# Patient Record
Sex: MALE | Race: BLACK OR AFRICAN AMERICAN | NOT HISPANIC OR LATINO | Employment: FULL TIME | URBAN - METROPOLITAN AREA
[De-identification: names, ages, dates, MRNs, and addresses within clinical notes are randomized per-mention and may not be internally consistent; named-entity substitution may affect disease eponyms.]

---

## 2017-01-16 RX ORDER — POTASSIUM CHLORIDE 1500 MG/1
TABLET, EXTENDED RELEASE ORAL
Qty: 60 TABLET | Refills: 0 | Status: SHIPPED | OUTPATIENT
Start: 2017-01-16 | End: 2017-02-21 | Stop reason: SDUPTHER

## 2017-02-21 RX ORDER — POTASSIUM CHLORIDE 1500 MG/1
TABLET, EXTENDED RELEASE ORAL
Qty: 60 TABLET | Refills: 0 | Status: SHIPPED | OUTPATIENT
Start: 2017-02-21 | End: 2017-04-04 | Stop reason: SDUPTHER

## 2017-04-05 RX ORDER — POTASSIUM CHLORIDE 1500 MG/1
TABLET, EXTENDED RELEASE ORAL
Qty: 60 TABLET | Refills: 0 | Status: SHIPPED | OUTPATIENT
Start: 2017-04-05 | End: 2017-05-15 | Stop reason: SDUPTHER

## 2017-05-15 RX ORDER — POTASSIUM CHLORIDE 1500 MG/1
TABLET, EXTENDED RELEASE ORAL
Qty: 60 TABLET | Refills: 3 | Status: SHIPPED | OUTPATIENT
Start: 2017-05-15 | End: 2017-12-07 | Stop reason: SDUPTHER

## 2017-06-08 ENCOUNTER — PATIENT OUTREACH (OUTPATIENT)
Dept: ADMINISTRATIVE | Facility: HOSPITAL | Age: 57
End: 2017-06-08
Payer: COMMERCIAL

## 2017-06-08 NOTE — LETTER
June 8, 2017    Agusto Juarez  [address] [place] [address]           [facility]  [address]  [facility] [address]  Phone: [phone] Dear Mr. Juarez:    [facility] is committed to your overall health.  To help you get the most out of each of your visits, we will review your information to make sure you are up to date on all of your recommended tests and/or procedures.      Joe Bentley MD has found that you may be due for   Health Maintenance Due   Topic    Pneumococcal PPSV23 (Medium Risk) (1)    Colonoscopy         If you have had any of the above done at another facility, please bring the records or information with you so that your record at [facility] will be complete.    If you are currently taking medication, please bring it with you to your appointment for review.    We will be happy to assist you with scheduling any necessary appointments or you may contact the [facility] appointment desk at [phone] to schedule at your convenience.     Thank you for choosing [facility] for your healthcare needs,        If you have any questions or concerns, please don't hesitate to call.    Sincerely,    Yun TITUS LPN  Care Coordination Department   [facility]

## 2017-06-22 ENCOUNTER — OFFICE VISIT (OUTPATIENT)
Dept: FAMILY MEDICINE | Facility: CLINIC | Age: 57
End: 2017-06-22
Payer: COMMERCIAL

## 2017-06-22 VITALS
HEIGHT: 70 IN | DIASTOLIC BLOOD PRESSURE: 76 MMHG | HEART RATE: 88 BPM | SYSTOLIC BLOOD PRESSURE: 120 MMHG | BODY MASS INDEX: 28.63 KG/M2 | WEIGHT: 200 LBS

## 2017-06-22 DIAGNOSIS — Z02.89 PHYSICAL EXAMINATION OF EMPLOYEE: Primary | ICD-10-CM

## 2017-06-22 PROCEDURE — 99999 PR PBB SHADOW E&M-EST. PATIENT-LVL II: CPT | Mod: PBBFAC,,, | Performed by: FAMILY MEDICINE

## 2017-06-22 PROCEDURE — 99214 OFFICE O/P EST MOD 30 MIN: CPT | Mod: S$GLB,,, | Performed by: FAMILY MEDICINE

## 2017-09-20 ENCOUNTER — OFFICE VISIT (OUTPATIENT)
Dept: FAMILY MEDICINE | Facility: CLINIC | Age: 57
End: 2017-09-20
Payer: COMMERCIAL

## 2017-09-20 VITALS
HEIGHT: 69 IN | DIASTOLIC BLOOD PRESSURE: 77 MMHG | HEART RATE: 86 BPM | WEIGHT: 196.88 LBS | TEMPERATURE: 98 F | SYSTOLIC BLOOD PRESSURE: 124 MMHG | BODY MASS INDEX: 29.16 KG/M2

## 2017-09-20 DIAGNOSIS — H10.9 CONJUNCTIVITIS OF RIGHT EYE, UNSPECIFIED CONJUNCTIVITIS TYPE: Primary | ICD-10-CM

## 2017-09-20 PROCEDURE — 99213 OFFICE O/P EST LOW 20 MIN: CPT | Mod: S$GLB,,, | Performed by: NURSE PRACTITIONER

## 2017-09-20 PROCEDURE — 3008F BODY MASS INDEX DOCD: CPT | Mod: S$GLB,,, | Performed by: NURSE PRACTITIONER

## 2017-09-20 PROCEDURE — 3078F DIAST BP <80 MM HG: CPT | Mod: S$GLB,,, | Performed by: NURSE PRACTITIONER

## 2017-09-20 PROCEDURE — 99999 PR PBB SHADOW E&M-EST. PATIENT-LVL III: CPT | Mod: PBBFAC,,, | Performed by: NURSE PRACTITIONER

## 2017-09-20 PROCEDURE — 3074F SYST BP LT 130 MM HG: CPT | Mod: S$GLB,,, | Performed by: NURSE PRACTITIONER

## 2017-09-20 RX ORDER — CIPROFLOXACIN HYDROCHLORIDE 3 MG/ML
1 SOLUTION/ DROPS OPHTHALMIC EVERY 4 HOURS
Qty: 5 ML | Refills: 0 | Status: SHIPPED | OUTPATIENT
Start: 2017-09-20 | End: 2017-09-27

## 2017-09-20 NOTE — PROGRESS NOTES
Subjective:       Patient ID: Agusto Juarez is a 57 y.o. male.    Chief Complaint: Eye Problem    Conjunctivitis   This is a new (Right eye) problem. The current episode started in the past 7 days. The problem occurs constantly. The problem has been unchanged. Pertinent negatives include no abdominal pain, anorexia, arthralgias, change in bowel habit, chest pain, chills, congestion, coughing, diaphoresis, fatigue, fever, headaches, joint swelling, myalgias, nausea, neck pain, numbness, rash, sore throat, swollen glands, urinary symptoms, vertigo, visual change, vomiting or weakness. Nothing aggravates the symptoms. He has tried nothing for the symptoms. The treatment provided no relief.     Past Medical History:   Diagnosis Date    BPH (benign prostatic hypertrophy)     Hyperlipidemia     Hypertension      Social History     Social History    Marital status:      Spouse name: N/A    Number of children: N/A    Years of education: N/A     Occupational History         Social History Main Topics    Smoking status: Current Every Day Smoker     Packs/day: 0.50     Years: 30.00    Smokeless tobacco: Not on file    Alcohol use No    Drug use: No    Sexual activity: Not on file     History reviewed. No pertinent surgical history.    Review of Systems   Constitutional: Negative.  Negative for chills, diaphoresis, fatigue and fever.   HENT: Negative.  Negative for congestion and sore throat.    Eyes: Positive for pain, discharge, redness and itching.        Matting upon awakening   Respiratory: Negative.  Negative for cough.    Cardiovascular: Negative.  Negative for chest pain.   Gastrointestinal: Negative.  Negative for abdominal pain, anorexia, change in bowel habit, nausea and vomiting.   Endocrine: Negative.    Genitourinary: Negative.    Musculoskeletal: Negative.  Negative for arthralgias, joint swelling, myalgias and neck pain.   Skin: Negative.  Negative for rash.   Allergic/Immunologic: Negative.     Neurological: Negative.  Negative for vertigo, weakness, numbness and headaches.   Psychiatric/Behavioral: Negative.        Objective:      Physical Exam   Constitutional: He is oriented to person, place, and time. He appears well-developed and well-nourished.   HENT:   Head: Normocephalic.   Right Ear: External ear normal.   Left Ear: External ear normal.   Nose: Nose normal.   Mouth/Throat: Oropharynx is clear and moist.   Eyes: Pupils are equal, round, and reactive to light. Right eye exhibits discharge. Right eye exhibits no chemosis, no exudate and no hordeolum. No foreign body present in the right eye. Left eye exhibits no chemosis, no discharge, no exudate and no hordeolum. No foreign body present in the left eye. Right conjunctiva is injected. Right conjunctiva has no hemorrhage. Left conjunctiva is not injected. Left conjunctiva has no hemorrhage. No scleral icterus.   Neck: Normal range of motion. Neck supple.   Cardiovascular: Normal rate, regular rhythm and normal heart sounds.    Pulmonary/Chest: Effort normal and breath sounds normal.   Abdominal: Soft. Bowel sounds are normal.   Musculoskeletal: Normal range of motion.   Neurological: He is alert and oriented to person, place, and time.   Skin: Skin is warm and dry. Capillary refill takes 2 to 3 seconds.   Psychiatric: He has a normal mood and affect. His behavior is normal. Judgment and thought content normal.   Nursing note and vitals reviewed.      Assessment:       1. Conjunctivitis of right eye, unspecified conjunctivitis type        Plan:           Agusto was seen today for eye problem.    Diagnoses and all orders for this visit:    Conjunctivitis of right eye, unspecified conjunctivitis type  -     ciprofloxacin HCl (CILOXAN) 0.3 % ophthalmic solution; Place 1 drop into the right eye every 4 (four) hours.  Handout r/t cipro, conjuntivitis given  Strict handwashing encouraged  Report to ER as needed

## 2017-12-06 RX ORDER — POTASSIUM CHLORIDE 1500 MG/1
TABLET, EXTENDED RELEASE ORAL
Qty: 60 TABLET | Refills: 3 | OUTPATIENT
Start: 2017-12-06

## 2017-12-07 RX ORDER — POTASSIUM CHLORIDE 20 MEQ/1
40 TABLET, EXTENDED RELEASE ORAL DAILY
Qty: 60 TABLET | Refills: 5 | Status: SHIPPED | OUTPATIENT
Start: 2017-12-07 | End: 2018-12-18 | Stop reason: SDUPTHER

## 2017-12-21 DIAGNOSIS — I10 ESSENTIAL HYPERTENSION: Primary | ICD-10-CM

## 2017-12-21 RX ORDER — PRAVASTATIN SODIUM 10 MG/1
TABLET ORAL
Qty: 30 TABLET | Refills: 11 | Status: SHIPPED | OUTPATIENT
Start: 2017-12-21 | End: 2018-12-30 | Stop reason: SDUPTHER

## 2017-12-21 RX ORDER — HYDROCHLOROTHIAZIDE 25 MG/1
TABLET ORAL
Qty: 30 TABLET | Refills: 11 | Status: SHIPPED | OUTPATIENT
Start: 2017-12-21 | End: 2018-12-30 | Stop reason: SDUPTHER

## 2017-12-22 ENCOUNTER — TELEPHONE (OUTPATIENT)
Dept: FAMILY MEDICINE | Facility: CLINIC | Age: 57
End: 2017-12-22

## 2017-12-26 ENCOUNTER — TELEPHONE (OUTPATIENT)
Dept: FAMILY MEDICINE | Facility: CLINIC | Age: 57
End: 2017-12-26

## 2017-12-26 DIAGNOSIS — E78.5 HYPERLIPIDEMIA, UNSPECIFIED HYPERLIPIDEMIA TYPE: Primary | ICD-10-CM

## 2017-12-26 NOTE — TELEPHONE ENCOUNTER
----- Message from Marquita Zamora sent at 12/26/2017  8:27 AM CST -----  Contact: wife  States the pt has to have his labs drawn at [facility], the pt wife request a call to [phone]///[id number]

## 2017-12-27 ENCOUNTER — TELEPHONE (OUTPATIENT)
Dept: FAMILY MEDICINE | Facility: CLINIC | Age: 57
End: 2017-12-27

## 2017-12-28 LAB
ALBUMIN SERPL-MCNC: 4.2 G/DL (ref 3.5–5.5)
ALBUMIN/GLOB SERPL: 1.6 {RATIO} (ref 1.2–2.2)
ALP SERPL-CCNC: 59 IU/L (ref 39–117)
ALT SERPL-CCNC: 15 IU/L (ref 0–44)
AST SERPL-CCNC: 16 IU/L (ref 0–40)
BILIRUB SERPL-MCNC: 0.8 MG/DL (ref 0–1.2)
BUN SERPL-MCNC: 19 MG/DL (ref 6–24)
BUN/CREAT SERPL: 19 (ref 9–20)
CALCIUM SERPL-MCNC: 9.3 MG/DL (ref 8.7–10.2)
CHLORIDE SERPL-SCNC: 104 MMOL/L (ref 96–106)
CHOLEST SERPL-MCNC: 146 MG/DL (ref 100–199)
CO2 SERPL-SCNC: 26 MMOL/L (ref 18–29)
CREAT SERPL-MCNC: 1.01 MG/DL (ref 0.76–1.27)
GLOBULIN SER CALC-MCNC: 2.7 G/DL (ref 1.5–4.5)
GLUCOSE SERPL-MCNC: 99 MG/DL (ref 65–99)
HDLC SERPL-MCNC: 48 MG/DL
LDLC SERPL CALC-MCNC: 84 MG/DL (ref 0–99)
POTASSIUM SERPL-SCNC: 4 MMOL/L (ref 3.5–5.2)
PROT SERPL-MCNC: 6.9 G/DL (ref 6–8.5)
SODIUM SERPL-SCNC: 144 MMOL/L (ref 134–144)
TRIGL SERPL-MCNC: 70 MG/DL (ref 0–149)
VLDLC SERPL CALC-MCNC: 14 MG/DL (ref 5–40)

## 2018-08-27 ENCOUNTER — PATIENT OUTREACH (OUTPATIENT)
Dept: ADMINISTRATIVE | Facility: HOSPITAL | Age: 58
End: 2018-08-27

## 2018-12-13 ENCOUNTER — PATIENT OUTREACH (OUTPATIENT)
Dept: ADMINISTRATIVE | Facility: HOSPITAL | Age: 58
End: 2018-12-13

## 2018-12-19 RX ORDER — POTASSIUM CHLORIDE 1500 MG/1
TABLET, EXTENDED RELEASE ORAL
Qty: 60 TABLET | Refills: 0 | Status: SHIPPED | OUTPATIENT
Start: 2018-12-19 | End: 2019-01-30 | Stop reason: SDUPTHER

## 2018-12-31 RX ORDER — HYDROCHLOROTHIAZIDE 25 MG/1
TABLET ORAL
Qty: 30 TABLET | Refills: 0 | Status: SHIPPED | OUTPATIENT
Start: 2018-12-31 | End: 2019-01-30 | Stop reason: SDUPTHER

## 2018-12-31 RX ORDER — PRAVASTATIN SODIUM 10 MG/1
TABLET ORAL
Qty: 30 TABLET | Refills: 0 | Status: SHIPPED | OUTPATIENT
Start: 2018-12-31 | End: 2019-01-30 | Stop reason: SDUPTHER

## 2019-01-30 ENCOUNTER — OFFICE VISIT (OUTPATIENT)
Dept: FAMILY MEDICINE | Facility: CLINIC | Age: 59
End: 2019-01-30
Payer: COMMERCIAL

## 2019-01-30 VITALS
HEART RATE: 83 BPM | BODY MASS INDEX: 30.76 KG/M2 | DIASTOLIC BLOOD PRESSURE: 76 MMHG | SYSTOLIC BLOOD PRESSURE: 120 MMHG | HEIGHT: 67 IN | WEIGHT: 196 LBS | TEMPERATURE: 98 F

## 2019-01-30 DIAGNOSIS — E78.5 HYPERLIPIDEMIA, UNSPECIFIED HYPERLIPIDEMIA TYPE: ICD-10-CM

## 2019-01-30 DIAGNOSIS — I10 ESSENTIAL HYPERTENSION: ICD-10-CM

## 2019-01-30 DIAGNOSIS — F17.200 SMOKING: ICD-10-CM

## 2019-01-30 DIAGNOSIS — Z00.00 ROUTINE HISTORY AND PHYSICAL EXAMINATION OF ADULT: Primary | ICD-10-CM

## 2019-01-30 PROCEDURE — 3074F SYST BP LT 130 MM HG: CPT | Mod: CPTII,S$GLB,, | Performed by: FAMILY MEDICINE

## 2019-01-30 PROCEDURE — 3074F PR MOST RECENT SYSTOLIC BLOOD PRESSURE < 130 MM HG: ICD-10-PCS | Mod: CPTII,S$GLB,, | Performed by: FAMILY MEDICINE

## 2019-01-30 PROCEDURE — 99396 PREV VISIT EST AGE 40-64: CPT | Mod: S$GLB,,, | Performed by: FAMILY MEDICINE

## 2019-01-30 PROCEDURE — 3078F PR MOST RECENT DIASTOLIC BLOOD PRESSURE < 80 MM HG: ICD-10-PCS | Mod: CPTII,S$GLB,, | Performed by: FAMILY MEDICINE

## 2019-01-30 PROCEDURE — 99396 PR PREVENTIVE VISIT,EST,40-64: ICD-10-PCS | Mod: S$GLB,,, | Performed by: FAMILY MEDICINE

## 2019-01-30 PROCEDURE — 99999 PR PBB SHADOW E&M-EST. PATIENT-LVL III: CPT | Mod: PBBFAC,,, | Performed by: FAMILY MEDICINE

## 2019-01-30 PROCEDURE — 3078F DIAST BP <80 MM HG: CPT | Mod: CPTII,S$GLB,, | Performed by: FAMILY MEDICINE

## 2019-01-30 PROCEDURE — 99999 PR PBB SHADOW E&M-EST. PATIENT-LVL III: ICD-10-PCS | Mod: PBBFAC,,, | Performed by: FAMILY MEDICINE

## 2019-01-30 RX ORDER — HYDROCHLOROTHIAZIDE 25 MG/1
25 TABLET ORAL DAILY
Qty: 90 TABLET | Refills: 3 | Status: SHIPPED | OUTPATIENT
Start: 2019-01-30 | End: 2020-01-20

## 2019-01-30 RX ORDER — PRAVASTATIN SODIUM 10 MG/1
10 TABLET ORAL DAILY
Qty: 90 TABLET | Refills: 3 | Status: SHIPPED | OUTPATIENT
Start: 2019-01-30 | End: 2020-01-20

## 2019-01-30 RX ORDER — POTASSIUM CHLORIDE 20 MEQ/1
20 TABLET, EXTENDED RELEASE ORAL DAILY
Qty: 90 TABLET | Refills: 3 | Status: SHIPPED | OUTPATIENT
Start: 2019-01-30 | End: 2020-02-26

## 2019-01-30 NOTE — PROGRESS NOTES
Physical exam.  Hypertension controlled.  Hyperlipidemia reviewed.  Smoking, interested in cessation.      Past Medical History:  Past Medical History:   Diagnosis Date    BPH (benign prostatic hypertrophy)     Hyperlipidemia     Hypertension      No past surgical history on file.  Social History     Socioeconomic History    Marital status:      Spouse name: Not on file    Number of children: Not on file    Years of education: Not on file    Highest education level: Not on file   Social Needs    Financial resource strain: Not on file    Food insecurity - worry: Not on file    Food insecurity - inability: Not on file    Transportation needs - medical: Not on file    Transportation needs - non-medical: Not on file   Occupational History    Not on file   Tobacco Use    Smoking status: Current Every Day Smoker     Packs/day: 0.50     Years: 30.00     Pack years: 15.00   Substance and Sexual Activity    Alcohol use: No     Alcohol/week: 0.0 oz    Drug use: No    Sexual activity: Not on file   Other Topics Concern    Not on file   Social History Narrative    Not on file     Family History   Problem Relation Age of Onset    Hypertension Unknown      Review of patient's allergies indicates:  No Known Allergies  Current Outpatient Medications on File Prior to Visit   Medication Sig Dispense Refill    [DISCONTINUED] hydroCHLOROthiazide (HYDRODIURIL) 25 MG tablet TAKE ONE TABLET BY MOUTH ONCE DAILY 30 tablet 0    [DISCONTINUED] KLOR-CON M20 20 mEq tablet TAKE TWO TABLETS BY MOUTH ONCE DAILY (Patient taking differently: TAKE one TABLETS BY MOUTH ONCE DAILY) 60 tablet 0    [DISCONTINUED] pravastatin (PRAVACHOL) 10 MG tablet TAKE ONE TABLET BY MOUTH ONCE DAILY 30 tablet 0     No current facility-administered medications on file prior to visit.            ROS:  GENERAL: No fever, chills,  or significant weight changes.  HEENT: No headache or hearing complaints.  No dysphagia  Eyes: No vision  "complaints  CHEST: Denies ALFARO, cyanosis, wheezing, cough and sputum production.  CARDIOVASCULAR: Denies chest pain, PND, orthopnea or reduced exercise tolerance.  ABDOMEN: Appetite fine. Denies diarrhea, abdominal pain, hematemesis or blood in stool.  URINARY: No flank pain, dysuria or hematuria.  MUSCULOSKELETAL: No warmth swelling or tenderness of the joints  NEUROLOGIC: No focal weakness numbness or paresthesia  PSYCHIATRIC: Denies depression        OBJECTIVE:     Vitals:    01/30/19 0929   BP: 120/76   Pulse: 83   Temp: 98.1 °F (36.7 °C)   Weight: 88.9 kg (196 lb)   Height: 5' 7" (1.702 m)     Wt Readings from Last 3 Encounters:   01/30/19 88.9 kg (196 lb)   09/20/17 89.3 kg (196 lb 13.9 oz)   06/22/17 90.7 kg (200 lb)     APPEARANCE: Well nourished, well developed, in no acute distress.    HEAD: Normocephalic.  Atraumatic.  No sinus tenderness.  EYES:   Right eye: Pupil reactive.  Conjunctiva clear.    Left eye: Pupil reactive.  Conjunctiva clear.    Both fundi:  Grossly normal to nondilated exam. EOMI.    EARS: TM's intact. Light reflex normal. No retraction or perforation.    NOSE:  clear.  MOUTH & THROAT:  No pharyngeal erythema or exudate. No lesions.  NECK: Supple. No bruits.  No JVD.  No cervical lymphadenopathy.  No thyromegaly.    CHEST: Breath sounds clear bilaterally.  Normal respiratory effort  CARDIOVASCULAR: Normal rate.  Regular rhythm.  No murmurs.  No rub.  No gallops.  ABDOMEN: Bowel sounds normal.  Soft.  No tenderness.  No organomegaly.  PERIPHERAL VASCULAR: No cyanosis.  No clubbing.  No edema.  NEUROLOGIC: No focal findings.  MENTAL STATUS: Alert.  Oriented x 3.          Agusto was seen today for annual exam and medication refill.    Diagnoses and all orders for this visit:    Routine history and physical examination of adult  -     Comprehensive metabolic panel; Future  -     Lipid panel; Future  -     PSA, Screening; Future  -     Fecal Immunochemical Test (iFOBT); " Future    Hyperlipidemia, unspecified hyperlipidemia type    Essential hypertension  -     Comprehensive metabolic panel; Future  -     Lipid panel; Future    Smoking  -     Ambulatory referral to Smoking Cessation Program    Other orders  -     hydroCHLOROthiazide (HYDRODIURIL) 25 MG tablet; Take 1 tablet (25 mg total) by mouth once daily.  -     pravastatin (PRAVACHOL) 10 MG tablet; Take 1 tablet (10 mg total) by mouth once daily.  -     potassium chloride SA (KLOR-CON M20) 20 MEQ tablet; Take 1 tablet (20 mEq total) by mouth once daily.     Anticipatory guidance: Don't smoke.  Healthy diet and regular exercise recommended.  Continue current medications as is

## 2019-01-31 ENCOUNTER — TELEPHONE (OUTPATIENT)
Dept: FAMILY MEDICINE | Facility: CLINIC | Age: 59
End: 2019-01-31

## 2019-01-31 DIAGNOSIS — Z00.00 ROUTINE HISTORY AND PHYSICAL EXAMINATION OF ADULT: Primary | ICD-10-CM

## 2019-01-31 DIAGNOSIS — I10 ESSENTIAL HYPERTENSION: ICD-10-CM

## 2019-01-31 NOTE — TELEPHONE ENCOUNTER
----- Message from Lynda Teran sent at 1/31/2019  8:09 AM CST -----  Contact: Pt  Please give pt a call at ..[phone] (voru) regarding labs being done through [facility]

## 2019-02-05 ENCOUNTER — TELEPHONE (OUTPATIENT)
Dept: FAMILY MEDICINE | Facility: CLINIC | Age: 59
End: 2019-02-05

## 2019-02-05 NOTE — TELEPHONE ENCOUNTER
----- Message from Curt Barbour sent at 2/5/2019  8:16 AM CST -----  Contact: Pt   States he's calling to let the nurse know that he's going to [facility] in [place] and needs to have the orders sent there and can be reached at [phone]//thanks/dbw

## 2019-02-05 NOTE — TELEPHONE ENCOUNTER
----- Message from Amanda Marcelo sent at 2/5/2019  9:12 AM CST -----  Contact: [phone]   Patient Waiting at the lab   Pt would like to consult with the nurse about labs, pt waiting on labs to be sent to [facility].  Please call back at [phone].  Thx-AH

## 2019-02-07 LAB
ALBUMIN SERPL-MCNC: 4.2 G/DL (ref 3.5–5.5)
ALBUMIN/GLOB SERPL: 1.4 {RATIO} (ref 1.2–2.2)
ALP SERPL-CCNC: 59 IU/L (ref 39–117)
ALT SERPL-CCNC: 14 IU/L (ref 0–44)
AST SERPL-CCNC: 16 IU/L (ref 0–40)
BILIRUB SERPL-MCNC: 0.6 MG/DL (ref 0–1.2)
BUN SERPL-MCNC: 13 MG/DL (ref 6–24)
BUN/CREAT SERPL: 13 (ref 9–20)
CALCIUM SERPL-MCNC: 9.4 MG/DL (ref 8.7–10.2)
CHLORIDE SERPL-SCNC: 102 MMOL/L (ref 96–106)
CHOLEST SERPL-MCNC: 138 MG/DL (ref 100–199)
CO2 SERPL-SCNC: 25 MMOL/L (ref 20–29)
CREAT SERPL-MCNC: 0.97 MG/DL (ref 0.76–1.27)
GLOBULIN SER CALC-MCNC: 2.9 G/DL (ref 1.5–4.5)
GLUCOSE SERPL-MCNC: 101 MG/DL (ref 65–99)
HDLC SERPL-MCNC: 42 MG/DL
LDLC SERPL CALC-MCNC: 79 MG/DL (ref 0–99)
POTASSIUM SERPL-SCNC: 4.2 MMOL/L (ref 3.5–5.2)
PROT SERPL-MCNC: 7.1 G/DL (ref 6–8.5)
PSA SERPL-MCNC: 0.6 NG/ML (ref 0–4)
SODIUM SERPL-SCNC: 141 MMOL/L (ref 134–144)
TRIGL SERPL-MCNC: 86 MG/DL (ref 0–149)
VLDLC SERPL CALC-MCNC: 17 MG/DL (ref 5–40)

## 2019-02-18 ENCOUNTER — CLINICAL SUPPORT (OUTPATIENT)
Dept: SMOKING CESSATION | Facility: CLINIC | Age: 59
End: 2019-02-18
Payer: COMMERCIAL

## 2019-02-18 DIAGNOSIS — F17.210 MODERATE CIGARETTE SMOKER (10-19 PER DAY): Primary | ICD-10-CM

## 2019-02-18 PROCEDURE — 99404 PREV MED CNSL INDIV APPRX 60: CPT | Mod: S$GLB,,,

## 2019-02-18 PROCEDURE — 99999 PR PBB SHADOW E&M-EST. PATIENT-LVL I: CPT | Mod: PBBFAC,,,

## 2019-02-18 PROCEDURE — 99999 PR PBB SHADOW E&M-EST. PATIENT-LVL I: ICD-10-PCS | Mod: PBBFAC,,,

## 2019-02-18 PROCEDURE — 99404 PR PREVENT COUNSEL,INDIV,60 MIN: ICD-10-PCS | Mod: S$GLB,,,

## 2019-02-18 RX ORDER — IBUPROFEN 200 MG
1 TABLET ORAL DAILY
Qty: 28 PATCH | Refills: 0 | Status: SHIPPED | OUTPATIENT
Start: 2019-02-18 | End: 2019-05-01

## 2019-02-18 RX ORDER — VARENICLINE TARTRATE 0.5 (11)-1
KIT ORAL
Qty: 1 PACKAGE | Refills: 0 | Status: SHIPPED | OUTPATIENT
Start: 2019-02-18 | End: 2020-06-11

## 2019-02-18 NOTE — Clinical Note
Patient seen for the tobacco cessation program. This will be his first attempt to stop smoking through our program. He is a cigarette smoker of 1 PPD X 41 years. He quit once in the '80's for 3 months and started back because of weight gain.  He is motivated to quit the use of tobacco and has agreed to attend our 6 week tobacco cessation program.

## 2019-03-19 ENCOUNTER — TELEPHONE (OUTPATIENT)
Dept: SMOKING CESSATION | Facility: CLINIC | Age: 59
End: 2019-03-19

## 2019-03-26 ENCOUNTER — TELEPHONE (OUTPATIENT)
Dept: SMOKING CESSATION | Facility: CLINIC | Age: 59
End: 2019-03-26

## 2019-03-26 NOTE — TELEPHONE ENCOUNTER
Attempted to contact patient to follow up with smoking cessation. He has been a no show for several appointments. I was able to leave a detailed message requesting a return call. He has my contact information, Ness Dunn, [phone].

## 2019-04-01 ENCOUNTER — TELEPHONE (OUTPATIENT)
Dept: SMOKING CESSATION | Facility: CLINIC | Age: 59
End: 2019-04-01

## 2019-04-01 NOTE — TELEPHONE ENCOUNTER
Attempted to contact patient to follow up with smoking cessation. I was able to leave a detailed message requesting a return call. He has my contact information, Ness Dunn, [phone].

## 2019-04-10 ENCOUNTER — TELEPHONE (OUTPATIENT)
Dept: SMOKING CESSATION | Facility: CLINIC | Age: 59
End: 2019-04-10

## 2019-04-10 NOTE — TELEPHONE ENCOUNTER
Attempted to contact patient to follow up with smoking  cessation. I was able to leave a detailed message with the following information: Diane Miller, [facility] Tobacco Cessation program and my phone number [phone]. I requested a return call.

## 2019-05-01 ENCOUNTER — HOSPITAL ENCOUNTER (OUTPATIENT)
Dept: RADIOLOGY | Facility: HOSPITAL | Age: 59
Discharge: HOME | End: 2019-05-01
Attending: FAMILY MEDICINE
Payer: COMMERCIAL

## 2019-05-01 ENCOUNTER — OFFICE VISIT (OUTPATIENT)
Dept: FAMILY MEDICINE | Facility: CLINIC | Age: 59
End: 2019-05-01
Payer: COMMERCIAL

## 2019-05-01 VITALS
TEMPERATURE: 98 F | WEIGHT: 193 LBS | SYSTOLIC BLOOD PRESSURE: 120 MMHG | DIASTOLIC BLOOD PRESSURE: 82 MMHG | BODY MASS INDEX: 28.58 KG/M2 | HEART RATE: 88 BPM | HEIGHT: 69 IN

## 2019-05-01 DIAGNOSIS — F17.200 SMOKING: ICD-10-CM

## 2019-05-01 DIAGNOSIS — R05.9 COUGH: ICD-10-CM

## 2019-05-01 DIAGNOSIS — M25.511 ACUTE PAIN OF RIGHT SHOULDER: ICD-10-CM

## 2019-05-01 DIAGNOSIS — M25.511 ACUTE PAIN OF RIGHT SHOULDER: Primary | ICD-10-CM

## 2019-05-01 PROCEDURE — 3008F PR BODY MASS INDEX (BMI) DOCUMENTED: ICD-10-PCS | Mod: CPTII,S$GLB,, | Performed by: FAMILY MEDICINE

## 2019-05-01 PROCEDURE — 3074F PR MOST RECENT SYSTOLIC BLOOD PRESSURE < 130 MM HG: ICD-10-PCS | Mod: CPTII,S$GLB,, | Performed by: FAMILY MEDICINE

## 2019-05-01 PROCEDURE — 71046 X-RAY EXAM CHEST 2 VIEWS: CPT | Mod: TC,PO

## 2019-05-01 PROCEDURE — 71046 XR CHEST PA AND LATERAL: ICD-10-PCS | Mod: 26,,, | Performed by: RADIOLOGY

## 2019-05-01 PROCEDURE — 99999 PR PBB SHADOW E&M-EST. PATIENT-LVL III: CPT | Mod: PBBFAC,,, | Performed by: FAMILY MEDICINE

## 2019-05-01 PROCEDURE — 71046 X-RAY EXAM CHEST 2 VIEWS: CPT | Mod: 26,,, | Performed by: RADIOLOGY

## 2019-05-01 PROCEDURE — 99213 OFFICE O/P EST LOW 20 MIN: CPT | Mod: S$GLB,,, | Performed by: FAMILY MEDICINE

## 2019-05-01 PROCEDURE — 99213 PR OFFICE/OUTPT VISIT, EST, LEVL III, 20-29 MIN: ICD-10-PCS | Mod: S$GLB,,, | Performed by: FAMILY MEDICINE

## 2019-05-01 PROCEDURE — 3079F DIAST BP 80-89 MM HG: CPT | Mod: CPTII,S$GLB,, | Performed by: FAMILY MEDICINE

## 2019-05-01 PROCEDURE — 3079F PR MOST RECENT DIASTOLIC BLOOD PRESSURE 80-89 MM HG: ICD-10-PCS | Mod: CPTII,S$GLB,, | Performed by: FAMILY MEDICINE

## 2019-05-01 PROCEDURE — 3008F BODY MASS INDEX DOCD: CPT | Mod: CPTII,S$GLB,, | Performed by: FAMILY MEDICINE

## 2019-05-01 PROCEDURE — 3074F SYST BP LT 130 MM HG: CPT | Mod: CPTII,S$GLB,, | Performed by: FAMILY MEDICINE

## 2019-05-01 PROCEDURE — 99999 PR PBB SHADOW E&M-EST. PATIENT-LVL III: ICD-10-PCS | Mod: PBBFAC,,, | Performed by: FAMILY MEDICINE

## 2019-05-01 RX ORDER — IBUPROFEN 800 MG/1
800 TABLET ORAL EVERY 8 HOURS PRN
Qty: 30 TABLET | Refills: 0 | Status: SHIPPED | OUTPATIENT
Start: 2019-05-01 | End: 2019-05-11

## 2019-05-01 RX ORDER — DOXYCYCLINE 100 MG/1
100 CAPSULE ORAL 2 TIMES DAILY
Qty: 14 CAPSULE | Refills: 0 | Status: SHIPPED | OUTPATIENT
Start: 2019-05-01 | End: 2019-05-08

## 2019-05-01 RX ORDER — BENZONATATE 200 MG/1
200 CAPSULE ORAL 3 TIMES DAILY PRN
Qty: 30 CAPSULE | Refills: 0 | Status: SHIPPED | OUTPATIENT
Start: 2019-05-01 | End: 2019-05-11

## 2019-05-01 NOTE — PROGRESS NOTES
Patient complains of mild discomfort at the posterior shoulder.  Occasionally goes towards the anterior shoulder.  Notices mainly associated with mild intermittent cough.  Cough is productive yellowish/greenish.  No fever chills.  No upper respiratory symptoms otherwise.  Symptoms for about 2-3 weeks.  Does smoke, trying to quit.  Denies any exertional chest pain or shortness of breath.  No injury.      Past Medical History:  Past Medical History:   Diagnosis Date    BPH (benign prostatic hypertrophy)     Hyperlipidemia     Hypertension      No past surgical history on file.  Social History     Socioeconomic History    Marital status:      Spouse name: Not on file    Number of children: Not on file    Years of education: Not on file    Highest education level: Not on file   Occupational History    Not on file   Social Needs    Financial resource strain: Not on file    Food insecurity:     Worry: Not on file     Inability: Not on file    Transportation needs:     Medical: Not on file     Non-medical: Not on file   Tobacco Use    Smoking status: Current Every Day Smoker     Packs/day: 0.50     Years: 41.00     Pack years: 20.50     Types: Cigarettes     Start date: 5/1/1978    Smokeless tobacco: Never Used    Tobacco comment: now 1 pack/week   Substance and Sexual Activity    Alcohol use: No     Alcohol/week: 0.0 oz    Drug use: No    Sexual activity: Not on file   Lifestyle    Physical activity:     Days per week: Not on file     Minutes per session: Not on file    Stress: Not on file   Relationships    Social connections:     Talks on phone: Not on file     Gets together: Not on file     Attends Jain service: Not on file     Active member of club or organization: Not on file     Attends meetings of clubs or organizations: Not on file     Relationship status: Not on file   Other Topics Concern    Not on file   Social History Narrative    Not on file     Family History   Problem  "Relation Age of Onset    Hypertension Unknown      Review of patient's allergies indicates:  No Known Allergies  Current Outpatient Medications on File Prior to Visit   Medication Sig Dispense Refill    hydroCHLOROthiazide (HYDRODIURIL) 25 MG tablet Take 1 tablet (25 mg total) by mouth once daily. 90 tablet 3    potassium chloride SA (KLOR-CON M20) 20 MEQ tablet Take 1 tablet (20 mEq total) by mouth once daily. 90 tablet 3    pravastatin (PRAVACHOL) 10 MG tablet Take 1 tablet (10 mg total) by mouth once daily. 90 tablet 3    varenicline (CHANTIX STARTING MONTH BOX) 0.5 mg (11)- 1 mg (42) tablet Take one 0.5mg tab by mouth once daily X3 days,then increase to one 0.5mg tab twice daily X4 days,then increase to one 1mg tab twice daily 1 Package 0    [DISCONTINUED] nicotine (NICODERM CQ) 21 mg/24 hr Place 1 patch onto the skin once daily. 28 patch 0     No current facility-administered medications on file prior to visit.            ROS:  GENERAL: No fever, chills,  or significant weight changes.   CARDIOVASCULAR: Denies exertional chest pain, PND, orthopnea or reduced exercise tolerance.  ABDOMEN: Appetite fine. Denies diarrhea, abdominal pain, hematemesis or blood in stool.  URINARY: No flank pain, dysuria or hematuria.      OBJECTIVE:     Vitals:    05/01/19 0908   BP: 120/82   Pulse: 88   Temp: 98.4 °F (36.9 °C)   Weight: 87.5 kg (193 lb)   Height: 5' 9" (1.753 m)     Wt Readings from Last 3 Encounters:   05/01/19 87.5 kg (193 lb)   01/30/19 88.9 kg (196 lb)   09/20/17 89.3 kg (196 lb 13.9 oz)     APPEARANCE: Well nourished, well developed, in no acute distress.    HEAD: Normocephalic.  Atraumatic.  No sinus tenderness.  EYES:   Right eye: Pupil reactive.  Conjunctiva clear.    Left eye: Pupil reactive.  Conjunctiva clear.     EOMI.    EARS: TM's intact. Light reflex normal. No retraction or perforation.    NOSE:  clear.  MOUTH & THROAT:  No pharyngeal erythema or exudate. No lesions.  NECK: Supple.  Nontender. " No bruits.  No JVD.  No cervical lymphadenopathy.  No thyromegaly.    CHEST: Breath sounds clear bilaterally.  Normal respiratory effort  CARDIOVASCULAR: Normal rate.  Regular rhythm.  No murmurs.  No rub.  No gallops.  NEUROLOGIC: No focal findings.  MENTAL STATUS: Alert.  Oriented x 3.  Right shoulder full range of motion.  No impingement.  No tenderness.        Agusto was seen today for cough and back pain.    Diagnoses and all orders for this visit:    Acute pain of right shoulder  -     X-Ray Chest PA And Lateral; Future    Cough  -     X-Ray Chest PA And Lateral; Future    Smoking  -     X-Ray Chest PA And Lateral; Future    Other orders  -     ibuprofen (ADVIL,MOTRIN) 800 MG tablet; Take 1 tablet (800 mg total) by mouth every 8 (eight) hours as needed for Pain.  -     doxycycline (MONODOX) 100 MG capsule; Take 1 capsule (100 mg total) by mouth 2 (two) times daily. for 7 days  -     benzonatate (TESSALON) 200 MG capsule; Take 1 capsule (200 mg total) by mouth 3 (three) times daily as needed for Cough.     Will treat for possible mild bronchitis with antibiotics due to the length of symptoms.  X-ray as above.  Follow-up if symptoms not improving next couple weeks  Continue attempts with smoking cessation

## 2019-08-14 ENCOUNTER — TELEPHONE (OUTPATIENT)
Dept: SMOKING CESSATION | Facility: CLINIC | Age: 59
End: 2019-08-14

## 2019-09-18 ENCOUNTER — TELEPHONE (OUTPATIENT)
Dept: SMOKING CESSATION | Facility: CLINIC | Age: 59
End: 2019-09-18

## 2019-09-18 NOTE — TELEPHONE ENCOUNTER
2nd attempt unable to leave a message regarding smoking cessation quit 1 episode. Call can not be completed.

## 2020-01-20 RX ORDER — HYDROCHLOROTHIAZIDE 25 MG/1
TABLET ORAL
Qty: 90 TABLET | Refills: 0 | Status: SHIPPED | OUTPATIENT
Start: 2020-01-20 | End: 2020-04-20

## 2020-01-20 RX ORDER — PRAVASTATIN SODIUM 10 MG/1
TABLET ORAL
Qty: 90 TABLET | Refills: 0 | Status: SHIPPED | OUTPATIENT
Start: 2020-01-20 | End: 2020-04-20

## 2020-02-26 RX ORDER — POTASSIUM CHLORIDE 20 MEQ/1
TABLET, EXTENDED RELEASE ORAL
Qty: 90 TABLET | Refills: 0 | Status: SHIPPED | OUTPATIENT
Start: 2020-02-26 | End: 2020-05-26

## 2020-03-09 ENCOUNTER — TELEPHONE (OUTPATIENT)
Dept: SMOKING CESSATION | Facility: CLINIC | Age: 60
End: 2020-03-09

## 2020-04-20 RX ORDER — PRAVASTATIN SODIUM 10 MG/1
TABLET ORAL
Qty: 90 TABLET | Refills: 1 | Status: SHIPPED | OUTPATIENT
Start: 2020-04-20 | End: 2020-10-18 | Stop reason: SDUPTHER

## 2020-04-20 RX ORDER — HYDROCHLOROTHIAZIDE 25 MG/1
TABLET ORAL
Qty: 90 TABLET | Refills: 1 | Status: SHIPPED | OUTPATIENT
Start: 2020-04-20 | End: 2020-10-27 | Stop reason: SDUPTHER

## 2020-05-26 RX ORDER — POTASSIUM CHLORIDE 20 MEQ/1
TABLET, EXTENDED RELEASE ORAL
Qty: 30 TABLET | Refills: 0 | Status: SHIPPED | OUTPATIENT
Start: 2020-05-26 | End: 2020-06-11 | Stop reason: SDUPTHER

## 2020-06-10 ENCOUNTER — PATIENT OUTREACH (OUTPATIENT)
Dept: ADMINISTRATIVE | Facility: HOSPITAL | Age: 60
End: 2020-06-10

## 2020-06-10 NOTE — PROGRESS NOTES
Health Maintenance Updated.   Immunizations: Abstracted.   Care Everywhere: Abstracted: No results found.     Health Maintenance Due   Topic    Colonoscopy     Pneumococcal Vaccine (Medium Risk) (1 of 1 - PPSV23)    Lipid Panel

## 2020-06-11 ENCOUNTER — LAB VISIT (OUTPATIENT)
Dept: LAB | Facility: HOSPITAL | Age: 60
End: 2020-06-11
Attending: FAMILY MEDICINE
Payer: COMMERCIAL

## 2020-06-11 ENCOUNTER — OFFICE VISIT (OUTPATIENT)
Dept: FAMILY MEDICINE | Facility: CLINIC | Age: 60
End: 2020-06-11
Payer: COMMERCIAL

## 2020-06-11 VITALS
HEIGHT: 69 IN | SYSTOLIC BLOOD PRESSURE: 136 MMHG | TEMPERATURE: 98 F | WEIGHT: 189 LBS | HEART RATE: 86 BPM | DIASTOLIC BLOOD PRESSURE: 88 MMHG | BODY MASS INDEX: 27.99 KG/M2

## 2020-06-11 DIAGNOSIS — Z00.00 ROUTINE HISTORY AND PHYSICAL EXAMINATION OF ADULT: ICD-10-CM

## 2020-06-11 DIAGNOSIS — Z12.11 SCREENING FOR COLON CANCER: ICD-10-CM

## 2020-06-11 DIAGNOSIS — Z12.5 SCREENING FOR PROSTATE CANCER: ICD-10-CM

## 2020-06-11 DIAGNOSIS — I10 ESSENTIAL HYPERTENSION: ICD-10-CM

## 2020-06-11 DIAGNOSIS — E78.5 HYPERLIPIDEMIA, UNSPECIFIED HYPERLIPIDEMIA TYPE: ICD-10-CM

## 2020-06-11 DIAGNOSIS — F17.200 SMOKING: ICD-10-CM

## 2020-06-11 DIAGNOSIS — Z00.00 ROUTINE HISTORY AND PHYSICAL EXAMINATION OF ADULT: Primary | ICD-10-CM

## 2020-06-11 LAB
ALBUMIN SERPL BCP-MCNC: 3.9 G/DL (ref 3.5–5.2)
ALP SERPL-CCNC: 58 U/L (ref 55–135)
ALT SERPL W/O P-5'-P-CCNC: 19 U/L (ref 10–44)
ANION GAP SERPL CALC-SCNC: 8 MMOL/L (ref 8–16)
AST SERPL-CCNC: 19 U/L (ref 10–40)
BASOPHILS # BLD AUTO: 0.04 K/UL (ref 0–0.2)
BASOPHILS NFR BLD: 0.6 % (ref 0–1.9)
BILIRUB SERPL-MCNC: 0.4 MG/DL (ref 0.1–1)
BUN SERPL-MCNC: 14 MG/DL (ref 6–20)
CALCIUM SERPL-MCNC: 9.3 MG/DL (ref 8.7–10.5)
CHLORIDE SERPL-SCNC: 105 MMOL/L (ref 95–110)
CHOLEST SERPL-MCNC: 140 MG/DL (ref 120–199)
CHOLEST/HDLC SERPL: 3.1 {RATIO} (ref 2–5)
CO2 SERPL-SCNC: 30 MMOL/L (ref 23–29)
COMPLEXED PSA SERPL-MCNC: 0.64 NG/ML (ref 0–4)
CREAT SERPL-MCNC: 1.1 MG/DL (ref 0.5–1.4)
DIFFERENTIAL METHOD: ABNORMAL
EOSINOPHIL # BLD AUTO: 0.2 K/UL (ref 0–0.5)
EOSINOPHIL NFR BLD: 3.4 % (ref 0–8)
ERYTHROCYTE [DISTWIDTH] IN BLOOD BY AUTOMATED COUNT: 15.4 % (ref 11.5–14.5)
EST. GFR  (AFRICAN AMERICAN): >60 ML/MIN/1.73 M^2
EST. GFR  (NON AFRICAN AMERICAN): >60 ML/MIN/1.73 M^2
GLUCOSE SERPL-MCNC: 87 MG/DL (ref 70–110)
HCT VFR BLD AUTO: 46.6 % (ref 40–54)
HDLC SERPL-MCNC: 45 MG/DL (ref 40–75)
HDLC SERPL: 32.1 % (ref 20–50)
HGB BLD-MCNC: 15 G/DL (ref 14–18)
IMM GRANULOCYTES # BLD AUTO: 0.01 K/UL (ref 0–0.04)
IMM GRANULOCYTES NFR BLD AUTO: 0.2 % (ref 0–0.5)
LDLC SERPL CALC-MCNC: 79 MG/DL (ref 63–159)
LYMPHOCYTES # BLD AUTO: 2.2 K/UL (ref 1–4.8)
LYMPHOCYTES NFR BLD: 33.9 % (ref 18–48)
MCH RBC QN AUTO: 30.2 PG (ref 27–31)
MCHC RBC AUTO-ENTMCNC: 32.2 G/DL (ref 32–36)
MCV RBC AUTO: 94 FL (ref 82–98)
MONOCYTES # BLD AUTO: 0.8 K/UL (ref 0.3–1)
MONOCYTES NFR BLD: 12.6 % (ref 4–15)
NEUTROPHILS # BLD AUTO: 3.2 K/UL (ref 1.8–7.7)
NEUTROPHILS NFR BLD: 49.3 % (ref 38–73)
NONHDLC SERPL-MCNC: 95 MG/DL
NRBC BLD-RTO: 0 /100 WBC
PLATELET # BLD AUTO: 241 K/UL (ref 150–350)
PMV BLD AUTO: 10.6 FL (ref 9.2–12.9)
POTASSIUM SERPL-SCNC: 4.1 MMOL/L (ref 3.5–5.1)
PROT SERPL-MCNC: 7.5 G/DL (ref 6–8.4)
RBC # BLD AUTO: 4.96 M/UL (ref 4.6–6.2)
SODIUM SERPL-SCNC: 143 MMOL/L (ref 136–145)
TRIGL SERPL-MCNC: 80 MG/DL (ref 30–150)
WBC # BLD AUTO: 6.52 K/UL (ref 3.9–12.7)

## 2020-06-11 PROCEDURE — 3075F PR MOST RECENT SYSTOLIC BLOOD PRESS GE 130-139MM HG: ICD-10-PCS | Mod: CPTII,S$GLB,, | Performed by: FAMILY MEDICINE

## 2020-06-11 PROCEDURE — 80061 LIPID PANEL: CPT

## 2020-06-11 PROCEDURE — 99999 PR PBB SHADOW E&M-EST. PATIENT-LVL III: ICD-10-PCS | Mod: PBBFAC,,, | Performed by: FAMILY MEDICINE

## 2020-06-11 PROCEDURE — 84153 ASSAY OF PSA TOTAL: CPT

## 2020-06-11 PROCEDURE — 99999 PR PBB SHADOW E&M-EST. PATIENT-LVL III: CPT | Mod: PBBFAC,,, | Performed by: FAMILY MEDICINE

## 2020-06-11 PROCEDURE — 99396 PREV VISIT EST AGE 40-64: CPT | Mod: S$GLB,,, | Performed by: FAMILY MEDICINE

## 2020-06-11 PROCEDURE — 3079F DIAST BP 80-89 MM HG: CPT | Mod: CPTII,S$GLB,, | Performed by: FAMILY MEDICINE

## 2020-06-11 PROCEDURE — 3075F SYST BP GE 130 - 139MM HG: CPT | Mod: CPTII,S$GLB,, | Performed by: FAMILY MEDICINE

## 2020-06-11 PROCEDURE — 3079F PR MOST RECENT DIASTOLIC BLOOD PRESSURE 80-89 MM HG: ICD-10-PCS | Mod: CPTII,S$GLB,, | Performed by: FAMILY MEDICINE

## 2020-06-11 PROCEDURE — 99396 PR PREVENTIVE VISIT,EST,40-64: ICD-10-PCS | Mod: S$GLB,,, | Performed by: FAMILY MEDICINE

## 2020-06-11 PROCEDURE — 85025 COMPLETE CBC W/AUTO DIFF WBC: CPT

## 2020-06-11 PROCEDURE — 36415 COLL VENOUS BLD VENIPUNCTURE: CPT | Mod: PO

## 2020-06-11 PROCEDURE — 80053 COMPREHEN METABOLIC PANEL: CPT

## 2020-06-11 RX ORDER — BUPROPION HYDROCHLORIDE 300 MG/1
300 TABLET ORAL DAILY
Qty: 30 TABLET | Refills: 2 | Status: SHIPPED | OUTPATIENT
Start: 2020-06-11 | End: 2020-07-11

## 2020-06-11 RX ORDER — BUPROPION HYDROCHLORIDE 150 MG/1
150 TABLET ORAL DAILY
Qty: 7 TABLET | Refills: 0 | Status: SHIPPED | OUTPATIENT
Start: 2020-06-11 | End: 2021-06-03

## 2020-06-11 RX ORDER — FLUCONAZOLE 150 MG/1
300 TABLET ORAL
Qty: 4 TABLET | Refills: 0 | Status: SHIPPED | OUTPATIENT
Start: 2020-06-11 | End: 2020-06-19

## 2020-06-11 RX ORDER — POTASSIUM CHLORIDE 20 MEQ/1
20 TABLET, EXTENDED RELEASE ORAL DAILY
Qty: 90 TABLET | Refills: 3 | Status: SHIPPED | OUTPATIENT
Start: 2020-06-11 | End: 2021-06-25 | Stop reason: SDUPTHER

## 2020-06-11 NOTE — PROGRESS NOTES
Patient presents physical exam.  Continues to smoke, but states has cut back.  Had side effects from Chantix and discontinued.  Hypertension controlled.  Hyperlipidemia compliant medication.  He does have tinea versicolor which he treats with Selsun Blue twice weekly, but still has some rash.    Agusto was seen today for annual exam and medication refill.    Diagnoses and all orders for this visit:    Routine history and physical examination of adult  -     Lipid Panel; Future  -     Comprehensive metabolic panel; Future  -     CBC auto differential; Future  -     PSA, Screening; Future  -     Fecal Immunochemical Test (iFOBT); Future    Essential hypertension  -     Comprehensive metabolic panel; Future  -     CBC auto differential; Future    Hyperlipidemia, unspecified hyperlipidemia type  -     Lipid Panel; Future    Smoking    Screening for prostate cancer  -     PSA, Screening; Future    Screening for colon cancer  -     Fecal Immunochemical Test (iFOBT); Future    Other orders  -     buPROPion (WELLBUTRIN XL) 150 MG TB24 tablet; Take 1 tablet (150 mg total) by mouth once daily. Then start 300 mg pills.  -     buPROPion (WELLBUTRIN XL) 300 MG 24 hr tablet; Take 1 tablet (300 mg total) by mouth once daily.  -     potassium chloride SA (K-DUR,KLOR-CON) 20 MEQ tablet; Take 1 tablet (20 mEq total) by mouth once daily.  -     fluconazole (DIFLUCAN) 150 MG Tab; Take 2 tablets (300 mg total) by mouth every 7 days. for 2 doses     Declines immunizations.  Assistance with smoking cessation was offered, including:  [x]  Medications  [x]  Counseling  [x]  Printed Information on Smoking Cessation  [x]  Referral to a Smoking Cessation Program-declined    Patient was counseled regarding smoking for 3-10 minutes.        Anticipatory guidance: Don't smoke.  Healthy diet and regular exercise recommended.          Past Medical History:  Past Medical History:   Diagnosis Date    BPH (benign prostatic hypertrophy)      Hyperlipidemia     Hypertension      No past surgical history on file.  Review of patient's allergies indicates:  No Known Allergies  Current Outpatient Medications on File Prior to Visit   Medication Sig Dispense Refill    hydroCHLOROthiazide (HYDRODIURIL) 25 MG tablet Take 1 tablet by mouth once daily 90 tablet 1    pravastatin (PRAVACHOL) 10 MG tablet Take 1 tablet by mouth once daily 90 tablet 1    [DISCONTINUED] potassium chloride SA (K-DUR,KLOR-CON) 20 MEQ tablet Take 1 tablet by mouth once daily 30 tablet 0    [DISCONTINUED] varenicline (CHANTIX STARTING MONTH BOX) 0.5 mg (11)- 1 mg (42) tablet Take one 0.5mg tab by mouth once daily X3 days,then increase to one 0.5mg tab twice daily X4 days,then increase to one 1mg tab twice daily (Patient not taking: Reported on 6/11/2020) 1 Package 0     No current facility-administered medications on file prior to visit.      Social History     Socioeconomic History    Marital status:      Spouse name: Not on file    Number of children: Not on file    Years of education: Not on file    Highest education level: Not on file   Occupational History    Not on file   Social Needs    Financial resource strain: Not on file    Food insecurity:     Worry: Not on file     Inability: Not on file    Transportation needs:     Medical: Not on file     Non-medical: Not on file   Tobacco Use    Smoking status: Current Every Day Smoker     Packs/day: 0.50     Years: 41.00     Pack years: 20.50     Types: Cigarettes     Start date: 5/1/1978    Smokeless tobacco: Never Used    Tobacco comment: now 1 pack/week   Substance and Sexual Activity    Alcohol use: No     Alcohol/week: 0.0 standard drinks    Drug use: No    Sexual activity: Not on file   Lifestyle    Physical activity:     Days per week: Not on file     Minutes per session: Not on file    Stress: Not on file   Relationships    Social connections:     Talks on phone: Not on file     Gets together: Not on  "file     Attends Religion service: Not on file     Active member of club or organization: Not on file     Attends meetings of clubs or organizations: Not on file     Relationship status: Not on file   Other Topics Concern    Not on file   Social History Narrative    Not on file     Family History   Problem Relation Age of Onset    Hypertension Unknown              ROS:  GENERAL: No fever, chills,  or significant weight changes.  HEENT: No headache or hearing complaints.  No dysphagia  Eyes: No vision complaints  CHEST: Denies ALFARO, cyanosis, wheezing, cough and sputum production.  CARDIOVASCULAR: Denies chest pain, PND, orthopnea or reduced exercise tolerance.  ABDOMEN: Appetite fine. Denies diarrhea, abdominal pain, hematemesis or blood in stool.  URINARY: No flank pain, dysuria or hematuria.  MUSCULOSKELETAL: No warmth swelling or tenderness of the joints  NEUROLOGIC: No focal weakness numbness or paresthesia  PSYCHIATRIC: Denies depression        Vitals:    06/11/20 0828   BP: 136/88   Pulse: 86   Temp: 97.9 °F (36.6 °C)   Weight: 85.7 kg (189 lb)   Height: 5' 9" (1.753 m)     Wt Readings from Last 3 Encounters:   06/11/20 85.7 kg (189 lb)   05/01/19 87.5 kg (193 lb)   01/30/19 88.9 kg (196 lb)       OBJECTIVE:   APPEARANCE: Well nourished, well developed, in no acute distress.    HEAD: Normocephalic.  Atraumatic.  No sinus tenderness.  EYES:   Right eye: Pupil reactive.  Conjunctiva clear.    Left eye: Pupil reactive.  Conjunctiva clear.  EOMI.    EARS: TM's intact. Light reflex normal. No retraction or perforation.    NOSE:  clear.  MOUTH & THROAT:  No pharyngeal erythema or exudate. No lesions.  NECK: Supple. No bruits.  No JVD.  No cervical lymphadenopathy.  No thyromegaly.    CHEST: Breath sounds clear bilaterally.  Normal respiratory effort  CARDIOVASCULAR: Normal rate.  Regular rhythm.  No murmurs.  No rub.  No gallops.  ABDOMEN: Bowel sounds normal.  Soft.  No tenderness.  No organomegaly.  PERIPHERAL " VASCULAR: No cyanosis.  No clubbing.  No edema.  NEUROLOGIC: No focal findings.  MENTAL STATUS: Alert.  Oriented x 3.  Skin shows confluent macular rash with slight scale and hypopigmentation around the neck upper chest and upper back.

## 2020-06-11 NOTE — PATIENT INSTRUCTIONS
Try to stop smoking after being on the bupropion for 2 weeks    How to Quit Smoking  Smoking is one of the hardest habits to break. About half of all people who have ever smoked have been able to quit. Most people who still smoke want to quit. Here are some of the best ways to stop smoking.    Keep trying  Most smokers make many attempts at quitting before they are successful. Its important not to give up.  Go cold turkey  Most former smokers quit cold turkey (all at once). Trying to cut back gradually doesn't seem to work as well, perhaps because it continues the smoking habit. Also, it is possible to inhale more while smoking fewer cigarettes. This results in the same amount of nicotine in your body.  Get support  Support programs can be a big help, especially for heavy smokers. These groups offer lectures, ways to change behavior, and peer support. Here are some ways to find a support program:  · Free national quitline: 800-QUIT-NOW (888-721-8462).  · Hospital quit-smoking programs.  · American Lung Association: (334.708.1513).  · American Cancer Society (962-048-2010).  Support at home is important too. Nonsmokers can offer praise and encouragement. If the smoker in your life finds it hard to quit, encourage them to keep trying.  Over-the-counter medicines  Nicotine replacement therapy may make quitting easier. Certain aids, such as the nicotine patch, gum, and lozenges, are available without a prescription. It is best to use these under a doctors care, though. The skin patch provides a steady supply of nicotine. Nicotine gum and lozenges give temporary bursts of low levels of nicotine. Both methods reduce the craving for cigarettes. Warning: If you have nausea, vomiting, dizziness, weakness, or a fast heartbeat, stop using these products and see your doctor.  Prescription medicines  After reviewing your smoking patterns and past attempts to quit, your doctor may offer a prescription medicine such as  "bupropion, varenicline, a nicotine inhaler, or nasal spray. Each has advantages and side effects. Your doctor can review these with you.  Health benefits of quitting  The benefits of quitting start right away and keep improving the longer you go without smoking. These benefits occur at any age.  So whether you are 17 or 70, quitting is a good decision. Some of the benefits include:  · 20 minutes: Blood pressure and pulse return to normal.  · 8 hours: Oxygen levels return to normal.  · 2 days: Ability to smell and taste begin to improve as damaged nerves regrow.  · 2 to 3 weeks: Circulation and lung function improve.  · 1 to 9 months: Coughing, congestion, and shortness of breath decrease; tiredness decreases.  · 1 year: Risk of heart attack decreases by half.  · 5 years: Risk of lung cancer decreases by half; risk of stroke becomes the same as a nonsmokers.  For more on how to quit smoking, try these online resources:   · Smokefree.gov  · "Clearing the Air" booklet from the National [employer]: smokefree.gov/sites/default/files/pdf/clearing-the-air-accessible.pdf  Date Last Reviewed: 3/1/2017  © 5769-5231 [employer]. [address] [place] [address], [place] [address]. All rights reserved. This information is not intended as a substitute for professional medical care. Always follow your healthcare professional's instructions.          "

## 2021-01-29 ENCOUNTER — OFFICE VISIT (OUTPATIENT)
Dept: FAMILY MEDICINE | Facility: CLINIC | Age: 61
End: 2021-01-29
Payer: COMMERCIAL

## 2021-01-29 ENCOUNTER — HOSPITAL ENCOUNTER (OUTPATIENT)
Dept: RADIOLOGY | Facility: HOSPITAL | Age: 61
Discharge: HOME | End: 2021-01-29
Attending: NURSE PRACTITIONER
Payer: COMMERCIAL

## 2021-01-29 VITALS
SYSTOLIC BLOOD PRESSURE: 146 MMHG | HEIGHT: 66 IN | BODY MASS INDEX: 31.08 KG/M2 | TEMPERATURE: 98 F | DIASTOLIC BLOOD PRESSURE: 96 MMHG | WEIGHT: 193.38 LBS | HEART RATE: 89 BPM

## 2021-01-29 DIAGNOSIS — R10.13 EPIGASTRIC DISCOMFORT: ICD-10-CM

## 2021-01-29 DIAGNOSIS — R10.9 ABDOMINAL PAIN, UNSPECIFIED ABDOMINAL LOCATION: Primary | ICD-10-CM

## 2021-01-29 DIAGNOSIS — R10.9 ABDOMINAL PAIN, UNSPECIFIED ABDOMINAL LOCATION: ICD-10-CM

## 2021-01-29 PROCEDURE — 74019 RADEX ABDOMEN 2 VIEWS: CPT | Mod: 26,,, | Performed by: RADIOLOGY

## 2021-01-29 PROCEDURE — 74019 XR ABDOMEN FLAT AND ERECT: ICD-10-PCS | Mod: 26,,, | Performed by: RADIOLOGY

## 2021-01-29 PROCEDURE — 99213 PR OFFICE/OUTPT VISIT, EST, LEVL III, 20-29 MIN: ICD-10-PCS | Mod: S$GLB,,, | Performed by: NURSE PRACTITIONER

## 2021-01-29 PROCEDURE — 74019 RADEX ABDOMEN 2 VIEWS: CPT | Mod: TC,PO

## 2021-01-29 PROCEDURE — 1125F PR PAIN SEVERITY QUANTIFIED, PAIN PRESENT: ICD-10-PCS | Mod: S$GLB,,, | Performed by: NURSE PRACTITIONER

## 2021-01-29 PROCEDURE — 99999 PR PBB SHADOW E&M-EST. PATIENT-LVL IV: CPT | Mod: PBBFAC,,, | Performed by: NURSE PRACTITIONER

## 2021-01-29 PROCEDURE — 3080F DIAST BP >= 90 MM HG: CPT | Mod: CPTII,S$GLB,, | Performed by: NURSE PRACTITIONER

## 2021-01-29 PROCEDURE — 99213 OFFICE O/P EST LOW 20 MIN: CPT | Mod: S$GLB,,, | Performed by: NURSE PRACTITIONER

## 2021-01-29 PROCEDURE — 3008F PR BODY MASS INDEX (BMI) DOCUMENTED: ICD-10-PCS | Mod: CPTII,S$GLB,, | Performed by: NURSE PRACTITIONER

## 2021-01-29 PROCEDURE — 99999 PR PBB SHADOW E&M-EST. PATIENT-LVL IV: ICD-10-PCS | Mod: PBBFAC,,, | Performed by: NURSE PRACTITIONER

## 2021-01-29 PROCEDURE — 1125F AMNT PAIN NOTED PAIN PRSNT: CPT | Mod: S$GLB,,, | Performed by: NURSE PRACTITIONER

## 2021-01-29 PROCEDURE — 3080F PR MOST RECENT DIASTOLIC BLOOD PRESSURE >= 90 MM HG: ICD-10-PCS | Mod: CPTII,S$GLB,, | Performed by: NURSE PRACTITIONER

## 2021-01-29 PROCEDURE — 3077F SYST BP >= 140 MM HG: CPT | Mod: CPTII,S$GLB,, | Performed by: NURSE PRACTITIONER

## 2021-01-29 PROCEDURE — 3008F BODY MASS INDEX DOCD: CPT | Mod: CPTII,S$GLB,, | Performed by: NURSE PRACTITIONER

## 2021-01-29 PROCEDURE — 3077F PR MOST RECENT SYSTOLIC BLOOD PRESSURE >= 140 MM HG: ICD-10-PCS | Mod: CPTII,S$GLB,, | Performed by: NURSE PRACTITIONER

## 2021-01-29 RX ORDER — ASPIRIN 81 MG/1
81 TABLET ORAL DAILY
COMMUNITY
End: 2021-07-02

## 2021-01-29 RX ORDER — DICYCLOMINE HYDROCHLORIDE 20 MG/1
20 TABLET ORAL 2 TIMES DAILY PRN
Qty: 14 TABLET | Refills: 0 | Status: SHIPPED | OUTPATIENT
Start: 2021-01-29 | End: 2021-02-05

## 2021-04-09 ENCOUNTER — OFFICE VISIT (OUTPATIENT)
Dept: FAMILY MEDICINE | Facility: CLINIC | Age: 61
End: 2021-04-09
Payer: COMMERCIAL

## 2021-04-09 VITALS
WEIGHT: 184.38 LBS | TEMPERATURE: 98 F | SYSTOLIC BLOOD PRESSURE: 138 MMHG | DIASTOLIC BLOOD PRESSURE: 88 MMHG | BODY MASS INDEX: 27.94 KG/M2 | HEART RATE: 100 BPM | HEIGHT: 68 IN | RESPIRATION RATE: 16 BRPM

## 2021-04-09 DIAGNOSIS — R10.13 EPIGASTRIC DISCOMFORT: Primary | ICD-10-CM

## 2021-04-09 DIAGNOSIS — K21.9 GASTROESOPHAGEAL REFLUX DISEASE, UNSPECIFIED WHETHER ESOPHAGITIS PRESENT: ICD-10-CM

## 2021-04-09 DIAGNOSIS — R19.06 ABDOMINAL WALL MASS OF EPIGASTRIC REGION: ICD-10-CM

## 2021-04-09 PROCEDURE — 3008F PR BODY MASS INDEX (BMI) DOCUMENTED: ICD-10-PCS | Mod: CPTII,S$GLB,, | Performed by: FAMILY MEDICINE

## 2021-04-09 PROCEDURE — 3008F BODY MASS INDEX DOCD: CPT | Mod: CPTII,S$GLB,, | Performed by: FAMILY MEDICINE

## 2021-04-09 PROCEDURE — 1125F PR PAIN SEVERITY QUANTIFIED, PAIN PRESENT: ICD-10-PCS | Mod: S$GLB,,, | Performed by: FAMILY MEDICINE

## 2021-04-09 PROCEDURE — 99999 PR PBB SHADOW E&M-EST. PATIENT-LVL IV: ICD-10-PCS | Mod: PBBFAC,,, | Performed by: FAMILY MEDICINE

## 2021-04-09 PROCEDURE — 1125F AMNT PAIN NOTED PAIN PRSNT: CPT | Mod: S$GLB,,, | Performed by: FAMILY MEDICINE

## 2021-04-09 PROCEDURE — 99214 OFFICE O/P EST MOD 30 MIN: CPT | Mod: S$GLB,,, | Performed by: FAMILY MEDICINE

## 2021-04-09 PROCEDURE — 99214 PR OFFICE/OUTPT VISIT, EST, LEVL IV, 30-39 MIN: ICD-10-PCS | Mod: S$GLB,,, | Performed by: FAMILY MEDICINE

## 2021-04-09 PROCEDURE — 99999 PR PBB SHADOW E&M-EST. PATIENT-LVL IV: CPT | Mod: PBBFAC,,, | Performed by: FAMILY MEDICINE

## 2021-04-09 RX ORDER — PANTOPRAZOLE SODIUM 40 MG/1
40 TABLET, DELAYED RELEASE ORAL DAILY
Qty: 30 TABLET | Refills: 1 | Status: SHIPPED | OUTPATIENT
Start: 2021-04-09 | End: 2022-05-23

## 2021-04-12 ENCOUNTER — PATIENT OUTREACH (OUTPATIENT)
Dept: ADMINISTRATIVE | Facility: OTHER | Age: 61
End: 2021-04-12

## 2021-04-13 ENCOUNTER — OFFICE VISIT (OUTPATIENT)
Dept: SURGERY | Facility: CLINIC | Age: 61
End: 2021-04-13
Payer: COMMERCIAL

## 2021-04-13 VITALS
DIASTOLIC BLOOD PRESSURE: 96 MMHG | SYSTOLIC BLOOD PRESSURE: 159 MMHG | TEMPERATURE: 98 F | BODY MASS INDEX: 29.8 KG/M2 | HEIGHT: 68 IN | WEIGHT: 196.63 LBS | HEART RATE: 88 BPM

## 2021-04-13 DIAGNOSIS — K46.9 ABDOMINAL HERNIA WITHOUT OBSTRUCTION AND WITHOUT GANGRENE, RECURRENCE NOT SPECIFIED, UNSPECIFIED HERNIA TYPE: ICD-10-CM

## 2021-04-13 DIAGNOSIS — R19.06 ABDOMINAL WALL MASS OF EPIGASTRIC REGION: ICD-10-CM

## 2021-04-13 DIAGNOSIS — R10.13 EPIGASTRIC DISCOMFORT: ICD-10-CM

## 2021-04-13 DIAGNOSIS — Z12.11 SPECIAL SCREENING FOR MALIGNANT NEOPLASM OF COLON: Primary | ICD-10-CM

## 2021-04-13 PROCEDURE — 99999 PR PBB SHADOW E&M-EST. PATIENT-LVL IV: CPT | Mod: PBBFAC,,, | Performed by: STUDENT IN AN ORGANIZED HEALTH CARE EDUCATION/TRAINING PROGRAM

## 2021-04-13 PROCEDURE — 99204 OFFICE O/P NEW MOD 45 MIN: CPT | Mod: S$GLB,,, | Performed by: STUDENT IN AN ORGANIZED HEALTH CARE EDUCATION/TRAINING PROGRAM

## 2021-04-13 PROCEDURE — 3008F BODY MASS INDEX DOCD: CPT | Mod: CPTII,S$GLB,, | Performed by: STUDENT IN AN ORGANIZED HEALTH CARE EDUCATION/TRAINING PROGRAM

## 2021-04-13 PROCEDURE — 99204 PR OFFICE/OUTPT VISIT, NEW, LEVL IV, 45-59 MIN: ICD-10-PCS | Mod: S$GLB,,, | Performed by: STUDENT IN AN ORGANIZED HEALTH CARE EDUCATION/TRAINING PROGRAM

## 2021-04-13 PROCEDURE — 1125F PR PAIN SEVERITY QUANTIFIED, PAIN PRESENT: ICD-10-PCS | Mod: S$GLB,,, | Performed by: STUDENT IN AN ORGANIZED HEALTH CARE EDUCATION/TRAINING PROGRAM

## 2021-04-13 PROCEDURE — 3008F PR BODY MASS INDEX (BMI) DOCUMENTED: ICD-10-PCS | Mod: CPTII,S$GLB,, | Performed by: STUDENT IN AN ORGANIZED HEALTH CARE EDUCATION/TRAINING PROGRAM

## 2021-04-13 PROCEDURE — 99999 PR PBB SHADOW E&M-EST. PATIENT-LVL IV: ICD-10-PCS | Mod: PBBFAC,,, | Performed by: STUDENT IN AN ORGANIZED HEALTH CARE EDUCATION/TRAINING PROGRAM

## 2021-04-13 PROCEDURE — 1125F AMNT PAIN NOTED PAIN PRSNT: CPT | Mod: S$GLB,,, | Performed by: STUDENT IN AN ORGANIZED HEALTH CARE EDUCATION/TRAINING PROGRAM

## 2021-04-14 ENCOUNTER — TELEPHONE (OUTPATIENT)
Dept: GASTROENTEROLOGY | Facility: CLINIC | Age: 61
End: 2021-04-14

## 2021-04-14 RX ORDER — HYDROCHLOROTHIAZIDE 25 MG/1
TABLET ORAL
Qty: 90 TABLET | Refills: 0 | Status: SHIPPED | OUTPATIENT
Start: 2021-04-14 | End: 2021-06-27

## 2021-04-14 RX ORDER — PRAVASTATIN SODIUM 10 MG/1
TABLET ORAL
Qty: 90 TABLET | Refills: 0 | Status: SHIPPED | OUTPATIENT
Start: 2021-04-14 | End: 2021-06-27

## 2021-04-26 ENCOUNTER — HOSPITAL ENCOUNTER (OUTPATIENT)
Dept: RADIOLOGY | Facility: HOSPITAL | Age: 61
Discharge: HOME | End: 2021-04-26
Attending: STUDENT IN AN ORGANIZED HEALTH CARE EDUCATION/TRAINING PROGRAM
Payer: COMMERCIAL

## 2021-04-26 DIAGNOSIS — K46.9 ABDOMINAL HERNIA WITHOUT OBSTRUCTION AND WITHOUT GANGRENE, RECURRENCE NOT SPECIFIED, UNSPECIFIED HERNIA TYPE: ICD-10-CM

## 2021-04-26 PROCEDURE — 74176 CT ABD & PELVIS W/O CONTRAST: CPT | Mod: TC,PO

## 2021-04-26 PROCEDURE — 74176 CT ABD & PELVIS W/O CONTRAST: CPT | Mod: 26,,, | Performed by: RADIOLOGY

## 2021-04-26 PROCEDURE — 25500020 PHARM REV CODE 255: Mod: PO | Performed by: STUDENT IN AN ORGANIZED HEALTH CARE EDUCATION/TRAINING PROGRAM

## 2021-04-26 PROCEDURE — 74176 CT ABDOMEN PELVIS WITHOUT CONTRAST: ICD-10-PCS | Mod: 26,,, | Performed by: RADIOLOGY

## 2021-04-26 PROCEDURE — A9698 NON-RAD CONTRAST MATERIALNOC: HCPCS | Mod: PO | Performed by: STUDENT IN AN ORGANIZED HEALTH CARE EDUCATION/TRAINING PROGRAM

## 2021-04-26 RX ADMIN — IOHEXOL 1000 ML: 9 SOLUTION ORAL at 10:04

## 2021-04-27 ENCOUNTER — OFFICE VISIT (OUTPATIENT)
Dept: SURGERY | Facility: CLINIC | Age: 61
End: 2021-04-27
Payer: COMMERCIAL

## 2021-04-27 ENCOUNTER — TELEPHONE (OUTPATIENT)
Dept: ENDOSCOPY | Facility: HOSPITAL | Age: 61
End: 2021-04-27

## 2021-04-27 VITALS — HEIGHT: 68 IN | BODY MASS INDEX: 27.74 KG/M2 | TEMPERATURE: 98 F | RESPIRATION RATE: 18 BRPM | WEIGHT: 183 LBS

## 2021-04-27 DIAGNOSIS — R19.06 EPIGASTRIC MASS: Primary | ICD-10-CM

## 2021-04-27 PROCEDURE — 3008F PR BODY MASS INDEX (BMI) DOCUMENTED: ICD-10-PCS | Mod: CPTII,S$GLB,, | Performed by: STUDENT IN AN ORGANIZED HEALTH CARE EDUCATION/TRAINING PROGRAM

## 2021-04-27 PROCEDURE — 99212 PR OFFICE/OUTPT VISIT, EST, LEVL II, 10-19 MIN: ICD-10-PCS | Mod: S$GLB,,, | Performed by: STUDENT IN AN ORGANIZED HEALTH CARE EDUCATION/TRAINING PROGRAM

## 2021-04-27 PROCEDURE — 99999 PR PBB SHADOW E&M-EST. PATIENT-LVL III: ICD-10-PCS | Mod: PBBFAC,,, | Performed by: STUDENT IN AN ORGANIZED HEALTH CARE EDUCATION/TRAINING PROGRAM

## 2021-04-27 PROCEDURE — 3008F BODY MASS INDEX DOCD: CPT | Mod: CPTII,S$GLB,, | Performed by: STUDENT IN AN ORGANIZED HEALTH CARE EDUCATION/TRAINING PROGRAM

## 2021-04-27 PROCEDURE — 99999 PR PBB SHADOW E&M-EST. PATIENT-LVL III: CPT | Mod: PBBFAC,,, | Performed by: STUDENT IN AN ORGANIZED HEALTH CARE EDUCATION/TRAINING PROGRAM

## 2021-04-27 PROCEDURE — 1126F AMNT PAIN NOTED NONE PRSNT: CPT | Mod: S$GLB,,, | Performed by: STUDENT IN AN ORGANIZED HEALTH CARE EDUCATION/TRAINING PROGRAM

## 2021-04-27 PROCEDURE — 1126F PR PAIN SEVERITY QUANTIFIED, NO PAIN PRESENT: ICD-10-PCS | Mod: S$GLB,,, | Performed by: STUDENT IN AN ORGANIZED HEALTH CARE EDUCATION/TRAINING PROGRAM

## 2021-04-27 PROCEDURE — 99212 OFFICE O/P EST SF 10 MIN: CPT | Mod: S$GLB,,, | Performed by: STUDENT IN AN ORGANIZED HEALTH CARE EDUCATION/TRAINING PROGRAM

## 2021-04-28 ENCOUNTER — TELEPHONE (OUTPATIENT)
Dept: SURGERY | Facility: CLINIC | Age: 61
End: 2021-04-28

## 2021-04-28 DIAGNOSIS — R19.06 EPIGASTRIC MASS: Primary | ICD-10-CM

## 2021-04-29 ENCOUNTER — TELEPHONE (OUTPATIENT)
Dept: ENDOSCOPY | Facility: HOSPITAL | Age: 61
End: 2021-04-29

## 2021-04-30 ENCOUNTER — ANESTHESIA (OUTPATIENT)
Dept: ENDOSCOPY | Facility: HOSPITAL | Age: 61
End: 2021-04-30
Payer: COMMERCIAL

## 2021-04-30 ENCOUNTER — HOSPITAL ENCOUNTER (OUTPATIENT)
Facility: HOSPITAL | Age: 61
Discharge: HOME | End: 2021-04-30
Attending: INTERNAL MEDICINE | Admitting: INTERNAL MEDICINE
Payer: COMMERCIAL

## 2021-04-30 ENCOUNTER — ANESTHESIA EVENT (OUTPATIENT)
Dept: ENDOSCOPY | Facility: HOSPITAL | Age: 61
End: 2021-04-30
Payer: COMMERCIAL

## 2021-04-30 VITALS
HEIGHT: 69 IN | RESPIRATION RATE: 18 BRPM | WEIGHT: 200 LBS | BODY MASS INDEX: 29.62 KG/M2 | OXYGEN SATURATION: 99 % | HEART RATE: 73 BPM | DIASTOLIC BLOOD PRESSURE: 82 MMHG | SYSTOLIC BLOOD PRESSURE: 122 MMHG | TEMPERATURE: 98 F

## 2021-04-30 DIAGNOSIS — R93.5 ABNORMAL CT OF THE ABDOMEN: Primary | ICD-10-CM

## 2021-04-30 LAB — SARS-COV-2 RDRP RESP QL NAA+PROBE: NEGATIVE

## 2021-04-30 PROCEDURE — 88341 PR IHC OR ICC EACH ADD'L SINGLE ANTIBODY  STAINPR: ICD-10-PCS | Mod: 26,,, | Performed by: PATHOLOGY

## 2021-04-30 PROCEDURE — 37000008 HC ANESTHESIA 1ST 15 MINUTES: Performed by: INTERNAL MEDICINE

## 2021-04-30 PROCEDURE — 43239 EGD BIOPSY SINGLE/MULTIPLE: CPT | Performed by: INTERNAL MEDICINE

## 2021-04-30 PROCEDURE — 43242 PR UPGI ENDOSCOPY,FN NEEDLE BX,GUIDED: ICD-10-PCS | Mod: ,,, | Performed by: INTERNAL MEDICINE

## 2021-04-30 PROCEDURE — U0002 COVID-19 LAB TEST NON-CDC: HCPCS | Performed by: INTERNAL MEDICINE

## 2021-04-30 PROCEDURE — 81314 PDGFRA GENE: CPT | Performed by: PATHOLOGY

## 2021-04-30 PROCEDURE — 27202131 HC NEEDLE, FNB SINGLE (ANY): Performed by: INTERNAL MEDICINE

## 2021-04-30 PROCEDURE — 27201012 HC FORCEPS, HOT/COLD, DISP: Performed by: INTERNAL MEDICINE

## 2021-04-30 PROCEDURE — 88342 CHG IMMUNOCYTOCHEMISTRY: ICD-10-PCS | Mod: 26,,, | Performed by: PATHOLOGY

## 2021-04-30 PROCEDURE — 37000009 HC ANESTHESIA EA ADD 15 MINS: Performed by: INTERNAL MEDICINE

## 2021-04-30 PROCEDURE — 63600175 PHARM REV CODE 636 W HCPCS: Performed by: NURSE ANESTHETIST, CERTIFIED REGISTERED

## 2021-04-30 PROCEDURE — 81272 KIT GENE TARGETED SEQ ANALYS: CPT | Performed by: PATHOLOGY

## 2021-04-30 PROCEDURE — 88381 MICRODISSECTION MANUAL: CPT | Performed by: PATHOLOGY

## 2021-04-30 PROCEDURE — 88341 IMHCHEM/IMCYTCHM EA ADD ANTB: CPT | Mod: 59 | Performed by: PATHOLOGY

## 2021-04-30 PROCEDURE — 43239 EGD BIOPSY SINGLE/MULTIPLE: CPT | Mod: 59,,, | Performed by: INTERNAL MEDICINE

## 2021-04-30 PROCEDURE — 88173 PR  INTERPRETATION OF FNA SMEAR: ICD-10-PCS | Mod: 26,,, | Performed by: PATHOLOGY

## 2021-04-30 PROCEDURE — 43242 EGD US FINE NEEDLE BX/ASPIR: CPT | Mod: ,,, | Performed by: INTERNAL MEDICINE

## 2021-04-30 PROCEDURE — 25000003 PHARM REV CODE 250: Performed by: INTERNAL MEDICINE

## 2021-04-30 PROCEDURE — 88173 CYTOPATH EVAL FNA REPORT: CPT | Performed by: PATHOLOGY

## 2021-04-30 PROCEDURE — 43239 PR EGD, FLEX, W/BIOPSY, SGL/MULTI: ICD-10-PCS | Mod: 59,,, | Performed by: INTERNAL MEDICINE

## 2021-04-30 PROCEDURE — 88341 IMHCHEM/IMCYTCHM EA ADD ANTB: CPT | Mod: 26,,, | Performed by: PATHOLOGY

## 2021-04-30 PROCEDURE — 88342 IMHCHEM/IMCYTCHM 1ST ANTB: CPT | Mod: 26,,, | Performed by: PATHOLOGY

## 2021-04-30 PROCEDURE — 25000003 PHARM REV CODE 250: Performed by: NURSE ANESTHETIST, CERTIFIED REGISTERED

## 2021-04-30 PROCEDURE — 88305 TISSUE EXAM BY PATHOLOGIST: CPT | Performed by: PATHOLOGY

## 2021-04-30 PROCEDURE — 88305 TISSUE EXAM BY PATHOLOGIST: ICD-10-PCS | Mod: 26,,, | Performed by: PATHOLOGY

## 2021-04-30 PROCEDURE — 88305 TISSUE EXAM BY PATHOLOGIST: CPT | Mod: 26,,, | Performed by: PATHOLOGY

## 2021-04-30 PROCEDURE — 88342 IMHCHEM/IMCYTCHM 1ST ANTB: CPT | Performed by: PATHOLOGY

## 2021-04-30 PROCEDURE — 88173 CYTOPATH EVAL FNA REPORT: CPT | Mod: 26,,, | Performed by: PATHOLOGY

## 2021-04-30 PROCEDURE — 43242 EGD US FINE NEEDLE BX/ASPIR: CPT | Performed by: INTERNAL MEDICINE

## 2021-04-30 RX ORDER — PROPOFOL 10 MG/ML
INJECTION, EMULSION INTRAVENOUS
Status: DISCONTINUED | OUTPATIENT
Start: 2021-04-30 | End: 2021-04-30

## 2021-04-30 RX ORDER — DEXTROMETHORPHAN/PSEUDOEPHED 2.5-7.5/.8
DROPS ORAL
Status: COMPLETED | OUTPATIENT
Start: 2021-04-30 | End: 2021-04-30

## 2021-04-30 RX ORDER — LIDOCAINE HCL/PF 100 MG/5ML
SYRINGE (ML) INTRAVENOUS
Status: DISCONTINUED | OUTPATIENT
Start: 2021-04-30 | End: 2021-04-30

## 2021-04-30 RX ORDER — PHENYLEPHRINE HYDROCHLORIDE 10 MG/ML
INJECTION INTRAVENOUS
Status: DISCONTINUED | OUTPATIENT
Start: 2021-04-30 | End: 2021-04-30

## 2021-04-30 RX ORDER — SODIUM CHLORIDE 9 MG/ML
INJECTION, SOLUTION INTRAVENOUS CONTINUOUS
Status: DISCONTINUED | OUTPATIENT
Start: 2021-04-30 | End: 2021-04-30 | Stop reason: HOSPADM

## 2021-04-30 RX ORDER — SODIUM CHLORIDE 0.9 % (FLUSH) 0.9 %
10 SYRINGE (ML) INJECTION
Status: DISCONTINUED | OUTPATIENT
Start: 2021-04-30 | End: 2022-09-13

## 2021-04-30 RX ADMIN — LIDOCAINE HYDROCHLORIDE 50 MG: 20 INJECTION, SOLUTION INTRAVENOUS at 12:04

## 2021-04-30 RX ADMIN — PROPOFOL 30 MG: 10 INJECTION, EMULSION INTRAVENOUS at 12:04

## 2021-04-30 RX ADMIN — GLYCOPYRROLATE 0.2 MG: 0.2 INJECTION, SOLUTION INTRAMUSCULAR; INTRAVITREAL at 12:04

## 2021-04-30 RX ADMIN — PROPOFOL 20 MG: 10 INJECTION, EMULSION INTRAVENOUS at 12:04

## 2021-04-30 RX ADMIN — SIMETHICONE 40 MG: 20 SUSPENSION/ DROPS ORAL at 12:04

## 2021-04-30 RX ADMIN — PROPOFOL 80 MG: 10 INJECTION, EMULSION INTRAVENOUS at 12:04

## 2021-04-30 RX ADMIN — PHENYLEPHRINE HYDROCHLORIDE 100 MCG: 10 INJECTION INTRAVENOUS at 12:04

## 2021-04-30 RX ADMIN — PHENYLEPHRINE HYDROCHLORIDE 50 MCG: 10 INJECTION INTRAVENOUS at 12:04

## 2021-04-30 RX ADMIN — SODIUM CHLORIDE: 0.9 INJECTION, SOLUTION INTRAVENOUS at 10:04

## 2021-05-03 ENCOUNTER — TELEPHONE (OUTPATIENT)
Dept: ENDOSCOPY | Facility: HOSPITAL | Age: 61
End: 2021-05-03

## 2021-05-04 ENCOUNTER — TELEPHONE (OUTPATIENT)
Dept: FAMILY MEDICINE | Facility: CLINIC | Age: 61
End: 2021-05-04

## 2021-05-04 DIAGNOSIS — C49.A0 MALIGNANT GASTROINTESTINAL STROMAL TUMOR, UNSPECIFIED SITE: Primary | ICD-10-CM

## 2021-05-04 LAB
COMMENT: ABNORMAL
FINAL PATHOLOGIC DIAGNOSIS: ABNORMAL
Lab: ABNORMAL

## 2021-05-05 PROBLEM — C49.A0 MALIGNANT GASTROINTESTINAL STROMAL TUMOR: Status: ACTIVE | Noted: 2021-05-05

## 2021-05-07 ENCOUNTER — TELEPHONE (OUTPATIENT)
Dept: FAMILY MEDICINE | Facility: CLINIC | Age: 61
End: 2021-05-07

## 2021-05-07 ENCOUNTER — TELEPHONE (OUTPATIENT)
Dept: ENDOSCOPY | Facility: HOSPITAL | Age: 61
End: 2021-05-07

## 2021-05-07 DIAGNOSIS — C49.A2 GASTRIC STROMAL TUMOR: Primary | ICD-10-CM

## 2021-05-10 ENCOUNTER — NURSE TRIAGE (OUTPATIENT)
Dept: ADMINISTRATIVE | Facility: CLINIC | Age: 61
End: 2021-05-10

## 2021-05-11 ENCOUNTER — TELEPHONE (OUTPATIENT)
Dept: HEMATOLOGY/ONCOLOGY | Facility: CLINIC | Age: 61
End: 2021-05-11

## 2021-05-12 ENCOUNTER — TELEPHONE (OUTPATIENT)
Dept: HEMATOLOGY/ONCOLOGY | Facility: CLINIC | Age: 61
End: 2021-05-12

## 2021-05-24 ENCOUNTER — LAB VISIT (OUTPATIENT)
Dept: LAB | Facility: HOSPITAL | Age: 61
End: 2021-05-24
Attending: INTERNAL MEDICINE
Payer: COMMERCIAL

## 2021-05-24 ENCOUNTER — OFFICE VISIT (OUTPATIENT)
Dept: HEMATOLOGY/ONCOLOGY | Facility: CLINIC | Age: 61
End: 2021-05-24
Payer: COMMERCIAL

## 2021-05-24 ENCOUNTER — DOCUMENTATION ONLY (OUTPATIENT)
Dept: HEMATOLOGY/ONCOLOGY | Facility: CLINIC | Age: 61
End: 2021-05-24

## 2021-05-24 VITALS
OXYGEN SATURATION: 98 % | WEIGHT: 176.13 LBS | HEART RATE: 95 BPM | BODY MASS INDEX: 26.69 KG/M2 | TEMPERATURE: 98 F | SYSTOLIC BLOOD PRESSURE: 153 MMHG | HEIGHT: 68 IN | DIASTOLIC BLOOD PRESSURE: 89 MMHG

## 2021-05-24 DIAGNOSIS — C49.A0 MALIGNANT GASTROINTESTINAL STROMAL TUMOR, UNSPECIFIED SITE: ICD-10-CM

## 2021-05-24 DIAGNOSIS — C49.A2 GASTRIC STROMAL TUMOR: ICD-10-CM

## 2021-05-24 LAB
ALBUMIN SERPL BCP-MCNC: 3.3 G/DL (ref 3.5–5.2)
ALP SERPL-CCNC: 85 U/L (ref 55–135)
ALT SERPL W/O P-5'-P-CCNC: 22 U/L (ref 10–44)
ANION GAP SERPL CALC-SCNC: 11 MMOL/L (ref 8–16)
AST SERPL-CCNC: 34 U/L (ref 10–40)
BASOPHILS # BLD AUTO: 0.06 K/UL (ref 0–0.2)
BASOPHILS NFR BLD: 0.7 % (ref 0–1.9)
BILIRUB SERPL-MCNC: 0.7 MG/DL (ref 0.1–1)
BUN SERPL-MCNC: 13 MG/DL (ref 6–20)
CALCIUM SERPL-MCNC: 9.5 MG/DL (ref 8.7–10.5)
CHLORIDE SERPL-SCNC: 104 MMOL/L (ref 95–110)
CO2 SERPL-SCNC: 28 MMOL/L (ref 23–29)
CREAT SERPL-MCNC: 1.1 MG/DL (ref 0.5–1.4)
DIFFERENTIAL METHOD: ABNORMAL
EOSINOPHIL # BLD AUTO: 0.3 K/UL (ref 0–0.5)
EOSINOPHIL NFR BLD: 3.1 % (ref 0–8)
ERYTHROCYTE [DISTWIDTH] IN BLOOD BY AUTOMATED COUNT: 13.8 % (ref 11.5–14.5)
EST. GFR  (AFRICAN AMERICAN): >60 ML/MIN/1.73 M^2
EST. GFR  (NON AFRICAN AMERICAN): >60 ML/MIN/1.73 M^2
GLUCOSE SERPL-MCNC: 92 MG/DL (ref 70–110)
HCT VFR BLD AUTO: 42.1 % (ref 40–54)
HGB BLD-MCNC: 13.4 G/DL (ref 14–18)
IMM GRANULOCYTES # BLD AUTO: 0.03 K/UL (ref 0–0.04)
IMM GRANULOCYTES NFR BLD AUTO: 0.3 % (ref 0–0.5)
LYMPHOCYTES # BLD AUTO: 2.7 K/UL (ref 1–4.8)
LYMPHOCYTES NFR BLD: 29.3 % (ref 18–48)
MCH RBC QN AUTO: 27.4 PG (ref 27–31)
MCHC RBC AUTO-ENTMCNC: 31.8 G/DL (ref 32–36)
MCV RBC AUTO: 86 FL (ref 82–98)
MONOCYTES # BLD AUTO: 1.3 K/UL (ref 0.3–1)
MONOCYTES NFR BLD: 14 % (ref 4–15)
NEUTROPHILS # BLD AUTO: 4.9 K/UL (ref 1.8–7.7)
NEUTROPHILS NFR BLD: 52.6 % (ref 38–73)
NRBC BLD-RTO: 0 /100 WBC
PLATELET # BLD AUTO: 282 K/UL (ref 150–450)
PMV BLD AUTO: 9.4 FL (ref 9.2–12.9)
POTASSIUM SERPL-SCNC: 3.4 MMOL/L (ref 3.5–5.1)
PROT SERPL-MCNC: 8.4 G/DL (ref 6–8.4)
RBC # BLD AUTO: 4.89 M/UL (ref 4.6–6.2)
SODIUM SERPL-SCNC: 143 MMOL/L (ref 136–145)
WBC # BLD AUTO: 9.22 K/UL (ref 3.9–12.7)

## 2021-05-24 PROCEDURE — 1126F AMNT PAIN NOTED NONE PRSNT: CPT | Mod: S$GLB,,, | Performed by: INTERNAL MEDICINE

## 2021-05-24 PROCEDURE — 3008F BODY MASS INDEX DOCD: CPT | Mod: CPTII,S$GLB,, | Performed by: INTERNAL MEDICINE

## 2021-05-24 PROCEDURE — 36415 COLL VENOUS BLD VENIPUNCTURE: CPT | Performed by: INTERNAL MEDICINE

## 2021-05-24 PROCEDURE — 85025 COMPLETE CBC W/AUTO DIFF WBC: CPT | Performed by: INTERNAL MEDICINE

## 2021-05-24 PROCEDURE — 3008F PR BODY MASS INDEX (BMI) DOCUMENTED: ICD-10-PCS | Mod: CPTII,S$GLB,, | Performed by: INTERNAL MEDICINE

## 2021-05-24 PROCEDURE — 99205 PR OFFICE/OUTPT VISIT, NEW, LEVL V, 60-74 MIN: ICD-10-PCS | Mod: S$GLB,,, | Performed by: INTERNAL MEDICINE

## 2021-05-24 PROCEDURE — 80053 COMPREHEN METABOLIC PANEL: CPT | Performed by: INTERNAL MEDICINE

## 2021-05-24 PROCEDURE — 1126F PR PAIN SEVERITY QUANTIFIED, NO PAIN PRESENT: ICD-10-PCS | Mod: S$GLB,,, | Performed by: INTERNAL MEDICINE

## 2021-05-24 PROCEDURE — 99999 PR PBB SHADOW E&M-EST. PATIENT-LVL III: ICD-10-PCS | Mod: PBBFAC,,, | Performed by: INTERNAL MEDICINE

## 2021-05-24 PROCEDURE — 99999 PR PBB SHADOW E&M-EST. PATIENT-LVL III: CPT | Mod: PBBFAC,,, | Performed by: INTERNAL MEDICINE

## 2021-05-24 PROCEDURE — 99205 OFFICE O/P NEW HI 60 MIN: CPT | Mod: S$GLB,,, | Performed by: INTERNAL MEDICINE

## 2021-05-24 RX ORDER — ACETAMINOPHEN 325 MG/1
325 TABLET ORAL EVERY 6 HOURS PRN
COMMUNITY

## 2021-05-25 ENCOUNTER — TELEPHONE (OUTPATIENT)
Dept: GASTROENTEROLOGY | Facility: CLINIC | Age: 61
End: 2021-05-25

## 2021-05-26 LAB
COMMENT: NORMAL
FINAL PATHOLOGIC DIAGNOSIS: NORMAL
GROSS: NORMAL
Lab: NORMAL
SUPPLEMENTAL DIAGNOSIS: NORMAL

## 2021-05-28 ENCOUNTER — SPECIALTY PHARMACY (OUTPATIENT)
Dept: PHARMACY | Facility: CLINIC | Age: 61
End: 2021-05-28

## 2021-05-28 ENCOUNTER — TUMOR BOARD CONFERENCE (OUTPATIENT)
Dept: HEMATOLOGY/ONCOLOGY | Facility: CLINIC | Age: 61
End: 2021-05-28

## 2021-05-28 DIAGNOSIS — C49.A2 GASTRIC STROMAL TUMOR: Primary | ICD-10-CM

## 2021-05-28 RX ORDER — IMATINIB MESYLATE 400 MG/1
400 TABLET, FILM COATED ORAL DAILY
Qty: 90 TABLET | Refills: 1 | Status: SHIPPED | OUTPATIENT
Start: 2021-05-28 | End: 2021-12-09 | Stop reason: SDUPTHER

## 2021-06-01 ENCOUNTER — DOCUMENTATION ONLY (OUTPATIENT)
Dept: HEMATOLOGY/ONCOLOGY | Facility: CLINIC | Age: 61
End: 2021-06-01

## 2021-06-03 ENCOUNTER — HOSPITAL ENCOUNTER (OUTPATIENT)
Dept: RADIOLOGY | Facility: HOSPITAL | Age: 61
Discharge: HOME | End: 2021-06-03
Attending: INTERNAL MEDICINE
Payer: COMMERCIAL

## 2021-06-03 ENCOUNTER — CLINICAL SUPPORT (OUTPATIENT)
Dept: HEMATOLOGY/ONCOLOGY | Facility: CLINIC | Age: 61
End: 2021-06-03
Payer: COMMERCIAL

## 2021-06-03 ENCOUNTER — HOSPITAL ENCOUNTER (OUTPATIENT)
Dept: CARDIOLOGY | Facility: HOSPITAL | Age: 61
Discharge: HOME | End: 2021-06-03
Attending: INTERNAL MEDICINE
Payer: COMMERCIAL

## 2021-06-03 VITALS
BODY MASS INDEX: 26.67 KG/M2 | SYSTOLIC BLOOD PRESSURE: 153 MMHG | HEART RATE: 77 BPM | DIASTOLIC BLOOD PRESSURE: 89 MMHG | WEIGHT: 176 LBS | HEIGHT: 68 IN

## 2021-06-03 DIAGNOSIS — C49.A0 MALIGNANT GASTROINTESTINAL STROMAL TUMOR, UNSPECIFIED SITE: ICD-10-CM

## 2021-06-03 DIAGNOSIS — C49.A2 GASTRIC STROMAL TUMOR: ICD-10-CM

## 2021-06-03 DIAGNOSIS — Z71.9 ENCOUNTER FOR EDUCATION: Primary | ICD-10-CM

## 2021-06-03 LAB
ASCENDING AORTA: 2.78 CM
AV INDEX (PROSTH): 0.94
AV MEAN GRADIENT: 4 MMHG
AV PEAK GRADIENT: 8 MMHG
AV VALVE AREA: 2.76 CM2
AV VELOCITY RATIO: 0.91
BSA FOR ECHO PROCEDURE: 1.96 M2
CV ECHO LV RWT: 0.56 CM
DOP CALC AO PEAK VEL: 1.45 M/S
DOP CALC AO VTI: 26.28 CM
DOP CALC LVOT AREA: 3 CM2
DOP CALC LVOT DIAMETER: 1.94 CM
DOP CALC LVOT PEAK VEL: 1.32 M/S
DOP CALC LVOT STROKE VOLUME: 72.62 CM3
DOP CALC RVOT PEAK VEL: 0.8 M/S
DOP CALC RVOT VTI: 12.94 CM
DOP CALCLVOT PEAK VEL VTI: 24.58 CM
E WAVE DECELERATION TIME: 243.81 MSEC
E/A RATIO: 0.78
E/E' RATIO: 6.09 M/S
ECHO LV POSTERIOR WALL: 1.04 CM (ref 0.6–1.1)
EJECTION FRACTION: 60 %
FRACTIONAL SHORTENING: 38 % (ref 28–44)
INTERVENTRICULAR SEPTUM: 1.13 CM (ref 0.6–1.1)
IVRT: 74.22 MSEC
LA MAJOR: 4.42 CM
LA MINOR: 4.23 CM
LA WIDTH: 2.74 CM
LEFT ATRIUM SIZE: 2.88 CM
LEFT ATRIUM VOLUME INDEX: 14.9 ML/M2
LEFT ATRIUM VOLUME: 29 CM3
LEFT INTERNAL DIMENSION IN SYSTOLE: 2.31 CM (ref 2.1–4)
LEFT VENTRICLE DIASTOLIC VOLUME INDEX: 29.94 ML/M2
LEFT VENTRICLE DIASTOLIC VOLUME: 58.08 ML
LEFT VENTRICLE MASS INDEX: 65 G/M2
LEFT VENTRICLE SYSTOLIC VOLUME INDEX: 9.4 ML/M2
LEFT VENTRICLE SYSTOLIC VOLUME: 18.33 ML
LEFT VENTRICULAR INTERNAL DIMENSION IN DIASTOLE: 3.7 CM (ref 3.5–6)
LEFT VENTRICULAR MASS: 126.74 G
LV LATERAL E/E' RATIO: 5.58 M/S
LV SEPTAL E/E' RATIO: 6.7 M/S
MV PEAK A VEL: 0.86 M/S
MV PEAK E VEL: 0.67 M/S
MV STENOSIS PRESSURE HALF TIME: 70.7 MS
MV VALVE AREA P 1/2 METHOD: 3.11 CM2
PULM VEIN S/D RATIO: 1.36
PV MEAN GRADIENT: 2 MMHG
PV PEAK D VEL: 0.45 M/S
PV PEAK S VEL: 0.61 M/S
PV PEAK VELOCITY: 1.07 CM/S
RA MAJOR: 4.25 CM
RA PRESSURE: 3 MMHG
RA WIDTH: 2.97 CM
RIGHT VENTRICULAR END-DIASTOLIC DIMENSION: 2.8 CM
SINUS: 3.03 CM
STJ: 2.45 CM
TDI LATERAL: 0.12 M/S
TDI SEPTAL: 0.1 M/S
TDI: 0.11 M/S

## 2021-06-03 PROCEDURE — 99499 NO LOS: ICD-10-PCS | Mod: S$GLB,,,

## 2021-06-03 PROCEDURE — 93306 ECHO (CUPID ONLY): ICD-10-PCS | Mod: 26,,, | Performed by: INTERNAL MEDICINE

## 2021-06-03 PROCEDURE — 93356 MYOCRD STRAIN IMG SPCKL TRCK: CPT | Mod: ,,, | Performed by: INTERNAL MEDICINE

## 2021-06-03 PROCEDURE — 71260 CT THORAX DX C+: CPT | Mod: TC

## 2021-06-03 PROCEDURE — 93356 ECHO (CUPID ONLY): ICD-10-PCS | Mod: ,,, | Performed by: INTERNAL MEDICINE

## 2021-06-03 PROCEDURE — 93306 TTE W/DOPPLER COMPLETE: CPT | Mod: 26,,, | Performed by: INTERNAL MEDICINE

## 2021-06-03 PROCEDURE — 99499 UNLISTED E&M SERVICE: CPT | Mod: S$GLB,,,

## 2021-06-03 PROCEDURE — 25500020 PHARM REV CODE 255: Performed by: INTERNAL MEDICINE

## 2021-06-03 PROCEDURE — 93306 TTE W/DOPPLER COMPLETE: CPT

## 2021-06-03 RX ADMIN — IOHEXOL 100 ML: 350 INJECTION, SOLUTION INTRAVENOUS at 03:06

## 2021-06-07 ENCOUNTER — DOCUMENTATION ONLY (OUTPATIENT)
Dept: HEMATOLOGY/ONCOLOGY | Facility: CLINIC | Age: 61
End: 2021-06-07

## 2021-06-10 ENCOUNTER — TELEPHONE (OUTPATIENT)
Dept: HEMATOLOGY/ONCOLOGY | Facility: CLINIC | Age: 61
End: 2021-06-10

## 2021-06-10 ENCOUNTER — DOCUMENTATION ONLY (OUTPATIENT)
Dept: HEMATOLOGY/ONCOLOGY | Facility: CLINIC | Age: 61
End: 2021-06-10

## 2021-06-14 ENCOUNTER — DOCUMENTATION ONLY (OUTPATIENT)
Dept: HEMATOLOGY/ONCOLOGY | Facility: CLINIC | Age: 61
End: 2021-06-14

## 2021-06-15 ENCOUNTER — SPECIALTY PHARMACY (OUTPATIENT)
Dept: PHARMACY | Facility: CLINIC | Age: 61
End: 2021-06-15

## 2021-06-16 RX ORDER — DICYCLOMINE HYDROCHLORIDE 20 MG/1
20 TABLET ORAL EVERY 6 HOURS
COMMUNITY

## 2021-06-16 RX ORDER — BENZONATATE 200 MG/1
200 CAPSULE ORAL 3 TIMES DAILY PRN
COMMUNITY
End: 2021-07-02

## 2021-06-17 ENCOUNTER — LAB VISIT (OUTPATIENT)
Dept: LAB | Facility: HOSPITAL | Age: 61
End: 2021-06-17
Attending: INTERNAL MEDICINE
Payer: COMMERCIAL

## 2021-06-17 ENCOUNTER — OFFICE VISIT (OUTPATIENT)
Dept: HEMATOLOGY/ONCOLOGY | Facility: CLINIC | Age: 61
End: 2021-06-17
Payer: COMMERCIAL

## 2021-06-17 VITALS
SYSTOLIC BLOOD PRESSURE: 143 MMHG | HEIGHT: 68 IN | WEIGHT: 165.38 LBS | TEMPERATURE: 98 F | HEART RATE: 91 BPM | DIASTOLIC BLOOD PRESSURE: 90 MMHG | OXYGEN SATURATION: 98 % | BODY MASS INDEX: 25.07 KG/M2

## 2021-06-17 DIAGNOSIS — Z79.899 IMMUNOSUPPRESSED DUE TO CHEMOTHERAPY: Primary | ICD-10-CM

## 2021-06-17 DIAGNOSIS — C49.A2 GASTRIC STROMAL TUMOR: ICD-10-CM

## 2021-06-17 DIAGNOSIS — C49.A0 MALIGNANT GASTROINTESTINAL STROMAL TUMOR, UNSPECIFIED SITE: ICD-10-CM

## 2021-06-17 DIAGNOSIS — D84.821 IMMUNOSUPPRESSED DUE TO CHEMOTHERAPY: Primary | ICD-10-CM

## 2021-06-17 DIAGNOSIS — T45.1X5A IMMUNOSUPPRESSED DUE TO CHEMOTHERAPY: Primary | ICD-10-CM

## 2021-06-17 LAB
ALBUMIN SERPL BCP-MCNC: 3.3 G/DL (ref 3.5–5.2)
ALP SERPL-CCNC: 132 U/L (ref 55–135)
ALT SERPL W/O P-5'-P-CCNC: 33 U/L (ref 10–44)
ANION GAP SERPL CALC-SCNC: 11 MMOL/L (ref 8–16)
AST SERPL-CCNC: 48 U/L (ref 10–40)
BASOPHILS # BLD AUTO: 0.06 K/UL (ref 0–0.2)
BASOPHILS NFR BLD: 0.7 % (ref 0–1.9)
BILIRUB SERPL-MCNC: 1 MG/DL (ref 0.1–1)
BUN SERPL-MCNC: 13 MG/DL (ref 6–20)
CALCIUM SERPL-MCNC: 10 MG/DL (ref 8.7–10.5)
CHLORIDE SERPL-SCNC: 101 MMOL/L (ref 95–110)
CO2 SERPL-SCNC: 30 MMOL/L (ref 23–29)
CREAT SERPL-MCNC: 1.1 MG/DL (ref 0.5–1.4)
DIFFERENTIAL METHOD: ABNORMAL
EOSINOPHIL # BLD AUTO: 0.3 K/UL (ref 0–0.5)
EOSINOPHIL NFR BLD: 3.3 % (ref 0–8)
ERYTHROCYTE [DISTWIDTH] IN BLOOD BY AUTOMATED COUNT: 14 % (ref 11.5–14.5)
EST. GFR  (AFRICAN AMERICAN): >60 ML/MIN/1.73 M^2
EST. GFR  (NON AFRICAN AMERICAN): >60 ML/MIN/1.73 M^2
GLUCOSE SERPL-MCNC: 104 MG/DL (ref 70–110)
HCT VFR BLD AUTO: 40.5 % (ref 40–54)
HGB BLD-MCNC: 13.4 G/DL (ref 14–18)
IMM GRANULOCYTES # BLD AUTO: 0.03 K/UL (ref 0–0.04)
IMM GRANULOCYTES NFR BLD AUTO: 0.3 % (ref 0–0.5)
LYMPHOCYTES # BLD AUTO: 2.4 K/UL (ref 1–4.8)
LYMPHOCYTES NFR BLD: 27.5 % (ref 18–48)
MCH RBC QN AUTO: 28 PG (ref 27–31)
MCHC RBC AUTO-ENTMCNC: 33.1 G/DL (ref 32–36)
MCV RBC AUTO: 85 FL (ref 82–98)
MONOCYTES # BLD AUTO: 1.4 K/UL (ref 0.3–1)
MONOCYTES NFR BLD: 15.4 % (ref 4–15)
NEUTROPHILS # BLD AUTO: 4.7 K/UL (ref 1.8–7.7)
NEUTROPHILS NFR BLD: 52.8 % (ref 38–73)
NRBC BLD-RTO: 0 /100 WBC
PLATELET # BLD AUTO: 285 K/UL (ref 150–450)
PMV BLD AUTO: 9.7 FL (ref 9.2–12.9)
POTASSIUM SERPL-SCNC: 3 MMOL/L (ref 3.5–5.1)
PROT SERPL-MCNC: 8.7 G/DL (ref 6–8.4)
RBC # BLD AUTO: 4.79 M/UL (ref 4.6–6.2)
SODIUM SERPL-SCNC: 142 MMOL/L (ref 136–145)
WBC # BLD AUTO: 8.84 K/UL (ref 3.9–12.7)

## 2021-06-17 PROCEDURE — 85025 COMPLETE CBC W/AUTO DIFF WBC: CPT | Performed by: INTERNAL MEDICINE

## 2021-06-17 PROCEDURE — 80053 COMPREHEN METABOLIC PANEL: CPT | Performed by: INTERNAL MEDICINE

## 2021-06-17 PROCEDURE — 1126F PR PAIN SEVERITY QUANTIFIED, NO PAIN PRESENT: ICD-10-PCS | Mod: S$GLB,,, | Performed by: INTERNAL MEDICINE

## 2021-06-17 PROCEDURE — 99999 PR PBB SHADOW E&M-EST. PATIENT-LVL III: ICD-10-PCS | Mod: PBBFAC,,, | Performed by: INTERNAL MEDICINE

## 2021-06-17 PROCEDURE — 36415 COLL VENOUS BLD VENIPUNCTURE: CPT | Performed by: INTERNAL MEDICINE

## 2021-06-17 PROCEDURE — 3008F PR BODY MASS INDEX (BMI) DOCUMENTED: ICD-10-PCS | Mod: CPTII,S$GLB,, | Performed by: INTERNAL MEDICINE

## 2021-06-17 PROCEDURE — 1126F AMNT PAIN NOTED NONE PRSNT: CPT | Mod: S$GLB,,, | Performed by: INTERNAL MEDICINE

## 2021-06-17 PROCEDURE — 3008F BODY MASS INDEX DOCD: CPT | Mod: CPTII,S$GLB,, | Performed by: INTERNAL MEDICINE

## 2021-06-17 PROCEDURE — 99999 PR PBB SHADOW E&M-EST. PATIENT-LVL III: CPT | Mod: PBBFAC,,, | Performed by: INTERNAL MEDICINE

## 2021-06-17 PROCEDURE — 99215 OFFICE O/P EST HI 40 MIN: CPT | Mod: S$GLB,,, | Performed by: INTERNAL MEDICINE

## 2021-06-17 PROCEDURE — 99215 PR OFFICE/OUTPT VISIT, EST, LEVL V, 40-54 MIN: ICD-10-PCS | Mod: S$GLB,,, | Performed by: INTERNAL MEDICINE

## 2021-06-17 RX ORDER — PROCHLORPERAZINE MALEATE 5 MG
5 TABLET ORAL EVERY 6 HOURS PRN
Qty: 90 TABLET | Refills: 1 | Status: SHIPPED | OUTPATIENT
Start: 2021-06-17 | End: 2022-05-23

## 2021-06-17 RX ORDER — ONDANSETRON 8 MG/1
8 TABLET, ORALLY DISINTEGRATING ORAL EVERY 12 HOURS PRN
Qty: 90 TABLET | Refills: 1 | Status: SHIPPED | OUTPATIENT
Start: 2021-06-17 | End: 2022-05-23

## 2021-06-25 ENCOUNTER — SPECIALTY PHARMACY (OUTPATIENT)
Dept: PHARMACY | Facility: CLINIC | Age: 61
End: 2021-06-25

## 2021-06-25 DIAGNOSIS — C49.A0 MALIGNANT GASTROINTESTINAL STROMAL TUMOR, UNSPECIFIED SITE: Primary | ICD-10-CM

## 2021-06-27 RX ORDER — HYDROCHLOROTHIAZIDE 25 MG/1
TABLET ORAL
Qty: 90 TABLET | Refills: 0 | Status: SHIPPED | OUTPATIENT
Start: 2021-06-27 | End: 2021-07-02

## 2021-06-27 RX ORDER — POTASSIUM CHLORIDE 20 MEQ/1
20 TABLET, EXTENDED RELEASE ORAL DAILY
Qty: 90 TABLET | Refills: 0 | Status: SHIPPED | OUTPATIENT
Start: 2021-06-27 | End: 2021-09-29

## 2021-06-27 RX ORDER — PRAVASTATIN SODIUM 10 MG/1
TABLET ORAL
Qty: 90 TABLET | Refills: 0 | Status: SHIPPED | OUTPATIENT
Start: 2021-06-27 | End: 2021-10-20

## 2021-06-29 ENCOUNTER — PATIENT MESSAGE (OUTPATIENT)
Dept: FAMILY MEDICINE | Facility: CLINIC | Age: 61
End: 2021-06-29

## 2021-07-02 ENCOUNTER — OFFICE VISIT (OUTPATIENT)
Dept: FAMILY MEDICINE | Facility: CLINIC | Age: 61
End: 2021-07-02
Payer: COMMERCIAL

## 2021-07-02 VITALS
TEMPERATURE: 98 F | DIASTOLIC BLOOD PRESSURE: 65 MMHG | SYSTOLIC BLOOD PRESSURE: 106 MMHG | HEIGHT: 68 IN | HEART RATE: 84 BPM | BODY MASS INDEX: 26.01 KG/M2 | WEIGHT: 171.63 LBS

## 2021-07-02 DIAGNOSIS — E87.6 DRUG-INDUCED HYPOKALEMIA: ICD-10-CM

## 2021-07-02 DIAGNOSIS — T50.905A DRUG-INDUCED HYPOKALEMIA: ICD-10-CM

## 2021-07-02 DIAGNOSIS — E78.5 HYPERLIPIDEMIA, UNSPECIFIED HYPERLIPIDEMIA TYPE: ICD-10-CM

## 2021-07-02 DIAGNOSIS — C49.A0 MALIGNANT GASTROINTESTINAL STROMAL TUMOR, UNSPECIFIED SITE: Primary | ICD-10-CM

## 2021-07-02 DIAGNOSIS — I10 ESSENTIAL HYPERTENSION: ICD-10-CM

## 2021-07-02 PROCEDURE — 3008F PR BODY MASS INDEX (BMI) DOCUMENTED: ICD-10-PCS | Mod: CPTII,S$GLB,, | Performed by: FAMILY MEDICINE

## 2021-07-02 PROCEDURE — 1126F PR PAIN SEVERITY QUANTIFIED, NO PAIN PRESENT: ICD-10-PCS | Mod: S$GLB,,, | Performed by: FAMILY MEDICINE

## 2021-07-02 PROCEDURE — 99214 PR OFFICE/OUTPT VISIT, EST, LEVL IV, 30-39 MIN: ICD-10-PCS | Mod: S$GLB,,, | Performed by: FAMILY MEDICINE

## 2021-07-02 PROCEDURE — 99999 PR PBB SHADOW E&M-EST. PATIENT-LVL IV: CPT | Mod: PBBFAC,,, | Performed by: FAMILY MEDICINE

## 2021-07-02 PROCEDURE — 1126F AMNT PAIN NOTED NONE PRSNT: CPT | Mod: S$GLB,,, | Performed by: FAMILY MEDICINE

## 2021-07-02 PROCEDURE — 3008F BODY MASS INDEX DOCD: CPT | Mod: CPTII,S$GLB,, | Performed by: FAMILY MEDICINE

## 2021-07-02 PROCEDURE — 99214 OFFICE O/P EST MOD 30 MIN: CPT | Mod: S$GLB,,, | Performed by: FAMILY MEDICINE

## 2021-07-02 PROCEDURE — 99999 PR PBB SHADOW E&M-EST. PATIENT-LVL IV: ICD-10-PCS | Mod: PBBFAC,,, | Performed by: FAMILY MEDICINE

## 2021-07-06 ENCOUNTER — TELEPHONE (OUTPATIENT)
Dept: FAMILY MEDICINE | Facility: CLINIC | Age: 61
End: 2021-07-06

## 2021-07-07 ENCOUNTER — TELEPHONE (OUTPATIENT)
Dept: GASTROENTEROLOGY | Facility: CLINIC | Age: 61
End: 2021-07-07

## 2021-07-09 ENCOUNTER — SPECIALTY PHARMACY (OUTPATIENT)
Dept: PHARMACY | Facility: CLINIC | Age: 61
End: 2021-07-09

## 2021-07-16 ENCOUNTER — CLINICAL SUPPORT (OUTPATIENT)
Dept: FAMILY MEDICINE | Facility: CLINIC | Age: 61
End: 2021-07-16
Payer: COMMERCIAL

## 2021-07-16 ENCOUNTER — TELEPHONE (OUTPATIENT)
Dept: FAMILY MEDICINE | Facility: CLINIC | Age: 61
End: 2021-07-16

## 2021-07-16 VITALS — HEART RATE: 80 BPM | SYSTOLIC BLOOD PRESSURE: 112 MMHG | DIASTOLIC BLOOD PRESSURE: 69 MMHG

## 2021-07-16 DIAGNOSIS — Z01.30 BP CHECK: Primary | ICD-10-CM

## 2021-07-16 PROCEDURE — 99999 PR PBB SHADOW E&M-EST. PATIENT-LVL II: CPT | Mod: PBBFAC,,,

## 2021-07-16 PROCEDURE — 99999 PR PBB SHADOW E&M-EST. PATIENT-LVL II: ICD-10-PCS | Mod: PBBFAC,,,

## 2021-08-02 ENCOUNTER — OFFICE VISIT (OUTPATIENT)
Dept: HEMATOLOGY/ONCOLOGY | Facility: CLINIC | Age: 61
End: 2021-08-02
Payer: COMMERCIAL

## 2021-08-02 ENCOUNTER — LAB VISIT (OUTPATIENT)
Dept: LAB | Facility: HOSPITAL | Age: 61
End: 2021-08-02
Attending: INTERNAL MEDICINE
Payer: COMMERCIAL

## 2021-08-02 VITALS
BODY MASS INDEX: 27.76 KG/M2 | TEMPERATURE: 97 F | HEIGHT: 68 IN | DIASTOLIC BLOOD PRESSURE: 82 MMHG | WEIGHT: 183.19 LBS | OXYGEN SATURATION: 99 % | SYSTOLIC BLOOD PRESSURE: 126 MMHG | HEART RATE: 75 BPM

## 2021-08-02 DIAGNOSIS — C49.A0 MALIGNANT GASTROINTESTINAL STROMAL TUMOR, UNSPECIFIED SITE: ICD-10-CM

## 2021-08-02 DIAGNOSIS — E78.5 HYPERLIPIDEMIA, UNSPECIFIED HYPERLIPIDEMIA TYPE: ICD-10-CM

## 2021-08-02 LAB
ALBUMIN SERPL BCP-MCNC: 3.3 G/DL (ref 3.5–5.2)
ALP SERPL-CCNC: 66 U/L (ref 55–135)
ALT SERPL W/O P-5'-P-CCNC: 18 U/L (ref 10–44)
ANION GAP SERPL CALC-SCNC: 8 MMOL/L (ref 8–16)
AST SERPL-CCNC: 18 U/L (ref 10–40)
BASOPHILS # BLD AUTO: 0.04 K/UL (ref 0–0.2)
BASOPHILS NFR BLD: 0.7 % (ref 0–1.9)
BILIRUB SERPL-MCNC: 0.8 MG/DL (ref 0.1–1)
BUN SERPL-MCNC: 13 MG/DL (ref 6–20)
CALCIUM SERPL-MCNC: 8.6 MG/DL (ref 8.7–10.5)
CHLORIDE SERPL-SCNC: 111 MMOL/L (ref 95–110)
CHOLEST SERPL-MCNC: 107 MG/DL (ref 120–199)
CHOLEST/HDLC SERPL: 2.4 {RATIO} (ref 2–5)
CO2 SERPL-SCNC: 23 MMOL/L (ref 23–29)
CREAT SERPL-MCNC: 0.8 MG/DL (ref 0.5–1.4)
DIFFERENTIAL METHOD: ABNORMAL
EOSINOPHIL # BLD AUTO: 0.3 K/UL (ref 0–0.5)
EOSINOPHIL NFR BLD: 4.8 % (ref 0–8)
ERYTHROCYTE [DISTWIDTH] IN BLOOD BY AUTOMATED COUNT: 21.3 % (ref 11.5–14.5)
EST. GFR  (AFRICAN AMERICAN): >60 ML/MIN/1.73 M^2
EST. GFR  (NON AFRICAN AMERICAN): >60 ML/MIN/1.73 M^2
GLUCOSE SERPL-MCNC: 91 MG/DL (ref 70–110)
HCT VFR BLD AUTO: 32.5 % (ref 40–54)
HDLC SERPL-MCNC: 44 MG/DL (ref 40–75)
HDLC SERPL: 41.1 % (ref 20–50)
HGB BLD-MCNC: 10.5 G/DL (ref 14–18)
IMM GRANULOCYTES # BLD AUTO: 0.01 K/UL (ref 0–0.04)
IMM GRANULOCYTES NFR BLD AUTO: 0.2 % (ref 0–0.5)
LDLC SERPL CALC-MCNC: 56.4 MG/DL (ref 63–159)
LYMPHOCYTES # BLD AUTO: 1.9 K/UL (ref 1–4.8)
LYMPHOCYTES NFR BLD: 34.9 % (ref 18–48)
MCH RBC QN AUTO: 28.5 PG (ref 27–31)
MCHC RBC AUTO-ENTMCNC: 32.3 G/DL (ref 32–36)
MCV RBC AUTO: 88 FL (ref 82–98)
MONOCYTES # BLD AUTO: 0.6 K/UL (ref 0.3–1)
MONOCYTES NFR BLD: 11.6 % (ref 4–15)
NEUTROPHILS # BLD AUTO: 2.6 K/UL (ref 1.8–7.7)
NEUTROPHILS NFR BLD: 47.8 % (ref 38–73)
NONHDLC SERPL-MCNC: 63 MG/DL
NRBC BLD-RTO: 0 /100 WBC
PLATELET # BLD AUTO: 224 K/UL (ref 150–450)
PMV BLD AUTO: 9.2 FL (ref 9.2–12.9)
POTASSIUM SERPL-SCNC: 3.7 MMOL/L (ref 3.5–5.1)
PROT SERPL-MCNC: 6.3 G/DL (ref 6–8.4)
RBC # BLD AUTO: 3.68 M/UL (ref 4.6–6.2)
SODIUM SERPL-SCNC: 142 MMOL/L (ref 136–145)
TRIGL SERPL-MCNC: 33 MG/DL (ref 30–150)
WBC # BLD AUTO: 5.41 K/UL (ref 3.9–12.7)

## 2021-08-02 PROCEDURE — 1159F MED LIST DOCD IN RCRD: CPT | Mod: CPTII,S$GLB,, | Performed by: INTERNAL MEDICINE

## 2021-08-02 PROCEDURE — 3079F DIAST BP 80-89 MM HG: CPT | Mod: CPTII,S$GLB,, | Performed by: INTERNAL MEDICINE

## 2021-08-02 PROCEDURE — 99999 PR PBB SHADOW E&M-EST. PATIENT-LVL IV: CPT | Mod: PBBFAC,,, | Performed by: INTERNAL MEDICINE

## 2021-08-02 PROCEDURE — 85025 COMPLETE CBC W/AUTO DIFF WBC: CPT | Performed by: INTERNAL MEDICINE

## 2021-08-02 PROCEDURE — 3079F PR MOST RECENT DIASTOLIC BLOOD PRESSURE 80-89 MM HG: ICD-10-PCS | Mod: CPTII,S$GLB,, | Performed by: INTERNAL MEDICINE

## 2021-08-02 PROCEDURE — 80053 COMPREHEN METABOLIC PANEL: CPT | Performed by: INTERNAL MEDICINE

## 2021-08-02 PROCEDURE — 3074F PR MOST RECENT SYSTOLIC BLOOD PRESSURE < 130 MM HG: ICD-10-PCS | Mod: CPTII,S$GLB,, | Performed by: INTERNAL MEDICINE

## 2021-08-02 PROCEDURE — 99999 PR PBB SHADOW E&M-EST. PATIENT-LVL IV: ICD-10-PCS | Mod: PBBFAC,,, | Performed by: INTERNAL MEDICINE

## 2021-08-02 PROCEDURE — 3008F BODY MASS INDEX DOCD: CPT | Mod: CPTII,S$GLB,, | Performed by: INTERNAL MEDICINE

## 2021-08-02 PROCEDURE — 99215 PR OFFICE/OUTPT VISIT, EST, LEVL V, 40-54 MIN: ICD-10-PCS | Mod: S$GLB,,, | Performed by: INTERNAL MEDICINE

## 2021-08-02 PROCEDURE — 36415 COLL VENOUS BLD VENIPUNCTURE: CPT | Performed by: INTERNAL MEDICINE

## 2021-08-02 PROCEDURE — 3008F PR BODY MASS INDEX (BMI) DOCUMENTED: ICD-10-PCS | Mod: CPTII,S$GLB,, | Performed by: INTERNAL MEDICINE

## 2021-08-02 PROCEDURE — 1126F AMNT PAIN NOTED NONE PRSNT: CPT | Mod: CPTII,S$GLB,, | Performed by: INTERNAL MEDICINE

## 2021-08-02 PROCEDURE — 99215 OFFICE O/P EST HI 40 MIN: CPT | Mod: S$GLB,,, | Performed by: INTERNAL MEDICINE

## 2021-08-02 PROCEDURE — 1126F PR PAIN SEVERITY QUANTIFIED, NO PAIN PRESENT: ICD-10-PCS | Mod: CPTII,S$GLB,, | Performed by: INTERNAL MEDICINE

## 2021-08-02 PROCEDURE — 3074F SYST BP LT 130 MM HG: CPT | Mod: CPTII,S$GLB,, | Performed by: INTERNAL MEDICINE

## 2021-08-02 PROCEDURE — 1159F PR MEDICATION LIST DOCUMENTED IN MEDICAL RECORD: ICD-10-PCS | Mod: CPTII,S$GLB,, | Performed by: INTERNAL MEDICINE

## 2021-08-02 PROCEDURE — 80061 LIPID PANEL: CPT | Performed by: FAMILY MEDICINE

## 2021-08-06 ENCOUNTER — SPECIALTY PHARMACY (OUTPATIENT)
Dept: PHARMACY | Facility: CLINIC | Age: 61
End: 2021-08-06

## 2021-09-07 ENCOUNTER — SPECIALTY PHARMACY (OUTPATIENT)
Dept: PHARMACY | Facility: CLINIC | Age: 61
End: 2021-09-07

## 2021-09-07 ENCOUNTER — TELEPHONE (OUTPATIENT)
Dept: PHARMACY | Facility: CLINIC | Age: 61
End: 2021-09-07

## 2021-09-07 ENCOUNTER — LAB VISIT (OUTPATIENT)
Dept: LAB | Facility: HOSPITAL | Age: 61
End: 2021-09-07
Attending: INTERNAL MEDICINE
Payer: COMMERCIAL

## 2021-09-07 ENCOUNTER — OFFICE VISIT (OUTPATIENT)
Dept: HEMATOLOGY/ONCOLOGY | Facility: CLINIC | Age: 61
End: 2021-09-07
Payer: COMMERCIAL

## 2021-09-07 VITALS
WEIGHT: 173.5 LBS | SYSTOLIC BLOOD PRESSURE: 135 MMHG | TEMPERATURE: 97 F | DIASTOLIC BLOOD PRESSURE: 84 MMHG | HEIGHT: 68 IN | BODY MASS INDEX: 26.3 KG/M2 | OXYGEN SATURATION: 100 % | HEART RATE: 83 BPM

## 2021-09-07 DIAGNOSIS — C49.A0 MALIGNANT GASTROINTESTINAL STROMAL TUMOR, UNSPECIFIED SITE: ICD-10-CM

## 2021-09-07 LAB
ALBUMIN SERPL BCP-MCNC: 3.5 G/DL (ref 3.5–5.2)
ALP SERPL-CCNC: 62 U/L (ref 55–135)
ALT SERPL W/O P-5'-P-CCNC: 17 U/L (ref 10–44)
ANION GAP SERPL CALC-SCNC: 8 MMOL/L (ref 8–16)
AST SERPL-CCNC: 20 U/L (ref 10–40)
BASOPHILS # BLD AUTO: 0.05 K/UL (ref 0–0.2)
BASOPHILS NFR BLD: 0.8 % (ref 0–1.9)
BILIRUB SERPL-MCNC: 0.8 MG/DL (ref 0.1–1)
BUN SERPL-MCNC: 13 MG/DL (ref 6–20)
CALCIUM SERPL-MCNC: 8.6 MG/DL (ref 8.7–10.5)
CHLORIDE SERPL-SCNC: 112 MMOL/L (ref 95–110)
CO2 SERPL-SCNC: 22 MMOL/L (ref 23–29)
CREAT SERPL-MCNC: 0.9 MG/DL (ref 0.5–1.4)
DIFFERENTIAL METHOD: ABNORMAL
EOSINOPHIL # BLD AUTO: 0.3 K/UL (ref 0–0.5)
EOSINOPHIL NFR BLD: 5.1 % (ref 0–8)
ERYTHROCYTE [DISTWIDTH] IN BLOOD BY AUTOMATED COUNT: 19.2 % (ref 11.5–14.5)
EST. GFR  (AFRICAN AMERICAN): >60 ML/MIN/1.73 M^2
EST. GFR  (NON AFRICAN AMERICAN): >60 ML/MIN/1.73 M^2
GLUCOSE SERPL-MCNC: 93 MG/DL (ref 70–110)
HCT VFR BLD AUTO: 32.9 % (ref 40–54)
HGB BLD-MCNC: 10.7 G/DL (ref 14–18)
IMM GRANULOCYTES # BLD AUTO: 0.01 K/UL (ref 0–0.04)
IMM GRANULOCYTES NFR BLD AUTO: 0.2 % (ref 0–0.5)
LYMPHOCYTES # BLD AUTO: 1.8 K/UL (ref 1–4.8)
LYMPHOCYTES NFR BLD: 29.9 % (ref 18–48)
MCH RBC QN AUTO: 29.7 PG (ref 27–31)
MCHC RBC AUTO-ENTMCNC: 32.5 G/DL (ref 32–36)
MCV RBC AUTO: 91 FL (ref 82–98)
MONOCYTES # BLD AUTO: 0.7 K/UL (ref 0.3–1)
MONOCYTES NFR BLD: 11.6 % (ref 4–15)
NEUTROPHILS # BLD AUTO: 3.2 K/UL (ref 1.8–7.7)
NEUTROPHILS NFR BLD: 52.4 % (ref 38–73)
NRBC BLD-RTO: 0 /100 WBC
PLATELET # BLD AUTO: 184 K/UL (ref 150–450)
PMV BLD AUTO: 8.6 FL (ref 9.2–12.9)
POTASSIUM SERPL-SCNC: 4.1 MMOL/L (ref 3.5–5.1)
PROT SERPL-MCNC: 6.7 G/DL (ref 6–8.4)
RBC # BLD AUTO: 3.6 M/UL (ref 4.6–6.2)
SODIUM SERPL-SCNC: 142 MMOL/L (ref 136–145)
WBC # BLD AUTO: 6.05 K/UL (ref 3.9–12.7)

## 2021-09-07 PROCEDURE — 3079F DIAST BP 80-89 MM HG: CPT | Mod: CPTII,S$GLB,, | Performed by: INTERNAL MEDICINE

## 2021-09-07 PROCEDURE — 3075F SYST BP GE 130 - 139MM HG: CPT | Mod: CPTII,S$GLB,, | Performed by: INTERNAL MEDICINE

## 2021-09-07 PROCEDURE — 3008F PR BODY MASS INDEX (BMI) DOCUMENTED: ICD-10-PCS | Mod: CPTII,S$GLB,, | Performed by: INTERNAL MEDICINE

## 2021-09-07 PROCEDURE — 36415 COLL VENOUS BLD VENIPUNCTURE: CPT | Performed by: INTERNAL MEDICINE

## 2021-09-07 PROCEDURE — 99215 OFFICE O/P EST HI 40 MIN: CPT | Mod: S$GLB,,, | Performed by: INTERNAL MEDICINE

## 2021-09-07 PROCEDURE — 99999 PR PBB SHADOW E&M-EST. PATIENT-LVL III: CPT | Mod: PBBFAC,,, | Performed by: INTERNAL MEDICINE

## 2021-09-07 PROCEDURE — 85025 COMPLETE CBC W/AUTO DIFF WBC: CPT | Performed by: INTERNAL MEDICINE

## 2021-09-07 PROCEDURE — 3008F BODY MASS INDEX DOCD: CPT | Mod: CPTII,S$GLB,, | Performed by: INTERNAL MEDICINE

## 2021-09-07 PROCEDURE — 99215 PR OFFICE/OUTPT VISIT, EST, LEVL V, 40-54 MIN: ICD-10-PCS | Mod: S$GLB,,, | Performed by: INTERNAL MEDICINE

## 2021-09-07 PROCEDURE — 1159F PR MEDICATION LIST DOCUMENTED IN MEDICAL RECORD: ICD-10-PCS | Mod: CPTII,S$GLB,, | Performed by: INTERNAL MEDICINE

## 2021-09-07 PROCEDURE — 3079F PR MOST RECENT DIASTOLIC BLOOD PRESSURE 80-89 MM HG: ICD-10-PCS | Mod: CPTII,S$GLB,, | Performed by: INTERNAL MEDICINE

## 2021-09-07 PROCEDURE — 80053 COMPREHEN METABOLIC PANEL: CPT | Performed by: INTERNAL MEDICINE

## 2021-09-07 PROCEDURE — 99999 PR PBB SHADOW E&M-EST. PATIENT-LVL III: ICD-10-PCS | Mod: PBBFAC,,, | Performed by: INTERNAL MEDICINE

## 2021-09-07 PROCEDURE — 3075F PR MOST RECENT SYSTOLIC BLOOD PRESS GE 130-139MM HG: ICD-10-PCS | Mod: CPTII,S$GLB,, | Performed by: INTERNAL MEDICINE

## 2021-09-07 PROCEDURE — 1159F MED LIST DOCD IN RCRD: CPT | Mod: CPTII,S$GLB,, | Performed by: INTERNAL MEDICINE

## 2021-09-29 RX ORDER — POTASSIUM CHLORIDE 20 MEQ/1
TABLET, EXTENDED RELEASE ORAL
Qty: 90 TABLET | Refills: 3 | Status: SHIPPED | OUTPATIENT
Start: 2021-09-29 | End: 2022-11-07

## 2021-10-05 ENCOUNTER — HOSPITAL ENCOUNTER (OUTPATIENT)
Dept: RADIOLOGY | Facility: HOSPITAL | Age: 61
Discharge: HOME | End: 2021-10-05
Attending: INTERNAL MEDICINE
Payer: COMMERCIAL

## 2021-10-05 ENCOUNTER — LAB VISIT (OUTPATIENT)
Dept: LAB | Facility: HOSPITAL | Age: 61
End: 2021-10-05
Attending: INTERNAL MEDICINE
Payer: COMMERCIAL

## 2021-10-05 DIAGNOSIS — C49.A0 MALIGNANT GASTROINTESTINAL STROMAL TUMOR, UNSPECIFIED SITE: ICD-10-CM

## 2021-10-05 LAB
ALBUMIN SERPL BCP-MCNC: 3.6 G/DL (ref 3.5–5.2)
ALP SERPL-CCNC: 62 U/L (ref 55–135)
ALT SERPL W/O P-5'-P-CCNC: 17 U/L (ref 10–44)
ANION GAP SERPL CALC-SCNC: 9 MMOL/L (ref 8–16)
AST SERPL-CCNC: 17 U/L (ref 10–40)
BASOPHILS # BLD AUTO: 0.02 K/UL (ref 0–0.2)
BASOPHILS NFR BLD: 0.3 % (ref 0–1.9)
BILIRUB SERPL-MCNC: 0.6 MG/DL (ref 0.1–1)
BUN SERPL-MCNC: 12 MG/DL (ref 6–20)
CALCIUM SERPL-MCNC: 8.5 MG/DL (ref 8.7–10.5)
CHLORIDE SERPL-SCNC: 107 MMOL/L (ref 95–110)
CO2 SERPL-SCNC: 24 MMOL/L (ref 23–29)
CREAT SERPL-MCNC: 0.9 MG/DL (ref 0.5–1.4)
DIFFERENTIAL METHOD: ABNORMAL
EOSINOPHIL # BLD AUTO: 0.1 K/UL (ref 0–0.5)
EOSINOPHIL NFR BLD: 1.3 % (ref 0–8)
ERYTHROCYTE [DISTWIDTH] IN BLOOD BY AUTOMATED COUNT: 16.8 % (ref 11.5–14.5)
EST. GFR  (AFRICAN AMERICAN): >60 ML/MIN/1.73 M^2
EST. GFR  (NON AFRICAN AMERICAN): >60 ML/MIN/1.73 M^2
GLUCOSE SERPL-MCNC: 106 MG/DL (ref 70–110)
HCT VFR BLD AUTO: 33.7 % (ref 40–54)
HGB BLD-MCNC: 11.3 G/DL (ref 14–18)
IMM GRANULOCYTES # BLD AUTO: 0.01 K/UL (ref 0–0.04)
IMM GRANULOCYTES NFR BLD AUTO: 0.1 % (ref 0–0.5)
LYMPHOCYTES # BLD AUTO: 1.7 K/UL (ref 1–4.8)
LYMPHOCYTES NFR BLD: 21.8 % (ref 18–48)
MCH RBC QN AUTO: 30.7 PG (ref 27–31)
MCHC RBC AUTO-ENTMCNC: 33.5 G/DL (ref 32–36)
MCV RBC AUTO: 92 FL (ref 82–98)
MONOCYTES # BLD AUTO: 0.8 K/UL (ref 0.3–1)
MONOCYTES NFR BLD: 9.7 % (ref 4–15)
NEUTROPHILS # BLD AUTO: 5.3 K/UL (ref 1.8–7.7)
NEUTROPHILS NFR BLD: 66.8 % (ref 38–73)
NRBC BLD-RTO: 0 /100 WBC
PLATELET # BLD AUTO: 223 K/UL (ref 150–450)
PMV BLD AUTO: 8.9 FL (ref 9.2–12.9)
POTASSIUM SERPL-SCNC: 3.9 MMOL/L (ref 3.5–5.1)
PROT SERPL-MCNC: 6.8 G/DL (ref 6–8.4)
RBC # BLD AUTO: 3.68 M/UL (ref 4.6–6.2)
SODIUM SERPL-SCNC: 140 MMOL/L (ref 136–145)
WBC # BLD AUTO: 7.86 K/UL (ref 3.9–12.7)

## 2021-10-05 PROCEDURE — 74177 CT CHEST ABDOMEN PELVIS WITH CONTRAST (XPD): ICD-10-PCS | Mod: 26,,, | Performed by: RADIOLOGY

## 2021-10-05 PROCEDURE — 71260 CT THORAX DX C+: CPT | Mod: 26,,, | Performed by: RADIOLOGY

## 2021-10-05 PROCEDURE — 36415 COLL VENOUS BLD VENIPUNCTURE: CPT | Performed by: INTERNAL MEDICINE

## 2021-10-05 PROCEDURE — 85025 COMPLETE CBC W/AUTO DIFF WBC: CPT | Performed by: INTERNAL MEDICINE

## 2021-10-05 PROCEDURE — 74177 CT ABD & PELVIS W/CONTRAST: CPT | Mod: 26,,, | Performed by: RADIOLOGY

## 2021-10-05 PROCEDURE — 80053 COMPREHEN METABOLIC PANEL: CPT | Performed by: INTERNAL MEDICINE

## 2021-10-05 PROCEDURE — G1004 CDSM NDSC: HCPCS

## 2021-10-05 PROCEDURE — 25500020 PHARM REV CODE 255: Performed by: INTERNAL MEDICINE

## 2021-10-05 PROCEDURE — 71260 CT CHEST ABDOMEN PELVIS WITH CONTRAST (XPD): ICD-10-PCS | Mod: 26,,, | Performed by: RADIOLOGY

## 2021-10-05 PROCEDURE — A9698 NON-RAD CONTRAST MATERIALNOC: HCPCS | Performed by: INTERNAL MEDICINE

## 2021-10-05 RX ADMIN — IOHEXOL 75 ML: 350 INJECTION, SOLUTION INTRAVENOUS at 11:10

## 2021-10-05 RX ADMIN — IOHEXOL 1000 ML: 12 SOLUTION ORAL at 10:10

## 2021-10-06 ENCOUNTER — OFFICE VISIT (OUTPATIENT)
Dept: HEMATOLOGY/ONCOLOGY | Facility: CLINIC | Age: 61
End: 2021-10-06
Payer: COMMERCIAL

## 2021-10-06 ENCOUNTER — SPECIALTY PHARMACY (OUTPATIENT)
Dept: PHARMACY | Facility: CLINIC | Age: 61
End: 2021-10-06

## 2021-10-06 DIAGNOSIS — C49.A0 MALIGNANT GASTROINTESTINAL STROMAL TUMOR, UNSPECIFIED SITE: ICD-10-CM

## 2021-10-06 PROCEDURE — 99215 PR OFFICE/OUTPT VISIT, EST, LEVL V, 40-54 MIN: ICD-10-PCS | Mod: 95,,, | Performed by: INTERNAL MEDICINE

## 2021-10-06 PROCEDURE — 99215 OFFICE O/P EST HI 40 MIN: CPT | Mod: 95,,, | Performed by: INTERNAL MEDICINE

## 2021-10-13 ENCOUNTER — TELEPHONE (OUTPATIENT)
Dept: HEMATOLOGY/ONCOLOGY | Facility: CLINIC | Age: 61
End: 2021-10-13

## 2021-10-20 RX ORDER — PRAVASTATIN SODIUM 10 MG/1
TABLET ORAL
Qty: 90 TABLET | Refills: 3 | Status: SHIPPED | OUTPATIENT
Start: 2021-10-20 | End: 2022-11-28

## 2021-11-08 ENCOUNTER — SPECIALTY PHARMACY (OUTPATIENT)
Dept: PHARMACY | Facility: CLINIC | Age: 61
End: 2021-11-08
Payer: COMMERCIAL

## 2021-11-09 ENCOUNTER — LAB VISIT (OUTPATIENT)
Dept: LAB | Facility: HOSPITAL | Age: 61
End: 2021-11-09
Attending: FAMILY MEDICINE
Payer: COMMERCIAL

## 2021-11-09 DIAGNOSIS — C49.A0 MALIGNANT GASTROINTESTINAL STROMAL TUMOR, UNSPECIFIED SITE: ICD-10-CM

## 2021-11-09 LAB
ALBUMIN SERPL BCP-MCNC: 3.2 G/DL (ref 3.5–5.2)
ALP SERPL-CCNC: 70 U/L (ref 55–135)
ALT SERPL W/O P-5'-P-CCNC: 12 U/L (ref 10–44)
ANION GAP SERPL CALC-SCNC: 4 MMOL/L (ref 8–16)
AST SERPL-CCNC: 14 U/L (ref 10–40)
BASOPHILS # BLD AUTO: 0.04 K/UL (ref 0–0.2)
BASOPHILS NFR BLD: 0.6 % (ref 0–1.9)
BILIRUB SERPL-MCNC: 0.5 MG/DL (ref 0.1–1)
BUN SERPL-MCNC: 9 MG/DL (ref 6–20)
CALCIUM SERPL-MCNC: 8.8 MG/DL (ref 8.7–10.5)
CHLORIDE SERPL-SCNC: 106 MMOL/L (ref 95–110)
CO2 SERPL-SCNC: 28 MMOL/L (ref 23–29)
CREAT SERPL-MCNC: 0.9 MG/DL (ref 0.5–1.4)
DIFFERENTIAL METHOD: ABNORMAL
EOSINOPHIL # BLD AUTO: 0.1 K/UL (ref 0–0.5)
EOSINOPHIL NFR BLD: 1.5 % (ref 0–8)
ERYTHROCYTE [DISTWIDTH] IN BLOOD BY AUTOMATED COUNT: 14.3 % (ref 11.5–14.5)
EST. GFR  (AFRICAN AMERICAN): >60 ML/MIN/1.73 M^2
EST. GFR  (NON AFRICAN AMERICAN): >60 ML/MIN/1.73 M^2
GLUCOSE SERPL-MCNC: 91 MG/DL (ref 70–110)
HCT VFR BLD AUTO: 30 % (ref 40–54)
HGB BLD-MCNC: 10 G/DL (ref 14–18)
IMM GRANULOCYTES # BLD AUTO: 0.02 K/UL (ref 0–0.04)
IMM GRANULOCYTES NFR BLD AUTO: 0.3 % (ref 0–0.5)
LYMPHOCYTES # BLD AUTO: 1.5 K/UL (ref 1–4.8)
LYMPHOCYTES NFR BLD: 23.4 % (ref 18–48)
MCH RBC QN AUTO: 29.7 PG (ref 27–31)
MCHC RBC AUTO-ENTMCNC: 33.3 G/DL (ref 32–36)
MCV RBC AUTO: 89 FL (ref 82–98)
MONOCYTES # BLD AUTO: 0.6 K/UL (ref 0.3–1)
MONOCYTES NFR BLD: 8.5 % (ref 4–15)
NEUTROPHILS # BLD AUTO: 4.3 K/UL (ref 1.8–7.7)
NEUTROPHILS NFR BLD: 66 % (ref 38–73)
NRBC BLD-RTO: 0 /100 WBC
PLATELET # BLD AUTO: 306 K/UL (ref 150–450)
PMV BLD AUTO: 8.4 FL (ref 9.2–12.9)
POTASSIUM SERPL-SCNC: 3.8 MMOL/L (ref 3.5–5.1)
PROT SERPL-MCNC: 6.8 G/DL (ref 6–8.4)
RBC # BLD AUTO: 3.37 M/UL (ref 4.6–6.2)
SODIUM SERPL-SCNC: 138 MMOL/L (ref 136–145)
WBC # BLD AUTO: 6.58 K/UL (ref 3.9–12.7)

## 2021-11-09 PROCEDURE — 36415 COLL VENOUS BLD VENIPUNCTURE: CPT | Mod: PO | Performed by: INTERNAL MEDICINE

## 2021-11-09 PROCEDURE — 85025 COMPLETE CBC W/AUTO DIFF WBC: CPT | Mod: PO | Performed by: INTERNAL MEDICINE

## 2021-11-09 PROCEDURE — 80053 COMPREHEN METABOLIC PANEL: CPT | Performed by: INTERNAL MEDICINE

## 2021-11-10 ENCOUNTER — TELEPHONE (OUTPATIENT)
Dept: HEMATOLOGY/ONCOLOGY | Facility: CLINIC | Age: 61
End: 2021-11-10
Payer: COMMERCIAL

## 2021-11-16 ENCOUNTER — OFFICE VISIT (OUTPATIENT)
Dept: HEMATOLOGY/ONCOLOGY | Facility: CLINIC | Age: 61
End: 2021-11-16
Payer: COMMERCIAL

## 2021-11-16 DIAGNOSIS — C49.A0 MALIGNANT GASTROINTESTINAL STROMAL TUMOR, UNSPECIFIED SITE: ICD-10-CM

## 2021-11-16 PROCEDURE — 99215 PR OFFICE/OUTPT VISIT, EST, LEVL V, 40-54 MIN: ICD-10-PCS | Mod: 95,,, | Performed by: INTERNAL MEDICINE

## 2021-11-16 PROCEDURE — 99215 OFFICE O/P EST HI 40 MIN: CPT | Mod: 95,,, | Performed by: INTERNAL MEDICINE

## 2021-12-09 ENCOUNTER — SPECIALTY PHARMACY (OUTPATIENT)
Dept: PHARMACY | Facility: CLINIC | Age: 61
End: 2021-12-09
Payer: COMMERCIAL

## 2021-12-09 DIAGNOSIS — C49.A2 GASTRIC STROMAL TUMOR: ICD-10-CM

## 2021-12-09 RX ORDER — IMATINIB MESYLATE 400 MG/1
400 TABLET, FILM COATED ORAL DAILY
Qty: 90 TABLET | Refills: 1 | Status: SHIPPED | OUTPATIENT
Start: 2021-12-09 | End: 2022-06-06 | Stop reason: SDUPTHER

## 2021-12-14 ENCOUNTER — SPECIALTY PHARMACY (OUTPATIENT)
Dept: PHARMACY | Facility: CLINIC | Age: 61
End: 2021-12-14
Payer: COMMERCIAL

## 2021-12-28 ENCOUNTER — LAB VISIT (OUTPATIENT)
Dept: LAB | Facility: HOSPITAL | Age: 61
End: 2021-12-28
Attending: FAMILY MEDICINE
Payer: COMMERCIAL

## 2021-12-28 DIAGNOSIS — C49.A0 MALIGNANT GASTROINTESTINAL STROMAL TUMOR, UNSPECIFIED SITE: ICD-10-CM

## 2021-12-28 LAB
ALBUMIN SERPL BCP-MCNC: 3.3 G/DL (ref 3.5–5.2)
ALP SERPL-CCNC: 81 U/L (ref 55–135)
ALT SERPL W/O P-5'-P-CCNC: 18 U/L (ref 10–44)
ANION GAP SERPL CALC-SCNC: 6 MMOL/L (ref 8–16)
AST SERPL-CCNC: 18 U/L (ref 10–40)
BASOPHILS # BLD AUTO: 0.03 K/UL (ref 0–0.2)
BASOPHILS NFR BLD: 0.5 % (ref 0–1.9)
BILIRUB SERPL-MCNC: 0.6 MG/DL (ref 0.1–1)
BUN SERPL-MCNC: 11 MG/DL (ref 6–20)
CALCIUM SERPL-MCNC: 8.6 MG/DL (ref 8.7–10.5)
CHLORIDE SERPL-SCNC: 111 MMOL/L (ref 95–110)
CO2 SERPL-SCNC: 25 MMOL/L (ref 23–29)
CREAT SERPL-MCNC: 0.8 MG/DL (ref 0.5–1.4)
DIFFERENTIAL METHOD: ABNORMAL
EOSINOPHIL # BLD AUTO: 0.1 K/UL (ref 0–0.5)
EOSINOPHIL NFR BLD: 2.1 % (ref 0–8)
ERYTHROCYTE [DISTWIDTH] IN BLOOD BY AUTOMATED COUNT: 17.2 % (ref 11.5–14.5)
EST. GFR  (AFRICAN AMERICAN): >60 ML/MIN/1.73 M^2
EST. GFR  (NON AFRICAN AMERICAN): >60 ML/MIN/1.73 M^2
GLUCOSE SERPL-MCNC: 86 MG/DL (ref 70–110)
HCT VFR BLD AUTO: 32.6 % (ref 40–54)
HGB BLD-MCNC: 10.7 G/DL (ref 14–18)
IMM GRANULOCYTES # BLD AUTO: 0.02 K/UL (ref 0–0.04)
IMM GRANULOCYTES NFR BLD AUTO: 0.3 % (ref 0–0.5)
LYMPHOCYTES # BLD AUTO: 1.5 K/UL (ref 1–4.8)
LYMPHOCYTES NFR BLD: 22.9 % (ref 18–48)
MCH RBC QN AUTO: 28.8 PG (ref 27–31)
MCHC RBC AUTO-ENTMCNC: 32.8 G/DL (ref 32–36)
MCV RBC AUTO: 88 FL (ref 82–98)
MONOCYTES # BLD AUTO: 0.7 K/UL (ref 0.3–1)
MONOCYTES NFR BLD: 10.5 % (ref 4–15)
NEUTROPHILS # BLD AUTO: 4.2 K/UL (ref 1.8–7.7)
NEUTROPHILS NFR BLD: 64 % (ref 38–73)
NRBC BLD-RTO: 0 /100 WBC
PLATELET # BLD AUTO: 242 K/UL (ref 150–450)
PMV BLD AUTO: 8.7 FL (ref 9.2–12.9)
POTASSIUM SERPL-SCNC: 4 MMOL/L (ref 3.5–5.1)
PROT SERPL-MCNC: 6.5 G/DL (ref 6–8.4)
RBC # BLD AUTO: 3.72 M/UL (ref 4.6–6.2)
SODIUM SERPL-SCNC: 142 MMOL/L (ref 136–145)
WBC # BLD AUTO: 6.59 K/UL (ref 3.9–12.7)

## 2021-12-28 PROCEDURE — 36415 COLL VENOUS BLD VENIPUNCTURE: CPT | Mod: PO | Performed by: INTERNAL MEDICINE

## 2021-12-28 PROCEDURE — 80053 COMPREHEN METABOLIC PANEL: CPT | Performed by: INTERNAL MEDICINE

## 2021-12-28 PROCEDURE — 85025 COMPLETE CBC W/AUTO DIFF WBC: CPT | Mod: PO | Performed by: INTERNAL MEDICINE

## 2021-12-29 ENCOUNTER — OFFICE VISIT (OUTPATIENT)
Dept: HEMATOLOGY/ONCOLOGY | Facility: CLINIC | Age: 61
End: 2021-12-29
Payer: COMMERCIAL

## 2021-12-29 DIAGNOSIS — D84.821 IMMUNOSUPPRESSED DUE TO CHEMOTHERAPY: Primary | ICD-10-CM

## 2021-12-29 DIAGNOSIS — T45.1X5A IMMUNOSUPPRESSED DUE TO CHEMOTHERAPY: Primary | ICD-10-CM

## 2021-12-29 DIAGNOSIS — C49.A0 MALIGNANT GASTROINTESTINAL STROMAL TUMOR, UNSPECIFIED SITE: ICD-10-CM

## 2021-12-29 DIAGNOSIS — Z79.899 IMMUNOSUPPRESSED DUE TO CHEMOTHERAPY: Primary | ICD-10-CM

## 2021-12-29 PROCEDURE — 99215 PR OFFICE/OUTPT VISIT, EST, LEVL V, 40-54 MIN: ICD-10-PCS | Mod: 95,,, | Performed by: INTERNAL MEDICINE

## 2021-12-29 PROCEDURE — 99215 OFFICE O/P EST HI 40 MIN: CPT | Mod: 95,,, | Performed by: INTERNAL MEDICINE

## 2022-01-10 ENCOUNTER — SPECIALTY PHARMACY (OUTPATIENT)
Dept: PHARMACY | Facility: CLINIC | Age: 62
End: 2022-01-10
Payer: COMMERCIAL

## 2022-01-10 NOTE — TELEPHONE ENCOUNTER
Specialty Pharmacy - Refill Coordination    Specialty Medication Orders Linked to Encounter    Flowsheet Row Most Recent Value   Medication #1 imatinib (GLEEVEC) 400 MG Tab (Order#[id number], Rx#[id number])      Completed [facility] refill sheet and will emailed to [facility]    Refill Questions - Documented Responses    Flowsheet Row Most Recent Value   Patient Availability and HIPAA Verification    Does patient want to proceed with activity? Yes   HIPAA/medical authority confirmed? Yes   Relationship to patient of person spoken to? Self   Refill Screening Questions    Changes to allergies? No   Changes to medications? No   New conditions since last clinic visit? No   Unplanned office visit, urgent care, ED, or hospital admission in the last 4 weeks? No   How does patient/caregiver feel medication is working? Good   Financial problems or insurance changes? No   How many doses of your specialty medications were missed in the last 4 weeks? 0   Would patient like to speak to a pharmacist? No   When does the patient need to receive the medication? 01/18/22   Refill Delivery Questions    How will the patient receive the medication? Delivery Allegra   When does the patient need to receive the medication? 01/18/22   Shipping Address Home   Address in [facility] confirmed and updated if neccessary? Yes   Expected Copay ($) 80   Is the patient able to afford the medication copay? Yes   Payment Method invoice (approval required)  [[facility]]   Days supply of Refill 30   Supplies needed? No supplies needed   Refill activity completed? Yes   Refill activity plan Refill scheduled   Shipment/Pickup Date: 01/14/22          Current Outpatient Medications   Medication Sig    acetaminophen (TYLENOL) 325 MG tablet Take 325 mg by mouth every 6 (six) hours as needed for Pain.    dicyclomine (BENTYL) 20 mg tablet Take 20 mg by mouth every 6 (six) hours. Uses prn    imatinib (GLEEVEC) 400 MG Tab Take 1 tablet (400 mg total) by mouth once daily.     multivitamin capsule Take 1 capsule by mouth once daily.    ondansetron (ZOFRAN-ODT) 8 MG TbDL Take 1 tablet (8 mg total) by mouth every 12 (twelve) hours as needed.    pantoprazole (PROTONIX) 40 MG tablet Take 1 tablet (40 mg total) by mouth once daily. (Patient taking differently: Take 40 mg by mouth once daily. Uses prn)    potassium chloride SA (K-DUR,KLOR-CON) 20 MEQ tablet Take 1 tablet by mouth once daily    pravastatin (PRAVACHOL) 10 MG tablet Take 1 tablet by mouth once daily    prochlorperazine (COMPAZINE) 5 MG tablet Take 1 tablet (5 mg total) by mouth every 6 (six) hours as needed.   Last reviewed on 9/7/2021 11:45 AM by Lynda Brantley LPN    Review of patient's allergies indicates:  No Known Allergies Last reviewed on  12/29/2021 11:57 AM by Francisco Johnston      Tasks added this encounter   2/10/2022 - Refill Call (Auto Added)   Tasks due within next 3 months   3/2/2022 - Clinical - Follow Up Assesement (90 day)     Batool Zamora, PharmD  [facility]  [address] [facility] [address]  Phone: [phone]  Fax: [phone]

## 2022-01-24 ENCOUNTER — LAB VISIT (OUTPATIENT)
Dept: LAB | Facility: HOSPITAL | Age: 62
End: 2022-01-24
Attending: INTERNAL MEDICINE
Payer: COMMERCIAL

## 2022-01-24 DIAGNOSIS — D84.821 IMMUNOSUPPRESSED DUE TO CHEMOTHERAPY: ICD-10-CM

## 2022-01-24 DIAGNOSIS — Z79.899 IMMUNOSUPPRESSED DUE TO CHEMOTHERAPY: ICD-10-CM

## 2022-01-24 DIAGNOSIS — C49.A0 MALIGNANT GASTROINTESTINAL STROMAL TUMOR, UNSPECIFIED SITE: ICD-10-CM

## 2022-01-24 DIAGNOSIS — T45.1X5A IMMUNOSUPPRESSED DUE TO CHEMOTHERAPY: ICD-10-CM

## 2022-01-24 LAB
ALBUMIN SERPL BCP-MCNC: 3.5 G/DL (ref 3.5–5.2)
ALP SERPL-CCNC: 72 U/L (ref 55–135)
ALT SERPL W/O P-5'-P-CCNC: 20 U/L (ref 10–44)
ANION GAP SERPL CALC-SCNC: 5 MMOL/L (ref 8–16)
AST SERPL-CCNC: 21 U/L (ref 10–40)
BASOPHILS # BLD AUTO: 0.04 K/UL (ref 0–0.2)
BASOPHILS NFR BLD: 0.6 % (ref 0–1.9)
BILIRUB SERPL-MCNC: 0.7 MG/DL (ref 0.1–1)
BUN SERPL-MCNC: 10 MG/DL (ref 6–20)
CALCIUM SERPL-MCNC: 9 MG/DL (ref 8.7–10.5)
CHLORIDE SERPL-SCNC: 108 MMOL/L (ref 95–110)
CO2 SERPL-SCNC: 27 MMOL/L (ref 23–29)
CREAT SERPL-MCNC: 0.9 MG/DL (ref 0.5–1.4)
DIFFERENTIAL METHOD: ABNORMAL
EOSINOPHIL # BLD AUTO: 0.1 K/UL (ref 0–0.5)
EOSINOPHIL NFR BLD: 2.1 % (ref 0–8)
ERYTHROCYTE [DISTWIDTH] IN BLOOD BY AUTOMATED COUNT: 18.9 % (ref 11.5–14.5)
EST. GFR  (AFRICAN AMERICAN): >60 ML/MIN/1.73 M^2
EST. GFR  (NON AFRICAN AMERICAN): >60 ML/MIN/1.73 M^2
GLUCOSE SERPL-MCNC: 106 MG/DL (ref 70–110)
HCT VFR BLD AUTO: 33.9 % (ref 40–54)
HGB BLD-MCNC: 11.2 G/DL (ref 14–18)
IMM GRANULOCYTES # BLD AUTO: 0.01 K/UL (ref 0–0.04)
IMM GRANULOCYTES NFR BLD AUTO: 0.1 % (ref 0–0.5)
LYMPHOCYTES # BLD AUTO: 1.7 K/UL (ref 1–4.8)
LYMPHOCYTES NFR BLD: 25 % (ref 18–48)
MCH RBC QN AUTO: 29.3 PG (ref 27–31)
MCHC RBC AUTO-ENTMCNC: 33 G/DL (ref 32–36)
MCV RBC AUTO: 89 FL (ref 82–98)
MONOCYTES # BLD AUTO: 0.6 K/UL (ref 0.3–1)
MONOCYTES NFR BLD: 9.1 % (ref 4–15)
NEUTROPHILS # BLD AUTO: 4.3 K/UL (ref 1.8–7.7)
NEUTROPHILS NFR BLD: 63.2 % (ref 38–73)
NRBC BLD-RTO: 0 /100 WBC
PLATELET # BLD AUTO: 236 K/UL (ref 150–450)
PMV BLD AUTO: 9 FL (ref 9.2–12.9)
POTASSIUM SERPL-SCNC: 3.6 MMOL/L (ref 3.5–5.1)
PROT SERPL-MCNC: 7 G/DL (ref 6–8.4)
RBC # BLD AUTO: 3.82 M/UL (ref 4.6–6.2)
SODIUM SERPL-SCNC: 140 MMOL/L (ref 136–145)
WBC # BLD AUTO: 6.81 K/UL (ref 3.9–12.7)

## 2022-01-24 PROCEDURE — 80053 COMPREHEN METABOLIC PANEL: CPT | Performed by: INTERNAL MEDICINE

## 2022-01-24 PROCEDURE — 85025 COMPLETE CBC W/AUTO DIFF WBC: CPT | Mod: PO | Performed by: INTERNAL MEDICINE

## 2022-01-24 PROCEDURE — 36415 COLL VENOUS BLD VENIPUNCTURE: CPT | Mod: PO | Performed by: INTERNAL MEDICINE

## 2022-01-26 ENCOUNTER — OFFICE VISIT (OUTPATIENT)
Dept: HEMATOLOGY/ONCOLOGY | Facility: CLINIC | Age: 62
End: 2022-01-26
Payer: COMMERCIAL

## 2022-01-26 VITALS
DIASTOLIC BLOOD PRESSURE: 84 MMHG | OXYGEN SATURATION: 99 % | SYSTOLIC BLOOD PRESSURE: 154 MMHG | WEIGHT: 183.44 LBS | TEMPERATURE: 99 F | BODY MASS INDEX: 27.17 KG/M2 | HEART RATE: 97 BPM | HEIGHT: 69 IN

## 2022-01-26 DIAGNOSIS — C49.A0 MALIGNANT GASTROINTESTINAL STROMAL TUMOR, UNSPECIFIED SITE: ICD-10-CM

## 2022-01-26 PROCEDURE — 1160F PR REVIEW ALL MEDS BY PRESCRIBER/CLIN PHARMACIST DOCUMENTED: ICD-10-PCS | Mod: CPTII,S$GLB,,

## 2022-01-26 PROCEDURE — 99999 PR PBB SHADOW E&M-EST. PATIENT-LVL IV: CPT | Mod: PBBFAC,,,

## 2022-01-26 PROCEDURE — 3079F DIAST BP 80-89 MM HG: CPT | Mod: CPTII,S$GLB,,

## 2022-01-26 PROCEDURE — 3079F PR MOST RECENT DIASTOLIC BLOOD PRESSURE 80-89 MM HG: ICD-10-PCS | Mod: CPTII,S$GLB,,

## 2022-01-26 PROCEDURE — 3077F SYST BP >= 140 MM HG: CPT | Mod: CPTII,S$GLB,,

## 2022-01-26 PROCEDURE — 3008F PR BODY MASS INDEX (BMI) DOCUMENTED: ICD-10-PCS | Mod: CPTII,S$GLB,,

## 2022-01-26 PROCEDURE — 3077F PR MOST RECENT SYSTOLIC BLOOD PRESSURE >= 140 MM HG: ICD-10-PCS | Mod: CPTII,S$GLB,,

## 2022-01-26 PROCEDURE — 1159F PR MEDICATION LIST DOCUMENTED IN MEDICAL RECORD: ICD-10-PCS | Mod: CPTII,S$GLB,,

## 2022-01-26 PROCEDURE — 99214 PR OFFICE/OUTPT VISIT, EST, LEVL IV, 30-39 MIN: ICD-10-PCS | Mod: S$GLB,,,

## 2022-01-26 PROCEDURE — 99214 OFFICE O/P EST MOD 30 MIN: CPT | Mod: S$GLB,,,

## 2022-01-26 PROCEDURE — 1160F RVW MEDS BY RX/DR IN RCRD: CPT | Mod: CPTII,S$GLB,,

## 2022-01-26 PROCEDURE — 1159F MED LIST DOCD IN RCRD: CPT | Mod: CPTII,S$GLB,,

## 2022-01-26 PROCEDURE — 3008F BODY MASS INDEX DOCD: CPT | Mod: CPTII,S$GLB,,

## 2022-01-26 PROCEDURE — 99999 PR PBB SHADOW E&M-EST. PATIENT-LVL IV: ICD-10-PCS | Mod: PBBFAC,,,

## 2022-01-26 NOTE — PROGRESS NOTES
Subjective:      Patient ID: Agusto Juarez Sr. is a 62 y.o. male.    Chief Complaint: Follow-up.      HPI:   Mr. Juarez is 62-year-old male with past medical history that is significant for hypertension, hyperlipidemia, benign prostatic hyperplasia, and stage IV malignant gastrointestinal stromal tumor. He presents with his wife today for follow-up and lab evaluation while taking Gleevec. Pt states he is feeling great overall. He experienced some abdominal pain his grandchild jumped on his lap recently and accidentally hit him in the stomach but the pain quickly resolved and there was no bleeding, bruising or continued pain. He denies abnormal bleeding including epistaxis, hemoptysis, hematemesis, hematochezia, melena or hematuria.  Denies fever, chills, nvd, cp, sob, swelling, or dysuria.        Social History     Socioeconomic History    Marital status:    Tobacco Use    Smoking status: Current Every Day Smoker     Packs/day: 0.50     Years: 41.00     Pack years: 20.50     Types: Cigarettes     Start date: 5/1/1978    Smokeless tobacco: Never Used   Substance and Sexual Activity    Alcohol use: Yes     Alcohol/week: 0.0 standard drinks    Drug use: No       Family History   Problem Relation Age of Onset    Hypertension Unknown        Past Surgical History:   Procedure Laterality Date    ENDOSCOPIC ULTRASOUND OF UPPER GASTROINTESTINAL TRACT N/A 4/30/2021    Procedure: ULTRASOUND, UPPER GI TRACT, ENDOSCOPIC;  Surgeon: Rikki Shabazz MD;  Location: [facility];  Service: Endoscopy;  Laterality: N/A;  Rapid COVID prior - last minute addition to schedule - ttr    ESOPHAGOGASTRODUODENOSCOPY N/A 4/30/2021    Procedure: EGD (ESOPHAGOGASTRODUODENOSCOPY);  Surgeon: Rikki Shabazz MD;  Location: [facility];  Service: Endoscopy;  Laterality: N/A;  EGD/EUS with biopsy within a week is fine. 45min case with me OK. Any campus. -Dr. Shabazz       Past Medical History:   Diagnosis Date    BPH (benign prostatic  hypertrophy)     Hyperlipidemia     Hypertension        Review of Systems   Constitutional: Negative for activity change, appetite change, chills, fatigue, fever and unexpected weight change.   HENT: Negative for hearing loss, mouth sores, nosebleeds, sore throat, tinnitus, trouble swallowing and voice change.    Eyes: Negative for visual disturbance.   Respiratory: Negative for cough, chest tightness, shortness of breath and wheezing.    Cardiovascular: Negative for chest pain, palpitations and leg swelling.   Gastrointestinal: Negative for anal bleeding, blood in stool, constipation, diarrhea, nausea and vomiting.   Genitourinary: Negative for frequency, hematuria and testicular pain.   Musculoskeletal: Negative for arthralgias, back pain, gait problem and neck pain.   Skin: Negative for color change, pallor, rash and wound.   Allergic/Immunologic: Negative for immunocompromised state.   Neurological: Negative for seizures, syncope, weakness, numbness and headaches.   Hematological: Negative for adenopathy. Does not bruise/bleed easily.   Psychiatric/Behavioral: Negative for agitation, confusion, decreased concentration, hallucinations and sleep disturbance. The patient is not nervous/anxious.           Medication List with Changes/Refills   Current Medications    ACETAMINOPHEN (TYLENOL) 325 MG TABLET    Take 325 mg by mouth every 6 (six) hours as needed for Pain.    DICYCLOMINE (BENTYL) 20 MG TABLET    Take 20 mg by mouth every 6 (six) hours. Uses prn    IMATINIB (GLEEVEC) 400 MG TAB    Take 1 tablet (400 mg total) by mouth once daily.    MULTIVITAMIN CAPSULE    Take 1 capsule by mouth once daily.    ONDANSETRON (ZOFRAN-ODT) 8 MG TBDL    Take 1 tablet (8 mg total) by mouth every 12 (twelve) hours as needed.    PANTOPRAZOLE (PROTONIX) 40 MG TABLET    Take 1 tablet (40 mg total) by mouth once daily.    POTASSIUM CHLORIDE SA (K-DUR,KLOR-CON) 20 MEQ TABLET    Take 1 tablet by mouth once daily    PRAVASTATIN  (PRAVACHOL) 10 MG TABLET    Take 1 tablet by mouth once daily    PROCHLORPERAZINE (COMPAZINE) 5 MG TABLET    Take 1 tablet (5 mg total) by mouth every 6 (six) hours as needed.        Objective:     Vitals:    01/26/22 1331   BP: (!) 154/84   Pulse: 97   Temp: 98.5 °F (36.9 °C)       Physical Exam  Constitutional:       General: He is not in acute distress.     Appearance: He is well-developed.   HENT:      Head: Normocephalic and atraumatic.      Right Ear: External ear normal.      Left Ear: External ear normal.      Mouth/Throat:      Mouth: Mucous membranes are moist.      Pharynx: Oropharynx is clear. No oropharyngeal exudate.   Eyes:      General: No scleral icterus.        Right eye: No discharge.         Left eye: No discharge.      Conjunctiva/sclera: Conjunctivae normal.      Pupils: Pupils are equal, round, and reactive to light.   Neck:      Thyroid: No thyromegaly.   Cardiovascular:      Rate and Rhythm: Normal rate and regular rhythm.      Heart sounds: Normal heart sounds. No murmur heard.      Pulmonary:      Effort: Pulmonary effort is normal. No respiratory distress.      Breath sounds: Normal breath sounds. No wheezing.   Chest:      Chest wall: No tenderness.   Breasts:      Right: No supraclavicular adenopathy.      Left: No supraclavicular adenopathy.       Abdominal:      General: Bowel sounds are normal. There is no distension.      Palpations: Abdomen is soft. There is no mass.      Tenderness: There is no abdominal tenderness. There is no rebound.   Musculoskeletal:         General: No tenderness. Normal range of motion.      Cervical back: Normal range of motion and neck supple.   Lymphadenopathy:      Cervical: No cervical adenopathy.      Right cervical: No superficial cervical adenopathy.     Left cervical: No superficial cervical adenopathy.      Upper Body:      Right upper body: No supraclavicular or pectoral adenopathy.      Left upper body: No supraclavicular or pectoral adenopathy.    Skin:     General: Skin is warm and dry.      Capillary Refill: Capillary refill takes 2 to 3 seconds.      Coloration: Skin is not pale.      Findings: No erythema or rash.   Neurological:      Mental Status: He is alert and oriented to person, place, and time.      Cranial Nerves: No cranial nerve deficit.      Sensory: No sensory deficit.   Psychiatric:         Mood and Affect: Mood normal.         Behavior: Behavior normal.         Thought Content: Thought content normal.         Judgment: Judgment normal.       Lab Results   Component Value Date    WBC 6.81 01/24/2022    HGB 11.2 (L) 01/24/2022    HCT 33.9 (L) 01/24/2022    MCV 89 01/24/2022     01/24/2022       Lab Results   Component Value Date     01/24/2022    K 3.6 01/24/2022     01/24/2022    CO2 27 01/24/2022    BUN 10 01/24/2022    CREATININE 0.9 01/24/2022    CALCIUM 9.0 01/24/2022    ANIONGAP 5 (L) 01/24/2022    ESTGFRAFRICA >60.0 01/24/2022    EGFRNONAA >60.0 01/24/2022     Lab Results   Component Value Date    ALT 20 01/24/2022    AST 21 01/24/2022    ALKPHOS 72 01/24/2022    BILITOT 0.7 01/24/2022       Assessment/Plan:     Problem List Items Addressed This Visit        Oncology    Malignant gastrointestinal stromal tumor     Given excellent response to Gleevec, plan is to continue treatment as prescribed.  Reviewed labs today, no concerning cytopenia, advised to continue Gleevec at prescribed dose.             Relevant Orders    CBC Auto Differential    Comprehensive Metabolic Panel        Follow-up: In 4 weeks with CBC, CMP and Dr. Johnston.      Thank You,    LUKE Simpson

## 2022-01-27 NOTE — ASSESSMENT & PLAN NOTE
Given excellent response to Gleevec, plan is to continue treatment as prescribed.  Reviewed labs today, no concerning cytopenia, advised to continue Gleevec at prescribed dose.

## 2022-02-03 ENCOUNTER — TELEPHONE (OUTPATIENT)
Dept: HEMATOLOGY/ONCOLOGY | Facility: CLINIC | Age: 62
End: 2022-02-03
Payer: COMMERCIAL

## 2022-02-03 NOTE — TELEPHONE ENCOUNTER
Spoke to patient instructed to take Mucinex force fluids and f/u with PCP, verbalized understanding.

## 2022-02-03 NOTE — TELEPHONE ENCOUNTER
----- Message from Yolanda Zamora sent at 2/3/2022  9:22 AM CST -----  Contact: Agusto [phone]  Patient would like to get medical advice.    Comments:   Calling to discuss sinus issues he is having. Patient would like advice on what kind of medication he can take.

## 2022-03-03 ENCOUNTER — LAB VISIT (OUTPATIENT)
Dept: LAB | Facility: HOSPITAL | Age: 62
End: 2022-03-03
Attending: FAMILY MEDICINE
Payer: COMMERCIAL

## 2022-03-03 DIAGNOSIS — C49.A0 MALIGNANT GASTROINTESTINAL STROMAL TUMOR, UNSPECIFIED SITE: ICD-10-CM

## 2022-03-03 LAB
ALBUMIN SERPL BCP-MCNC: 3.3 G/DL (ref 3.5–5.2)
ALP SERPL-CCNC: 68 U/L (ref 55–135)
ALT SERPL W/O P-5'-P-CCNC: 17 U/L (ref 10–44)
ANION GAP SERPL CALC-SCNC: 6 MMOL/L (ref 8–16)
AST SERPL-CCNC: 19 U/L (ref 10–40)
BASOPHILS # BLD AUTO: 0.04 K/UL (ref 0–0.2)
BASOPHILS NFR BLD: 0.6 % (ref 0–1.9)
BILIRUB SERPL-MCNC: 0.4 MG/DL (ref 0.1–1)
BUN SERPL-MCNC: 13 MG/DL (ref 6–20)
CALCIUM SERPL-MCNC: 8.9 MG/DL (ref 8.7–10.5)
CHLORIDE SERPL-SCNC: 110 MMOL/L (ref 95–110)
CO2 SERPL-SCNC: 27 MMOL/L (ref 23–29)
CREAT SERPL-MCNC: 0.8 MG/DL (ref 0.5–1.4)
DIFFERENTIAL METHOD: ABNORMAL
EOSINOPHIL # BLD AUTO: 0.2 K/UL (ref 0–0.5)
EOSINOPHIL NFR BLD: 2.2 % (ref 0–8)
ERYTHROCYTE [DISTWIDTH] IN BLOOD BY AUTOMATED COUNT: 18.6 % (ref 11.5–14.5)
EST. GFR  (AFRICAN AMERICAN): >60 ML/MIN/1.73 M^2
EST. GFR  (NON AFRICAN AMERICAN): >60 ML/MIN/1.73 M^2
GLUCOSE SERPL-MCNC: 86 MG/DL (ref 70–110)
HCT VFR BLD AUTO: 32.8 % (ref 40–54)
HGB BLD-MCNC: 10.8 G/DL (ref 14–18)
IMM GRANULOCYTES # BLD AUTO: 0.01 K/UL (ref 0–0.04)
IMM GRANULOCYTES NFR BLD AUTO: 0.1 % (ref 0–0.5)
LYMPHOCYTES # BLD AUTO: 1.5 K/UL (ref 1–4.8)
LYMPHOCYTES NFR BLD: 22.5 % (ref 18–48)
MCH RBC QN AUTO: 29 PG (ref 27–31)
MCHC RBC AUTO-ENTMCNC: 32.9 G/DL (ref 32–36)
MCV RBC AUTO: 88 FL (ref 82–98)
MONOCYTES # BLD AUTO: 0.7 K/UL (ref 0.3–1)
MONOCYTES NFR BLD: 10.7 % (ref 4–15)
NEUTROPHILS # BLD AUTO: 4.4 K/UL (ref 1.8–7.7)
NEUTROPHILS NFR BLD: 64 % (ref 38–73)
NRBC BLD-RTO: 0 /100 WBC
PLATELET # BLD AUTO: 231 K/UL (ref 150–450)
PMV BLD AUTO: 9.2 FL (ref 9.2–12.9)
POTASSIUM SERPL-SCNC: 3.9 MMOL/L (ref 3.5–5.1)
PROT SERPL-MCNC: 6.7 G/DL (ref 6–8.4)
RBC # BLD AUTO: 3.73 M/UL (ref 4.6–6.2)
SODIUM SERPL-SCNC: 143 MMOL/L (ref 136–145)
WBC # BLD AUTO: 6.84 K/UL (ref 3.9–12.7)

## 2022-03-03 PROCEDURE — 85025 COMPLETE CBC W/AUTO DIFF WBC: CPT | Mod: PO

## 2022-03-03 PROCEDURE — 80053 COMPREHEN METABOLIC PANEL: CPT

## 2022-03-03 PROCEDURE — 36415 COLL VENOUS BLD VENIPUNCTURE: CPT | Mod: PO

## 2022-03-04 ENCOUNTER — OFFICE VISIT (OUTPATIENT)
Dept: HEMATOLOGY/ONCOLOGY | Facility: CLINIC | Age: 62
End: 2022-03-04
Payer: COMMERCIAL

## 2022-03-04 VITALS
WEIGHT: 183 LBS | DIASTOLIC BLOOD PRESSURE: 86 MMHG | SYSTOLIC BLOOD PRESSURE: 145 MMHG | HEART RATE: 88 BPM | HEIGHT: 69 IN | OXYGEN SATURATION: 100 % | BODY MASS INDEX: 27.11 KG/M2 | TEMPERATURE: 98 F

## 2022-03-04 DIAGNOSIS — C49.A0 MALIGNANT GASTROINTESTINAL STROMAL TUMOR, UNSPECIFIED SITE: ICD-10-CM

## 2022-03-04 DIAGNOSIS — D84.821 IMMUNOSUPPRESSED DUE TO CHEMOTHERAPY: Primary | ICD-10-CM

## 2022-03-04 DIAGNOSIS — T45.1X5A IMMUNOSUPPRESSED DUE TO CHEMOTHERAPY: Primary | ICD-10-CM

## 2022-03-04 DIAGNOSIS — Z79.899 IMMUNOSUPPRESSED DUE TO CHEMOTHERAPY: Primary | ICD-10-CM

## 2022-03-04 PROCEDURE — 3077F PR MOST RECENT SYSTOLIC BLOOD PRESSURE >= 140 MM HG: ICD-10-PCS | Mod: CPTII,S$GLB,, | Performed by: INTERNAL MEDICINE

## 2022-03-04 PROCEDURE — 3008F PR BODY MASS INDEX (BMI) DOCUMENTED: ICD-10-PCS | Mod: CPTII,S$GLB,, | Performed by: INTERNAL MEDICINE

## 2022-03-04 PROCEDURE — 1159F PR MEDICATION LIST DOCUMENTED IN MEDICAL RECORD: ICD-10-PCS | Mod: CPTII,S$GLB,, | Performed by: INTERNAL MEDICINE

## 2022-03-04 PROCEDURE — 99215 PR OFFICE/OUTPT VISIT, EST, LEVL V, 40-54 MIN: ICD-10-PCS | Mod: S$GLB,,, | Performed by: INTERNAL MEDICINE

## 2022-03-04 PROCEDURE — 99215 OFFICE O/P EST HI 40 MIN: CPT | Mod: S$GLB,,, | Performed by: INTERNAL MEDICINE

## 2022-03-04 PROCEDURE — 3077F SYST BP >= 140 MM HG: CPT | Mod: CPTII,S$GLB,, | Performed by: INTERNAL MEDICINE

## 2022-03-04 PROCEDURE — 99999 PR PBB SHADOW E&M-EST. PATIENT-LVL IV: ICD-10-PCS | Mod: PBBFAC,,, | Performed by: INTERNAL MEDICINE

## 2022-03-04 PROCEDURE — 3079F PR MOST RECENT DIASTOLIC BLOOD PRESSURE 80-89 MM HG: ICD-10-PCS | Mod: CPTII,S$GLB,, | Performed by: INTERNAL MEDICINE

## 2022-03-04 PROCEDURE — 99999 PR PBB SHADOW E&M-EST. PATIENT-LVL IV: CPT | Mod: PBBFAC,,, | Performed by: INTERNAL MEDICINE

## 2022-03-04 PROCEDURE — 1159F MED LIST DOCD IN RCRD: CPT | Mod: CPTII,S$GLB,, | Performed by: INTERNAL MEDICINE

## 2022-03-04 PROCEDURE — 3008F BODY MASS INDEX DOCD: CPT | Mod: CPTII,S$GLB,, | Performed by: INTERNAL MEDICINE

## 2022-03-04 PROCEDURE — 3079F DIAST BP 80-89 MM HG: CPT | Mod: CPTII,S$GLB,, | Performed by: INTERNAL MEDICINE

## 2022-03-04 NOTE — PROGRESS NOTES
Subjective:   Date of Visit: 3/4/22   ?   ?    REFERRING PROVIDER: No referring provider defined for this encounter.   ?   CHIEF COMPLAINT:  GIST tumor???????   ?   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   CURRENT TREATMENT: TKI    PAST TREATMENT:  None  ?   ONCOLOGIC HISTORY:     1. Lymph node, perigastric, biopsy:   - Gastrointestinal stromal tumor (GIST)   - No lymphoid tissue present   - See comment     Final Pathologic Diagnosis 1.  Gastric mass, biopsy:       -  Gastrointestinal stromal tumor,  see synoptic report   SYNOPTIC REPORT   PROCEDURE:  Endoscopic biopsy   TUMOR SITE:  Gastric body   HISTOLOGIC TYPE:  Gastrointestinal stromal tumor, mixed spindle cell and   epithelioid type   MITOTIC RATE:  Cannot be determined, see comment   NECROSIS:  Not identified   HISTOLOGIC GRADE:   Presumed low-grade (G1), see comment   RISK ASSESSMENT:        Given radiographic measurement of 20 cm with low mitotic count,  likely   moderate risk   ANCILLARY STUDIES:  Immunohistochemical stains with appropriate controls were   performed and show the following results:        :  Positive        DOG1:  Positive        AE1/AE3:  Negative          HPI:  61-year-old male with past medical history that is significant for hypertension, hyperlipidemia, benign prostatic hyperplasia was recently seen for hernia by Dr. Glass.  CT scan showed a large mass that surrounded the stomach, duodenum, pancreas with involvement of the liver.    He subsequently underwent endoscopic ultrasound with biopsy showing GIST measuring up to 20 cm.    He denies abnormal bleeding including epistaxis, hemoptysis, hematemesis, hematochezia, melena or hematuria.  Most recent CBC showed hemoglobin above 10 grams/deciliter.    Denies fever, chills, nausea or vomiting, chest pain, shortness of breath, diarrhea or dysuria.  He however complains of abdominal pain.       Review of Systems   Constitutional: Positive for fatigue. Negative for  activity change, appetite change, chills, fever and unexpected weight change.   HENT: Negative for hearing loss, mouth sores, nosebleeds, sore throat, tinnitus, trouble swallowing and voice change.    Eyes: Negative for visual disturbance.   Respiratory: Negative for cough, chest tightness, shortness of breath and wheezing.    Cardiovascular: Negative for chest pain, palpitations and leg swelling.   Gastrointestinal: Negative for anal bleeding, blood in stool, constipation, diarrhea, nausea and vomiting.   Genitourinary: Negative for frequency, hematuria and testicular pain.   Musculoskeletal: Negative for arthralgias, back pain, gait problem and neck pain.   Skin: Negative for color change, pallor, rash and wound.   Allergic/Immunologic: Negative for immunocompromised state.   Neurological: Negative for seizures, syncope, weakness, numbness and headaches.   Hematological: Negative for adenopathy. Does not bruise/bleed easily.   Psychiatric/Behavioral: Negative for agitation, confusion, decreased concentration, hallucinations and sleep disturbance. The patient is not nervous/anxious.        ?   PAST MEDICAL HISTORY:   Past Medical History:   Diagnosis Date    BPH (benign prostatic hypertrophy)     Hyperlipidemia     Hypertension     ?     PAST SURGICAL HISTORY:   Past Surgical History:   Procedure Laterality Date    ENDOSCOPIC ULTRASOUND OF UPPER GASTROINTESTINAL TRACT N/A 4/30/2021    Procedure: ULTRASOUND, UPPER GI TRACT, ENDOSCOPIC;  Surgeon: Rikki Shabazz MD;  Location: [facility];  Service: Endoscopy;  Laterality: N/A;  Rapid COVID prior - last minute addition to schedule - ttr    ESOPHAGOGASTRODUODENOSCOPY N/A 4/30/2021    Procedure: EGD (ESOPHAGOGASTRODUODENOSCOPY);  Surgeon: Rikki Shabazz MD;  Location: [facility];  Service: Endoscopy;  Laterality: N/A;  EGD/EUS with biopsy within a week is fine. 45min case with me OK. Any campus. -Dr. Shabazz      ?   ALLERGIES:   Allergies as of 03/04/2022    (No Known  Allergies)      ?   MEDICATIONS:?   Outpatient Medications Marked as Taking for the 3/4/22 encounter (Office Visit) with Francisco Johnston MD   Medication Sig Dispense Refill    acetaminophen (TYLENOL) 325 MG tablet Take 325 mg by mouth every 6 (six) hours as needed for Pain.      dicyclomine (BENTYL) 20 mg tablet Take 20 mg by mouth every 6 (six) hours. Uses prn      imatinib (GLEEVEC) 400 MG Tab Take 1 tablet (400 mg total) by mouth once daily. 90 tablet 1    multivitamin capsule Take 1 capsule by mouth once daily.      ondansetron (ZOFRAN-ODT) 8 MG TbDL Take 1 tablet (8 mg total) by mouth every 12 (twelve) hours as needed. 90 tablet 1    pantoprazole (PROTONIX) 40 MG tablet Take 1 tablet (40 mg total) by mouth once daily. (Patient taking differently: Take 40 mg by mouth once daily. Uses prn) 30 tablet 1    potassium chloride SA (K-DUR,KLOR-CON) 20 MEQ tablet Take 1 tablet by mouth once daily 90 tablet 3    pravastatin (PRAVACHOL) 10 MG tablet Take 1 tablet by mouth once daily 90 tablet 3    prochlorperazine (COMPAZINE) 5 MG tablet Take 1 tablet (5 mg total) by mouth every 6 (six) hours as needed. 90 tablet 1      ?   SOCIAL HISTORY:?   Social History     Tobacco Use    Smoking status: Current Every Day Smoker     Packs/day: 0.50     Years: 41.00     Pack years: 20.50     Types: Cigarettes     Start date: 5/1/1978    Smokeless tobacco: Never Used   Substance Use Topics    Alcohol use: Yes     Alcohol/week: 0.0 standard drinks        ?   FAMILY HISTORY:   family history includes Hypertension in his unknown relative.   ?     Objective:      Physical Exam  Constitutional:       General: He is not in acute distress.     Appearance: He is well-developed.   HENT:      Head: Normocephalic and atraumatic.      Right Ear: External ear normal.      Left Ear: External ear normal.      Mouth/Throat:      Pharynx: No oropharyngeal exudate.   Eyes:      General: No scleral icterus.        Right eye: No discharge.          Left eye: No discharge.      Conjunctiva/sclera: Conjunctivae normal.      Pupils: Pupils are equal, round, and reactive to light.   Neck:      Thyroid: No thyromegaly.   Cardiovascular:      Rate and Rhythm: Normal rate and regular rhythm.      Heart sounds: Normal heart sounds. No murmur heard.  Pulmonary:      Effort: Pulmonary effort is normal. No respiratory distress.      Breath sounds: Normal breath sounds. No wheezing.   Chest:      Chest wall: No tenderness.   Breasts:      Right: No supraclavicular adenopathy.      Left: No supraclavicular adenopathy.       Abdominal:      General: Bowel sounds are normal. There is no distension.      Palpations: Abdomen is soft. There is no mass.      Tenderness: There is no abdominal tenderness. There is no rebound.   Musculoskeletal:         General: No tenderness. Normal range of motion.      Cervical back: Normal range of motion and neck supple.   Lymphadenopathy:      Cervical: No cervical adenopathy.      Right cervical: No superficial cervical adenopathy.     Left cervical: No superficial cervical adenopathy.      Upper Body:      Right upper body: No supraclavicular or pectoral adenopathy.      Left upper body: No supraclavicular or pectoral adenopathy.   Skin:     General: Skin is warm and dry.      Capillary Refill: Capillary refill takes 2 to 3 seconds.      Coloration: Skin is not pale.      Findings: No erythema or rash.   Neurological:      Mental Status: He is alert and oriented to person, place, and time.      Cranial Nerves: No cranial nerve deficit.      Sensory: No sensory deficit.   Psychiatric:         Behavior: Behavior normal.         Judgment: Judgment normal.         Vitals:    03/04/22 1112   BP: (!) 145/86   Pulse: 88   Temp: 98.2 °F (36.8 °C)        ECOG SCORE    1 - Restricted in strenuous activity-ambulatory and able to carry out work of a light nature       ?   Laboratory:  ?   No visits with results within 1 Day(s) from this visit.    Latest known visit with results is:   Lab Visit on 03/03/2022   Component Date Value Ref Range Status    WBC 03/03/2022 6.84  3.90 - 12.70 K/uL Final    RBC 03/03/2022 3.73 (A) 4.60 - 6.20 M/uL Final    Hemoglobin 03/03/2022 10.8 (A) 14.0 - 18.0 g/dL Final    Hematocrit 03/03/2022 32.8 (A) 40.0 - 54.0 % Final    MCV 03/03/2022 88  82 - 98 fL Final    MCH 03/03/2022 29.0  27.0 - 31.0 pg Final    MCHC 03/03/2022 32.9  32.0 - 36.0 g/dL Final    RDW 03/03/2022 18.6 (A) 11.5 - 14.5 % Final    Platelets 03/03/2022 231  150 - 450 K/uL Final    MPV 03/03/2022 9.2  9.2 - 12.9 fL Final    Immature Granulocytes 03/03/2022 0.1  0.0 - 0.5 % Final    Gran # (ANC) 03/03/2022 4.4  1.8 - 7.7 K/uL Final    Immature Grans (Abs) 03/03/2022 0.01  0.00 - 0.04 K/uL Final    Lymph # 03/03/2022 1.5  1.0 - 4.8 K/uL Final    Mono # 03/03/2022 0.7  0.3 - 1.0 K/uL Final    Eos # 03/03/2022 0.2  0.0 - 0.5 K/uL Final    Baso # 03/03/2022 0.04  0.00 - 0.20 K/uL Final    nRBC 03/03/2022 0  0 /100 WBC Final    Gran % 03/03/2022 64.0  38.0 - 73.0 % Final    Lymph % 03/03/2022 22.5  18.0 - 48.0 % Final    Mono % 03/03/2022 10.7  4.0 - 15.0 % Final    Eosinophil % 03/03/2022 2.2  0.0 - 8.0 % Final    Basophil % 03/03/2022 0.6  0.0 - 1.9 % Final    Differential Method 03/03/2022 Automated   Final    Sodium 03/03/2022 143  136 - 145 mmol/L Final    Potassium 03/03/2022 3.9  3.5 - 5.1 mmol/L Final    Chloride 03/03/2022 110  95 - 110 mmol/L Final    CO2 03/03/2022 27  23 - 29 mmol/L Final    Glucose 03/03/2022 86  70 - 110 mg/dL Final    BUN 03/03/2022 13  6 - 20 mg/dL Final    Creatinine 03/03/2022 0.8  0.5 - 1.4 mg/dL Final    Calcium 03/03/2022 8.9  8.7 - 10.5 mg/dL Final    Total Protein 03/03/2022 6.7  6.0 - 8.4 g/dL Final    Albumin 03/03/2022 3.3 (A) 3.5 - 5.2 g/dL Final    Total Bilirubin 03/03/2022 0.4  0.1 - 1.0 mg/dL Final    Alkaline Phosphatase 03/03/2022 68  55 - 135 U/L Final    AST 03/03/2022 19  10 -  40 U/L Final    ALT 03/03/2022 17  10 - 44 U/L Final    Anion Gap 03/03/2022 6 (A) 8 - 16 mmol/L Final    eGFR if African American 03/03/2022 >60.0  >60 mL/min/1.73 m^2 Final    eGFR if non African American 03/03/2022 >60.0  >60 mL/min/1.73 m^2 Final      ?   Tumor markers   ?   ?   Imaging: CT Chest Abdomen Pelvis With Contrast  Narrative: EXAMINATION:  CT CHEST ABDOMEN PELVIS WITH CONTRAST (XPD)    CLINICAL HISTORY:  Gist;Gastrointestinal stromal tumor, unspecified site    TECHNIQUE:  Low-dose CT of the chest with contrast and CT abdomen and pelvis with and without contrast was acquired helically from the lung apices through the ischial tuberosities status post administration of 75 cc of Omnipaque 350. Oral contrast was administered.  Axial and reformatted images were reviewed.    COMPARISON:  04/26/2021 and CT chest from 06/03/2021    FINDINGS:  CHEST    No infiltrates or pleural effusions are identified.  There is a stable noncalcified pulmonary nodule within the right middle lobe series 5, image 384 that measures approximately 5 mm in size.  No new or enlarging pulmonary nodules are identified.    Minimal vascular calcification noted in the region of the aortic arch.  There is no pericardial effusion.  No enlarged mediastinal, hilar or axillary lymph nodes are identified.    ABDOMEN    Liver/gallbladder/biliary: Multiple hypodense lesions again seen within the liver the majority of which appear to be subcentimeter in size.  The largest is seen within the medial segment of the right hepatic lobe adjacent to the gallbladder and measures a maximum of 4.3 by 3.9 cm in size as compared to 3.9 x 3.6 cm in size on the prior study.  The gallbladder is present and abutted by large tumor within the upper abdomen.  No biliary ductal dilation.    Pancreas: The pancreas demonstrates no focal abnormality.  Large mass within the upper abdomen abuts the head and proximal body of the pancreas.    Spleen: Mass abutting the  spleen.  The spleen itself is not enlarged.    Adrenals: Unremarkable    Kidneys: The kidneys are equally perfused and demonstrate no solid masses. No nephrolithiasis. No obstructive uropathy.    Bowel/Mesentery: There is no evidence to suggest bowel obstruction.  There is a large low density confluent mass within the upper abdomen that encases the stomach, abuts the inferior aspect of the liver, abuts the pancreas and extends into the jed hepatis.  When compared to the prior study there is decreased attenuation of the mass.  There also appears to be decreased mass effect upon the stomach although the stomach is better distended when compared to the prior exam.  The maximal thickness of the mass along the left lateral margin of the stomach at the midportion where the mass previously measured approximately 8.6 cm in the transverse dimension now measures approximately 6 cm in the transverse dimension.  The volume of the mass within the right upper quadrant of the abdomen adjacent to the liver has also diminished when compared to the prior study and measures a maximum of approximately 7.4 cm in the transverse dimension more anteriorly as compared to approximately 10 cm in the maximal dimension more anteriorly.  Multiple additional smaller peritoneal implants are also noted also appear to be slightly smaller in size within the upper abdomen.  Normal appendix noted.  No mesenteric stranding or adenopathy.    Retroperitoneum: No adenopathy.  Minimal vascular calcification seen involving the aorta.  No aneurysm.    PELVIS    Genitourinary/Reproductive organs: Unremarkable    Adenopathy: None    Free Fluid: No free fluid    Osseus Structures/Soft tissues: No suspicious appearing osseus lesions. No significant soft tissue abnormality.  Impression: 1. Stable appearance of a 5 mm noncalcified pulmonary nodule within the right middle lobe.  2. Findings consistent with treatment response with significant decreased attenuation  of the large confluent mass within the upper abdomen.  Due to the large size and shape of this mass it is difficult to give exact measurements although there are measurements given within the body of the report that demonstrate the decrease in volume/size of the mass.  Multiple hypodense lesions again seen within the liver.  The largest lesion appears to be more hypodense but slightly larger.  This is nonspecific but could be secondary to increased necrosis.  3. Remaining findings as discussed in detail above.    Electronically signed by: Nick Fairchild DO  Date:    10/05/2021  Time:    11:40     ?      Pathology:  Pathology Results  (Last 10 years)               04/30/21 1301  Specimen to Pathology, Surgery Gastrointestinal tract Edited Result - FINAL    Narrative:  Pre-op Diagnosis: Epigastric mass [R19.06]   Procedure(s):   EGD (ESOPHAGOGASTRODUODENOSCOPY)   ULTRASOUND, UPPER GI TRACT, ENDOSCOPIC   Number of specimens:   Jar 1: gastric mass   Release to patient->Immediate   Specimen total (fresh, frozen, permanent):->1              ?   Assessment/Plan:       1. Immunosuppressed due to chemotherapy    2. Malignant gastrointestinal stromal tumor, unspecified site          Malignant gastrointestinal stromal tumor  Stage IV (CT 4, CN 1, cM0, unknown mitotic rate) gastrointestinal stromal tumor.  Risk assessment, at least moderate risk.  Given the inability to determine mitotic rate, I advised patient that his risk of metastasis ranges from 12% to 86%.     Plan to complete staging with CT scan of chest.  Recommend genotyping of tissue to assess for PDGFR mutation.     Will recommend neoadjuvant treatment with TKI (imatinib at 400 mg daily).  No reportable alteration was noted in the GIST panel analyzed.  Case was presented at tumor conference with recommendation to initiate neoadjuvant TKI.     Advised patient that the pop post of neoadjuvant therapy is to reduce morbidity risk following surgery.  Plan is to continue  neoadjuvant therapy with short interval imaging every 8-12 weeks until maximum response is achieved.  Advised that maximum response is defined as no radiographic difference between 2 successive CT scans which could take up to 12 months.     Reviewed 2D echocardiogram from 06/03/2021 that showed normal left ventricular systolic and diastolic function with estimated ejection fraction of 60%.  Global longitudinal strain is -18.5%.     Restaging CT scan of chest abdomen and pelvis from 10/05/2021 showed overall positive therapeutic response.  For instance the mass within the right upper quadrant of abdomen adjacent to the liver that previously measured 10 cm, now measures 7.4 cm.  Furthermore, maximal thickness of the mass along the left lateral margin of the stomach at the midportion where the mass previously measured approximately 8.6 cm in the transverse dimension now measures approximately 6 cm in the transverse dimension.     Given excellent response to Gleevec, plan is to continue treatment as prescribed.  Reviewed labs today, no concerning cytopenia, advised to continue Gleevec at prescribed dose.  Return back in 4 weeks with repeat labs or sooner if needed.  Due for restaging CT scan, order placed.    Immunosuppressed due to chemotherapy  -     CBC Auto Differential; Future; Expected date: 03/04/2022  -     Comprehensive Metabolic Panel; Future; Expected date: 03/04/2022  -     CT Chest Abdomen Pelvis With Contrast; Future; Expected date: 03/04/2022    Malignant gastrointestinal stromal tumor, unspecified site  -     CBC Auto Differential; Future; Expected date: 03/04/2022  -     Comprehensive Metabolic Panel; Future; Expected date: 03/04/2022  -     CT Chest Abdomen Pelvis With Contrast; Future; Expected date: 03/04/2022    ?   Follow-Up: Follow up in about 4 weeks (around 4/1/2022).    MELYSSA DYKES Md., Ph.D  Hematology & Oncology Department  Phone #: [phone]

## 2022-03-04 NOTE — ASSESSMENT & PLAN NOTE
Stage IV (CT 4, CN 1, cM0, unknown mitotic rate) gastrointestinal stromal tumor.  Risk assessment, at least moderate risk.  Given the inability to determine mitotic rate, I advised patient that his risk of metastasis ranges from 12% to 86%.     Plan to complete staging with CT scan of chest.  Recommend genotyping of tissue to assess for PDGFR mutation.     Will recommend neoadjuvant treatment with TKI (imatinib at 400 mg daily).  No reportable alteration was noted in the GIST panel analyzed.  Case was presented at tumor conference with recommendation to initiate neoadjuvant TKI.     Advised patient that the pop post of neoadjuvant therapy is to reduce morbidity risk following surgery.  Plan is to continue neoadjuvant therapy with short interval imaging every 8-12 weeks until maximum response is achieved.  Advised that maximum response is defined as no radiographic difference between 2 successive CT scans which could take up to 12 months.     Reviewed 2D echocardiogram from 06/03/2021 that showed normal left ventricular systolic and diastolic function with estimated ejection fraction of 60%.  Global longitudinal strain is -18.5%.     Restaging CT scan of chest abdomen and pelvis from 10/05/2021 showed overall positive therapeutic response.  For instance the mass within the right upper quadrant of abdomen adjacent to the liver that previously measured 10 cm, now measures 7.4 cm.  Furthermore, maximal thickness of the mass along the left lateral margin of the stomach at the midportion where the mass previously measured approximately 8.6 cm in the transverse dimension now measures approximately 6 cm in the transverse dimension.     Given excellent response to Gleevec, plan is to continue treatment as prescribed.  Reviewed labs today, no concerning cytopenia, advised to continue Gleevec at prescribed dose.  Return back in 4 weeks with repeat labs or sooner if needed.  Due for restaging CT scan, order placed.

## 2022-03-09 ENCOUNTER — SPECIALTY PHARMACY (OUTPATIENT)
Dept: PHARMACY | Facility: CLINIC | Age: 62
End: 2022-03-09
Payer: COMMERCIAL

## 2022-03-10 NOTE — TELEPHONE ENCOUNTER
Specialty Pharmacy - Refill Coordination  Specialty Pharmacy - Clinical Reassessment    Specialty Medication Orders Linked to Encounter    Flowsheet Row Most Recent Value   Medication #1 imatinib (GLEEVEC) 400 MG Tab (Order#[id number], Rx#[id number])        Patient Diagnosis   C49.A0 - Malignant gastrointestinal stromal tumor    Nickie Juarez Sr. is a 62 y.o. male, who is followed by the specialty pharmacy service for management and education.    Recent Encounters     Date Type Provider Description    03/09/2022 Specialty Pharmacy CESAR WARREN, PharmD Refill Coordination; Follow-up Clinical Reassessment    02/10/2022 Specialty Pharmacy Shiloh Camp, PharmD Refill Coordination    01/10/2022 Specialty Pharmacy Caleb LeahyD Refill Coordination    12/14/2021 Specialty Pharmacy Moiz Argueta Refill Coordination    12/14/2021 Specialty Pharmacy CESAR WARREN, Corey Referral Authorization        Clinical call attempts since last clinical assessment   No call attempts found.     Current Outpatient Medications   Medication Sig    acetaminophen (TYLENOL) 325 MG tablet Take 325 mg by mouth every 6 (six) hours as needed for Pain.    dicyclomine (BENTYL) 20 mg tablet Take 20 mg by mouth every 6 (six) hours. Uses prn    imatinib (GLEEVEC) 400 MG Tab Take 1 tablet (400 mg total) by mouth once daily.    multivitamin capsule Take 1 capsule by mouth once daily.    ondansetron (ZOFRAN-ODT) 8 MG TbDL Take 1 tablet (8 mg total) by mouth every 12 (twelve) hours as needed.    pantoprazole (PROTONIX) 40 MG tablet Take 1 tablet (40 mg total) by mouth once daily. (Patient taking differently: Take 40 mg by mouth once daily. Uses prn)    potassium chloride SA (K-DUR,KLOR-CON) 20 MEQ tablet Take 1 tablet by mouth once daily    pravastatin (PRAVACHOL) 10 MG tablet Take 1 tablet by mouth once daily    prochlorperazine (COMPAZINE) 5 MG tablet Take 1 tablet (5 mg total) by mouth every 6 (six) hours as  needed.   Last reviewed on 3/4/2022 11:17 AM by Lynda Brantley LPN    Review of patient's allergies indicates:  No Known AllergiesLast reviewed on  3/4/2022 11:17 AM by Lynda Brantley    Drug Interactions    Drug interactions evaluated: yes  Clinically relevant drug interactions identified: no  Provided the patient with educational material regarding drug interactions: not applicable       Medication Adherence    Patient reported X missed doses in the last month: 0  Any gaps in refill history greater than 2 weeks in the last 3 months: no  Demonstrates understanding of importance of adherence: yes  Informant: patient  Reliability of informant: reliable  Provider-estimated medication adherence level: %  Reasons for non-adherence: no problems identified  Adherence tools used: directed education  Support network for adherence: healthcare provider  Confirmed plan for next specialty medication refill: delivery by pharmacy  Refills needed for supportive medications: not needed       Adverse Effects    Arthralgias: Pos  Joint swelling: Pos  *All other systems reviewed and are negative       Assessment Questions - Documented Responses    Flowsheet Row Most Recent Value   Assessment    Medication Reconciliation completed for patient No   During the past 4 weeks, did patient have any of the following urgent care visits? None   Goals of Therapy Status Achieving   All education points have been covered with patient? No, patient declined- printed education provided   Welcome packet contents reviewed and discussed with patient? No   Assesment completed? No   Plan Therapy continued   Do you need to open a clinical intervention (i-vent)? No   Do you want to schedule first shipment? No   Medication #1 Assessment Info    Patient status Existing medication, Exisiting to [facility]   Is this medication appropriate for the patient? Yes   Is this medication effective? Yes        Refill Questions - Documented Responses    Flowsheet Row  "Most Recent Value   Patient Availability and HIPAA Verification    Does patient want to proceed with activity? Yes   HIPAA/medical authority confirmed? Yes   Relationship to patient of person spoken to? Self   Refill Screening Questions    Changes to allergies? No   Changes to medications? No   New conditions since last clinic visit? No   Unplanned office visit, urgent care, ED, or hospital admission in the last 4 weeks? No   How does patient/caregiver feel medication is working? Good   Financial problems or insurance changes? No   How many doses of your specialty medications were missed in the last 4 weeks? 0   Would patient like to speak to a pharmacist? No   When does the patient need to receive the medication? 03/14/22   Refill Delivery Questions    How will the patient receive the medication? Delivery Allegra   When does the patient need to receive the medication? 03/14/22   Shipping Address Home   Address in [facility] confirmed and updated if neccessary? Yes   Expected Copay ($) 80   Is the patient able to afford the medication copay? Yes   Payment Method --  [FA pending.]   Days supply of Refill 30   Supplies needed? No supplies needed   Refill activity completed? Yes   Refill activity plan Refill scheduled   Shipment/Pickup Date: 03/14/22          Objective    He has a past medical history of BPH (benign prostatic hypertrophy), Hyperlipidemia, and Hypertension.    Tried/failed medications: none    BP Readings from Last 4 Encounters:   03/04/22 (!) 145/86   01/26/22 (!) 154/84   09/07/21 135/84   08/02/21 126/82     Ht Readings from Last 4 Encounters:   03/04/22 5' 9" (1.753 m)   01/26/22 5' 9" (1.753 m)   09/07/21 5' 8" (1.727 m)   08/02/21 5' 8" (1.727 m)     Wt Readings from Last 4 Encounters:   03/04/22 83 kg (182 lb 15.7 oz)   01/26/22 83.2 kg (183 lb 6.8 oz)   09/07/21 78.7 kg (173 lb 8 oz)   08/02/21 83.1 kg (183 lb 3.2 oz)     Recent Labs   Lab Result Units 03/03/22  1057 01/24/22  1017 " 12/28/21  1038 12/28/21  1038 12/28/21  1035   RBC M/uL 3.73 L 3.82 L  --  3.72 L  --    Hemoglobin g/dL 10.8 L 11.2 L  --  10.7 L  --    Hematocrit % 32.8 L 33.9 L  --  32.6 L  --    WBC K/uL 6.84 6.81  --  6.59  --    Gran # (ANC) K/uL 4.4 4.3   < > 4.2  --    Gran % % 64.0 63.2  --  64.0  --    Platelets K/uL 231 236  --  242  --    Sodium mmol/L 143 140  --   --  142   Potassium mmol/L 3.9 3.6  --   --  4.0   Chloride mmol/L 110 108  --   --  111 H   Glucose mg/dL 86 106  --   --  86   BUN mg/dL 13 10  --   --  11   Creatinine mg/dL 0.8 0.9  --   --  0.8   Calcium mg/dL 8.9 9.0  --   --  8.6 L   Total Protein g/dL 6.7 7.0  --   --  6.5   Albumin g/dL 3.3 L 3.5  --   --  3.3 L   Total Bilirubin mg/dL 0.4 0.7  --   --  0.6   Alkaline Phosphatase U/L 68 72  --   --  81   AST U/L 19 21  --   --  18   ALT U/L 17 20  --   --  18    < > = values in this interval not displayed.     The goals of cancer treatment include:  · Achieving remission of cancer, if possible  · Reducing tumor size and spread of cancer, if remission is not possible  · Minimizing pain and symptoms of the cancer  · Preventing infection and other complications of treatment  · Promoting adequate nutrition  · Encouraging proper hydration  · Improving or maintaining quality of life  · Maintaining optimal therapy adherence  · Minimizing and managing side effects    Goals of Therapy Status: Achieving    Assessment/Plan  Patient plans to continue therapy without changes      Indication, dosage, appropriateness, effectiveness, safety and convenience of his specialty medication(s) were reviewed today.     Patient Education   Pharmacist offer to  patient was declined. Printed educational materials will be provided with medication.  Patient did accept verbal education on the following topics:  · Proper use, timely administration, and missed dose management  · Side effects, including prevention, minimization, and management  · New or changed medications,  including prescribe and over the counter medications and supplements  · Storage, safe handling, and disposal    Dosing, how taking: Gleevec 400 mg: Patient takes one tablet by mouth once daily with food and water.   Missed doses: Denies.  Storage: In his kitchen in a cool area.   Handling: Pt states he handles Gleevec with a napkin. He does not touch the medication directly   Side effects: Denies side effects. Pt states his knee has started swelling/pain, tightness. He states his joint pain and swelling started about a day ago,  it occurs when he is moving around.  Pt states he has some dizziness after a dose only if he is sitting and tries to get up abruptly.   Recent infections: Denies recent infections or fever.   Pain: 6 or 7/10 swelling/pain in his knee. I recommended he try taking Tylenol 650 mg for Arthritis pain. If this does not help, he should reach out to his provider.   Appetite: unchanged. Pt has at least 3 meals per day.   Energy, fatigue: unchanged, 10/10 energy level. 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   ED/UC visits: Denies   Denies new medications.  No new allergies or health conditions.     Labs reviewed from: , Labs are stable to continue at current dose.       Tasks added this encounter   No tasks added.   Tasks due within next 3 months   3/2/2022 - Clinical - Follow Up Assesement (90 day)  3/8/2022 - Refill Call (Auto Added)     CESAR WARREN, PharmD  [facility]  [address]  [facility] [address]  Phone: [phone]  Fax: [phone]

## 2022-03-10 NOTE — TELEPHONE ENCOUNTER
Sent email to Maria A ROD to see if [employer] can assist with $80 copay amount for patients Gleevec refill. Determination pending.

## 2022-03-10 NOTE — TELEPHONE ENCOUNTER
Specialty Pharmacy - Refill Coordination  Specialty Pharmacy - Clinical Reassessment    Specialty Medication Orders Linked to Encounter    Flowsheet Row Most Recent Value   Medication #1 imatinib (GLEEVEC) 400 MG Tab (Order#[id number], Rx#[id number])          Refill Questions - Documented Responses    Flowsheet Row Most Recent Value   Patient Availability and HIPAA Verification    Does patient want to proceed with activity? Yes   HIPAA/medical authority confirmed? Yes   Relationship to patient of person spoken to? Self   Refill Screening Questions    Changes to allergies? No   Changes to medications? No   New conditions since last clinic visit? No   Unplanned office visit, urgent care, ED, or hospital admission in the last 4 weeks? No   How does patient/caregiver feel medication is working? Good   Financial problems or insurance changes? No   How many doses of your specialty medications were missed in the last 4 weeks? 0   Would patient like to speak to a pharmacist? No   When does the patient need to receive the medication? 03/14/22   Refill Delivery Questions    How will the patient receive the medication? Delivery Allegra   When does the patient need to receive the medication? 03/14/22   Shipping Address Home   Address in [facility] confirmed and updated if neccessary? Yes   Expected Copay ($) 80   Is the patient able to afford the medication copay? Yes   Payment Method --  [FA pending.]   Days supply of Refill 30   Supplies needed? No supplies needed   Refill activity completed? Yes   Refill activity plan Refill scheduled   Shipment/Pickup Date: 03/14/22          Current Outpatient Medications   Medication Sig    acetaminophen (TYLENOL) 325 MG tablet Take 325 mg by mouth every 6 (six) hours as needed for Pain.    dicyclomine (BENTYL) 20 mg tablet Take 20 mg by mouth every 6 (six) hours. Uses prn    imatinib (GLEEVEC) 400 MG Tab Take 1 tablet (400 mg total) by mouth once daily.    multivitamin capsule  Take 1 capsule by mouth once daily.    ondansetron (ZOFRAN-ODT) 8 MG TbDL Take 1 tablet (8 mg total) by mouth every 12 (twelve) hours as needed.    pantoprazole (PROTONIX) 40 MG tablet Take 1 tablet (40 mg total) by mouth once daily. (Patient taking differently: Take 40 mg by mouth once daily. Uses prn)    potassium chloride SA (K-DUR,KLOR-CON) 20 MEQ tablet Take 1 tablet by mouth once daily    pravastatin (PRAVACHOL) 10 MG tablet Take 1 tablet by mouth once daily    prochlorperazine (COMPAZINE) 5 MG tablet Take 1 tablet (5 mg total) by mouth every 6 (six) hours as needed.   Last reviewed on 3/4/2022 11:17 AM by Lynda Brantley LPN    Review of patient's allergies indicates:  No Known Allergies Last reviewed on  3/4/2022 11:17 AM by Lynda Brantley      Tasks added this encounter   8/29/2022 - Clinical - Follow Up Assesement (180 day)  4/6/2022 - Refill Call (Auto Added)   Tasks due within next 3 months   No tasks due.     CESAR WARREN, PharmD  [facility]  [address]  [facility] [address]  Phone: [phone]  Fax: [phone]

## 2022-03-14 ENCOUNTER — DOCUMENTATION ONLY (OUTPATIENT)
Dept: HEMATOLOGY/ONCOLOGY | Facility: CLINIC | Age: 62
End: 2022-03-14
Payer: COMMERCIAL

## 2022-03-14 NOTE — PROGRESS NOTES
Robert completed pharmacy cost transfer for $80 from [employer] for [facility]. Staff reached out to patient and he cannot afford cost. Grupor will obtain MD's signature and email back to staff.

## 2022-03-15 ENCOUNTER — DOCUMENTATION ONLY (OUTPATIENT)
Dept: HEMATOLOGY/ONCOLOGY | Facility: CLINIC | Age: 62
End: 2022-03-15
Payer: COMMERCIAL

## 2022-03-15 NOTE — TELEPHONE ENCOUNTER
[employer] approved cost transfer in the amount of $80 for patients refill. Cost transfer form received.

## 2022-03-15 NOTE — PROGRESS NOTES
SWer emailed signed cost transfer to [facility] on today. SWer will remain available.

## 2022-04-07 ENCOUNTER — SPECIALTY PHARMACY (OUTPATIENT)
Dept: PHARMACY | Facility: CLINIC | Age: 62
End: 2022-04-07
Payer: COMMERCIAL

## 2022-04-07 NOTE — TELEPHONE ENCOUNTER
Specialty Pharmacy - Refill Coordination    Specialty Medication Orders Linked to Encounter    Flowsheet Row Most Recent Value   Medication #1 imatinib (GLEEVEC) 400 MG Tab (Order#[id number], Rx#[id number])          Refill Questions - Documented Responses    Flowsheet Row Most Recent Value   Patient Availability and HIPAA Verification    Does patient want to proceed with activity? Yes   HIPAA/medical authority confirmed? Yes   Relationship to patient of person spoken to? Self   Refill Screening Questions    Changes to allergies? No   Changes to medications? No   New conditions since last clinic visit? No   Unplanned office visit, urgent care, ED, or hospital admission in the last 4 weeks? No   How does patient/caregiver feel medication is working? Good   Financial problems or insurance changes? No   How many doses of your specialty medications were missed in the last 4 weeks? 0   Would patient like to speak to a pharmacist? No   When does the patient need to receive the medication? 04/14/22   Refill Delivery Questions    How will the patient receive the medication? Mail   When does the patient need to receive the medication? 04/14/22   Shipping Address Home   Address in [facility] confirmed and updated if neccessary? Yes   Expected Copay ($) 0   Is the patient able to afford the medication copay? Yes   Payment Method zero copay   Days supply of Refill 30   Supplies needed? No supplies needed   Refill activity completed? Yes   Refill activity plan Refill scheduled   Shipment/Pickup Date: 04/12/22          Current Outpatient Medications   Medication Sig    acetaminophen (TYLENOL) 325 MG tablet Take 325 mg by mouth every 6 (six) hours as needed for Pain.    dicyclomine (BENTYL) 20 mg tablet Take 20 mg by mouth every 6 (six) hours. Uses prn    imatinib (GLEEVEC) 400 MG Tab Take 1 tablet (400 mg total) by mouth once daily.    multivitamin capsule Take 1 capsule by mouth once daily.    ondansetron (ZOFRAN-ODT)  8 MG TbDL Take 1 tablet (8 mg total) by mouth every 12 (twelve) hours as needed.    pantoprazole (PROTONIX) 40 MG tablet Take 1 tablet (40 mg total) by mouth once daily. (Patient taking differently: Take 40 mg by mouth once daily. Uses prn)    potassium chloride SA (K-DUR,KLOR-CON) 20 MEQ tablet Take 1 tablet by mouth once daily    pravastatin (PRAVACHOL) 10 MG tablet Take 1 tablet by mouth once daily    prochlorperazine (COMPAZINE) 5 MG tablet Take 1 tablet (5 mg total) by mouth every 6 (six) hours as needed.   Last reviewed on 3/4/2022 11:17 AM by Lynda Brantley LPN    Review of patient's allergies indicates:  No Known Allergies Last reviewed on  3/4/2022 11:17 AM by Lynda Brantley      Tasks added this encounter   5/7/2022 - Refill Call (Auto Added)   Tasks due within next 3 months   No tasks due.     Tiffanie Bains, PharmD  [facility]  [address] [place] [address]  Phone: [phone]  Fax: [phone]

## 2022-04-08 NOTE — TELEPHONE ENCOUNTER
Sent email to Anjali ROD to see if [employer] can assist with $80 copay amount for patients Gleevec refill. Determination pending.

## 2022-04-13 ENCOUNTER — HOSPITAL ENCOUNTER (OUTPATIENT)
Dept: RADIOLOGY | Facility: HOSPITAL | Age: 62
Discharge: HOME | End: 2022-04-13
Attending: INTERNAL MEDICINE
Payer: COMMERCIAL

## 2022-04-13 DIAGNOSIS — C49.A0 MALIGNANT GASTROINTESTINAL STROMAL TUMOR, UNSPECIFIED SITE: ICD-10-CM

## 2022-04-13 DIAGNOSIS — T45.1X5A IMMUNOSUPPRESSED DUE TO CHEMOTHERAPY: ICD-10-CM

## 2022-04-13 DIAGNOSIS — D84.821 IMMUNOSUPPRESSED DUE TO CHEMOTHERAPY: ICD-10-CM

## 2022-04-13 DIAGNOSIS — Z79.899 IMMUNOSUPPRESSED DUE TO CHEMOTHERAPY: ICD-10-CM

## 2022-04-13 PROCEDURE — 25500020 PHARM REV CODE 255: Performed by: INTERNAL MEDICINE

## 2022-04-13 PROCEDURE — 71260 CT THORAX DX C+: CPT | Mod: TC

## 2022-04-13 PROCEDURE — A9698 NON-RAD CONTRAST MATERIALNOC: HCPCS | Performed by: INTERNAL MEDICINE

## 2022-04-13 PROCEDURE — 74177 CT ABD & PELVIS W/CONTRAST: CPT | Mod: TC

## 2022-04-13 RX ADMIN — IOHEXOL 100 ML: 350 INJECTION, SOLUTION INTRAVENOUS at 11:04

## 2022-04-13 RX ADMIN — IOHEXOL 1000 ML: 9 SOLUTION ORAL at 11:04

## 2022-04-14 ENCOUNTER — OFFICE VISIT (OUTPATIENT)
Dept: HEMATOLOGY/ONCOLOGY | Facility: CLINIC | Age: 62
End: 2022-04-14
Payer: COMMERCIAL

## 2022-04-14 DIAGNOSIS — C49.A0 MALIGNANT GASTROINTESTINAL STROMAL TUMOR, UNSPECIFIED SITE: Primary | ICD-10-CM

## 2022-04-14 PROCEDURE — 1160F RVW MEDS BY RX/DR IN RCRD: CPT | Mod: CPTII,95,, | Performed by: NURSE PRACTITIONER

## 2022-04-14 PROCEDURE — 99214 OFFICE O/P EST MOD 30 MIN: CPT | Mod: 95,,, | Performed by: NURSE PRACTITIONER

## 2022-04-14 PROCEDURE — 99214 PR OFFICE/OUTPT VISIT, EST, LEVL IV, 30-39 MIN: ICD-10-PCS | Mod: 95,,, | Performed by: NURSE PRACTITIONER

## 2022-04-14 PROCEDURE — 1159F MED LIST DOCD IN RCRD: CPT | Mod: CPTII,95,, | Performed by: NURSE PRACTITIONER

## 2022-04-14 PROCEDURE — 1159F PR MEDICATION LIST DOCUMENTED IN MEDICAL RECORD: ICD-10-PCS | Mod: CPTII,95,, | Performed by: NURSE PRACTITIONER

## 2022-04-14 PROCEDURE — 1160F PR REVIEW ALL MEDS BY PRESCRIBER/CLIN PHARMACIST DOCUMENTED: ICD-10-PCS | Mod: CPTII,95,, | Performed by: NURSE PRACTITIONER

## 2022-04-14 NOTE — PROGRESS NOTES
Subjective:       Patient ID: Agusto Juarez Sr. is a 62 y.o. male.    Chief Complaint:   1. Malignant gastrointestinal stromal tumor, unspecified site       Current Treatment:  OP IMATINIB DAILY - GIST    Treatment History:    HPI: This is a 61-year-old male with past medical history that is significant for hypertension, hyperlipidemia, benign prostatic hyperplasia was recently seen for hernia by Dr. Glass.  CT scan showed a large mass that surrounded the stomach, duodenum, pancreas with involvement of the liver.     He subsequently underwent endoscopic ultrasound with biopsy showing GIST measuring up to 20 cm.     He denies abnormal bleeding including epistaxis, hemoptysis, hematemesis, hematochezia, melena or hematuria.  Most recent CBC showed hemoglobin above 10 grams/deciliter.     Denies fever, chills, nausea or vomiting, chest pain, shortness of breath, diarrhea or dysuria.  He however complains of abdominal pain.    Interval History: Patient is seen for follow up on Gleevec. Recent CT A/P reveals continued reduction in size of abdominal mass; RML pulmonary nodule stable. CBC, CMP stable. He reports adherence to daily Gleevec and does not request any refills. He reports a good appetite and normal BMs; denies n/v/d/c and abdominal pain. His only complaint today is intermittent right knee pain and swelling that began about 3 weeks ago and is not associated with rainy/cold weather. He denies current injury of this knee with strenuous activity. He also denies having injured this knee in the past or having had surgery on it. Will order an xray and refer him to Ortho for follow up per his consent. Reviewed with him recent CT scan and lab results.     Social History     Socioeconomic History    Marital status:    Tobacco Use    Smoking status: Current Every Day Smoker     Packs/day: 0.50     Years: 41.00     Pack years: 20.50     Types: Cigarettes     Start date: 5/1/1978    Smokeless tobacco: Never Used    Substance and Sexual Activity    Alcohol use: Yes     Alcohol/week: 0.0 standard drinks    Drug use: No       Past Medical History:   Diagnosis Date    BPH (benign prostatic hypertrophy)     Hyperlipidemia     Hypertension        Family History   Problem Relation Age of Onset    Hypertension Unknown        Past Surgical History:   Procedure Laterality Date    ENDOSCOPIC ULTRASOUND OF UPPER GASTROINTESTINAL TRACT N/A 4/30/2021    Procedure: ULTRASOUND, UPPER GI TRACT, ENDOSCOPIC;  Surgeon: Rikki Shabazz MD;  Location: [facility];  Service: Endoscopy;  Laterality: N/A;  Rapid COVID prior - last minute addition to schedule - ttr    ESOPHAGOGASTRODUODENOSCOPY N/A 4/30/2021    Procedure: EGD (ESOPHAGOGASTRODUODENOSCOPY);  Surgeon: Rikki Shabazz MD;  Location: [facility];  Service: Endoscopy;  Laterality: N/A;  EGD/EUS with biopsy within a week is fine. 45min case with me OK. Any campus. -Dr. Shabazz       Review of Systems   Constitutional: Negative.    HENT: Negative.    Eyes: Negative.    Respiratory: Negative.    Cardiovascular: Negative.    Gastrointestinal: Negative.  Negative for abdominal pain, constipation, diarrhea, nausea and vomiting.   Endocrine: Negative.    Genitourinary: Negative.    Musculoskeletal: Positive for joint swelling (right knee).   Skin: Negative.    Allergic/Immunologic: Negative.    Neurological: Negative.    Hematological: Negative.    Psychiatric/Behavioral: Negative.          Medication List with Changes/Refills   Current Medications    ACETAMINOPHEN (TYLENOL) 325 MG TABLET    Take 325 mg by mouth every 6 (six) hours as needed for Pain.    DICYCLOMINE (BENTYL) 20 MG TABLET    Take 20 mg by mouth every 6 (six) hours. Uses prn    IMATINIB (GLEEVEC) 400 MG TAB    Take 1 tablet (400 mg total) by mouth once daily.    MULTIVITAMIN CAPSULE    Take 1 capsule by mouth once daily.    ONDANSETRON (ZOFRAN-ODT) 8 MG TBDL    Take 1 tablet (8 mg total) by mouth every 12 (twelve) hours as needed.     PANTOPRAZOLE (PROTONIX) 40 MG TABLET    Take 1 tablet (40 mg total) by mouth once daily.    POTASSIUM CHLORIDE SA (K-DUR,KLOR-CON) 20 MEQ TABLET    Take 1 tablet by mouth once daily    PRAVASTATIN (PRAVACHOL) 10 MG TABLET    Take 1 tablet by mouth once daily    PROCHLORPERAZINE (COMPAZINE) 5 MG TABLET    Take 1 tablet (5 mg total) by mouth every 6 (six) hours as needed.     Objective:   There were no vitals filed for this visit.  Results for orders placed or performed during the hospital encounter of 04/13/22 (from the past 2160 hour(s))   CT Chest Abdomen Pelvis With Contrast    Impression    Compared to 10/05/2021, interval continued reduction in size of the upper abdominal infiltrative lobular soft tissue neoplastic process, for example at the right upper quadrant now measuring 10 x 5 cm previously 13 x 7 cm, and at the left upper quadrant abutting the greater curvature of the stomach measuring 12 x 5 cm previously 18 x 6 cm.    Stable 5 mm pulmonary nodule right middle lobe.  No new nodules.      Electronically signed by: Hugo Becerra  Date:    04/13/2022  Time:    12:43     Lab Results   Component Value Date    WBC 7.15 04/13/2022    HGB 11.7 (L) 04/13/2022    HCT 34.3 (L) 04/13/2022    MCV 88 04/13/2022     04/13/2022       CMP  Sodium   Date Value Ref Range Status   04/13/2022 140 136 - 145 mmol/L Final     Potassium   Date Value Ref Range Status   04/13/2022 3.6 3.5 - 5.1 mmol/L Final     Chloride   Date Value Ref Range Status   04/13/2022 109 95 - 110 mmol/L Final     CO2   Date Value Ref Range Status   04/13/2022 25 23 - 29 mmol/L Final     Glucose   Date Value Ref Range Status   04/13/2022 102 70 - 110 mg/dL Final     BUN   Date Value Ref Range Status   04/13/2022 11 6 - 20 mg/dL Final     Creatinine   Date Value Ref Range Status   04/13/2022 0.9 0.5 - 1.4 mg/dL Final     Calcium   Date Value Ref Range Status   04/13/2022 8.8 8.7 - 10.5 mg/dL Final     Total Protein   Date Value Ref Range Status    04/13/2022 6.9 6.0 - 8.4 g/dL Final     Albumin   Date Value Ref Range Status   04/13/2022 3.5 3.5 - 5.2 g/dL Final     Total Bilirubin   Date Value Ref Range Status   04/13/2022 0.7 0.1 - 1.0 mg/dL Final     Comment:     For infants and newborns, interpretation of results should be based  on gestational age, weight and in agreement with clinical  observations.    Premature Infant recommended reference ranges:  Up to 24 hours.............<8.0 mg/dL  Up to 48 hours............<12.0 mg/dL  3-5 days..................<15.0 mg/dL  6-29 days.................<15.0 mg/dL       Alkaline Phosphatase   Date Value Ref Range Status   04/13/2022 70 55 - 135 U/L Final     AST   Date Value Ref Range Status   04/13/2022 21 10 - 40 U/L Final     ALT   Date Value Ref Range Status   04/13/2022 16 10 - 44 U/L Final     Anion Gap   Date Value Ref Range Status   04/13/2022 6 (L) 8 - 16 mmol/L Final     eGFR if    Date Value Ref Range Status   04/13/2022 >60 >60 mL/min/1.73 m^2 Final     eGFR if non    Date Value Ref Range Status   04/13/2022 >60 >60 mL/min/1.73 m^2 Final     Comment:     Calculation used to obtain the estimated glomerular filtration  rate (eGFR) is the CKD-EPI equation.            Physical Exam     Unable to assess due to virtual visit.    (1) Restricted in physically strenuous activity, ambulatory and able to do work of light nature  Assessment:     Problem List Items Addressed This Visit        Oncology    Malignant gastrointestinal stromal tumor - Primary            Plan:     Malignant gastrointestinal stromal tumor, unspecified site    Labs and CTs reviewed.  Continue daily Gleevec; no refill requested at this time.   Follow up in 4 weeks with CBC and Comprehensive Metabolic Panel.    I will review assessment/plan with collaborating physician Dr. Johnston.    XAVIER Sal

## 2022-04-18 ENCOUNTER — PATIENT MESSAGE (OUTPATIENT)
Dept: ADMINISTRATIVE | Facility: OTHER | Age: 62
End: 2022-04-18
Payer: COMMERCIAL

## 2022-04-19 DIAGNOSIS — M25.561 RIGHT KNEE PAIN, UNSPECIFIED CHRONICITY: Primary | ICD-10-CM

## 2022-05-09 ENCOUNTER — PATIENT MESSAGE (OUTPATIENT)
Dept: PHARMACY | Facility: CLINIC | Age: 62
End: 2022-05-09
Payer: COMMERCIAL

## 2022-05-11 ENCOUNTER — SPECIALTY PHARMACY (OUTPATIENT)
Dept: PHARMACY | Facility: CLINIC | Age: 62
End: 2022-05-11
Payer: COMMERCIAL

## 2022-05-11 NOTE — TELEPHONE ENCOUNTER
Specialty Pharmacy - Refill Coordination    Specialty Medication Orders Linked to Encounter    Flowsheet Row Most Recent Value   Medication #1 imatinib (GLEEVEC) 400 MG Tab (Order#[id number], Rx#[id number])          Refill Questions - Documented Responses    Flowsheet Row Most Recent Value   Patient Availability and HIPAA Verification    Does patient want to proceed with activity? Yes   HIPAA/medical authority confirmed? Yes   Relationship to patient of person spoken to? Self   Refill Screening Questions    Changes to allergies? No   Changes to medications? No   New conditions since last clinic visit? No   Unplanned office visit, urgent care, ED, or hospital admission in the last 4 weeks? No   How does patient/caregiver feel medication is working? Excellent   Financial problems or insurance changes? No   How many doses of your specialty medications were missed in the last 4 weeks? 0   Would patient like to speak to a pharmacist? No   When does the patient need to receive the medication? 05/13/22   Refill Delivery Questions    How will the patient receive the medication? Delivery Allegra   When does the patient need to receive the medication? 05/13/22   Shipping Address Home   Address in [facility] confirmed and updated if neccessary? Yes   Expected Copay ($) 80   Is the patient able to afford the medication copay? Yes   Payment Method --  [OCI requested]   Days supply of Refill 30   Supplies needed? No supplies needed   Refill activity completed? Yes   Refill activity plan Refill scheduled   Shipment/Pickup Date: 05/13/22          Current Outpatient Medications   Medication Sig    acetaminophen (TYLENOL) 325 MG tablet Take 325 mg by mouth every 6 (six) hours as needed for Pain.    dicyclomine (BENTYL) 20 mg tablet Take 20 mg by mouth every 6 (six) hours. Uses prn    imatinib (GLEEVEC) 400 MG Tab Take 1 tablet (400 mg total) by mouth once daily.    multivitamin capsule Take 1 capsule by mouth once daily.     ondansetron (ZOFRAN-ODT) 8 MG TbDL Take 1 tablet (8 mg total) by mouth every 12 (twelve) hours as needed.    pantoprazole (PROTONIX) 40 MG tablet Take 1 tablet (40 mg total) by mouth once daily. (Patient taking differently: Take 40 mg by mouth once daily. Uses prn)    potassium chloride SA (K-DUR,KLOR-CON) 20 MEQ tablet Take 1 tablet by mouth once daily    pravastatin (PRAVACHOL) 10 MG tablet Take 1 tablet by mouth once daily    prochlorperazine (COMPAZINE) 5 MG tablet Take 1 tablet (5 mg total) by mouth every 6 (six) hours as needed.   Last reviewed on 4/14/2022  3:07 PM by XAVIER Sal    Review of patient's allergies indicates:  No Known Allergies Last reviewed on  4/14/2022 3:07 PM by Jean Lyons      Tasks added this encounter   6/5/2022 - Refill Call (Auto Added)   Tasks due within next 3 months   No tasks due.     Juan Mercado, PharmD  [facility]  [address] [place] [id number]  Phone: [phone]  Fax: [phone]

## 2022-05-13 ENCOUNTER — DOCUMENTATION ONLY (OUTPATIENT)
Dept: HEMATOLOGY/ONCOLOGY | Facility: CLINIC | Age: 62
End: 2022-05-13
Payer: COMMERCIAL

## 2022-05-13 ENCOUNTER — TELEPHONE (OUTPATIENT)
Dept: HEMATOLOGY/ONCOLOGY | Facility: CLINIC | Age: 62
End: 2022-05-13
Payer: COMMERCIAL

## 2022-05-13 NOTE — PROGRESS NOTES
Susier received email inquiry from Fiordaliza Rockwell CPhT  regarding pt.'s $80 co-pay for pt.'s Gleevec refill on 5/12/22. Susier faxed cost transfer for $80 co-pay through [employer] on today's date 5/13/22 to [facility]. Swer will remain available.

## 2022-05-13 NOTE — TELEPHONE ENCOUNTER
----- Message from Nadia Gannon sent at 5/13/2022  1:56 PM CDT -----  Pt would like a return call regarding the pt feeling dizzy and wanted to know if medication might be the issue. Please call back at [phone] around 4pm preferably

## 2022-05-13 NOTE — PROGRESS NOTES
Specialty pharmacy staff reached out for assistance with patient's Gleevec cost. SWer submitted cost transfer for MD to sign. SWer will remain available.

## 2022-05-17 ENCOUNTER — TELEPHONE (OUTPATIENT)
Dept: HEMATOLOGY/ONCOLOGY | Facility: CLINIC | Age: 62
End: 2022-05-17
Payer: COMMERCIAL

## 2022-05-17 ENCOUNTER — HOSPITAL ENCOUNTER (EMERGENCY)
Facility: HOSPITAL | Age: 62
Discharge: HOME | End: 2022-05-17
Attending: EMERGENCY MEDICINE
Payer: COMMERCIAL

## 2022-05-17 VITALS
RESPIRATION RATE: 18 BRPM | HEART RATE: 87 BPM | WEIGHT: 183.19 LBS | OXYGEN SATURATION: 100 % | BODY MASS INDEX: 27.05 KG/M2 | DIASTOLIC BLOOD PRESSURE: 83 MMHG | SYSTOLIC BLOOD PRESSURE: 174 MMHG | TEMPERATURE: 99 F

## 2022-05-17 DIAGNOSIS — R42 DIZZINESS: Primary | ICD-10-CM

## 2022-05-17 DIAGNOSIS — T88.7XXA MEDICATION SIDE EFFECT: ICD-10-CM

## 2022-05-17 DIAGNOSIS — E87.6 CHRONIC HYPOKALEMIA: ICD-10-CM

## 2022-05-17 LAB
ALBUMIN SERPL BCP-MCNC: 3.9 G/DL (ref 3.5–5.2)
ALP SERPL-CCNC: 66 U/L (ref 55–135)
ALT SERPL W/O P-5'-P-CCNC: 18 U/L (ref 10–44)
ANION GAP SERPL CALC-SCNC: 11 MMOL/L (ref 8–16)
AST SERPL-CCNC: 22 U/L (ref 10–40)
BASOPHILS # BLD AUTO: 0.03 K/UL (ref 0–0.2)
BASOPHILS NFR BLD: 0.4 % (ref 0–1.9)
BILIRUB SERPL-MCNC: 0.8 MG/DL (ref 0.1–1)
BILIRUB UR QL STRIP: NEGATIVE
BUN SERPL-MCNC: 12 MG/DL (ref 6–20)
CALCIUM SERPL-MCNC: 8.9 MG/DL (ref 8.7–10.5)
CHLORIDE SERPL-SCNC: 110 MMOL/L (ref 95–110)
CLARITY UR: CLEAR
CO2 SERPL-SCNC: 22 MMOL/L (ref 23–29)
COLOR UR: YELLOW
CREAT SERPL-MCNC: 0.9 MG/DL (ref 0.5–1.4)
DIFFERENTIAL METHOD: ABNORMAL
EOSINOPHIL # BLD AUTO: 0.2 K/UL (ref 0–0.5)
EOSINOPHIL NFR BLD: 2.1 % (ref 0–8)
ERYTHROCYTE [DISTWIDTH] IN BLOOD BY AUTOMATED COUNT: 17.6 % (ref 11.5–14.5)
EST. GFR  (AFRICAN AMERICAN): >60 ML/MIN/1.73 M^2
EST. GFR  (NON AFRICAN AMERICAN): >60 ML/MIN/1.73 M^2
GLUCOSE SERPL-MCNC: 93 MG/DL (ref 70–110)
GLUCOSE UR QL STRIP: NEGATIVE
HCT VFR BLD AUTO: 33.8 % (ref 40–54)
HGB BLD-MCNC: 11.5 G/DL (ref 14–18)
HGB UR QL STRIP: NEGATIVE
IMM GRANULOCYTES # BLD AUTO: 0.02 K/UL (ref 0–0.04)
IMM GRANULOCYTES NFR BLD AUTO: 0.3 % (ref 0–0.5)
KETONES UR QL STRIP: NEGATIVE
LEUKOCYTE ESTERASE UR QL STRIP: NEGATIVE
LYMPHOCYTES # BLD AUTO: 2.5 K/UL (ref 1–4.8)
LYMPHOCYTES NFR BLD: 31.5 % (ref 18–48)
MCH RBC QN AUTO: 30.3 PG (ref 27–31)
MCHC RBC AUTO-ENTMCNC: 34 G/DL (ref 32–36)
MCV RBC AUTO: 89 FL (ref 82–98)
MONOCYTES # BLD AUTO: 0.7 K/UL (ref 0.3–1)
MONOCYTES NFR BLD: 9.5 % (ref 4–15)
NEUTROPHILS # BLD AUTO: 4.4 K/UL (ref 1.8–7.7)
NEUTROPHILS NFR BLD: 56.2 % (ref 38–73)
NITRITE UR QL STRIP: NEGATIVE
NRBC BLD-RTO: 0 /100 WBC
PH UR STRIP: 7 [PH] (ref 5–8)
PLATELET # BLD AUTO: 233 K/UL (ref 150–450)
PMV BLD AUTO: 9.6 FL (ref 9.2–12.9)
POTASSIUM SERPL-SCNC: 3.4 MMOL/L (ref 3.5–5.1)
PROT SERPL-MCNC: 7.4 G/DL (ref 6–8.4)
PROT UR QL STRIP: ABNORMAL
RBC # BLD AUTO: 3.79 M/UL (ref 4.6–6.2)
SODIUM SERPL-SCNC: 143 MMOL/L (ref 136–145)
SP GR UR STRIP: 1.02 (ref 1–1.03)
TROPONIN I SERPL DL<=0.01 NG/ML-MCNC: 0.01 NG/ML (ref 0–0.03)
URN SPEC COLLECT METH UR: ABNORMAL
UROBILINOGEN UR STRIP-ACNC: 1 EU/DL
WBC # BLD AUTO: 7.77 K/UL (ref 3.9–12.7)

## 2022-05-17 PROCEDURE — 85025 COMPLETE CBC W/AUTO DIFF WBC: CPT | Performed by: EMERGENCY MEDICINE

## 2022-05-17 PROCEDURE — 93010 EKG 12-LEAD: ICD-10-PCS | Mod: ,,, | Performed by: INTERNAL MEDICINE

## 2022-05-17 PROCEDURE — 93010 ELECTROCARDIOGRAM REPORT: CPT | Mod: ,,, | Performed by: INTERNAL MEDICINE

## 2022-05-17 PROCEDURE — 84484 ASSAY OF TROPONIN QUANT: CPT | Performed by: EMERGENCY MEDICINE

## 2022-05-17 PROCEDURE — 81003 URINALYSIS AUTO W/O SCOPE: CPT | Performed by: EMERGENCY MEDICINE

## 2022-05-17 PROCEDURE — 80053 COMPREHEN METABOLIC PANEL: CPT | Performed by: EMERGENCY MEDICINE

## 2022-05-17 PROCEDURE — 93005 ELECTROCARDIOGRAM TRACING: CPT

## 2022-05-17 PROCEDURE — 99285 EMERGENCY DEPT VISIT HI MDM: CPT | Mod: 25

## 2022-05-17 NOTE — ED PROVIDER NOTES
SCRIBE #1 NOTE: I, Talisha Hernandez, am scribing for, and in the presence of, Viri Mancia MD. I have scribed the entire note.      History      Chief Complaint   Patient presents with    Dizziness     Presents with complaints of intermittent episodes of dizziness. Not every day and passes after around 30 min       Review of patient's allergies indicates:  No Known Allergies     HPI   HPI    5/17/2022, 6:45 PM   History obtained from the patient      History of Present Illness: Agusto Juarez Sr. is a 62 y.o. male patient with a PMHx of malignant gastrointestinal stromal tumor, BPH, HLD, and HTN who presents to the Emergency Department for intermittent episodes of dizziness after he takes Gleevec which onset gradually 1 week PTA. The pt reports that he has taken Gleevec for his malignant gastrointestinal stromal tumor for 1 year. 1 week ago, the pt reports he started to have 35 to 40 minute episodes of dizziness after taking Gleevec. He states that his episodes of dizziness subside spontaneously everytime. He reports eating before and after taking Gleevec and drinking water after taking Gleevec. Due to his dizziness after taking Gleevec, the pt reports that he was referred to the ED by Dr. Johnston (Hematology and Oncology) for an evaluation. Symptoms are episodic and moderate in severity. No mitigating or exacerbating factors reported. No associated sxs reported. Patient denies any N/V/D, SOB, CP, fever, chills, weakness, and all other sxs at this time. No prior Tx reported. No further complaints or concerns at this time.         Arrival mode: Personal vehicle      PCP: Joe Bentley MD       Past Medical History:  Past Medical History:   Diagnosis Date    BPH (benign prostatic hypertrophy)     Hyperlipidemia     Hypertension        Past Surgical History:  Past Surgical History:   Procedure Laterality Date    ENDOSCOPIC ULTRASOUND OF UPPER GASTROINTESTINAL TRACT N/A 4/30/2021    Procedure: ULTRASOUND,  UPPER GI TRACT, ENDOSCOPIC;  Surgeon: Rikki Shabazz MD;  Location: [facility];  Service: Endoscopy;  Laterality: N/A;  Rapid COVID prior - last minute addition to schedule - ttr    ESOPHAGOGASTRODUODENOSCOPY N/A 4/30/2021    Procedure: EGD (ESOPHAGOGASTRODUODENOSCOPY);  Surgeon: Rikki Shabazz MD;  Location: [facility];  Service: Endoscopy;  Laterality: N/A;  EGD/EUS with biopsy within a week is fine. 45min case with me OK. Any campus. -Dr. Shabazz         Family History:  Family History   Problem Relation Age of Onset    Hypertension Unknown        Social History:  Social History     Tobacco Use    Smoking status: Current Every Day Smoker     Packs/day: 0.50     Years: 41.00     Pack years: 20.50     Types: Cigarettes     Start date: 5/1/1978    Smokeless tobacco: Never Used   Substance and Sexual Activity    Alcohol use: Yes     Alcohol/week: 0.0 standard drinks    Drug use: No    Sexual activity: Not on file       ROS   Review of Systems   Constitutional: Negative for chills and fever.   HENT: Negative for sore throat.    Respiratory: Negative for shortness of breath.    Cardiovascular: Negative for chest pain.   Gastrointestinal: Negative for diarrhea, nausea and vomiting.   Genitourinary: Negative for dysuria.   Musculoskeletal: Negative for back pain.   Skin: Negative for rash.   Neurological: Positive for dizziness. Negative for weakness.   Hematological: Does not bruise/bleed easily.   All other systems reviewed and are negative.    Physical Exam      Initial Vitals [05/17/22 1813]   BP Pulse Resp Temp SpO2   (!) 174/83 93 20 98.7 °F (37.1 °C) 100 %      MAP       --          Physical Exam  Nursing Notes and Vital Signs Reviewed.  Constitutional: Patient is in no acute distress. Well-developed and well-nourished.  Head: Atraumatic. Normocephalic.  Eyes: PERRL. EOM intact. Conjunctivae are not pale. No scleral icterus.  ENT: Mucous membranes are moist. Oropharynx is clear and symmetric.    Neck: Supple.  Full ROM. No lymphadenopathy.  Cardiovascular: Regular rate. Regular rhythm. No murmurs, rubs, or gallops. Distal pulses are 2+ and symmetric.  Pulmonary/Chest: No respiratory distress. Clear to auscultation bilaterally. No wheezing or rales.  Abdominal: Soft and non-distended.  There is no tenderness.  No rebound, guarding, or rigidity.   Musculoskeletal: Moves all extremities. No obvious deformities. No edema.  Skin: Warm and dry.  Neurological:  Alert, awake, and appropriate.  Normal speech.  No acute focal neurological deficits are appreciated.  Psychiatric: Normal affect. Good eye contact. Appropriate in content.    ED Course    Procedures  ED Vital Signs:  Vitals:    05/17/22 1813 05/17/22 1855   BP: (!) 174/83    Pulse: 93 94   Resp: 20    Temp: 98.7 °F (37.1 °C)    TempSrc: Oral    SpO2: 100%    Weight: 83.1 kg (183 lb 3.2 oz)        Abnormal Lab Results:  Labs Reviewed   CBC W/ AUTO DIFFERENTIAL - Abnormal; Notable for the following components:       Result Value    RBC 3.79 (*)     Hemoglobin 11.5 (*)     Hematocrit 33.8 (*)     RDW 17.6 (*)     All other components within normal limits   COMPREHENSIVE METABOLIC PANEL - Abnormal; Notable for the following components:    Potassium 3.4 (*)     CO2 22 (*)     All other components within normal limits   URINALYSIS, REFLEX TO URINE CULTURE - Abnormal; Notable for the following components:    Protein, UA Trace (*)     All other components within normal limits    Narrative:     Specimen Source->Urine   TROPONIN I        All Lab Results:  Results for orders placed or performed during the hospital encounter of 05/17/22   CBC auto differential   Result Value Ref Range    WBC 7.77 3.90 - 12.70 K/uL    RBC 3.79 (L) 4.60 - 6.20 M/uL    Hemoglobin 11.5 (L) 14.0 - 18.0 g/dL    Hematocrit 33.8 (L) 40.0 - 54.0 %    MCV 89 82 - 98 fL    MCH 30.3 27.0 - 31.0 pg    MCHC 34.0 32.0 - 36.0 g/dL    RDW 17.6 (H) 11.5 - 14.5 %    Platelets 233 150 - 450 K/uL    MPV 9.6 9.2 - 12.9  fL    Immature Granulocytes 0.3 0.0 - 0.5 %    Gran # (ANC) 4.4 1.8 - 7.7 K/uL    Immature Grans (Abs) 0.02 0.00 - 0.04 K/uL    Lymph # 2.5 1.0 - 4.8 K/uL    Mono # 0.7 0.3 - 1.0 K/uL    Eos # 0.2 0.0 - 0.5 K/uL    Baso # 0.03 0.00 - 0.20 K/uL    nRBC 0 0 /100 WBC    Gran % 56.2 38.0 - 73.0 %    Lymph % 31.5 18.0 - 48.0 %    Mono % 9.5 4.0 - 15.0 %    Eosinophil % 2.1 0.0 - 8.0 %    Basophil % 0.4 0.0 - 1.9 %    Differential Method Automated    Comprehensive metabolic panel   Result Value Ref Range    Sodium 143 136 - 145 mmol/L    Potassium 3.4 (L) 3.5 - 5.1 mmol/L    Chloride 110 95 - 110 mmol/L    CO2 22 (L) 23 - 29 mmol/L    Glucose 93 70 - 110 mg/dL    BUN 12 6 - 20 mg/dL    Creatinine 0.9 0.5 - 1.4 mg/dL    Calcium 8.9 8.7 - 10.5 mg/dL    Total Protein 7.4 6.0 - 8.4 g/dL    Albumin 3.9 3.5 - 5.2 g/dL    Total Bilirubin 0.8 0.1 - 1.0 mg/dL    Alkaline Phosphatase 66 55 - 135 U/L    AST 22 10 - 40 U/L    ALT 18 10 - 44 U/L    Anion Gap 11 8 - 16 mmol/L    eGFR if African American >60 >60 mL/min/1.73 m^2    eGFR if non African American >60 >60 mL/min/1.73 m^2   Troponin I #1   Result Value Ref Range    Troponin I 0.011 0.000 - 0.026 ng/mL   Urinalysis, Reflex to Urine Culture Urine, Clean Catch    Specimen: Urine   Result Value Ref Range    Specimen UA Urine, Clean Catch     Color, UA Yellow Yellow, Straw, Jaelyn    Appearance, UA Clear Clear    pH, UA 7.0 5.0 - 8.0    Specific Gravity, UA 1.020 1.005 - 1.030    Protein, UA Trace (A) Negative    Glucose, UA Negative Negative    Ketones, UA Negative Negative    Bilirubin (UA) Negative Negative    Occult Blood UA Negative Negative    Nitrite, UA Negative Negative    Urobilinogen, UA 1.0 <2.0 EU/dL    Leukocytes, UA Negative Negative         Imaging Results:  Imaging Results          X-Ray Chest PA And Lateral (Final result)  Result time 05/17/22 19:27:26    Final result by Abbe Silverman MD (05/17/22 19:27:26)                 Impression:      No acute  abnormality.      Electronically signed by: Abbe Herrera  Date:    05/17/2022  Time:    19:27             Narrative:    EXAMINATION:  XR CHEST PA AND LATERAL    CLINICAL HISTORY:  weakness;    TECHNIQUE:  PA and lateral views of the chest were performed.    COMPARISON:  05/01/2019.    FINDINGS:  The lungs are clear, with normal appearance of pulmonary vasculature and no pleural effusion or pneumothorax.    The cardiac silhouette is normal in size. The hilar and mediastinal contours are unremarkable.    Bones are intact.                               The EKG was ordered, reviewed, and independently interpreted by the ED provider.  Interpretation time: 18:59  Rate: 91 BPM  Rhythm: normal sinus rhythm  Interpretation: Left posterior fascicular block. No STEMI.           The Emergency Provider reviewed the vital signs and test results, which are outlined above.    ED Discussion     7:50 PM: Reassessed pt at this time. Discussed with pt all pertinent ED information and results. Discussed pt dx and plan of tx. Gave pt all f/u and return to the ED instructions. All questions and concerns were addressed at this time. Pt expresses understanding of information and instructions, and is comfortable with plan to discharge. Pt is stable for discharge.    I discussed with patient and/or family/caretaker that evaluation in the ED does not suggest any emergent or life threatening medical conditions requiring immediate intervention beyond what was provided in the ED, and I believe patient is safe for discharge.  Regardless, an unremarkable evaluation in the ED does not preclude the development or presence of a serious of life threatening condition. As such, patient was instructed to return immediately for any worsening or change in current symptoms.           ED Medication(s):  Medications - No data to display     Follow-up Information     Joe Bentley MD. Schedule an appointment as soon as possible for a visit in 2 days.     Specialty: Family Medicine  Why: Return to the Emergency Room, If symptoms worsen  Contact information:  [id number] [id number] [phone]                         New Prescriptions    No medications on file         Medical Decision Making    Medical Decision Making:   Clinical Tests:   Lab Tests: Ordered and Reviewed  Radiological Study: Ordered and Reviewed  Medical Tests: Ordered and Reviewed           Scribe Attestation:   Scribe #1: I performed the above scribed service and the documentation accurately describes the services I performed. I attest to the accuracy of the note.    Attending:   Physician Attestation Statement for Scribe #1: I, Viri Mancia MD, personally performed the services described in this documentation, as scribed by Talisha Hernandez, in my presence, and it is both accurate and complete.          Clinical Impression       ICD-10-CM ICD-9-CM   1. Dizziness  R42 780.4   2. Chronic hypokalemia  E87.6 276.8   3. Medication side effect  T88.7XXA 995.20       Disposition:   Disposition: Discharged  Condition: Stable         Viri Mancia MD  05/17/22 1955

## 2022-05-17 NOTE — TELEPHONE ENCOUNTER
----- Message from Francisco Johnston MD sent at 5/17/2022 11:55 AM CDT -----  Need to go to the ED for evaluation immediately. Thanks    ----- Message -----  From: Mahogany Whittaker MA  Sent: 5/17/2022  11:53 AM CDT  To: Francisco Johnston MD    Spoke to pt he states that the last couple of weeks he been taking its been making him dizzy and he have to sit a while also nauseated. He said that he cant get up and move around.  ----- Message -----  From: Nadia Gannon  Sent: 5/17/2022  11:42 AM CDT  To: [facility] Staff    Pt would like a return call regarding symptoms he feels when taken one of his medications. Please call back at [phone] before 2pm please.           
Spoke to pt informed him that Dr Johnston wants him to go to the ED for evaluation per the conversation about his medication making him dizzy and nauseated.   
No

## 2022-05-17 NOTE — TELEPHONE ENCOUNTER
----- Message from Nadia Gannon sent at 5/17/2022 11:40 AM CDT -----  Pt would like a return call regarding symptoms he feels when taken one of his medications. Please call back at .[phone] before 2pm please.

## 2022-05-17 NOTE — TELEPHONE ENCOUNTER
Spoke to pt he states that the last couple of weeks he been taking its been making him dizzy and he have to sit a while also nauseated. He said that he cant get up and move around. Informed him that I would let Dr Johnston know and get back with him with the recommendations.

## 2022-05-23 ENCOUNTER — OFFICE VISIT (OUTPATIENT)
Dept: FAMILY MEDICINE | Facility: CLINIC | Age: 62
End: 2022-05-23
Payer: COMMERCIAL

## 2022-05-23 VITALS
TEMPERATURE: 99 F | SYSTOLIC BLOOD PRESSURE: 138 MMHG | HEART RATE: 101 BPM | OXYGEN SATURATION: 98 % | HEIGHT: 69 IN | WEIGHT: 186.38 LBS | BODY MASS INDEX: 27.6 KG/M2 | DIASTOLIC BLOOD PRESSURE: 74 MMHG

## 2022-05-23 DIAGNOSIS — C49.A0 MALIGNANT GASTROINTESTINAL STROMAL TUMOR, UNSPECIFIED SITE: Primary | ICD-10-CM

## 2022-05-23 DIAGNOSIS — Z12.11 SCREENING FOR COLON CANCER: ICD-10-CM

## 2022-05-23 DIAGNOSIS — J06.9 UPPER RESPIRATORY TRACT INFECTION, UNSPECIFIED TYPE: ICD-10-CM

## 2022-05-23 DIAGNOSIS — I10 PRIMARY HYPERTENSION: ICD-10-CM

## 2022-05-23 DIAGNOSIS — T50.905A DRUG-INDUCED HYPOKALEMIA: ICD-10-CM

## 2022-05-23 DIAGNOSIS — E87.6 DRUG-INDUCED HYPOKALEMIA: ICD-10-CM

## 2022-05-23 PROBLEM — R93.5 ABNORMAL CT OF THE ABDOMEN: Status: RESOLVED | Noted: 2021-04-30 | Resolved: 2022-05-23

## 2022-05-23 LAB
CTP QC/QA: YES
SARS-COV-2 RDRP RESP QL NAA+PROBE: NEGATIVE

## 2022-05-23 PROCEDURE — 99999 PR PBB SHADOW E&M-EST. PATIENT-LVL IV: ICD-10-PCS | Mod: PBBFAC,,, | Performed by: FAMILY MEDICINE

## 2022-05-23 PROCEDURE — 99214 PR OFFICE/OUTPT VISIT, EST, LEVL IV, 30-39 MIN: ICD-10-PCS | Mod: S$GLB,,, | Performed by: FAMILY MEDICINE

## 2022-05-23 PROCEDURE — 3078F PR MOST RECENT DIASTOLIC BLOOD PRESSURE < 80 MM HG: ICD-10-PCS | Mod: CPTII,S$GLB,, | Performed by: FAMILY MEDICINE

## 2022-05-23 PROCEDURE — U0002: ICD-10-PCS | Mod: QW,S$GLB,, | Performed by: FAMILY MEDICINE

## 2022-05-23 PROCEDURE — 3008F PR BODY MASS INDEX (BMI) DOCUMENTED: ICD-10-PCS | Mod: CPTII,S$GLB,, | Performed by: FAMILY MEDICINE

## 2022-05-23 PROCEDURE — 99214 OFFICE O/P EST MOD 30 MIN: CPT | Mod: S$GLB,,, | Performed by: FAMILY MEDICINE

## 2022-05-23 PROCEDURE — 1159F MED LIST DOCD IN RCRD: CPT | Mod: CPTII,S$GLB,, | Performed by: FAMILY MEDICINE

## 2022-05-23 PROCEDURE — 3075F PR MOST RECENT SYSTOLIC BLOOD PRESS GE 130-139MM HG: ICD-10-PCS | Mod: CPTII,S$GLB,, | Performed by: FAMILY MEDICINE

## 2022-05-23 PROCEDURE — 3008F BODY MASS INDEX DOCD: CPT | Mod: CPTII,S$GLB,, | Performed by: FAMILY MEDICINE

## 2022-05-23 PROCEDURE — 99999 PR PBB SHADOW E&M-EST. PATIENT-LVL IV: CPT | Mod: PBBFAC,,, | Performed by: FAMILY MEDICINE

## 2022-05-23 PROCEDURE — U0002 COVID-19 LAB TEST NON-CDC: HCPCS | Mod: QW,S$GLB,, | Performed by: FAMILY MEDICINE

## 2022-05-23 PROCEDURE — 3078F DIAST BP <80 MM HG: CPT | Mod: CPTII,S$GLB,, | Performed by: FAMILY MEDICINE

## 2022-05-23 PROCEDURE — 1159F PR MEDICATION LIST DOCUMENTED IN MEDICAL RECORD: ICD-10-PCS | Mod: CPTII,S$GLB,, | Performed by: FAMILY MEDICINE

## 2022-05-23 PROCEDURE — 3075F SYST BP GE 130 - 139MM HG: CPT | Mod: CPTII,S$GLB,, | Performed by: FAMILY MEDICINE

## 2022-05-23 NOTE — PROGRESS NOTES
Patient presents follow-up ER visit.  He had some dizziness/lightheadedness.  He has not had any recurrence of symptoms.  ER note reviewed in epic.  He did have mild hypokalemia felt to be related to Gleevec which he uses for malignant gastrointestinal stromal tumor.  He is on supplementation.  Does complain of some mild upper respiratory symptoms over the past 2 days with some postnasal drainage rhinorrhea and slight cough.  Blood pressure controlled.    Agusto was seen today for hospital follow up.    Diagnoses and all orders for this visit:    Malignant gastrointestinal stromal tumor, unspecified site    Drug-induced hypokalemia    Upper respiratory tract infection, unspecified type  -     POCT COVID-19 Rapid Screening; Future  -     POCT COVID-19 Rapid Screening    Screening for colon cancer  -     Cologuard Screening (Multitarget Stool DNA); Future  -     Cologuard Screening (Multitarget Stool DNA)    Primary hypertension    Continue current medication.  Continue follow-up with his oncologist.  COVID testing negative.  May use over-the-counter cold medication.              Past Medical History:  Past Medical History:   Diagnosis Date    BPH (benign prostatic hypertrophy)     Hyperlipidemia     Hypertension      Past Surgical History:   Procedure Laterality Date    ENDOSCOPIC ULTRASOUND OF UPPER GASTROINTESTINAL TRACT N/A 4/30/2021    Procedure: ULTRASOUND, UPPER GI TRACT, ENDOSCOPIC;  Surgeon: Rikki Shabazz MD;  Location: [facility];  Service: Endoscopy;  Laterality: N/A;  Rapid COVID prior - last minute addition to schedule - ttr    ESOPHAGOGASTRODUODENOSCOPY N/A 4/30/2021    Procedure: EGD (ESOPHAGOGASTRODUODENOSCOPY);  Surgeon: Rikki Shabazz MD;  Location: [facility];  Service: Endoscopy;  Laterality: N/A;  EGD/EUS with biopsy within a week is fine. 45min case with me OK. Any campus. -Dr. Shabazz     Review of patient's allergies indicates:  No Known Allergies  Current Outpatient Medications on File Prior  to Visit   Medication Sig Dispense Refill    acetaminophen (TYLENOL) 325 MG tablet Take 325 mg by mouth every 6 (six) hours as needed for Pain.      imatinib (GLEEVEC) 400 MG Tab Take 1 tablet (400 mg total) by mouth once daily. 90 tablet 1    multivitamin capsule Take 1 capsule by mouth once daily.      potassium chloride SA (K-DUR,KLOR-CON) 20 MEQ tablet Take 1 tablet by mouth once daily 90 tablet 3    pravastatin (PRAVACHOL) 10 MG tablet Take 1 tablet by mouth once daily 90 tablet 3    dicyclomine (BENTYL) 20 mg tablet Take 20 mg by mouth every 6 (six) hours. Uses prn      [DISCONTINUED] ondansetron (ZOFRAN-ODT) 8 MG TbDL Take 1 tablet (8 mg total) by mouth every 12 (twelve) hours as needed. (Patient not taking: Reported on 5/23/2022) 90 tablet 1    [DISCONTINUED] pantoprazole (PROTONIX) 40 MG tablet Take 1 tablet (40 mg total) by mouth once daily. (Patient not taking: Reported on 5/23/2022) 30 tablet 1    [DISCONTINUED] prochlorperazine (COMPAZINE) 5 MG tablet Take 1 tablet (5 mg total) by mouth every 6 (six) hours as needed. (Patient not taking: Reported on 5/23/2022) 90 tablet 1     Current Facility-Administered Medications on File Prior to Visit   Medication Dose Route Frequency Provider Last Rate Last Admin    sodium chloride 0.9% flush 10 mL  10 mL Intravenous PRN Rikki Shabazz MD         Social History     Socioeconomic History    Marital status:    Tobacco Use    Smoking status: Current Every Day Smoker     Packs/day: 0.50     Years: 41.00     Pack years: 20.50     Types: Cigarettes     Start date: 5/1/1978    Smokeless tobacco: Never Used   Substance and Sexual Activity    Alcohol use: Yes     Alcohol/week: 0.0 standard drinks    Drug use: No     Family History   Problem Relation Age of Onset    Hypertension Unknown            ROS:  GENERAL: No fever, chills,  or significant weight changes.   CARDIOVASCULAR: Denies chest pain, PND, orthopnea or reduced exercise  "tolerance.  ABDOMEN: Appetite fine. Denies diarrhea, abdominal pain, hematemesis or blood in stool.  URINARY: No flank pain, dysuria or hematuria.    Vitals:    05/23/22 1356 05/23/22 1420   BP: (!) 150/86 138/74   Pulse: 101    Temp: 98.6 °F (37 °C)    SpO2: 98%    Weight: 84.6 kg (186 lb 6.4 oz)    Height: 5' 9" (1.753 m)      Wt Readings from Last 3 Encounters:   05/23/22 84.6 kg (186 lb 6.4 oz)   05/17/22 83.1 kg (183 lb 3.2 oz)   03/04/22 83 kg (182 lb 15.7 oz)       OBJECTIVE:   APPEARANCE: Well nourished, well developed, in no acute distress.    HEAD: Normocephalic.  Atraumatic.  No sinus tenderness.  EYES:   Right eye: Pupil reactive.  Conjunctiva clear.    Left eye: Pupil reactive.  Conjunctiva clear.  EOMI.    EARS: TM's intact. Light reflex normal. No retraction or perforation.    NOSE:  clear.  MOUTH & THROAT:  No pharyngeal erythema or exudate. No lesions.  NECK: Supple. No bruits.  No JVD.  No cervical lymphadenopathy.  No thyromegaly.    CHEST: Breath sounds clear bilaterally.  Normal respiratory effort  CARDIOVASCULAR: Normal rate.  Regular rhythm.  No murmurs.  No rub.  No gallops.  ABDOMEN: Bowel sounds normal.  Soft.  No tenderness.  No organomegaly.  PERIPHERAL VASCULAR: No cyanosis.  No clubbing.  No edema.  NEUROLOGIC: No focal findings.  MENTAL STATUS: Alert.  Oriented x 3.        "

## 2022-06-06 ENCOUNTER — SPECIALTY PHARMACY (OUTPATIENT)
Dept: PHARMACY | Facility: CLINIC | Age: 62
End: 2022-06-06
Payer: COMMERCIAL

## 2022-06-06 DIAGNOSIS — C49.A2 GASTRIC STROMAL TUMOR: ICD-10-CM

## 2022-06-06 RX ORDER — IMATINIB MESYLATE 400 MG/1
400 TABLET, FILM COATED ORAL DAILY
Qty: 90 TABLET | Refills: 1 | Status: CANCELLED | OUTPATIENT
Start: 2022-06-06 | End: 2023-06-06

## 2022-06-06 RX ORDER — IMATINIB MESYLATE 400 MG/1
400 TABLET, FILM COATED ORAL DAILY
Qty: 90 TABLET | Refills: 1 | Status: SHIPPED | OUTPATIENT
Start: 2022-06-06 | End: 2022-11-30 | Stop reason: SDUPTHER

## 2022-06-06 NOTE — TELEPHONE ENCOUNTER
Outgoing call to pt to see how many Gleevec he had on hand because RX is out of refills. Pt stated he has about 6 on hand. Refill request sent to [facility]. Informed pt once we get a new rx we will call to set up refill. Pt voiced understanding.

## 2022-06-09 ENCOUNTER — SPECIALTY PHARMACY (OUTPATIENT)
Dept: PHARMACY | Facility: CLINIC | Age: 62
End: 2022-06-09
Payer: COMMERCIAL

## 2022-06-09 NOTE — TELEPHONE ENCOUNTER
Sent message to [employer] MARC[employer] Boateng to see if [employer] can assist with $80 copay amount for patients Gleevec refill. Determination pending.

## 2022-06-09 NOTE — TELEPHONE ENCOUNTER
Specialty Pharmacy - Refill Coordination    Specialty Medication Orders Linked to Encounter    Flowsheet Row Most Recent Value   Medication #1 imatinib (GLEEVEC) 400 MG Tab (Order#[id number], Rx#[id number])          Refill Questions - Documented Responses    Flowsheet Row Most Recent Value   Patient Availability and HIPAA Verification    Does patient want to proceed with activity? Yes   HIPAA/medical authority confirmed? Yes   Relationship to patient of person spoken to? Self   Refill Screening Questions    Changes to allergies? No   Changes to medications? No   New conditions since last clinic visit? No   Unplanned office visit, urgent care, ED, or hospital admission in the last 4 weeks? No   How does patient/caregiver feel medication is working? Good   Financial problems or insurance changes? No   How many doses of your specialty medications were missed in the last 4 weeks? 0   Would patient like to speak to a pharmacist? No   When does the patient need to receive the medication? 06/13/22   Refill Delivery Questions    How will the patient receive the medication? Delivery Allegra   When does the patient need to receive the medication? 06/13/22   Shipping Address Home   Address in [facility] confirmed and updated if neccessary? Yes   Expected Copay ($) 80   Is the patient able to afford the medication copay? Yes   Payment Method --  [Requested [employer]]   Days supply of Refill 30   Supplies needed? No supplies needed   Refill activity completed? Yes   Refill activity plan Refill scheduled   Shipment/Pickup Date: 06/10/22          Current Outpatient Medications   Medication Sig    acetaminophen (TYLENOL) 325 MG tablet Take 325 mg by mouth every 6 (six) hours as needed for Pain.    dicyclomine (BENTYL) 20 mg tablet Take 20 mg by mouth every 6 (six) hours. Uses prn    imatinib (GLEEVEC) 400 MG Tab Take 1 tablet (400 mg total) by mouth once daily.    multivitamin capsule Take 1 capsule by mouth once daily.     potassium chloride SA (K-DUR,KLOR-CON) 20 MEQ tablet Take 1 tablet by mouth once daily    pravastatin (PRAVACHOL) 10 MG tablet Take 1 tablet by mouth once daily   Last reviewed on 5/23/2022  1:56 PM by Dana Hernandez LPN    Review of patient's allergies indicates:  No Known Allergies Last reviewed on  5/23/2022 1:55 PM by Dana Hernandez      Tasks added this encounter   7/6/2022 - Refill Call (Auto Added)   Tasks due within next 3 months   8/29/2022 - Clinical - Follow Up Assesement (180 day)     Milagros Chilel, Patient Care Assistant  [facility]  [address]  [facility] [address]  Phone: [phone]  Fax: [phone]

## 2022-06-13 ENCOUNTER — OFFICE VISIT (OUTPATIENT)
Dept: FAMILY MEDICINE | Facility: CLINIC | Age: 62
End: 2022-06-13
Payer: COMMERCIAL

## 2022-06-13 VITALS
HEART RATE: 93 BPM | HEIGHT: 68 IN | SYSTOLIC BLOOD PRESSURE: 137 MMHG | DIASTOLIC BLOOD PRESSURE: 70 MMHG | TEMPERATURE: 98 F | WEIGHT: 187.94 LBS | BODY MASS INDEX: 28.48 KG/M2

## 2022-06-13 DIAGNOSIS — H10.9 CONJUNCTIVITIS OF BOTH EYES, UNSPECIFIED CONJUNCTIVITIS TYPE: Primary | ICD-10-CM

## 2022-06-13 PROCEDURE — 3075F PR MOST RECENT SYSTOLIC BLOOD PRESS GE 130-139MM HG: ICD-10-PCS | Mod: CPTII,S$GLB,, | Performed by: FAMILY MEDICINE

## 2022-06-13 PROCEDURE — 99213 PR OFFICE/OUTPT VISIT, EST, LEVL III, 20-29 MIN: ICD-10-PCS | Mod: S$GLB,,, | Performed by: FAMILY MEDICINE

## 2022-06-13 PROCEDURE — 3008F PR BODY MASS INDEX (BMI) DOCUMENTED: ICD-10-PCS | Mod: CPTII,S$GLB,, | Performed by: FAMILY MEDICINE

## 2022-06-13 PROCEDURE — 99213 OFFICE O/P EST LOW 20 MIN: CPT | Mod: S$GLB,,, | Performed by: FAMILY MEDICINE

## 2022-06-13 PROCEDURE — 3008F BODY MASS INDEX DOCD: CPT | Mod: CPTII,S$GLB,, | Performed by: FAMILY MEDICINE

## 2022-06-13 PROCEDURE — 3078F PR MOST RECENT DIASTOLIC BLOOD PRESSURE < 80 MM HG: ICD-10-PCS | Mod: CPTII,S$GLB,, | Performed by: FAMILY MEDICINE

## 2022-06-13 PROCEDURE — 1159F PR MEDICATION LIST DOCUMENTED IN MEDICAL RECORD: ICD-10-PCS | Mod: CPTII,S$GLB,, | Performed by: FAMILY MEDICINE

## 2022-06-13 PROCEDURE — 99999 PR PBB SHADOW E&M-EST. PATIENT-LVL IV: ICD-10-PCS | Mod: PBBFAC,,, | Performed by: FAMILY MEDICINE

## 2022-06-13 PROCEDURE — 99999 PR PBB SHADOW E&M-EST. PATIENT-LVL IV: CPT | Mod: PBBFAC,,, | Performed by: FAMILY MEDICINE

## 2022-06-13 PROCEDURE — 3078F DIAST BP <80 MM HG: CPT | Mod: CPTII,S$GLB,, | Performed by: FAMILY MEDICINE

## 2022-06-13 PROCEDURE — 1159F MED LIST DOCD IN RCRD: CPT | Mod: CPTII,S$GLB,, | Performed by: FAMILY MEDICINE

## 2022-06-13 PROCEDURE — 3075F SYST BP GE 130 - 139MM HG: CPT | Mod: CPTII,S$GLB,, | Performed by: FAMILY MEDICINE

## 2022-06-13 RX ORDER — AZELASTINE HYDROCHLORIDE 0.5 MG/ML
1 SOLUTION/ DROPS OPHTHALMIC 2 TIMES DAILY
Qty: 6 ML | Refills: 0 | Status: SHIPPED | OUTPATIENT
Start: 2022-06-13 | End: 2022-12-16

## 2022-06-13 NOTE — PROGRESS NOTES
Complains of _Bilateral eye  Itching,redness, minimal watery discharge, morning matting.  No photophobia.  No foreign body.  No difficulty with vision loss. Onset past 5 days.     Agusto was seen today for conjunctivitis.    Diagnoses and all orders for this visit:    Conjunctivitis of both eyes, unspecified conjunctivitis type    Other orders  -     azelastine (OPTIVAR) 0.05 % ophthalmic solution; Place 1 drop into both eyes 2 (two) times daily.       follow-up eye doctor if symptoms not improving over the next few days or if symptoms worsen.    Follow-up as needed if symptoms not resolving with recommended treatment          Past Medical History:  Past Medical History:   Diagnosis Date    BPH (benign prostatic hypertrophy)     Hyperlipidemia     Hypertension      Past Surgical History:   Procedure Laterality Date    ENDOSCOPIC ULTRASOUND OF UPPER GASTROINTESTINAL TRACT N/A 4/30/2021    Procedure: ULTRASOUND, UPPER GI TRACT, ENDOSCOPIC;  Surgeon: Rikki Shabazz MD;  Location: [facility];  Service: Endoscopy;  Laterality: N/A;  Rapid COVID prior - last minute addition to schedule - ttr    ESOPHAGOGASTRODUODENOSCOPY N/A 4/30/2021    Procedure: EGD (ESOPHAGOGASTRODUODENOSCOPY);  Surgeon: Rikki Shabazz MD;  Location: [facility];  Service: Endoscopy;  Laterality: N/A;  EGD/EUS with biopsy within a week is fine. 45min case with me OK. Any campus. -Dr. Shabazz     Social History     Socioeconomic History    Marital status:    Tobacco Use    Smoking status: Current Every Day Smoker     Packs/day: 0.50     Years: 41.00     Pack years: 20.50     Types: Cigarettes     Start date: 5/1/1978    Smokeless tobacco: Never Used   Substance and Sexual Activity    Alcohol use: Yes     Alcohol/week: 0.0 standard drinks    Drug use: No     Family History   Problem Relation Age of Onset    Hypertension Unknown      Review of patient's allergies indicates:  No Known Allergies@10DAYS@   Current Outpatient Medications on File  "Prior to Visit   Medication Sig Dispense Refill    acetaminophen (TYLENOL) 325 MG tablet Take 325 mg by mouth every 6 (six) hours as needed for Pain.      dicyclomine (BENTYL) 20 mg tablet Take 20 mg by mouth every 6 (six) hours. Uses prn      imatinib (GLEEVEC) 400 MG Tab Take 1 tablet (400 mg total) by mouth once daily. 90 tablet 1    multivitamin capsule Take 1 capsule by mouth once daily.      potassium chloride SA (K-DUR,KLOR-CON) 20 MEQ tablet Take 1 tablet by mouth once daily 90 tablet 3    pravastatin (PRAVACHOL) 10 MG tablet Take 1 tablet by mouth once daily 90 tablet 3     Current Facility-Administered Medications on File Prior to Visit   Medication Dose Route Frequency Provider Last Rate Last Admin    sodium chloride 0.9% flush 10 mL  10 mL Intravenous PRN Rikki Shabazz MD         Vitals:    06/13/22 1355 06/13/22 1430   BP: (!) 150/78 137/70   Pulse: 93    Temp: 98.4 °F (36.9 °C)    TempSrc: Oral    Weight: 85.2 kg (187 lb 15.1 oz)    Height: 5' 8" (1.727 m)        general: No acute distress   HEENT: Head is atraumatic normocephalic. Ears: Normal tympanic membranes and external auditory canals. eyes pupils equal and reactive. Mild conjunctival erythema  Bilateral left greater than right.  No significant discharge or present.  No photophobia..      Extraocular movements are intact. Nose and throat are clear.   Neck supple no adenopathy JVD bruit or thyromegaly   Chest clear to auscultation. Normal respiratory effort                           "

## 2022-07-06 ENCOUNTER — PATIENT MESSAGE (OUTPATIENT)
Dept: PHARMACY | Facility: CLINIC | Age: 62
End: 2022-07-06
Payer: COMMERCIAL

## 2022-07-06 ENCOUNTER — SPECIALTY PHARMACY (OUTPATIENT)
Dept: PHARMACY | Facility: CLINIC | Age: 62
End: 2022-07-06
Payer: COMMERCIAL

## 2022-07-06 NOTE — TELEPHONE ENCOUNTER
Specialty Pharmacy - Refill Coordination    Specialty Medication Orders Linked to Encounter    Flowsheet Row Most Recent Value   Medication #1 imatinib (GLEEVEC) 400 MG Tab (Order#[id number], Rx#[id number])          Refill Questions - Documented Responses    Flowsheet Row Most Recent Value   Patient Availability and HIPAA Verification    Does patient want to proceed with activity? Yes   HIPAA/medical authority confirmed? Yes   Relationship to patient of person spoken to? Self   Refill Screening Questions    Changes to allergies? No   Changes to medications? No   New conditions since last clinic visit? No   Unplanned office visit, urgent care, ED, or hospital admission in the last 4 weeks? No   How does patient/caregiver feel medication is working? Good   Financial problems or insurance changes? No   How many doses of your specialty medications were missed in the last 4 weeks? 0   Would patient like to speak to a pharmacist? No   When does the patient need to receive the medication? 07/08/22   Refill Delivery Questions    How will the patient receive the medication? Delivery Allegra   When does the patient need to receive the medication? 07/08/22   Shipping Address Home   Address in [facility] confirmed and updated if neccessary? Yes   Expected Copay ($) 80   Is the patient able to afford the medication copay? Yes   Payment Method invoice (approval required)  [requesting [employer]]   Days supply of Refill 30   Supplies needed? No supplies needed   Refill activity completed? Yes   Refill activity plan Refill scheduled   Shipment/Pickup Date: 07/07/22          Current Outpatient Medications   Medication Sig    acetaminophen (TYLENOL) 325 MG tablet Take 325 mg by mouth every 6 (six) hours as needed for Pain.    azelastine (OPTIVAR) 0.05 % ophthalmic solution Place 1 drop into both eyes 2 (two) times daily.    dicyclomine (BENTYL) 20 mg tablet Take 20 mg by mouth every 6 (six) hours. Uses prn    imatinib (GLEEVEC) 400  MG Tab Take 1 tablet (400 mg total) by mouth once daily.    multivitamin capsule Take 1 capsule by mouth once daily.    potassium chloride SA (K-DUR,KLOR-CON) 20 MEQ tablet Take 1 tablet by mouth once daily    pravastatin (PRAVACHOL) 10 MG tablet Take 1 tablet by mouth once daily   Last reviewed on 6/13/2022  1:52 PM by Anjali Mccullough LPN    Review of patient's allergies indicates:  No Known Allergies Last reviewed on  6/13/2022 1:52 PM by Anjali Mccullough      Tasks added this encounter   7/31/2022 - Refill Call (Auto Added)   Tasks due within next 3 months   8/29/2022 - Clinical - Follow Up Assesement (180 day)     Tiffanie Bains, PharmD  [facility]  [address] [place], [place] [address]  [facility] [address]  Phone: [phone]  Fax: [phone]

## 2022-07-11 ENCOUNTER — DOCUMENTATION ONLY (OUTPATIENT)
Dept: HEMATOLOGY/ONCOLOGY | Facility: CLINIC | Age: 62
End: 2022-07-11
Payer: COMMERCIAL

## 2022-07-11 NOTE — TELEPHONE ENCOUNTER
[employer] approved cost transfer form in the amount of $80 for patients Gleevec refill. Cost transfer form received.

## 2022-07-11 NOTE — PROGRESS NOTES
Robert completed Cost transfer for patients Gleevec at [facility] in the amount of $80. SWer provided to MD for signature and will email to [facility] once received.

## 2022-08-01 ENCOUNTER — SPECIALTY PHARMACY (OUTPATIENT)
Dept: PHARMACY | Facility: CLINIC | Age: 62
End: 2022-08-01
Payer: COMMERCIAL

## 2022-08-01 NOTE — TELEPHONE ENCOUNTER
Sent message to [employer] Boateng to see if [employer] can assist with $80 copay for patients refill this month. Determination pending.

## 2022-08-01 NOTE — TELEPHONE ENCOUNTER
Specialty Pharmacy - Refill Coordination    Specialty Medication Orders Linked to Encounter    Flowsheet Row Most Recent Value   Medication #1 imatinib (GLEEVEC) 400 MG Tab (Order#[id number], Rx#[id number])        PT inquiring about medicaid coverage of Gleevec. Pt applied, unsure if will be approved. Informed that Gleevec would likely require a PA and I will reach out to FA and see if they have any further information.    Refill Questions - Documented Responses    Flowsheet Row Most Recent Value   Patient Availability and HIPAA Verification    Does patient want to proceed with activity? Yes   HIPAA/medical authority confirmed? Yes   Relationship to patient of person spoken to? Self   Refill Screening Questions    Changes to allergies? No   Changes to medications? No   New conditions since last clinic visit? No   Unplanned office visit, urgent care, ED, or hospital admission in the last 4 weeks? No   How does patient/caregiver feel medication is working? Good   Financial problems or insurance changes? No   How many doses of your specialty medications were missed in the last 4 weeks? 0   Would patient like to speak to a pharmacist? No   When does the patient need to receive the medication? 08/09/22   Refill Delivery Questions    How will the patient receive the medication? Delivery Allegra   When does the patient need to receive the medication? 08/09/22   Shipping Address Home   Address in [facility] confirmed and updated if neccessary? Yes   Expected Copay ($) 80   Is the patient able to afford the medication copay? Yes   Payment Method invoice (approval required)  [[employer]]   Days supply of Refill 30   Supplies needed? No supplies needed   Refill activity completed? Yes   Refill activity plan Refill scheduled   Shipment/Pickup Date: 08/04/22          Current Outpatient Medications   Medication Sig    acetaminophen (TYLENOL) 325 MG tablet Take 325 mg by mouth every 6 (six) hours as needed for Pain.    azelastine  (OPTIVAR) 0.05 % ophthalmic solution Place 1 drop into both eyes 2 (two) times daily.    dicyclomine (BENTYL) 20 mg tablet Take 20 mg by mouth every 6 (six) hours. Uses prn    imatinib (GLEEVEC) 400 MG Tab Take 1 tablet (400 mg total) by mouth once daily.    multivitamin capsule Take 1 capsule by mouth once daily.    potassium chloride SA (K-DUR,KLOR-CON) 20 MEQ tablet Take 1 tablet by mouth once daily    pravastatin (PRAVACHOL) 10 MG tablet Take 1 tablet by mouth once daily   Last reviewed on 6/13/2022  1:52 PM by Anjali Mccullough LPN    Review of patient's allergies indicates:  No Known Allergies Last reviewed on  6/13/2022 1:52 PM by Anjali Mccullough      Tasks added this encounter   9/1/2022 - Refill Call (Auto Added)   Tasks due within next 3 months   8/29/2022 - Clinical - Follow Up Assesement (180 day)     Kelsie Quiñones, PharmD  [facility]  [address] [place], [place] [address]  [facility] [address]  Phone: [phone]  Fax: [phone]

## 2022-08-03 ENCOUNTER — DOCUMENTATION ONLY (OUTPATIENT)
Dept: HEMATOLOGY/ONCOLOGY | Facility: CLINIC | Age: 62
End: 2022-08-03
Payer: COMMERCIAL

## 2022-08-03 NOTE — PROGRESS NOTES
[facility] reports patient requested assistance with prescription in the amount of $80. SWer completed cost transfer form and will provide to manager for signature. SWer will remain available.

## 2022-09-01 ENCOUNTER — PATIENT MESSAGE (OUTPATIENT)
Dept: PHARMACY | Facility: CLINIC | Age: 62
End: 2022-09-01
Payer: COMMERCIAL

## 2022-09-01 ENCOUNTER — SPECIALTY PHARMACY (OUTPATIENT)
Dept: PHARMACY | Facility: CLINIC | Age: 62
End: 2022-09-01
Payer: COMMERCIAL

## 2022-09-01 NOTE — TELEPHONE ENCOUNTER
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 is a 62 y.o. male, who is followed by the specialty pharmacy service for management and education.    Recent Encounters       Date Type Provider Description    09/01/2022 Specialty Pharmacy Tiffanie Bains PharmD Refill Coordination; Follow-up Clinical Reassessment    08/01/2022 Specialty Pharmacy Kelsie Quiñones, Corey Refill Coordination    07/06/2022 Specialty Pharmacy Tiffanie Bains PharmD Refill Coordination    06/09/2022 Specialty Pharmacy Milagros Chilel, Patient Care Assistant Refill Coordination    06/06/2022 Specialty Pharmacy Tiffanie Bains PharmD Refill Coordination; Referral Authorization          Clinical call attempts since last clinical assessment   9/1/2022  3:06 PM - Specialty Pharmacy - Clinical Reassessment by Tiffanie Bains PharmD  9/1/2022  3:06 PM - Specialty Pharmacy - Clinical Reassessment by Tiffanie Bains PharmD     Current Outpatient Medications   Medication Sig    acetaminophen (TYLENOL) 325 MG tablet Take 325 mg by mouth every 6 (six) hours as needed for Pain.    azelastine (OPTIVAR) 0.05 % ophthalmic solution Place 1 drop into both eyes 2 (two) times daily.    dicyclomine (BENTYL) 20 mg tablet Take 20 mg by mouth every 6 (six) hours. Uses prn    imatinib (GLEEVEC) 400 MG Tab Take 1 tablet (400 mg total) by mouth once daily.    multivitamin capsule Take 1 capsule by mouth once daily.    potassium chloride SA (K-DUR,KLOR-CON) 20 MEQ tablet Take 1 tablet by mouth once daily    pravastatin (PRAVACHOL) 10 MG tablet Take 1 tablet by mouth once daily   Last reviewed on 6/13/2022  1:52 PM by Anjali Mccullough LPN    Review of  patient's allergies indicates:  No Known AllergiesLast reviewed on  6/13/2022 1:52 PM by Anjali Mccullough    Drug Interactions    Clinically relevant drug interactions identified: no       Medication Adherence    Adherence tools used: directed education  Support network for adherence: healthcare provider         Assessment Questions - Documented Responses      Flowsheet Row Most Recent Value   Assessment    Medication Reconciliation completed for patient Yes   During the past 4 weeks, has patient missed any activities due to condition or medication? No   During the past 4 weeks, did patient have any of the following urgent care visits? None   Goals of Therapy Status Achieving   All education points have been covered with patient? Patient does not need education at this time.   Assesment completed? Yes   Plan Therapy continued   Do you need to open a clinical intervention (i-vent)? No   Medication #1 Assessment Info    Patient status Existing medication, Exisiting to [facility]   Is this medication appropriate for the patient? Yes   Is this medication effective? Yes          Refill Questions - Documented Responses      Flowsheet Row Most Recent Value   Patient Availability and HIPAA Verification    Does patient want to proceed with activity? Yes   HIPAA/medical authority confirmed? Yes   Relationship to patient of person spoken to? Self   Refill Screening Questions    Changes to allergies? No   Changes to medications? No   New conditions since last clinic visit? No   Unplanned office visit, urgent care, ED, or hospital admission in the last 4 weeks? No   How does patient/caregiver feel medication is working? Good   Financial problems or insurance changes? No   How many doses of your specialty medications were missed in the last 4 weeks? 0   Would patient like to speak to a pharmacist? No   When does the patient need to receive the medication? 09/10/22   Refill Delivery Questions    How will the patient receive the medication?  "Delivery [facility]   When does the patient need to receive the medication? 09/10/22   Shipping Address Home   Address in [facility] confirmed and updated if neccessary? Yes   Expected Copay ($) 80   Is the patient able to afford the medication copay? Yes   Payment Method invoice (approval required)  [[employer] REQUESTED]   Days supply of Refill 30   Supplies needed? No supplies needed   Refill activity completed? Yes   Refill activity plan Refill scheduled   Shipment/Pickup Date: 09/07/22            Objective    He has a past medical history of BPH (benign prostatic hypertrophy), Hyperlipidemia, and Hypertension.    Tried/failed medications: none     BP Readings from Last 4 Encounters:   06/13/22 137/70   05/23/22 138/74   05/17/22 (!) 174/83   03/04/22 (!) 145/86     Ht Readings from Last 4 Encounters:   06/13/22 5' 8" (1.727 m)   05/23/22 5' 9" (1.753 m)   03/04/22 5' 9" (1.753 m)   01/26/22 5' 9" (1.753 m)     Wt Readings from Last 4 Encounters:   06/13/22 85.2 kg (187 lb 15.1 oz)   05/23/22 84.6 kg (186 lb 6.4 oz)   05/17/22 83.1 kg (183 lb 3.2 oz)   03/04/22 83 kg (182 lb 15.7 oz)     No results for input(s): RBC, HGB, HCT, WBC, GRAN, PLT, NA, K, CL, GLU, BUN, CREATININE, CALCIUM, PROT, ALBUMIN, BILITOT, ALKPHOS, AST, ALT in the last 2160 hours.  The goals of cancer treatment include:  Achieving remission of cancer, if possible  Reducing tumor size and spread of cancer, if remission is not possible  Minimizing pain and symptoms of the cancer  Preventing infection and other complications of treatment  Promoting adequate nutrition  Encouraging proper hydration  Improving or maintaining quality of life  Maintaining optimal therapy adherence  Minimizing and managing side effects    Goals of Therapy Status: Achieving    Assessment/Plan  Patient plans to continue therapy without changes      Indication, dosage, appropriateness, effectiveness, safety and convenience of his specialty medication(s) were reviewed today. "       Side effects: sometimes a little nauseous, resolves quickly on its own  Recent infections: none   Pain: none   Appetite: no changes   Energy, fatigue: no changes   ED/UC visits: none   Medication list reviewed. No new allergies or health conditions.     Labs reviewed from: 05/17/2022    Therapy is appropriate for pt to continue.           Tasks added this encounter   2/21/2023 - Clinical - Follow Up Assesement (180 day)  10/3/2022 - Refill Call (Auto Added)   Tasks due within next 3 months   No tasks due.     Tiffanie Bains, PharmD  [facility]  [address]  [facility] [address]  Phone: [phone]  Fax: [phone]

## 2022-09-01 NOTE — TELEPHONE ENCOUNTER
Sent assistance request to [employer] to see if they can assist with patients $80 copay. Determination pending.

## 2022-09-07 ENCOUNTER — TELEPHONE (OUTPATIENT)
Dept: HEMATOLOGY/ONCOLOGY | Facility: CLINIC | Age: 62
End: 2022-09-07
Payer: COMMERCIAL

## 2022-09-07 NOTE — TELEPHONE ENCOUNTER
[facility] approved cost transfer in the amount of $80 for the patients refill. Cost transfer form received.

## 2022-09-07 NOTE — TELEPHONE ENCOUNTER
----- Message from Isamar Sarmiento sent at 9/7/2022  9:44 AM CDT -----  Contact: [phone]  Patient is requesting a call,Please call patient back at [phone].Thanks

## 2022-09-13 ENCOUNTER — HOSPITAL ENCOUNTER (OUTPATIENT)
Dept: RADIOLOGY | Facility: HOSPITAL | Age: 62
Discharge: HOME | End: 2022-09-13
Attending: FAMILY MEDICINE
Payer: COMMERCIAL

## 2022-09-13 ENCOUNTER — OFFICE VISIT (OUTPATIENT)
Dept: FAMILY MEDICINE | Facility: CLINIC | Age: 62
End: 2022-09-13
Payer: COMMERCIAL

## 2022-09-13 VITALS
HEIGHT: 68 IN | SYSTOLIC BLOOD PRESSURE: 144 MMHG | DIASTOLIC BLOOD PRESSURE: 80 MMHG | TEMPERATURE: 98 F | WEIGHT: 184.5 LBS | HEART RATE: 82 BPM | BODY MASS INDEX: 27.96 KG/M2

## 2022-09-13 DIAGNOSIS — I10 ESSENTIAL HYPERTENSION: ICD-10-CM

## 2022-09-13 DIAGNOSIS — S69.82XA HIGH PRESSURE INJURY OF LEFT HAND: Primary | ICD-10-CM

## 2022-09-13 DIAGNOSIS — S69.82XA HIGH PRESSURE INJURY OF LEFT HAND: ICD-10-CM

## 2022-09-13 PROCEDURE — 73130 XR HAND COMPLETE 3 VIEW LEFT: ICD-10-PCS | Mod: 26,LT,, | Performed by: RADIOLOGY

## 2022-09-13 PROCEDURE — 3079F PR MOST RECENT DIASTOLIC BLOOD PRESSURE 80-89 MM HG: ICD-10-PCS | Mod: CPTII,S$GLB,, | Performed by: FAMILY MEDICINE

## 2022-09-13 PROCEDURE — 3079F DIAST BP 80-89 MM HG: CPT | Mod: CPTII,S$GLB,, | Performed by: FAMILY MEDICINE

## 2022-09-13 PROCEDURE — 3008F PR BODY MASS INDEX (BMI) DOCUMENTED: ICD-10-PCS | Mod: CPTII,S$GLB,, | Performed by: FAMILY MEDICINE

## 2022-09-13 PROCEDURE — 73130 X-RAY EXAM OF HAND: CPT | Mod: TC,PO,LT

## 2022-09-13 PROCEDURE — 1159F MED LIST DOCD IN RCRD: CPT | Mod: CPTII,S$GLB,, | Performed by: FAMILY MEDICINE

## 2022-09-13 PROCEDURE — 99214 OFFICE O/P EST MOD 30 MIN: CPT | Mod: 25,S$GLB,, | Performed by: FAMILY MEDICINE

## 2022-09-13 PROCEDURE — 3077F PR MOST RECENT SYSTOLIC BLOOD PRESSURE >= 140 MM HG: ICD-10-PCS | Mod: CPTII,S$GLB,, | Performed by: FAMILY MEDICINE

## 2022-09-13 PROCEDURE — 90715 TDAP VACCINE 7 YRS/> IM: CPT | Mod: S$GLB,,, | Performed by: FAMILY MEDICINE

## 2022-09-13 PROCEDURE — 1159F PR MEDICATION LIST DOCUMENTED IN MEDICAL RECORD: ICD-10-PCS | Mod: CPTII,S$GLB,, | Performed by: FAMILY MEDICINE

## 2022-09-13 PROCEDURE — 99999 PR PBB SHADOW E&M-EST. PATIENT-LVL V: ICD-10-PCS | Mod: PBBFAC,,, | Performed by: FAMILY MEDICINE

## 2022-09-13 PROCEDURE — 90471 IMMUNIZATION ADMIN: CPT | Mod: S$GLB,,, | Performed by: FAMILY MEDICINE

## 2022-09-13 PROCEDURE — 3008F BODY MASS INDEX DOCD: CPT | Mod: CPTII,S$GLB,, | Performed by: FAMILY MEDICINE

## 2022-09-13 PROCEDURE — 3077F SYST BP >= 140 MM HG: CPT | Mod: CPTII,S$GLB,, | Performed by: FAMILY MEDICINE

## 2022-09-13 PROCEDURE — 73130 X-RAY EXAM OF HAND: CPT | Mod: 26,LT,, | Performed by: RADIOLOGY

## 2022-09-13 PROCEDURE — 90715 TDAP VACCINE GREATER THAN OR EQUAL TO 7YO IM: ICD-10-PCS | Mod: S$GLB,,, | Performed by: FAMILY MEDICINE

## 2022-09-13 PROCEDURE — 99214 PR OFFICE/OUTPT VISIT, EST, LEVL IV, 30-39 MIN: ICD-10-PCS | Mod: 25,S$GLB,, | Performed by: FAMILY MEDICINE

## 2022-09-13 PROCEDURE — 90471 TDAP VACCINE GREATER THAN OR EQUAL TO 7YO IM: ICD-10-PCS | Mod: S$GLB,,, | Performed by: FAMILY MEDICINE

## 2022-09-13 PROCEDURE — 99999 PR PBB SHADOW E&M-EST. PATIENT-LVL V: CPT | Mod: PBBFAC,,, | Performed by: FAMILY MEDICINE

## 2022-09-13 RX ORDER — CEPHALEXIN 500 MG/1
500 TABLET ORAL 4 TIMES DAILY
Qty: 28 TABLET | Refills: 0 | Status: SHIPPED | OUTPATIENT
Start: 2022-09-13 | End: 2022-09-20

## 2022-09-13 NOTE — PROGRESS NOTES
Patient was using a new  yesterday when the handle had a small rupture which directed high-pressure water into his left hand near the webspace at the base of the left thumb.  States he had water in his hand which she squeezed out.  Still has some swelling at the hand.  Still has some discomfort at the base of the thumb and thenar home.  Last tetanus immunization just under 10 years ago.  Denies any fever chills.  Able to move the hand but has some decreased range of motion with the thumb with some mild swelling.  He denies any decrease in sensation at present though apparently had some slight numbness with the initial injury.    Hypertension blood pressure mildly elevated today.  Currently diet treatment alone.  Agusto was seen today for hand injury.    Diagnoses and all orders for this visit:    High pressure injury of left hand  -     X-Ray Hand 3 view Left; Future  -     Ambulatory referral/consult to Orthopedics; Future    Essential hypertension    Other orders  -     Tdap Vaccine  -     cephalexin (KEFTAB) 500 mg tablet; Take 1 tablet (500 mg total) by mouth 4 (four) times daily. for 7 days    Arrange appointment with Orthopedics.  Prophylactic antibiotic as above.  Follow-up ER if new symptoms develop.  Recheck blood pressure with nurse 3 weeks.          Past Medical History:  Past Medical History:   Diagnosis Date    BPH (benign prostatic hypertrophy)     Hyperlipidemia     Hypertension      Past Surgical History:   Procedure Laterality Date    ENDOSCOPIC ULTRASOUND OF UPPER GASTROINTESTINAL TRACT N/A 4/30/2021    Procedure: ULTRASOUND, UPPER GI TRACT, ENDOSCOPIC;  Surgeon: Rikki Shabazz MD;  Location: [facility];  Service: Endoscopy;  Laterality: N/A;  Rapid COVID prior - last minute addition to schedule - ttr    ESOPHAGOGASTRODUODENOSCOPY N/A 4/30/2021    Procedure: EGD (ESOPHAGOGASTRODUODENOSCOPY);  Surgeon: Rikki Shabazz MD;  Location: [facility];  Service: Endoscopy;  Laterality: N/A;   EGD/EUS with biopsy within a week is fine. 45min case with me OK. Any campus. -Dr. Shabazz     Review of patient's allergies indicates:  No Known Allergies  Current Outpatient Medications on File Prior to Visit   Medication Sig Dispense Refill    acetaminophen (TYLENOL) 325 MG tablet Take 325 mg by mouth every 6 (six) hours as needed for Pain.      dicyclomine (BENTYL) 20 mg tablet Take 20 mg by mouth every 6 (six) hours. Uses prn      imatinib (GLEEVEC) 400 MG Tab Take 1 tablet (400 mg total) by mouth once daily. 90 tablet 1    multivitamin capsule Take 1 capsule by mouth once daily.      potassium chloride SA (K-DUR,KLOR-CON) 20 MEQ tablet Take 1 tablet by mouth once daily 90 tablet 3    pravastatin (PRAVACHOL) 10 MG tablet Take 1 tablet by mouth once daily 90 tablet 3    azelastine (OPTIVAR) 0.05 % ophthalmic solution Place 1 drop into both eyes 2 (two) times daily. 6 mL 0     Current Facility-Administered Medications on File Prior to Visit   Medication Dose Route Frequency Provider Last Rate Last Admin    [DISCONTINUED] sodium chloride 0.9% flush 10 mL  10 mL Intravenous PRN Rikki Shabazz MD         Social History     Socioeconomic History    Marital status:    Tobacco Use    Smoking status: Every Day     Packs/day: 0.50     Years: 41.00     Pack years: 20.50     Types: Cigarettes     Start date: 5/1/1978    Smokeless tobacco: Never   Substance and Sexual Activity    Alcohol use: Yes     Alcohol/week: 0.0 standard drinks    Drug use: No     Family History   Problem Relation Age of Onset    Hypertension Unknown            ROS:  GENERAL: No fever, chills,  or significant weight changes.   CARDIOVASCULAR: Denies chest pain, PND, orthopnea or reduced exercise tolerance.  ABDOMEN: Appetite fine. Denies diarrhea, abdominal pain, hematemesis or blood in stool.  URINARY: No flank pain, dysuria or hematuria.    Vitals:    09/13/22 0928 09/13/22 0952   BP: (!) 150/82 (!) 144/80   Pulse: 82    Temp: 97.9 °F (36.6 °C)   "  [facility]: Temporal    Weight: 83.7 kg (184 lb 8.4 oz)    Height: 5' 8" (1.727 m)        Wt Readings from Last 3 Encounters:   09/13/22 83.7 kg (184 lb 8.4 oz)   06/13/22 85.2 kg (187 lb 15.1 oz)   05/23/22 84.6 kg (186 lb 6.4 oz)       APPEARANCE: Well nourished, well developed, in no acute distress.    HEAD: Normocephalic.  Atraumatic.  EYES:   Right eye: Pupil reactive.  Conjunctiva clear.    Left eye: Pupil reactive.  Conjunctiva clear.    NECK: Supple.  MENTAL STATUS: Alert.  Oriented x 3.  Patient has a small puncture at the base of the thumb on the left hand close to the webspace.  He has some mild swelling at the base of the thumb and thenar eminence.  He has some tenderness at the 1st MCP joint and thenar eminence.  There is no fluctuance.  No significant erythema.  No obvious bruising at present.  He does have some decreased range of motion of the thumb related to swelling.  Tendons appear to be intact.  Photographs below.                "

## 2022-09-16 ENCOUNTER — OFFICE VISIT (OUTPATIENT)
Dept: ORTHOPEDICS | Facility: CLINIC | Age: 62
End: 2022-09-16
Payer: COMMERCIAL

## 2022-09-16 VITALS — WEIGHT: 184 LBS | HEIGHT: 68 IN | BODY MASS INDEX: 27.89 KG/M2

## 2022-09-16 DIAGNOSIS — S69.82XA HIGH PRESSURE INJURY OF LEFT HAND: ICD-10-CM

## 2022-09-16 PROCEDURE — 3008F PR BODY MASS INDEX (BMI) DOCUMENTED: ICD-10-PCS | Mod: CPTII,S$GLB,, | Performed by: ORTHOPAEDIC SURGERY

## 2022-09-16 PROCEDURE — 1159F PR MEDICATION LIST DOCUMENTED IN MEDICAL RECORD: ICD-10-PCS | Mod: CPTII,S$GLB,, | Performed by: ORTHOPAEDIC SURGERY

## 2022-09-16 PROCEDURE — 1160F RVW MEDS BY RX/DR IN RCRD: CPT | Mod: CPTII,S$GLB,, | Performed by: ORTHOPAEDIC SURGERY

## 2022-09-16 PROCEDURE — 99999 PR PBB SHADOW E&M-EST. PATIENT-LVL III: CPT | Mod: PBBFAC,,, | Performed by: ORTHOPAEDIC SURGERY

## 2022-09-16 PROCEDURE — 99203 OFFICE O/P NEW LOW 30 MIN: CPT | Mod: S$GLB,,, | Performed by: ORTHOPAEDIC SURGERY

## 2022-09-16 PROCEDURE — 1160F PR REVIEW ALL MEDS BY PRESCRIBER/CLIN PHARMACIST DOCUMENTED: ICD-10-PCS | Mod: CPTII,S$GLB,, | Performed by: ORTHOPAEDIC SURGERY

## 2022-09-16 PROCEDURE — 99999 PR PBB SHADOW E&M-EST. PATIENT-LVL III: ICD-10-PCS | Mod: PBBFAC,,, | Performed by: ORTHOPAEDIC SURGERY

## 2022-09-16 PROCEDURE — 1159F MED LIST DOCD IN RCRD: CPT | Mod: CPTII,S$GLB,, | Performed by: ORTHOPAEDIC SURGERY

## 2022-09-16 PROCEDURE — 3008F BODY MASS INDEX DOCD: CPT | Mod: CPTII,S$GLB,, | Performed by: ORTHOPAEDIC SURGERY

## 2022-09-16 PROCEDURE — 99203 PR OFFICE/OUTPT VISIT, NEW, LEVL III, 30-44 MIN: ICD-10-PCS | Mod: S$GLB,,, | Performed by: ORTHOPAEDIC SURGERY

## 2022-09-16 NOTE — PROGRESS NOTES
62 years old was using a  that was rated a 4000 psi 3 days ago and the hose burst water was injected into his hand 3rd entry point at the base of the thumb on the ulnar side towards the webspace.  Very painful forearm hand got swollen but the fluid egressed.  Patient is feeling better today but does have some soreness    Exam shows small puncture wound the base of the thumb on the ulnar side near the web space does not appear to be infected and size is normal flexor extensor intact normal sensation     X-rays show degenerative changes     Assessment:  Traumatic pressurized puncture wound to the left hand    Plan: Continue symptomatic care observation he was given a prescription for antibiotics which he can finish, follow-up in 2 weeks time    Patient seen as a consult from Dr. Joe enrique, communication via [facility]    Imaging studies ordered and reviewed by me    Further History  Aching pain  Worse with activity  Relieved with rest  No other associated symptoms  No other radiation    Further Exam  Alert and oriented  Pleasant  Contralateral limb has appropriate range of motion for age and condition  Contralateral limb has appropriate strength for age and condition  Contralateral limb has appropriate stability  for age and condition  No adenopathy  Pulses are appropriate for current condition  Skin is intact        Chief Complaint    Chief Complaint   Patient presents with    Left Hand - Pain    Left Wrist - Pain       HPI  Agusto Juarez Sr. is a 62 y.o.  male who presents with       Past Medical History  Past Medical History:   Diagnosis Date    BPH (benign prostatic hypertrophy)     Hyperlipidemia     Hypertension        Past Surgical History  Past Surgical History:   Procedure Laterality Date    ENDOSCOPIC ULTRASOUND OF UPPER GASTROINTESTINAL TRACT N/A 4/30/2021    Procedure: ULTRASOUND, UPPER GI TRACT, ENDOSCOPIC;  Surgeon: Rikki Shabazz MD;  Location: [facility];  Service: Endoscopy;   Laterality: N/A;  Rapid COVID prior - last minute addition to schedule - ttr    ESOPHAGOGASTRODUODENOSCOPY N/A 4/30/2021    Procedure: EGD (ESOPHAGOGASTRODUODENOSCOPY);  Surgeon: Rikki Shabazz MD;  Location: [facility];  Service: Endoscopy;  Laterality: N/A;  EGD/EUS with biopsy within a week is fine. 45min case with me OK. Any campus. -Dr. Shabazz       Medications  Current Outpatient Medications   Medication Sig    acetaminophen (TYLENOL) 325 MG tablet Take 325 mg by mouth every 6 (six) hours as needed for Pain.    cephalexin (KEFTAB) 500 mg tablet Take 1 tablet (500 mg total) by mouth 4 (four) times daily. for 7 days    dicyclomine (BENTYL) 20 mg tablet Take 20 mg by mouth every 6 (six) hours. Uses prn    imatinib (GLEEVEC) 400 MG Tab Take 1 tablet (400 mg total) by mouth once daily.    multivitamin capsule Take 1 capsule by mouth once daily.    potassium chloride SA (K-DUR,KLOR-CON) 20 MEQ tablet Take 1 tablet by mouth once daily    pravastatin (PRAVACHOL) 10 MG tablet Take 1 tablet by mouth once daily    azelastine (OPTIVAR) 0.05 % ophthalmic solution Place 1 drop into both eyes 2 (two) times daily.     No current facility-administered medications for this visit.       Allergies  Review of patient's allergies indicates:  No Known Allergies    Family History  Family History   Problem Relation Age of Onset    Hypertension Unknown        Social History  Social History     Socioeconomic History    Marital status:    Tobacco Use    Smoking status: Every Day     Packs/day: 0.50     Years: 41.00     Pack years: 20.50     Types: Cigarettes     Start date: 5/1/1978    Smokeless tobacco: Never   Substance and Sexual Activity    Alcohol use: Yes     Alcohol/week: 0.0 standard drinks    Drug use: No               Review of Systems     Constitutional: Negative    HENT: Negative  Eyes: Negative  Respiratory: Negative  Cardiovascular: Negative  Musculoskeletal: HPI  Skin: Negative  Neurological: Negative  Hematological:  Negative  Endocrine: Negative                 Physical Exam    There were no vitals filed for this visit.  Body mass index is 27.98 kg/m².  Physical Examination:     General appearance -  well appearing, and in no distress  Mental status - awake  Neck - supple  Chest -  symmetric air entry  Heart - normal rate   Abdomen - soft      Assessment     1. High pressure injury of left hand          Plan

## 2022-10-04 ENCOUNTER — SPECIALTY PHARMACY (OUTPATIENT)
Dept: PHARMACY | Facility: CLINIC | Age: 62
End: 2022-10-04
Payer: COMMERCIAL

## 2022-10-04 ENCOUNTER — CLINICAL SUPPORT (OUTPATIENT)
Dept: FAMILY MEDICINE | Facility: CLINIC | Age: 62
End: 2022-10-04
Payer: COMMERCIAL

## 2022-10-04 VITALS — SYSTOLIC BLOOD PRESSURE: 122 MMHG | DIASTOLIC BLOOD PRESSURE: 71 MMHG | HEART RATE: 83 BPM

## 2022-10-04 DIAGNOSIS — Z01.30 BP CHECK: Primary | ICD-10-CM

## 2022-10-04 PROCEDURE — 99999 PR PBB SHADOW E&M-EST. PATIENT-LVL II: CPT | Mod: PBBFAC,,,

## 2022-10-04 PROCEDURE — 99999 PR PBB SHADOW E&M-EST. PATIENT-LVL II: ICD-10-PCS | Mod: PBBFAC,,,

## 2022-10-04 NOTE — TELEPHONE ENCOUNTER
Specialty Pharmacy - Refill Coordination    Specialty Medication Orders Linked to Encounter      Flowsheet Row Most Recent Value   Medication #1 imatinib (GLEEVEC) 400 MG Tab (Order#[id number], Rx#[id number])            Refill Questions - Documented Responses      Flowsheet Row Most Recent Value   Patient Availability and HIPAA Verification    Does patient want to proceed with activity? Yes   Relationship to patient of person spoken to? Self   Refill Screening Questions    Changes to allergies? No   Changes to medications? No   New conditions since last clinic visit? No   Unplanned office visit, urgent care, ED, or hospital admission in the last 4 weeks? No   How does patient/caregiver feel medication is working? Good   Financial problems or insurance changes? No   How many doses of your specialty medications were missed in the last 4 weeks? 0   Would patient like to speak to a pharmacist? No   When does the patient need to receive the medication? 10/09/22   Refill Delivery Questions    How will the patient receive the medication? MEDRx   When does the patient need to receive the medication? 10/09/22   Shipping Address Home   Address in [facility] confirmed and updated if neccessary? Yes   Expected Copay ($) 80   Is the patient able to afford the medication copay? No   Payment Method invoice (approval required)   Days supply of Refill 30   Supplies needed? No supplies needed   Refill activity completed? Yes   Refill activity plan Refill scheduled   Shipment/Pickup Date: 10/06/22            Current Outpatient Medications   Medication Sig    acetaminophen (TYLENOL) 325 MG tablet Take 325 mg by mouth every 6 (six) hours as needed for Pain.    azelastine (OPTIVAR) 0.05 % ophthalmic solution Place 1 drop into both eyes 2 (two) times daily.    dicyclomine (BENTYL) 20 mg tablet Take 20 mg by mouth every 6 (six) hours. Uses prn    imatinib (GLEEVEC) 400 MG Tab Take 1 tablet (400 mg total) by mouth once daily.     multivitamin capsule Take 1 capsule by mouth once daily.    potassium chloride SA (K-DUR,KLOR-CON) 20 MEQ tablet Take 1 tablet by mouth once daily    pravastatin (PRAVACHOL) 10 MG tablet Take 1 tablet by mouth once daily   Last reviewed on 10/4/2022  9:11 AM by Josselyn Willams LPN    Review of patient's allergies indicates:  No Known Allergies Last reviewed on  10/4/2022 9:11 AM by Josselyn Willams      Tasks added this encounter   10/4/2022 - Referral Authorization - Refill Call  11/1/2022 - Refill Call (Auto Added)   Tasks due within next 3 months   No tasks due.     Fabio Amaral, PharmD  [facility]  [address] [address]  Phone: [phone]  Fax: [phone]

## 2022-11-01 ENCOUNTER — SPECIALTY PHARMACY (OUTPATIENT)
Dept: PHARMACY | Facility: CLINIC | Age: 62
End: 2022-11-01
Payer: COMMERCIAL

## 2022-11-01 NOTE — TELEPHONE ENCOUNTER
[facility] for this months refill. Determination pending.

## 2022-11-01 NOTE — TELEPHONE ENCOUNTER
Specialty Pharmacy - Refill Coordination    Specialty Medication Orders Linked to Encounter      Flowsheet Row Most Recent Value   Medication #1 imatinib (GLEEVEC) 400 MG Tab (Order#[id number], Rx#[id number])            Refill Questions - Documented Responses      Flowsheet Row Most Recent Value   Patient Availability and HIPAA Verification    Does patient want to proceed with activity? Yes   HIPAA/medical authority confirmed? Yes   Relationship to patient of person spoken to? Self   Refill Screening Questions    Changes to allergies? No   Changes to medications? No   New conditions since last clinic visit? No   Unplanned office visit, urgent care, ED, or hospital admission in the last 4 weeks? No   How does patient/caregiver feel medication is working? Good   Financial problems or insurance changes? No   How many doses of your specialty medications were missed in the last 4 weeks? 0   Would patient like to speak to a pharmacist? No   When does the patient need to receive the medication? 11/03/22   Refill Delivery Questions    How will the patient receive the medication? MEDRx   When does the patient need to receive the medication? 11/03/22   Shipping Address Home   Address in [facility] confirmed and updated if neccessary? Yes   Expected Copay ($) 80   Payment Method --  [[employer] check]   Days supply of Refill 30   Supplies needed? No supplies needed   Refill activity completed? Yes   Refill activity plan Refill scheduled   Shipment/Pickup Date: 11/02/22            Current Outpatient Medications   Medication Sig    acetaminophen (TYLENOL) 325 MG tablet Take 325 mg by mouth every 6 (six) hours as needed for Pain.    azelastine (OPTIVAR) 0.05 % ophthalmic solution Place 1 drop into both eyes 2 (two) times daily.    dicyclomine (BENTYL) 20 mg tablet Take 20 mg by mouth every 6 (six) hours. Uses prn    imatinib (GLEEVEC) 400 MG Tab Take 1 tablet (400 mg total) by mouth once daily.    multivitamin capsule Take 1  capsule by mouth once daily.    potassium chloride SA (K-DUR,KLOR-CON) 20 MEQ tablet Take 1 tablet by mouth once daily    pravastatin (PRAVACHOL) 10 MG tablet Take 1 tablet by mouth once daily   Last reviewed on 10/4/2022  9:11 AM by Josselyn Willams LPN    Review of patient's allergies indicates:  No Known Allergies Last reviewed on  10/4/2022 9:11 AM by Josselyn Willams      Tasks added this encounter   11/26/2022 - Refill Call (Auto Added)   Tasks due within next 3 months   10/4/2022 - Referral Authorization - Refill Call     [facility]  [address] [facility] [address]  Phone: [phone]  Fax: [phone]

## 2022-11-02 NOTE — TELEPHONE ENCOUNTER
[employer] approved cost transfer in the amount of $80 for patients refill. Cost transfer from received.

## 2022-11-06 NOTE — TELEPHONE ENCOUNTER
Care Due:                  Date            Visit Type   Department     Provider  --------------------------------------------------------------------------------                                EP -                              PRIMARY      [facility]  Last Visit: 09-      CARE ([facility])   MEDICINE       Joe Bentley  Next Visit: None Scheduled  None         None Found                                                            Last  Test          Frequency    Reason                     Performed    Due Date  --------------------------------------------------------------------------------    Lipid Panel.  12 months..  pravastatin..............  08- 07-    [facility] Embedded Care Gaps. Reference number: [id number]. 11/06/2022   11:25:46 AM CST

## 2022-11-07 RX ORDER — POTASSIUM CHLORIDE 20 MEQ/1
TABLET, EXTENDED RELEASE ORAL
Qty: 90 TABLET | Refills: 1 | Status: SHIPPED | OUTPATIENT
Start: 2022-11-07 | End: 2023-05-22

## 2022-11-07 NOTE — TELEPHONE ENCOUNTER
Refill Decision Note   Agusto Juarez  is requesting a refill authorization.  Brief Assessment and Rationale for Refill:  Approve    -Medication-Related Problems Identified: Requires labs  Medication Therapy Plan:       Medication Reconciliation Completed: No   Comments:     Provider Staff:     Action is required for this patient.   Please see care gap opportunities below in Care Due Message.     Thanks!  [facility]     Appointments      Date Provider   Last Visit   9/13/2022 Joe Bentley MD   Next Visit   Visit date not found Joe Bentley MD     Note composed:10:43 AM 11/07/2022           Note composed:10:43 AM 11/07/2022

## 2022-11-27 NOTE — TELEPHONE ENCOUNTER
No new care gaps identified.  [facility] Embedded Care Gaps. Reference number: [id number]. 11/27/2022   1:47:09 PM CST

## 2022-11-28 ENCOUNTER — SPECIALTY PHARMACY (OUTPATIENT)
Dept: PHARMACY | Facility: CLINIC | Age: 62
End: 2022-11-28
Payer: COMMERCIAL

## 2022-11-28 DIAGNOSIS — C49.A2 GASTRIC STROMAL TUMOR: ICD-10-CM

## 2022-11-28 RX ORDER — IMATINIB MESYLATE 400 MG/1
400 TABLET, FILM COATED ORAL DAILY
Qty: 90 TABLET | Refills: 1 | Status: CANCELLED | OUTPATIENT
Start: 2022-11-28 | End: 2023-11-28

## 2022-11-28 RX ORDER — PRAVASTATIN SODIUM 10 MG/1
TABLET ORAL
Qty: 90 TABLET | Refills: 0 | Status: SHIPPED | OUTPATIENT
Start: 2022-11-28 | End: 2023-03-08

## 2022-11-28 NOTE — TELEPHONE ENCOUNTER
Outgoing call: Patient stated he has 7 on hand. I informed him that a refill request was sent to his doctor and once approved [facility] will follow up.

## 2022-11-28 NOTE — TELEPHONE ENCOUNTER
Refill Routing Note   Medication(s) are not appropriate for processing by [facility] for the following reason(s):      - Required laboratory values are outdated    [facility] action(s):  Defer          Medication reconciliation completed: No     Appointments  past 12m or future 3m with PCP    Date Provider   Last Visit   9/13/2022 Joe Bentley MD   Next Visit   Visit date not found Joe Bentley MD   ED visits in past 90 days: 0        Note composed:2:23 PM 11/28/2022

## 2022-11-30 DIAGNOSIS — C49.A2 GASTRIC STROMAL TUMOR: ICD-10-CM

## 2022-11-30 RX ORDER — IMATINIB MESYLATE 400 MG/1
400 TABLET, FILM COATED ORAL DAILY
Qty: 90 TABLET | Refills: 1 | Status: SHIPPED | OUTPATIENT
Start: 2022-11-30 | End: 2023-01-09 | Stop reason: SDUPTHER

## 2022-11-30 RX ORDER — IMATINIB MESYLATE 400 MG/1
400 TABLET, FILM COATED ORAL DAILY
Qty: 90 TABLET | Refills: 1 | Status: SHIPPED | OUTPATIENT
Start: 2022-11-30 | End: 2022-11-30 | Stop reason: SDUPTHER

## 2022-11-30 NOTE — TELEPHONE ENCOUNTER
Spoke with pt regarding appt, pt stated that time did not work for him due to him being a . 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

## 2022-11-30 NOTE — TELEPHONE ENCOUNTER
Imatinib: Refill approved. Test claim: $80 copay. 30/30days. Releasing to [facility] for refill f/up call.

## 2022-12-01 NOTE — TELEPHONE ENCOUNTER
Specialty Pharmacy - Refill Coordination    Specialty Medication Orders Linked to Encounter      Flowsheet Row Most Recent Value   Medication #1 imatinib (GLEEVEC) 400 MG Tab (Order#[id number], Rx#[id number])            Refill Questions - Documented Responses      Flowsheet Row Most Recent Value   Patient Availability and HIPAA Verification    Does patient want to proceed with activity? Yes   HIPAA/medical authority confirmed? Yes   Relationship to patient of person spoken to? Self   Refill Screening Questions    Changes to allergies? No   Changes to medications? No   New conditions since last clinic visit? No   Unplanned office visit, urgent care, ED, or hospital admission in the last 4 weeks? No   How does patient/caregiver feel medication is working? Good   Would patient like to speak to a pharmacist? No   When does the patient need to receive the medication? 12/08/22   Refill Delivery Questions    How will the patient receive the medication? MEDRx   When does the patient need to receive the medication? 12/08/22   Shipping Address Home   Address in [facility] confirmed and updated if neccessary? Yes   Expected Copay ($) 80   Payment Method invoice (approval required)  [CAGNO/OCI]   Days supply of Refill 30   Supplies needed? No supplies needed   Refill activity completed? Yes   Refill activity plan Refill scheduled   Shipment/Pickup Date: 12/06/22            Current Outpatient Medications   Medication Sig    acetaminophen (TYLENOL) 325 MG tablet Take 325 mg by mouth every 6 (six) hours as needed for Pain.    azelastine (OPTIVAR) 0.05 % ophthalmic solution Place 1 drop into both eyes 2 (two) times daily.    dicyclomine (BENTYL) 20 mg tablet Take 20 mg by mouth every 6 (six) hours. Uses prn    imatinib (GLEEVEC) 400 MG Tab Take 1 tablet (400 mg total) by mouth once daily.    multivitamin capsule Take 1 capsule by mouth once daily.    potassium chloride SA (K-DUR,KLOR-CON) 20 MEQ tablet Take 1 tablet by mouth  once daily    pravastatin (PRAVACHOL) 10 MG tablet Take 1 tablet by mouth once daily   Last reviewed on 10/4/2022  9:11 AM by Josselyn Willams LPN    Review of patient's allergies indicates:  No Known Allergies Last reviewed on  11/30/2022 10:46 AM by Freeman Dee      Tasks added this encounter   12/31/2022 - Refill Call (Auto Added)   Tasks due within next 3 months   2/21/2023 - Clinical - Follow Up Assesement (180 day)  10/4/2022 - Referral Authorization - Refill Call     [facility]  [address] [place] [address]  Phone: [phone]  Fax: [phone]

## 2022-12-02 NOTE — TELEPHONE ENCOUNTER
Submitted assistance request to [facility] to request funds for [facility] copay. Determination pending.

## 2022-12-05 NOTE — TELEPHONE ENCOUNTER
[facility] approved cost transfer in the amount of $80 for patients refill. Cost transfer form received.

## 2022-12-16 ENCOUNTER — LAB VISIT (OUTPATIENT)
Dept: LAB | Facility: HOSPITAL | Age: 62
End: 2022-12-16
Attending: FAMILY MEDICINE
Payer: COMMERCIAL

## 2022-12-16 ENCOUNTER — OFFICE VISIT (OUTPATIENT)
Dept: FAMILY MEDICINE | Facility: CLINIC | Age: 62
End: 2022-12-16
Payer: COMMERCIAL

## 2022-12-16 VITALS
TEMPERATURE: 98 F | HEART RATE: 80 BPM | HEIGHT: 68 IN | WEIGHT: 185.5 LBS | SYSTOLIC BLOOD PRESSURE: 148 MMHG | DIASTOLIC BLOOD PRESSURE: 79 MMHG | BODY MASS INDEX: 28.11 KG/M2

## 2022-12-16 DIAGNOSIS — Z79.899 ENCOUNTER FOR LONG-TERM (CURRENT) USE OF MEDICATIONS: ICD-10-CM

## 2022-12-16 DIAGNOSIS — C49.A0 MALIGNANT GASTROINTESTINAL STROMAL TUMOR, UNSPECIFIED SITE: ICD-10-CM

## 2022-12-16 DIAGNOSIS — Z12.5 SCREENING FOR PROSTATE CANCER: ICD-10-CM

## 2022-12-16 DIAGNOSIS — I10 PRIMARY HYPERTENSION: ICD-10-CM

## 2022-12-16 DIAGNOSIS — Z00.00 ROUTINE HISTORY AND PHYSICAL EXAMINATION OF ADULT: Primary | ICD-10-CM

## 2022-12-16 DIAGNOSIS — Z00.00 ROUTINE HISTORY AND PHYSICAL EXAMINATION OF ADULT: ICD-10-CM

## 2022-12-16 DIAGNOSIS — E78.5 HYPERLIPIDEMIA, UNSPECIFIED HYPERLIPIDEMIA TYPE: ICD-10-CM

## 2022-12-16 DIAGNOSIS — F17.200 SMOKING: ICD-10-CM

## 2022-12-16 LAB
ALBUMIN SERPL BCP-MCNC: 3.7 G/DL (ref 3.5–5.2)
ALP SERPL-CCNC: 59 U/L (ref 55–135)
ALT SERPL W/O P-5'-P-CCNC: 18 U/L (ref 10–44)
ANION GAP SERPL CALC-SCNC: 6 MMOL/L (ref 8–16)
AST SERPL-CCNC: 23 U/L (ref 10–40)
BASOPHILS # BLD AUTO: 0.03 K/UL (ref 0–0.2)
BASOPHILS NFR BLD: 0.5 % (ref 0–1.9)
BILIRUB SERPL-MCNC: 0.7 MG/DL (ref 0.1–1)
BUN SERPL-MCNC: 11 MG/DL (ref 6–20)
CALCIUM SERPL-MCNC: 9.2 MG/DL (ref 8.7–10.5)
CHLORIDE SERPL-SCNC: 110 MMOL/L (ref 95–110)
CHOLEST SERPL-MCNC: 115 MG/DL (ref 120–199)
CHOLEST/HDLC SERPL: 2.4 {RATIO} (ref 2–5)
CO2 SERPL-SCNC: 25 MMOL/L (ref 23–29)
COMPLEXED PSA SERPL-MCNC: 2.6 NG/ML (ref 0–4)
CREAT SERPL-MCNC: 0.9 MG/DL (ref 0.5–1.4)
DIFFERENTIAL METHOD: ABNORMAL
EOSINOPHIL # BLD AUTO: 0.2 K/UL (ref 0–0.5)
EOSINOPHIL NFR BLD: 2.8 % (ref 0–8)
ERYTHROCYTE [DISTWIDTH] IN BLOOD BY AUTOMATED COUNT: 16.6 % (ref 11.5–14.5)
EST. GFR  (NO RACE VARIABLE): >60 ML/MIN/1.73 M^2
GLUCOSE SERPL-MCNC: 88 MG/DL (ref 70–110)
HCT VFR BLD AUTO: 35.5 % (ref 40–54)
HDLC SERPL-MCNC: 47 MG/DL (ref 40–75)
HDLC SERPL: 40.9 % (ref 20–50)
HGB BLD-MCNC: 11.3 G/DL (ref 14–18)
IMM GRANULOCYTES # BLD AUTO: 0.02 K/UL (ref 0–0.04)
IMM GRANULOCYTES NFR BLD AUTO: 0.3 % (ref 0–0.5)
LDLC SERPL CALC-MCNC: 59.4 MG/DL (ref 63–159)
LYMPHOCYTES # BLD AUTO: 1.7 K/UL (ref 1–4.8)
LYMPHOCYTES NFR BLD: 27.6 % (ref 18–48)
MCH RBC QN AUTO: 30.1 PG (ref 27–31)
MCHC RBC AUTO-ENTMCNC: 31.8 G/DL (ref 32–36)
MCV RBC AUTO: 94 FL (ref 82–98)
MONOCYTES # BLD AUTO: 0.6 K/UL (ref 0.3–1)
MONOCYTES NFR BLD: 10.1 % (ref 4–15)
NEUTROPHILS # BLD AUTO: 3.5 K/UL (ref 1.8–7.7)
NEUTROPHILS NFR BLD: 58.7 % (ref 38–73)
NONHDLC SERPL-MCNC: 68 MG/DL
NRBC BLD-RTO: 0 /100 WBC
PLATELET # BLD AUTO: 257 K/UL (ref 150–450)
PMV BLD AUTO: 9.5 FL (ref 9.2–12.9)
POTASSIUM SERPL-SCNC: 4.1 MMOL/L (ref 3.5–5.1)
PROT SERPL-MCNC: 7 G/DL (ref 6–8.4)
RBC # BLD AUTO: 3.76 M/UL (ref 4.6–6.2)
SODIUM SERPL-SCNC: 141 MMOL/L (ref 136–145)
TRIGL SERPL-MCNC: 43 MG/DL (ref 30–150)
TSH SERPL DL<=0.005 MIU/L-ACNC: 0.53 UIU/ML (ref 0.4–4)
WBC # BLD AUTO: 6.02 K/UL (ref 3.9–12.7)

## 2022-12-16 PROCEDURE — 99396 PR PREVENTIVE VISIT,EST,40-64: ICD-10-PCS | Mod: S$GLB,,, | Performed by: FAMILY MEDICINE

## 2022-12-16 PROCEDURE — 84153 ASSAY OF PSA TOTAL: CPT | Performed by: FAMILY MEDICINE

## 2022-12-16 PROCEDURE — 85025 COMPLETE CBC W/AUTO DIFF WBC: CPT | Performed by: FAMILY MEDICINE

## 2022-12-16 PROCEDURE — 3008F BODY MASS INDEX DOCD: CPT | Mod: CPTII,S$GLB,, | Performed by: FAMILY MEDICINE

## 2022-12-16 PROCEDURE — 1159F PR MEDICATION LIST DOCUMENTED IN MEDICAL RECORD: ICD-10-PCS | Mod: CPTII,S$GLB,, | Performed by: FAMILY MEDICINE

## 2022-12-16 PROCEDURE — 3077F SYST BP >= 140 MM HG: CPT | Mod: CPTII,S$GLB,, | Performed by: FAMILY MEDICINE

## 2022-12-16 PROCEDURE — 3077F PR MOST RECENT SYSTOLIC BLOOD PRESSURE >= 140 MM HG: ICD-10-PCS | Mod: CPTII,S$GLB,, | Performed by: FAMILY MEDICINE

## 2022-12-16 PROCEDURE — 3008F PR BODY MASS INDEX (BMI) DOCUMENTED: ICD-10-PCS | Mod: CPTII,S$GLB,, | Performed by: FAMILY MEDICINE

## 2022-12-16 PROCEDURE — 99999 PR PBB SHADOW E&M-EST. PATIENT-LVL IV: ICD-10-PCS | Mod: PBBFAC,,, | Performed by: FAMILY MEDICINE

## 2022-12-16 PROCEDURE — 99396 PREV VISIT EST AGE 40-64: CPT | Mod: S$GLB,,, | Performed by: FAMILY MEDICINE

## 2022-12-16 PROCEDURE — 84443 ASSAY THYROID STIM HORMONE: CPT | Performed by: FAMILY MEDICINE

## 2022-12-16 PROCEDURE — 1159F MED LIST DOCD IN RCRD: CPT | Mod: CPTII,S$GLB,, | Performed by: FAMILY MEDICINE

## 2022-12-16 PROCEDURE — 3078F DIAST BP <80 MM HG: CPT | Mod: CPTII,S$GLB,, | Performed by: FAMILY MEDICINE

## 2022-12-16 PROCEDURE — 36415 COLL VENOUS BLD VENIPUNCTURE: CPT | Mod: PO | Performed by: FAMILY MEDICINE

## 2022-12-16 PROCEDURE — 99999 PR PBB SHADOW E&M-EST. PATIENT-LVL IV: CPT | Mod: PBBFAC,,, | Performed by: FAMILY MEDICINE

## 2022-12-16 PROCEDURE — 3078F PR MOST RECENT DIASTOLIC BLOOD PRESSURE < 80 MM HG: ICD-10-PCS | Mod: CPTII,S$GLB,, | Performed by: FAMILY MEDICINE

## 2022-12-16 PROCEDURE — 80061 LIPID PANEL: CPT | Performed by: FAMILY MEDICINE

## 2022-12-16 PROCEDURE — 80053 COMPREHEN METABOLIC PANEL: CPT | Performed by: FAMILY MEDICINE

## 2022-12-16 RX ORDER — AMLODIPINE BESYLATE 2.5 MG/1
2.5 TABLET ORAL DAILY
Qty: 30 TABLET | Refills: 1 | Status: SHIPPED | OUTPATIENT
Start: 2022-12-16 | End: 2023-04-10

## 2022-12-16 NOTE — PATIENT INSTRUCTIONS
Health Maintenance Due   Topic Date Due    Pneumococcal Vaccines (Age 0-64) (1 - PCV) Never done    HIV Screening  Never done    Shingles Vaccine (1 of 2) Never done    Colorectal Cancer Screening  Never done    COVID-19 Vaccine (4 - Booster for Moderna series) 03/01/2022    LDCT Lung Screen  06/03/2022    Lipid Panel  08/02/2022    Influenza Vaccine (1) Never done

## 2022-12-16 NOTE — PROGRESS NOTES
Presents physical exam.  Blood pressure not controlled.  Hydrochlorothiazide was discontinued due to hypokalemia in conjunction with his Gleevec.  Blood pressure has gone up after discontinuation.  Sees Hematology Oncology regarding gastrointestinal stromal tumor with pending follow-up appointment.  He does continue to smoke intermittently.  He is interested in prostate cancer screening.  He has a Cologuard kit at home which he has not yet returned.    Agusto was seen today for annual exam.    Diagnoses and all orders for this visit:    Routine history and physical examination of adult  -     CBC Auto Differential; Future  -     Comprehensive Metabolic Panel; Future  -     Lipid Panel; Future  -     PSA, Screening; Future  -     TSH; Future    Primary hypertension    Hyperlipidemia, unspecified hyperlipidemia type  -     Lipid Panel; Future    Malignant gastrointestinal stromal tumor, unspecified site  -     CBC Auto Differential; Future  -     Comprehensive Metabolic Panel; Future  -     TSH; Future    Smoking    Encounter for long-term (current) use of medications  -     TSH; Future    Screening for prostate cancer  -     PSA, Screening; Future    Other orders  -     amLODIPine (NORVASC) 2.5 MG tablet; Take 1 tablet (2.5 mg total) by mouth once daily.    Start amlodipine.  Recheck blood pressure with nurse in 4 weeks.  Keep the scheduled appointment with the oncologist.  Encouraged him to stop smoking.  He will return the Cologuard kit.  Recommended immunizations, he states he wants to think about it.      Anticipatory guidance: Don't smoke.  Healthy diet and regular exercise recommended.          Past Medical History:  Past Medical History:   Diagnosis Date    BPH (benign prostatic hypertrophy)     Hyperlipidemia     Hypertension      Past Surgical History:   Procedure Laterality Date    ENDOSCOPIC ULTRASOUND OF UPPER GASTROINTESTINAL TRACT N/A 4/30/2021    Procedure: ULTRASOUND, UPPER GI TRACT, ENDOSCOPIC;   Surgeon: Rikki Shabazz MD;  Location: [facility];  Service: Endoscopy;  Laterality: N/A;  Rapid COVID prior - last minute addition to schedule - ttr    ESOPHAGOGASTRODUODENOSCOPY N/A 4/30/2021    Procedure: EGD (ESOPHAGOGASTRODUODENOSCOPY);  Surgeon: Rikki Shabazz MD;  Location: [facility];  Service: Endoscopy;  Laterality: N/A;  EGD/EUS with biopsy within a week is fine. 45min case with me OK. Any campus. -Dr. Shabazz     Review of patient's allergies indicates:  No Known Allergies  Current Outpatient Medications on File Prior to Visit   Medication Sig Dispense Refill    acetaminophen (TYLENOL) 325 MG tablet Take 325 mg by mouth every 6 (six) hours as needed for Pain.      dicyclomine (BENTYL) 20 mg tablet Take 20 mg by mouth every 6 (six) hours. Uses prn      imatinib (GLEEVEC) 400 MG Tab Take 1 tablet (400 mg total) by mouth once daily. 90 tablet 1    multivitamin capsule Take 1 capsule by mouth once daily.      potassium chloride SA (K-DUR,KLOR-CON) 20 MEQ tablet Take 1 tablet by mouth once daily 90 tablet 1    pravastatin (PRAVACHOL) 10 MG tablet Take 1 tablet by mouth once daily 90 tablet 0    [DISCONTINUED] azelastine (OPTIVAR) 0.05 % ophthalmic solution Place 1 drop into both eyes 2 (two) times daily. (Patient not taking: Reported on 12/16/2022) 6 mL 0     No current facility-administered medications on file prior to visit.     Social History     Socioeconomic History    Marital status:    Tobacco Use    Smoking status: Some Days     Packs/day: 0.50     Years: 41.00     Pack years: 20.50     Types: Cigarettes     Start date: 5/1/1978    Smokeless tobacco: Never   Substance and Sexual Activity    Alcohol use: Yes     Alcohol/week: 0.0 standard drinks    Drug use: No     Family History   Problem Relation Age of Onset    Hypertension Unknown            ROS:  GENERAL: No fever, chills,  or significant weight changes.   CARDIOVASCULAR: Denies chest pain, PND, orthopnea or reduced exercise tolerance.  ABDOMEN:  "Appetite fine. Denies diarrhea, abdominal pain, hematemesis or blood in stool.  URINARY: No flank pain, dysuria or hematuria.        Vitals:    12/16/22 0833   BP: (!) 148/79   Pulse: 80   Temp: 97.7 °F (36.5 °C)   TempSrc: Temporal   Weight: 84.1 kg (185 lb 8.3 oz)   Height: 5' 8" (1.727 m)     Wt Readings from Last 3 Encounters:   12/16/22 84.1 kg (185 lb 8.3 oz)   09/16/22 83.5 kg (184 lb)   09/13/22 83.7 kg (184 lb 8.4 oz)       OBJECTIVE:   APPEARANCE: Well nourished, well developed, in no acute distress.    HEAD: Normocephalic.  Atraumatic.  No sinus tenderness.  EYES:   Right eye: Pupil reactive.  Conjunctiva clear.    Left eye: Pupil reactive.  Conjunctiva clear.  EOMI.    EARS: TM's intact. Light reflex normal. No retraction or perforation.    NOSE:  clear.  MOUTH & THROAT:  No pharyngeal erythema or exudate. No lesions.  NECK: Supple. No bruits.  No JVD.  No cervical lymphadenopathy.  No thyromegaly.    CHEST: Breath sounds clear bilaterally.  Normal respiratory effort  CARDIOVASCULAR: Normal rate.  Regular rhythm.  No murmurs.  No rub.  No gallops.  ABDOMEN: Bowel sounds normal.  Soft.  No tenderness.  No organomegaly.  PERIPHERAL VASCULAR: No cyanosis.  No clubbing.  No edema.  NEUROLOGIC: No focal findings.  MENTAL STATUS: Alert.  Oriented x 3.        "

## 2023-01-03 ENCOUNTER — SPECIALTY PHARMACY (OUTPATIENT)
Dept: PHARMACY | Facility: CLINIC | Age: 63
End: 2023-01-03
Payer: COMMERCIAL

## 2023-01-03 NOTE — TELEPHONE ENCOUNTER
Specialty Pharmacy - Refill Coordination    Specialty Medication Orders Linked to Encounter      Flowsheet Row Most Recent Value   Medication #1 imatinib (GLEEVEC) 400 MG Tab (Order#[id number], Rx#[id number])            Refill Questions - Documented Responses      Flowsheet Row Most Recent Value   Patient Availability and HIPAA Verification    Does patient want to proceed with activity? Yes   HIPAA/medical authority confirmed? Yes   Relationship to patient of person spoken to? Self   Refill Screening Questions    Changes to allergies? No   Changes to medications? No   New conditions since last clinic visit? No   Unplanned office visit, urgent care, ED, or hospital admission in the last 4 weeks? No   How does patient/caregiver feel medication is working? Good   Financial problems or insurance changes? No   How many doses of your specialty medications were missed in the last 4 weeks? 0   Would patient like to speak to a pharmacist? No   When does the patient need to receive the medication? 01/10/23   Refill Delivery Questions    How will the patient receive the medication? MEDRx   When does the patient need to receive the medication? 01/10/23   Shipping Address Home   Address in [facility] confirmed and updated if neccessary? Yes   Expected Copay ($) 80   Is the patient able to afford the medication copay? Yes   Payment Method invoice (approval required)  [CAGNO requested]   Days supply of Refill 30   Supplies needed? No supplies needed   Refill activity completed? Yes   Refill activity plan Refill scheduled   Shipment/Pickup Date: 01/05/23            Current Outpatient Medications   Medication Sig    acetaminophen (TYLENOL) 325 MG tablet Take 325 mg by mouth every 6 (six) hours as needed for Pain.    amLODIPine (NORVASC) 2.5 MG tablet Take 1 tablet (2.5 mg total) by mouth once daily.    dicyclomine (BENTYL) 20 mg tablet Take 20 mg by mouth every 6 (six) hours. Uses prn    imatinib (GLEEVEC) 400 MG Tab Take 1  tablet (400 mg total) by mouth once daily.    multivitamin capsule Take 1 capsule by mouth once daily.    potassium chloride SA (K-DUR,KLOR-CON) 20 MEQ tablet Take 1 tablet by mouth once daily    pravastatin (PRAVACHOL) 10 MG tablet Take 1 tablet by mouth once daily   Last reviewed on 12/16/2022  8:34 AM by Mari Gonzalez LPN    Review of patient's allergies indicates:  No Known Allergies Last reviewed on  12/16/2022 8:34 AM by Mari Gonzalez      Tasks added this encounter   2/2/2023 - Refill Call (Auto Added)   Tasks due within next 3 months   2/21/2023 - Clinical - Follow Up Assesement (180 day)  10/4/2022 - Referral Authorization - Refill Call     Tiffanie Medley, PharmD  [facility]  [address] [place], [place] [address]  [facility] [address]  Phone: [phone]  Fax: [phone]

## 2023-01-03 NOTE — TELEPHONE ENCOUNTER
Specialty Pharmacy - Refill Coordination    Specialty Medication Orders Linked to Encounter      Flowsheet Row Most Recent Value   Medication #1 imatinib (GLEEVEC) 400 MG Tab (Order#[id number], Rx#[id number])            Refill Questions - Documented Responses      Flowsheet Row Most Recent Value   Patient Availability and HIPAA Verification    Does patient want to proceed with activity? Yes   HIPAA/medical authority confirmed? Yes   Relationship to patient of person spoken to? Self   Refill Screening Questions    Changes to allergies? No   Changes to medications? Yes  [amlodipine 2.5 -- cat c interaction counseled pt to monitor BP and signs of low BP to monitor for,]   New conditions since last clinic visit? No   Unplanned office visit, urgent care, ED, or hospital admission in the last 4 weeks? No   How does patient/caregiver feel medication is working? Good   Financial problems or insurance changes? No   How many doses of your specialty medications were missed in the last 4 weeks? 0   Would patient like to speak to a pharmacist? No   When does the patient need to receive the medication? 01/10/23   Refill Delivery Questions    How will the patient receive the medication? MEDRx   When does the patient need to receive the medication? 01/10/23   Shipping Address Home   Address in [facility] confirmed and updated if neccessary? Yes   Expected Copay ($) 80   Is the patient able to afford the medication copay? Yes   Payment Method invoice (approval required)  [[facility] requested]   Days supply of Refill 30   Supplies needed? No supplies needed   Refill activity completed? Yes   Refill activity plan Refill scheduled   Shipment/Pickup Date: 01/05/23            Current Outpatient Medications   Medication Sig    acetaminophen (TYLENOL) 325 MG tablet Take 325 mg by mouth every 6 (six) hours as needed for Pain.    amLODIPine (NORVASC) 2.5 MG tablet Take 1 tablet (2.5 mg total) by mouth once daily.    dicyclomine (BENTYL)  20 mg tablet Take 20 mg by mouth every 6 (six) hours. Uses prn    imatinib (GLEEVEC) 400 MG Tab Take 1 tablet (400 mg total) by mouth once daily.    multivitamin capsule Take 1 capsule by mouth once daily.    potassium chloride SA (K-DUR,KLOR-CON) 20 MEQ tablet Take 1 tablet by mouth once daily    pravastatin (PRAVACHOL) 10 MG tablet Take 1 tablet by mouth once daily   Last reviewed on 12/16/2022  8:34 AM by Mari Gonzalez LPN    Review of patient's allergies indicates:  No Known Allergies Last reviewed on  12/16/2022 8:34 AM by Mari Gonzalez      Tasks added this encounter   2/2/2023 - Refill Call (Auto Added)   Tasks due within next 3 months   2/21/2023 - Clinical - Follow Up Assesement (180 day)  10/4/2022 - Referral Authorization - Refill Call     Tiffanie Medley, PharmD  [facility]  [address] [place], [place] [address]  [facility] [address]  Phone: [phone]  Fax: [phone]

## 2023-01-03 NOTE — TELEPHONE ENCOUNTER
Outgoing call regarding Gleevec refill, pt stated he started amilodipine 2.5mg. Transferred to Tiffanie COHEN

## 2023-01-04 NOTE — TELEPHONE ENCOUNTER
Submitted assistance request to [facility] to request funds for patients refill scheduled to ship on 1/5. Determination pending.

## 2023-01-09 ENCOUNTER — OFFICE VISIT (OUTPATIENT)
Dept: HEMATOLOGY/ONCOLOGY | Facility: CLINIC | Age: 63
End: 2023-01-09
Payer: COMMERCIAL

## 2023-01-09 ENCOUNTER — LAB VISIT (OUTPATIENT)
Dept: LAB | Facility: HOSPITAL | Age: 63
End: 2023-01-09
Attending: INTERNAL MEDICINE
Payer: COMMERCIAL

## 2023-01-09 VITALS
BODY MASS INDEX: 28.1 KG/M2 | HEART RATE: 90 BPM | SYSTOLIC BLOOD PRESSURE: 128 MMHG | WEIGHT: 185.44 LBS | DIASTOLIC BLOOD PRESSURE: 76 MMHG | OXYGEN SATURATION: 100 % | HEIGHT: 68 IN | TEMPERATURE: 98 F

## 2023-01-09 DIAGNOSIS — E78.1 PURE HYPERTRIGLYCERIDEMIA: ICD-10-CM

## 2023-01-09 DIAGNOSIS — T45.1X5A IMMUNOSUPPRESSED DUE TO CHEMOTHERAPY: ICD-10-CM

## 2023-01-09 DIAGNOSIS — D84.821 IMMUNOSUPPRESSED DUE TO CHEMOTHERAPY: ICD-10-CM

## 2023-01-09 DIAGNOSIS — C78.7 SECONDARY LIVER CANCER: ICD-10-CM

## 2023-01-09 DIAGNOSIS — C78.6 SECONDARY MALIGNANCY OF PARIETAL PERITONEUM: ICD-10-CM

## 2023-01-09 DIAGNOSIS — C49.A2 GASTRIC STROMAL TUMOR: ICD-10-CM

## 2023-01-09 DIAGNOSIS — C49.A2 GASTRIC STROMAL TUMOR: Primary | ICD-10-CM

## 2023-01-09 DIAGNOSIS — C49.A0 MALIGNANT GASTROINTESTINAL STROMAL TUMOR, UNSPECIFIED SITE: ICD-10-CM

## 2023-01-09 DIAGNOSIS — Z79.899 IMMUNODEFICIENCY DUE TO CHEMOTHERAPY: ICD-10-CM

## 2023-01-09 DIAGNOSIS — T45.1X5A IMMUNODEFICIENCY DUE TO CHEMOTHERAPY: ICD-10-CM

## 2023-01-09 DIAGNOSIS — D84.821 IMMUNODEFICIENCY DUE TO CHEMOTHERAPY: ICD-10-CM

## 2023-01-09 DIAGNOSIS — Z79.899 IMMUNOSUPPRESSED DUE TO CHEMOTHERAPY: ICD-10-CM

## 2023-01-09 DIAGNOSIS — I10 PRIMARY HYPERTENSION: ICD-10-CM

## 2023-01-09 DIAGNOSIS — Z72.0 TOBACCO USE: ICD-10-CM

## 2023-01-09 LAB
ALBUMIN SERPL BCP-MCNC: 3.6 G/DL (ref 3.5–5.2)
ALP SERPL-CCNC: 62 U/L (ref 55–135)
ALT SERPL W/O P-5'-P-CCNC: 21 U/L (ref 10–44)
ANION GAP SERPL CALC-SCNC: 9 MMOL/L (ref 8–16)
AST SERPL-CCNC: 25 U/L (ref 10–40)
BASOPHILS # BLD AUTO: 0.03 K/UL (ref 0–0.2)
BASOPHILS NFR BLD: 0.4 % (ref 0–1.9)
BILIRUB SERPL-MCNC: 0.7 MG/DL (ref 0.1–1)
BUN SERPL-MCNC: 12 MG/DL (ref 6–20)
CALCIUM SERPL-MCNC: 9 MG/DL (ref 8.7–10.5)
CHLORIDE SERPL-SCNC: 108 MMOL/L (ref 95–110)
CO2 SERPL-SCNC: 24 MMOL/L (ref 23–29)
CREAT SERPL-MCNC: 0.9 MG/DL (ref 0.5–1.4)
DIFFERENTIAL METHOD: ABNORMAL
EOSINOPHIL # BLD AUTO: 0.2 K/UL (ref 0–0.5)
EOSINOPHIL NFR BLD: 2.8 % (ref 0–8)
ERYTHROCYTE [DISTWIDTH] IN BLOOD BY AUTOMATED COUNT: 16.5 % (ref 11.5–14.5)
EST. GFR  (NO RACE VARIABLE): >60 ML/MIN/1.73 M^2
GLUCOSE SERPL-MCNC: 97 MG/DL (ref 70–110)
HCT VFR BLD AUTO: 33.9 % (ref 40–54)
HGB BLD-MCNC: 11.3 G/DL (ref 14–18)
IMM GRANULOCYTES # BLD AUTO: 0.02 K/UL (ref 0–0.04)
IMM GRANULOCYTES NFR BLD AUTO: 0.3 % (ref 0–0.5)
LDH SERPL L TO P-CCNC: 192 U/L (ref 110–260)
LYMPHOCYTES # BLD AUTO: 1.9 K/UL (ref 1–4.8)
LYMPHOCYTES NFR BLD: 26.8 % (ref 18–48)
MCH RBC QN AUTO: 30.1 PG (ref 27–31)
MCHC RBC AUTO-ENTMCNC: 33.3 G/DL (ref 32–36)
MCV RBC AUTO: 90 FL (ref 82–98)
MONOCYTES # BLD AUTO: 0.8 K/UL (ref 0.3–1)
MONOCYTES NFR BLD: 10.6 % (ref 4–15)
NEUTROPHILS # BLD AUTO: 4.2 K/UL (ref 1.8–7.7)
NEUTROPHILS NFR BLD: 59.1 % (ref 38–73)
NRBC BLD-RTO: 0 /100 WBC
PLATELET # BLD AUTO: 227 K/UL (ref 150–450)
PMV BLD AUTO: 9.6 FL (ref 9.2–12.9)
POTASSIUM SERPL-SCNC: 4 MMOL/L (ref 3.5–5.1)
PROT SERPL-MCNC: 6.9 G/DL (ref 6–8.4)
RBC # BLD AUTO: 3.76 M/UL (ref 4.6–6.2)
SODIUM SERPL-SCNC: 141 MMOL/L (ref 136–145)
WBC # BLD AUTO: 7.08 K/UL (ref 3.9–12.7)

## 2023-01-09 PROCEDURE — 3078F PR MOST RECENT DIASTOLIC BLOOD PRESSURE < 80 MM HG: ICD-10-PCS | Mod: CPTII,S$GLB,, | Performed by: INTERNAL MEDICINE

## 2023-01-09 PROCEDURE — 1160F PR REVIEW ALL MEDS BY PRESCRIBER/CLIN PHARMACIST DOCUMENTED: ICD-10-PCS | Mod: CPTII,S$GLB,, | Performed by: INTERNAL MEDICINE

## 2023-01-09 PROCEDURE — 99999 PR PBB SHADOW E&M-EST. PATIENT-LVL IV: ICD-10-PCS | Mod: PBBFAC,,, | Performed by: INTERNAL MEDICINE

## 2023-01-09 PROCEDURE — 83615 LACTATE (LD) (LDH) ENZYME: CPT | Performed by: INTERNAL MEDICINE

## 2023-01-09 PROCEDURE — 3008F BODY MASS INDEX DOCD: CPT | Mod: CPTII,S$GLB,, | Performed by: INTERNAL MEDICINE

## 2023-01-09 PROCEDURE — 1159F MED LIST DOCD IN RCRD: CPT | Mod: CPTII,S$GLB,, | Performed by: INTERNAL MEDICINE

## 2023-01-09 PROCEDURE — 36415 COLL VENOUS BLD VENIPUNCTURE: CPT | Performed by: INTERNAL MEDICINE

## 2023-01-09 PROCEDURE — 99215 PR OFFICE/OUTPT VISIT, EST, LEVL V, 40-54 MIN: ICD-10-PCS | Mod: S$GLB,,, | Performed by: INTERNAL MEDICINE

## 2023-01-09 PROCEDURE — 3008F PR BODY MASS INDEX (BMI) DOCUMENTED: ICD-10-PCS | Mod: CPTII,S$GLB,, | Performed by: INTERNAL MEDICINE

## 2023-01-09 PROCEDURE — 99215 OFFICE O/P EST HI 40 MIN: CPT | Mod: S$GLB,,, | Performed by: INTERNAL MEDICINE

## 2023-01-09 PROCEDURE — 99999 PR PBB SHADOW E&M-EST. PATIENT-LVL IV: CPT | Mod: PBBFAC,,, | Performed by: INTERNAL MEDICINE

## 2023-01-09 PROCEDURE — 3074F SYST BP LT 130 MM HG: CPT | Mod: CPTII,S$GLB,, | Performed by: INTERNAL MEDICINE

## 2023-01-09 PROCEDURE — 3074F PR MOST RECENT SYSTOLIC BLOOD PRESSURE < 130 MM HG: ICD-10-PCS | Mod: CPTII,S$GLB,, | Performed by: INTERNAL MEDICINE

## 2023-01-09 PROCEDURE — 3078F DIAST BP <80 MM HG: CPT | Mod: CPTII,S$GLB,, | Performed by: INTERNAL MEDICINE

## 2023-01-09 PROCEDURE — 1160F RVW MEDS BY RX/DR IN RCRD: CPT | Mod: CPTII,S$GLB,, | Performed by: INTERNAL MEDICINE

## 2023-01-09 PROCEDURE — 1159F PR MEDICATION LIST DOCUMENTED IN MEDICAL RECORD: ICD-10-PCS | Mod: CPTII,S$GLB,, | Performed by: INTERNAL MEDICINE

## 2023-01-09 PROCEDURE — 85025 COMPLETE CBC W/AUTO DIFF WBC: CPT | Performed by: INTERNAL MEDICINE

## 2023-01-09 PROCEDURE — 80053 COMPREHEN METABOLIC PANEL: CPT | Performed by: INTERNAL MEDICINE

## 2023-01-09 RX ORDER — IMATINIB MESYLATE 400 MG/1
400 TABLET, FILM COATED ORAL DAILY
Qty: 90 TABLET | Refills: 1 | Status: SHIPPED | OUTPATIENT
Start: 2023-01-09 | End: 2023-07-17 | Stop reason: SDUPTHER

## 2023-01-09 RX ORDER — IMATINIB MESYLATE 400 MG/1
400 TABLET, FILM COATED ORAL DAILY
Qty: 90 TABLET | Refills: 1 | Status: SHIPPED | OUTPATIENT
Start: 2023-01-09 | End: 2023-01-09 | Stop reason: SDUPTHER

## 2023-01-09 NOTE — PROGRESS NOTES
Subjective:       Patient ID: Agusto Juarez Sr. is a 62 y.o. male.    Chief Complaint: Results, Chemotherapy, and Cancer    HPI:  62-year-old male currently on Gleevec 400 mg daily.  Tolerating therapy well denies nausea vomiting fevers chills night sweats.  Last imaging performed 4/22.  Patient continues followed ECOG status 1    Past Medical History:   Diagnosis Date    BPH (benign prostatic hypertrophy)     Hyperlipidemia     Hypertension     Malignant gastrointestinal stromal tumor 5/5/2021     Family History   Problem Relation Age of Onset    Hypertension Unknown      Social History     Socioeconomic History    Marital status:    Tobacco Use    Smoking status: Some Days     Packs/day: 0.50     Years: 41.00     Pack years: 20.50     Types: Cigarettes     Start date: 5/1/1978    Smokeless tobacco: Never   Substance and Sexual Activity    Alcohol use: Yes     Alcohol/week: 0.0 standard drinks    Drug use: No     Past Surgical History:   Procedure Laterality Date    ENDOSCOPIC ULTRASOUND OF UPPER GASTROINTESTINAL TRACT N/A 4/30/2021    Procedure: ULTRASOUND, UPPER GI TRACT, ENDOSCOPIC;  Surgeon: Rikki Shabazz MD;  Location: [facility];  Service: Endoscopy;  Laterality: N/A;  Rapid COVID prior - last minute addition to schedule - ttr    ESOPHAGOGASTRODUODENOSCOPY N/A 4/30/2021    Procedure: EGD (ESOPHAGOGASTRODUODENOSCOPY);  Surgeon: Rikki Shabazz MD;  Location: [facility];  Service: Endoscopy;  Laterality: N/A;  EGD/EUS with biopsy within a week is fine. 45min case with me OK. Any campus. -Dr. Shabazz       Labs:  Lab Results   Component Value Date    WBC 6.02 12/16/2022    HGB 11.3 (L) 12/16/2022    HCT 35.5 (L) 12/16/2022    MCV 94 12/16/2022     12/16/2022     BMP  Lab Results   Component Value Date     12/16/2022    K 4.1 12/16/2022     12/16/2022    CO2 25 12/16/2022    BUN 11 12/16/2022    CREATININE 0.9 12/16/2022    CALCIUM 9.2 12/16/2022    ANIONGAP 6 (L) 12/16/2022    ESTGFRAFRICA  >60 05/17/2022    EGFRNONAA >60 05/17/2022     Lab Results   Component Value Date    ALT 18 12/16/2022    AST 23 12/16/2022    ALKPHOS 59 12/16/2022    BILITOT 0.7 12/16/2022       No results found for: IRON, TIBC, FERRITIN, SATURATEDIRO  No results found for: TJSZSSRN60  No results found for: FOLATE  Lab Results   Component Value Date    TSH 0.534 12/16/2022         Review of Systems   Constitutional:  Negative for activity change, appetite change, chills, diaphoresis, fatigue, fever and unexpected weight change.   HENT:  Negative for congestion, dental problem, drooling, ear discharge, ear pain, facial swelling, hearing loss, mouth sores, nosebleeds, postnasal drip, rhinorrhea, sinus pressure, sneezing, sore throat, tinnitus, trouble swallowing and voice change.    Eyes:  Negative for photophobia, pain, discharge, redness, itching and visual disturbance.   Respiratory:  Negative for apnea, cough, choking, chest tightness, shortness of breath, wheezing and stridor.    Cardiovascular:  Negative for chest pain, palpitations and leg swelling.   Gastrointestinal:  Negative for abdominal distention, abdominal pain, anal bleeding, blood in stool, constipation, diarrhea, nausea, rectal pain and vomiting.   Endocrine: Negative for cold intolerance, heat intolerance, polydipsia, polyphagia and polyuria.   Genitourinary:  Negative for decreased urine volume, difficulty urinating, dysuria, enuresis, flank pain, frequency, genital sores, hematuria, penile discharge, penile pain, penile swelling, scrotal swelling, testicular pain and urgency.   Musculoskeletal:  Negative for arthralgias, back pain, gait problem, joint swelling, myalgias, neck pain and neck stiffness.   Skin:  Negative for color change, pallor, rash and wound.   Allergic/Immunologic: Negative for environmental allergies, food allergies and immunocompromised state.   Neurological:  Negative for dizziness, tremors, seizures, syncope, facial asymmetry, speech  difficulty, weakness, light-headedness, numbness and headaches.   Hematological:  Negative for adenopathy. Does not bruise/bleed easily.   Psychiatric/Behavioral:  Negative for agitation, behavioral problems, confusion, decreased concentration, dysphoric mood, hallucinations, self-injury, sleep disturbance and suicidal ideas. The patient is not nervous/anxious and is not hyperactive.      Objective:      Physical Exam  Vitals reviewed.   Constitutional:       General: He is not in acute distress.     Appearance: He is well-developed. He is not diaphoretic.   HENT:      Head: Normocephalic.      Right Ear: External ear normal.      Left Ear: External ear normal.      Nose: Nose normal.      Right Sinus: No maxillary sinus tenderness or frontal sinus tenderness.      Left Sinus: No maxillary sinus tenderness or frontal sinus tenderness.      Mouth/Throat:      Pharynx: No oropharyngeal exudate.   Eyes:      General: Lids are normal. No scleral icterus.        Right eye: No discharge.         Left eye: No discharge.      Extraocular Movements:      Right eye: Normal extraocular motion.      Left eye: Normal extraocular motion.      Conjunctiva/sclera:      Right eye: Right conjunctiva is not injected. No hemorrhage.     Left eye: Left conjunctiva is not injected. No hemorrhage.     Pupils: Pupils are equal, round, and reactive to light.   Neck:      Thyroid: No thyromegaly.      Vascular: No JVD.      Trachea: No tracheal deviation.   Cardiovascular:      Rate and Rhythm: Normal rate and regular rhythm.      Heart sounds: Normal heart sounds.   Pulmonary:      Effort: Pulmonary effort is normal. No respiratory distress.      Breath sounds: Normal breath sounds. No stridor.   Abdominal:      General: Bowel sounds are normal.      Palpations: Abdomen is soft. There is no hepatomegaly, splenomegaly or mass.      Tenderness: There is no abdominal tenderness.   Musculoskeletal:         General: No tenderness. Normal range  of motion.      Cervical back: Normal range of motion and neck supple.   Lymphadenopathy:      Head:      Right side of head: No posterior auricular or occipital adenopathy.      Left side of head: No posterior auricular or occipital adenopathy.      Cervical: No cervical adenopathy.      Right cervical: No superficial, deep or posterior cervical adenopathy.     Left cervical: No superficial, deep or posterior cervical adenopathy.      Upper Body:      Right upper body: No supraclavicular adenopathy.      Left upper body: No supraclavicular adenopathy.   Skin:     General: Skin is dry.      Findings: No erythema or rash.      Nails: There is no clubbing.   Neurological:      Mental Status: He is alert and oriented to person, place, and time.      Cranial Nerves: No cranial nerve deficit.      Coordination: Coordination normal.   Psychiatric:         Behavior: Behavior normal.         Thought Content: Thought content normal.         Judgment: Judgment normal.           Assessment:      1. Gastric stromal tumor    2. Malignant gastrointestinal stromal tumor, unspecified site    3. Immunosuppressed due to chemotherapy    4. Primary hypertension    5. Pure hypertriglyceridemia    6. Immunodeficiency due to chemotherapy    7. Secondary liver cancer    8. Secondary malignancy of parietal peritoneum    9. Tobacco use           Plan:     Continue with Gleevec 400 mg daily.  Recommend CBC CMP and LDH to be performed CT chest abdomen pelvis referral made to tobacco cessation previous history of tobacco use.  Would recommend follow-up with nurse practitioner/AP P in 6 months and return MD follow-up with standing labs and CT chest pelvis in 1 year assuming clinical stability.  Discussed implications answered questions        Freeman Dee Jr, MD FACP

## 2023-01-19 ENCOUNTER — HOSPITAL ENCOUNTER (OUTPATIENT)
Dept: RADIOLOGY | Facility: HOSPITAL | Age: 63
Discharge: HOME | End: 2023-01-19
Attending: INTERNAL MEDICINE
Payer: COMMERCIAL

## 2023-01-19 DIAGNOSIS — C49.A0 MALIGNANT GASTROINTESTINAL STROMAL TUMOR, UNSPECIFIED SITE: ICD-10-CM

## 2023-01-19 DIAGNOSIS — C49.A2 GASTRIC STROMAL TUMOR: ICD-10-CM

## 2023-01-19 DIAGNOSIS — D84.821 IMMUNOSUPPRESSED DUE TO CHEMOTHERAPY: ICD-10-CM

## 2023-01-19 DIAGNOSIS — T45.1X5A IMMUNOSUPPRESSED DUE TO CHEMOTHERAPY: ICD-10-CM

## 2023-01-19 DIAGNOSIS — Z79.899 IMMUNOSUPPRESSED DUE TO CHEMOTHERAPY: ICD-10-CM

## 2023-01-19 PROCEDURE — 25500020 PHARM REV CODE 255: Performed by: INTERNAL MEDICINE

## 2023-01-19 PROCEDURE — 74177 CT ABD & PELVIS W/CONTRAST: CPT | Mod: TC

## 2023-01-19 PROCEDURE — 71260 CT THORAX DX C+: CPT | Mod: 26,,, | Performed by: STUDENT IN AN ORGANIZED HEALTH CARE EDUCATION/TRAINING PROGRAM

## 2023-01-19 PROCEDURE — 71260 CT THORAX DX C+: CPT | Mod: TC

## 2023-01-19 PROCEDURE — 71260 CT CHEST ABDOMEN PELVIS WITH CONTRAST (XPD): ICD-10-PCS | Mod: 26,,, | Performed by: STUDENT IN AN ORGANIZED HEALTH CARE EDUCATION/TRAINING PROGRAM

## 2023-01-19 PROCEDURE — 74177 CT ABD & PELVIS W/CONTRAST: CPT | Mod: 26,,, | Performed by: STUDENT IN AN ORGANIZED HEALTH CARE EDUCATION/TRAINING PROGRAM

## 2023-01-19 PROCEDURE — 74177 CT CHEST ABDOMEN PELVIS WITH CONTRAST (XPD): ICD-10-PCS | Mod: 26,,, | Performed by: STUDENT IN AN ORGANIZED HEALTH CARE EDUCATION/TRAINING PROGRAM

## 2023-01-19 RX ADMIN — IOHEXOL 100 ML: 350 INJECTION, SOLUTION INTRAVENOUS at 11:01

## 2023-01-19 RX ADMIN — IOHEXOL 30 ML: 350 INJECTION, SOLUTION INTRAVENOUS at 11:01

## 2023-01-24 ENCOUNTER — PATIENT OUTREACH (OUTPATIENT)
Dept: ADMINISTRATIVE | Facility: HOSPITAL | Age: 63
End: 2023-01-24
Payer: COMMERCIAL

## 2023-02-02 ENCOUNTER — SPECIALTY PHARMACY (OUTPATIENT)
Dept: PHARMACY | Facility: CLINIC | Age: 63
End: 2023-02-02
Payer: COMMERCIAL

## 2023-02-02 NOTE — TELEPHONE ENCOUNTER
Submitted assistance request to [facility] to request funds for patients refill scheduled to ship on 2/6. Determination pending.

## 2023-02-02 NOTE — TELEPHONE ENCOUNTER
Specialty Pharmacy - Refill Coordination    Specialty Medication Orders Linked to Encounter      Flowsheet Row Most Recent Value   Medication #1 imatinib (GLEEVEC) 400 MG Tab (Order#[id number], Rx#[id number])            Refill Questions - Documented Responses      Flowsheet Row Most Recent Value   Patient Availability and HIPAA Verification    Does patient want to proceed with activity? Yes   HIPAA/medical authority confirmed? Yes   Relationship to patient of person spoken to? Self   Refill Screening Questions    Changes to allergies? No   Changes to medications? No   New conditions since last clinic visit? No   Unplanned office visit, urgent care, ED, or hospital admission in the last 4 weeks? No   How does patient/caregiver feel medication is working? Good   Financial problems or insurance changes? No   How many doses of your specialty medications were missed in the last 4 weeks? 0   Would patient like to speak to a pharmacist? No   When does the patient need to receive the medication? 02/08/23   Refill Delivery Questions    How will the patient receive the medication? MEDRx   When does the patient need to receive the medication? 02/08/23   Shipping Address Home   Address in [facility] confirmed and updated if neccessary? Yes   Expected Copay ($) 80   Is the patient able to afford the medication copay? Yes   Payment Method invoice (approval required)  [CAGNO/OCI]   Days supply of Refill 30   Refill activity completed? Yes   Refill activity plan Refill scheduled   Shipment/Pickup Date: 02/06/23            Current Outpatient Medications   Medication Sig    acetaminophen (TYLENOL) 325 MG tablet Take 325 mg by mouth every 6 (six) hours as needed for Pain.    amLODIPine (NORVASC) 2.5 MG tablet Take 1 tablet (2.5 mg total) by mouth once daily.    dicyclomine (BENTYL) 20 mg tablet Take 20 mg by mouth every 6 (six) hours. Uses prn    imatinib (GLEEVEC) 400 MG Tab Take 1 tablet (400 mg total) by mouth once daily.     multivitamin capsule Take 1 capsule by mouth once daily.    potassium chloride SA (K-DUR,KLOR-CON) 20 MEQ tablet Take 1 tablet by mouth once daily    pravastatin (PRAVACHOL) 10 MG tablet Take 1 tablet by mouth once daily   Last reviewed on 1/9/2023 10:25 AM by Freeman Dee MD    Review of patient's allergies indicates:  No Known Allergies Last reviewed on  1/9/2023 10:25 AM by Freeman Dee      Tasks added this encounter   7/25/2023 - Clinical - Follow Up Assesement (180 day)   Tasks due within next 3 months   2/2/2023 - Refill Call (Auto Added)     [facility]  [address] [facility] [address]  Phone: [phone]  Fax: [phone]

## 2023-02-24 ENCOUNTER — SPECIALTY PHARMACY (OUTPATIENT)
Dept: PHARMACY | Facility: CLINIC | Age: 63
End: 2023-02-24
Payer: COMMERCIAL

## 2023-02-24 NOTE — TELEPHONE ENCOUNTER
Specialty Pharmacy - Clinical Reassessment    Specialty Medication Orders Linked to Encounter      Flowsheet Row Most Recent Value   Medication #1 imatinib (GLEEVEC) 400 MG Tab (Order#[id number], Rx#[id number])          Patient Diagnosis   C49.A0 - Malignant gastrointestinal stromal tumor    Agusto Juarez Sr. is a 63 y.o. male, who is followed by the specialty pharmacy service for management and education of his Imatinib.  He has been on therapy with Imatinib since 6/19/2021.  I have reviewed his electronic medical record and current medication list and determined that specialty medication adjustment Is not needed at this time.    Patient has not experienced adverse events.  He Is adherent reporting 0 missed doses since last review.  Adherence has been encouraged with the following mechanism(s): directed education, alarm .  He is meeting goals of therapy and will continue treatment.        2/2/2023 1/3/2023 12/1/2022 11/1/2022 10/4/2022 9/1/2022 8/1/2022   Follow Up Review   # of missed doses 0 0    0  0 0 0 0   New Medications? No Yes    No No No No No No   New Conditions? No No    No No No No No No   New Allergies? No No    No No No No No No   Med Effective? Good Good    Good Good Good Good Good Good   Missed activities?      No    Urgent Care? No No    No No No No No No   Requested Pharmacist? No No    No No No No No No       Multiple values from one day are sorted in reverse-chronological order         Therapy is appropriate to continue.    Therapy is effective: Yes  On scale of 1 to 10, how does patient rank quality of life? (10 - Best): 10  Recommendations: none at this time.  Review Method: Patient Contact    Patient last seen on 1/9. I have reviewed CMP and CBC labs, no adjustments needed. CT on 1/2023 is stable. Per MD, patient will continue Imatinib 400 mg daily.       Tasks added this encounter   No tasks added.   Tasks due within next 3 months   2/21/2023 - Clinical - Follow Up Assesement (180  day)  3/4/2023 - Refill Call (Auto Added)     CESAR WARREN, PharmD  [facility]  [address]  [facility] [address]  Phone: [phone]  Fax: [phone]

## 2023-03-06 ENCOUNTER — SPECIALTY PHARMACY (OUTPATIENT)
Dept: PHARMACY | Facility: CLINIC | Age: 63
End: 2023-03-06
Payer: COMMERCIAL

## 2023-03-06 NOTE — TELEPHONE ENCOUNTER
Submitted request form to [facility] to request funds for patients refill scheduled to ship on 3/7. Determination pending.

## 2023-03-06 NOTE — TELEPHONE ENCOUNTER
Specialty Pharmacy - Refill Coordination    Specialty Medication Orders Linked to Encounter      Flowsheet Row Most Recent Value   Medication #1 imatinib (GLEEVEC) 400 MG Tab (Order#[id number], Rx#[id number])            Refill Questions - Documented Responses      Flowsheet Row Most Recent Value   Patient Availability and HIPAA Verification    Does patient want to proceed with activity? Yes   HIPAA/medical authority confirmed? Yes   Relationship to patient of person spoken to? Self   Refill Screening Questions    Changes to allergies? No   Changes to medications? No   New conditions since last clinic visit? No   Unplanned office visit, urgent care, ED, or hospital admission in the last 4 weeks? No   How does patient/caregiver feel medication is working? Good   Financial problems or insurance changes? No   Would patient like to speak to a pharmacist? No   When does the patient need to receive the medication? 03/10/23   Refill Delivery Questions    How will the patient receive the medication? MEDRx   When does the patient need to receive the medication? 03/10/23   Shipping Address Home   Address in [facility] confirmed and updated if neccessary? Yes   Expected Copay ($) 80   Is the patient able to afford the medication copay? Yes   Payment Method invoice (approval required)  [CANGO/OCI]   Days supply of Refill 30   Supplies needed? No supplies needed   Refill activity completed? Yes   Refill activity plan Refill scheduled   Shipment/Pickup Date: 03/07/23            Current Outpatient Medications   Medication Sig    acetaminophen (TYLENOL) 325 MG tablet Take 325 mg by mouth every 6 (six) hours as needed for Pain.    amLODIPine (NORVASC) 2.5 MG tablet Take 1 tablet (2.5 mg total) by mouth once daily.    dicyclomine (BENTYL) 20 mg tablet Take 20 mg by mouth every 6 (six) hours. Uses prn    imatinib (GLEEVEC) 400 MG Tab Take 1 tablet (400 mg total) by mouth once daily.    multivitamin capsule Take 1 capsule by  mouth once daily.    potassium chloride SA (K-DUR,KLOR-CON) 20 MEQ tablet Take 1 tablet by mouth once daily    pravastatin (PRAVACHOL) 10 MG tablet Take 1 tablet by mouth once daily   Last reviewed on 2/24/2023 10:06 AM by Sintia Sotomayor, PharmD    Review of patient's allergies indicates:  No Known Allergies Last reviewed on  2/24/2023 10:05 AM by Sintia Sotomayor      Tasks added this encounter   4/2/2023 - Refill Call (Auto Added)   Tasks due within next 3 months   No tasks due.     [facility]  [address] [facility] [address]  Phone: [phone]  Fax: [phone]

## 2023-04-03 ENCOUNTER — SPECIALTY PHARMACY (OUTPATIENT)
Dept: PHARMACY | Facility: CLINIC | Age: 63
End: 2023-04-03
Payer: COMMERCIAL

## 2023-04-03 ENCOUNTER — PATIENT OUTREACH (OUTPATIENT)
Dept: ADMINISTRATIVE | Facility: HOSPITAL | Age: 63
End: 2023-04-03
Payer: COMMERCIAL

## 2023-04-03 NOTE — TELEPHONE ENCOUNTER
Specialty Pharmacy - Refill Coordination    Specialty Medication Orders Linked to Encounter      Flowsheet Row Most Recent Value   Medication #1 imatinib (GLEEVEC) 400 MG Tab (Order#[id number], Rx#[id number])            Refill Questions - Documented Responses      Flowsheet Row Most Recent Value   Patient Availability and HIPAA Verification    Does patient want to proceed with activity? Yes   HIPAA/medical authority confirmed? Yes   Relationship to patient of person spoken to? Self   Refill Screening Questions    Changes to allergies? No   Changes to medications? No   New conditions since last clinic visit? No   Unplanned office visit, urgent care, ED, or hospital admission in the last 4 weeks? No   How does patient/caregiver feel medication is working? Good   Financial problems or insurance changes? No   How many doses of your specialty medications were missed in the last 4 weeks? 0   Would patient like to speak to a pharmacist? No   When does the patient need to receive the medication? 04/09/23   Refill Delivery Questions    How will the patient receive the medication? MEDRx   When does the patient need to receive the medication? 04/09/23   Shipping Address Home   Address in [facility] confirmed and updated if neccessary? No   Expected Copay ($) 80   Is the patient able to afford the medication copay? Yes   Payment Method invoice (approval required)  [Cagno/OCI]   Days supply of Refill 30   Supplies needed? No supplies needed   Refill activity completed? Yes   Refill activity plan Refill scheduled   Shipment/Pickup Date: 04/05/23            Current Outpatient Medications   Medication Sig    acetaminophen (TYLENOL) 325 MG tablet Take 325 mg by mouth every 6 (six) hours as needed for Pain.    amLODIPine (NORVASC) 2.5 MG tablet Take 1 tablet (2.5 mg total) by mouth once daily.    dicyclomine (BENTYL) 20 mg tablet Take 20 mg by mouth every 6 (six) hours. Uses prn    imatinib (GLEEVEC) 400 MG Tab Take 1 tablet  (400 mg total) by mouth once daily.    multivitamin capsule Take 1 capsule by mouth once daily.    potassium chloride SA (K-DUR,KLOR-CON) 20 MEQ tablet Take 1 tablet by mouth once daily    pravastatin (PRAVACHOL) 10 MG tablet Take 1 tablet by mouth once daily   Last reviewed on 2/24/2023 10:06 AM by Sintia Sotomayor, PharmD    Review of patient's allergies indicates:  No Known Allergies Last reviewed on  2/24/2023 10:05 AM by Sintia Sotomayor      Tasks added this encounter   5/2/2023 - Refill Call (Auto Added)   Tasks due within next 3 months   No tasks due.     [facility]  [address] [facility] [address]  Phone: [phone]  Fax: [phone]         4

## 2023-04-04 NOTE — TELEPHONE ENCOUNTER
[facility] approved cost transfer in the amount of $80 for Gleevec copay.    **[facility] will only be able to assist with 2 more fills at $80 after this fill**

## 2023-04-04 NOTE — TELEPHONE ENCOUNTER
Submitted assistance request to [facility] to request funds for Gleevec refill. Determination pending.

## 2023-04-08 ENCOUNTER — PATIENT MESSAGE (OUTPATIENT)
Dept: FAMILY MEDICINE | Facility: CLINIC | Age: 63
End: 2023-04-08
Payer: COMMERCIAL

## 2023-04-10 ENCOUNTER — TELEPHONE (OUTPATIENT)
Dept: FAMILY MEDICINE | Facility: CLINIC | Age: 63
End: 2023-04-10
Payer: COMMERCIAL

## 2023-04-10 VITALS — DIASTOLIC BLOOD PRESSURE: 76 MMHG | SYSTOLIC BLOOD PRESSURE: 124 MMHG

## 2023-04-10 NOTE — TELEPHONE ENCOUNTER
Mr Larry returned call regarding bp reading. States he checked it over the weekend and sent reading through pt portal    My blood pressure was checked on April 8 in the morning it was 124/76 pulse 87    Remote entry made.

## 2023-04-19 ENCOUNTER — PATIENT MESSAGE (OUTPATIENT)
Dept: ADMINISTRATIVE | Facility: HOSPITAL | Age: 63
End: 2023-04-19
Payer: COMMERCIAL

## 2023-04-22 ENCOUNTER — PATIENT MESSAGE (OUTPATIENT)
Dept: FAMILY MEDICINE | Facility: CLINIC | Age: 63
End: 2023-04-22
Payer: COMMERCIAL

## 2023-05-02 ENCOUNTER — SPECIALTY PHARMACY (OUTPATIENT)
Dept: PHARMACY | Facility: CLINIC | Age: 63
End: 2023-05-02
Payer: COMMERCIAL

## 2023-05-02 NOTE — TELEPHONE ENCOUNTER
Specialty Pharmacy - Refill Coordination    Specialty Medication Orders Linked to Encounter      Flowsheet Row Most Recent Value   Medication #1 imatinib (GLEEVEC) 400 MG Tab (Order#[id number], Rx#[id number])            Refill Questions - Documented Responses      Flowsheet Row Most Recent Value   Refill Screening Questions    Changes to allergies? No   Changes to medications? No   New conditions since last clinic visit? No   How does patient/caregiver feel medication is working? Good   Financial problems or insurance changes? No   How many doses of your specialty medications were missed in the last 4 weeks? 0   Would patient like to speak to a pharmacist? No   When does the patient need to receive the medication? 05/11/23   Refill Delivery Questions    How will the patient receive the medication? MEDRx   When does the patient need to receive the medication? 05/11/23   Shipping Address Home   Address in [facility] confirmed and updated if neccessary? Yes   Expected Copay ($) 80   Is the patient able to afford the medication copay? Yes   Payment Method --  [Cagn[employer]]   Days supply of Refill 30   Supplies needed? No supplies needed   Refill activity completed? Yes   Refill activity plan Refill scheduled   Shipment/Pickup Date: 05/03/23            Current Outpatient Medications   Medication Sig    acetaminophen (TYLENOL) 325 MG tablet Take 325 mg by mouth every 6 (six) hours as needed for Pain.    amLODIPine (NORVASC) 2.5 MG tablet Take 1 tablet by mouth once daily    dicyclomine (BENTYL) 20 mg tablet Take 20 mg by mouth every 6 (six) hours. Uses prn    imatinib (GLEEVEC) 400 MG Tab Take 1 tablet (400 mg total) by mouth once daily.    multivitamin capsule Take 1 capsule by mouth once daily.    potassium chloride SA (K-DUR,KLOR-CON) 20 MEQ tablet Take 1 tablet by mouth once daily    pravastatin (PRAVACHOL) 10 MG tablet Take 1 tablet by mouth once daily   Last reviewed on 2/24/2023 10:06 AM by Sintia Sotomayor  PharmD    Review of patient's allergies indicates:  No Known Allergies Last reviewed on  2/24/2023 10:05 AM by Sintia Sotomayor      Tasks added this encounter   No tasks added.   Tasks due within next 3 months   7/25/2023 - Clinical Assessment (6 month recurrence)  5/2/2023 - Refill Coordination Outreach (1 time occurrence)     [facility]  [address]  [facility] [address]  Phone: [phone]  Fax: [phone]

## 2023-05-22 RX ORDER — POTASSIUM CHLORIDE 20 MEQ/1
TABLET, EXTENDED RELEASE ORAL
Qty: 90 TABLET | Refills: 1 | Status: SHIPPED | OUTPATIENT
Start: 2023-05-22 | End: 2023-11-13

## 2023-05-22 NOTE — TELEPHONE ENCOUNTER
Refill Decision Note   Agusto Larry  is requesting a refill authorization.  Brief Assessment and Rationale for Refill:  Approve     Medication Therapy Plan:         Comments:     Note composed:5:59 PM 05/22/2023

## 2023-05-22 NOTE — TELEPHONE ENCOUNTER
No care due was identified.  [facility] Embedded Care Due Messages. Reference number: [id number].   5/22/2023 5:08:13 PM CDT

## 2023-05-23 ENCOUNTER — PATIENT MESSAGE (OUTPATIENT)
Dept: ADMINISTRATIVE | Facility: HOSPITAL | Age: 63
End: 2023-05-23
Payer: COMMERCIAL

## 2023-05-26 ENCOUNTER — SPECIALTY PHARMACY (OUTPATIENT)
Dept: PHARMACY | Facility: CLINIC | Age: 63
End: 2023-05-26
Payer: COMMERCIAL

## 2023-05-26 NOTE — TELEPHONE ENCOUNTER
Specialty Pharmacy - Refill Coordination    Specialty Medication Orders Linked to Encounter      Flowsheet Row Most Recent Value   Medication #1 imatinib (GLEEVEC) 400 MG Tab (Order#[id number], Rx#[id number])            Refill Questions - Documented Responses      Flowsheet Row Most Recent Value   Refill Screening Questions    Changes to allergies? No   Changes to medications? No   New conditions since last clinic visit? No   Unplanned office visit, urgent care, ED, or hospital admission in the last 4 weeks? No   How does patient/caregiver feel medication is working? Good   Financial problems or insurance changes? No   How many doses of your specialty medications were missed in the last 4 weeks? 0   Would patient like to speak to a pharmacist? No   When does the patient need to receive the medication? 06/01/23   Refill Delivery Questions    How will the patient receive the medication? MEDRx   When does the patient need to receive the medication? 06/01/23   Shipping Address Home   Address in [facility] confirmed and updated if neccessary? Yes   Expected Copay ($) 80   Is the patient able to afford the medication copay? Yes   Payment Method invoice (approval required)  [Cagno/[facility]ci]   Days supply of Refill 30   Supplies needed? No supplies needed   Refill activity completed? Yes   Refill activity plan Refill scheduled   Shipment/Pickup Date: 05/26/23            Current Outpatient Medications   Medication Sig    acetaminophen (TYLENOL) 325 MG tablet Take 325 mg by mouth every 6 (six) hours as needed for Pain.    amLODIPine (NORVASC) 2.5 MG tablet Take 1 tablet by mouth once daily    dicyclomine (BENTYL) 20 mg tablet Take 20 mg by mouth every 6 (six) hours. Uses prn    imatinib (GLEEVEC) 400 MG Tab Take 1 tablet (400 mg total) by mouth once daily.    multivitamin capsule Take 1 capsule by mouth once daily.    potassium chloride SA (K-DUR,KLOR-CON) 20 MEQ tablet TAKE 1  BY MOUTH ONCE DAILY    pravastatin (PRAVACHOL)  10 MG tablet Take 1 tablet by mouth once daily   Last reviewed on 2/24/2023 10:06 AM by Sintia Sotomayor, PharmULYSSES    Review of patient's allergies indicates:  No Known Allergies Last reviewed on  2/24/2023 10:05 AM by Sintia Sotomayor      Tasks added this encounter   No tasks added.   Tasks due within next 3 months   7/25/2023 - Clinical Assessment (6 month recurrence)  5/26/2023 - Refill Coordination Outreach (1 time occurrence)     [facility]  [address] [place] [address]  Phone: [phone]  Fax: [phone]

## 2023-06-19 ENCOUNTER — SPECIALTY PHARMACY (OUTPATIENT)
Dept: PHARMACY | Facility: CLINIC | Age: 63
End: 2023-06-19
Payer: COMMERCIAL

## 2023-06-19 NOTE — TELEPHONE ENCOUNTER
Specialty Pharmacy - Refill Coordination    Specialty Medication Orders Linked to Encounter      Flowsheet Row Most Recent Value   Medication #1 imatinib (GLEEVEC) 400 MG Tab (Order#[id number], Rx#[id number])            Refill Questions - Documented Responses      Flowsheet Row Most Recent Value   Patient Availability and HIPAA Verification    Does patient want to proceed with activity? Yes   HIPAA/medical authority confirmed? Yes   Relationship to patient of person spoken to? Self   Refill Screening Questions    Changes to allergies? No   Changes to medications? No   New conditions since last clinic visit? No   Unplanned office visit, urgent care, ED, or hospital admission in the last 4 weeks? No   How does patient/caregiver feel medication is working? Good   Financial problems or insurance changes? No   How many doses of your specialty medications were missed in the last 4 weeks? 0   Would patient like to speak to a pharmacist? No   When does the patient need to receive the medication? 06/29/23   Refill Delivery Questions    How will the patient receive the medication? MEDRx   When does the patient need to receive the medication? 06/29/23   Shipping Address Home   Address in [facility] confirmed and updated if neccessary? Yes   Expected Copay ($) 80   Is the patient able to afford the medication copay? Yes   Payment Method invoice (approval required)  [Cagno/[employer]ci]   Days supply of Refill 30   Supplies needed? No supplies needed   Refill activity completed? Yes   Refill activity plan Refill scheduled   Shipment/Pickup Date: 06/20/23            Current Outpatient Medications   Medication Sig    acetaminophen (TYLENOL) 325 MG tablet Take 325 mg by mouth every 6 (six) hours as needed for Pain.    amLODIPine (NORVASC) 2.5 MG tablet Take 1 tablet by mouth once daily    dicyclomine (BENTYL) 20 mg tablet Take 20 mg by mouth every 6 (six) hours. Uses prn    imatinib (GLEEVEC) 400 MG Tab Take 1 tablet (400 mg total)  by mouth once daily.    multivitamin capsule Take 1 capsule by mouth once daily.    potassium chloride SA (K-DUR,KLOR-CON) 20 MEQ tablet TAKE 1  BY MOUTH ONCE DAILY    pravastatin (PRAVACHOL) 10 MG tablet Take 1 tablet by mouth once daily   Last reviewed on 2/24/2023 10:06 AM by Sintia Sotomayor, PharmULYSSES    Review of patient's allergies indicates:  No Known Allergies Last reviewed on  2/24/2023 10:05 AM by Sintia Sotomayor      Tasks added this encounter   No tasks added.   Tasks due within next 3 months   7/25/2023 - Clinical Assessment (6 month recurrence)  6/18/2023 - Refill Coordination Outreach (1 time occurrence)     [facility]  [address]  [facility] [address]  Phone: [phone]  Fax: [phone]

## 2023-06-23 ENCOUNTER — DOCUMENTATION ONLY (OUTPATIENT)
Dept: HEMATOLOGY/ONCOLOGY | Facility: CLINIC | Age: 63
End: 2023-06-23
Payer: COMMERCIAL

## 2023-06-23 NOTE — PROGRESS NOTES
[employer] was consulted to assist with cost transfer for pt. Gleevec medication, cost $80 through [employer]. Susier obtained MD review/signature and sent to [facility]. Swer will remain available.

## 2023-06-23 NOTE — TELEPHONE ENCOUNTER
[facility]I approved $80 towards Gleevec refill.    Patient reached ceiling for CAGNO as of last month and [facility] will no longer be able to assist with refills.

## 2023-07-10 ENCOUNTER — TELEPHONE (OUTPATIENT)
Dept: FAMILY MEDICINE | Facility: CLINIC | Age: 63
End: 2023-07-10
Payer: COMMERCIAL

## 2023-07-10 NOTE — TELEPHONE ENCOUNTER
----- Message from Edie Waite sent at 7/10/2023 11:58 AM CDT -----  Contact: Agusto  Patient is calling to speak with the nurse regarding labs for A1c. Explains went to get Physical completed and need to get Alc check due to found out Glucose was high from the Urine. Please give a call back at.[phone] as requested.  Thanks  LR

## 2023-07-11 ENCOUNTER — OFFICE VISIT (OUTPATIENT)
Dept: FAMILY MEDICINE | Facility: CLINIC | Age: 63
End: 2023-07-11
Payer: COMMERCIAL

## 2023-07-11 VITALS
BODY MASS INDEX: 28.64 KG/M2 | HEART RATE: 88 BPM | TEMPERATURE: 98 F | HEIGHT: 68 IN | DIASTOLIC BLOOD PRESSURE: 76 MMHG | SYSTOLIC BLOOD PRESSURE: 131 MMHG | WEIGHT: 189 LBS

## 2023-07-11 DIAGNOSIS — R81 GLUCOSURIA: Primary | ICD-10-CM

## 2023-07-11 DIAGNOSIS — C78.6 SECONDARY MALIGNANCY OF PARIETAL PERITONEUM: ICD-10-CM

## 2023-07-11 DIAGNOSIS — R82.90 ABNORMAL URINALYSIS: ICD-10-CM

## 2023-07-11 DIAGNOSIS — C49.A0 MALIGNANT GASTROINTESTINAL STROMAL TUMOR, UNSPECIFIED SITE: ICD-10-CM

## 2023-07-11 DIAGNOSIS — C78.7 SECONDARY LIVER CANCER: ICD-10-CM

## 2023-07-11 DIAGNOSIS — Z12.11 SCREENING FOR COLON CANCER: ICD-10-CM

## 2023-07-11 LAB
BILIRUB UR QL STRIP: NEGATIVE
CLARITY UR: CLEAR
COLOR UR: YELLOW
GLUCOSE UR QL STRIP: NEGATIVE
HGB UR QL STRIP: ABNORMAL
HYALINE CASTS UR QL AUTO: 1 /LPF
KETONES UR QL STRIP: NEGATIVE
LEUKOCYTE ESTERASE UR QL STRIP: NEGATIVE
MICROSCOPIC COMMENT: NORMAL
NITRITE UR QL STRIP: NEGATIVE
PH UR STRIP: 7 [PH] (ref 5–8)
PROT UR QL STRIP: NEGATIVE
RBC #/AREA URNS AUTO: 3 /HPF (ref 0–4)
SP GR UR STRIP: 1.01 (ref 1–1.03)
SQUAMOUS #/AREA URNS AUTO: 0 /HPF
URN SPEC COLLECT METH UR: ABNORMAL
WBC #/AREA URNS AUTO: 5 /HPF (ref 0–5)

## 2023-07-11 PROCEDURE — 99999 PR PBB SHADOW E&M-EST. PATIENT-LVL IV: CPT | Mod: PBBFAC,,, | Performed by: FAMILY MEDICINE

## 2023-07-11 PROCEDURE — 3008F BODY MASS INDEX DOCD: CPT | Mod: CPTII,S$GLB,, | Performed by: FAMILY MEDICINE

## 2023-07-11 PROCEDURE — 3078F PR MOST RECENT DIASTOLIC BLOOD PRESSURE < 80 MM HG: ICD-10-PCS | Mod: CPTII,S$GLB,, | Performed by: FAMILY MEDICINE

## 2023-07-11 PROCEDURE — 3075F SYST BP GE 130 - 139MM HG: CPT | Mod: CPTII,S$GLB,, | Performed by: FAMILY MEDICINE

## 2023-07-11 PROCEDURE — 3075F PR MOST RECENT SYSTOLIC BLOOD PRESS GE 130-139MM HG: ICD-10-PCS | Mod: CPTII,S$GLB,, | Performed by: FAMILY MEDICINE

## 2023-07-11 PROCEDURE — 99213 OFFICE O/P EST LOW 20 MIN: CPT | Mod: S$GLB,,, | Performed by: FAMILY MEDICINE

## 2023-07-11 PROCEDURE — 1159F PR MEDICATION LIST DOCUMENTED IN MEDICAL RECORD: ICD-10-PCS | Mod: CPTII,S$GLB,, | Performed by: FAMILY MEDICINE

## 2023-07-11 PROCEDURE — 99213 PR OFFICE/OUTPT VISIT, EST, LEVL III, 20-29 MIN: ICD-10-PCS | Mod: S$GLB,,, | Performed by: FAMILY MEDICINE

## 2023-07-11 PROCEDURE — 1159F MED LIST DOCD IN RCRD: CPT | Mod: CPTII,S$GLB,, | Performed by: FAMILY MEDICINE

## 2023-07-11 PROCEDURE — 3078F DIAST BP <80 MM HG: CPT | Mod: CPTII,S$GLB,, | Performed by: FAMILY MEDICINE

## 2023-07-11 PROCEDURE — 99999 PR PBB SHADOW E&M-EST. PATIENT-LVL IV: ICD-10-PCS | Mod: PBBFAC,,, | Performed by: FAMILY MEDICINE

## 2023-07-11 PROCEDURE — 81003 URINALYSIS AUTO W/O SCOPE: CPT | Mod: 59,PO | Performed by: FAMILY MEDICINE

## 2023-07-11 PROCEDURE — 3008F PR BODY MASS INDEX (BMI) DOCUMENTED: ICD-10-PCS | Mod: CPTII,S$GLB,, | Performed by: FAMILY MEDICINE

## 2023-07-11 PROCEDURE — 81001 URINALYSIS AUTO W/SCOPE: CPT | Performed by: FAMILY MEDICINE

## 2023-07-11 NOTE — PROGRESS NOTES
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  He says they checked his sugar and it was 81.  No history of diabetes.  No polyuria polydipsia.  He is under treatment for gastrointestinal stromal tumor followed by Oncology.  He wanted to get Cologuard for colon screening.    Agusto was seen today for sugar in urine .    Diagnoses and all orders for this visit:    Glucosuria  -     Glucose, Fasting; Future  -     Hemoglobin A1C; Future  -     Urinalysis, Reflex to Urine Culture Urine, Clean Catch; Future  -     Urinalysis, Reflex to Urine Culture Urine, Clean Catch    Screening for colon cancer  -     Cologuard Screening (Multitarget Stool DNA); Future  -     Cologuard Screening (Multitarget Stool DNA)    Malignant gastrointestinal stromal tumor, unspecified site    Secondary liver cancer    Secondary malignancy of parietal peritoneum    Abnormal urinalysis  -     Urinalysis Microscopic      Recheck urine today.  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            Past Medical History:  Past Medical History:   Diagnosis Date    BPH (benign prostatic hypertrophy)     Hyperlipidemia     Hypertension     Malignant gastrointestinal stromal tumor 5/5/2021     Past Surgical History:   Procedure Laterality Date    ENDOSCOPIC ULTRASOUND OF UPPER GASTROINTESTINAL TRACT N/A 4/30/2021    Procedure: ULTRASOUND, UPPER GI TRACT, ENDOSCOPIC;  Surgeon: Rikki Shabazz MD;  Location: [facility];  Service: Endoscopy;  Laterality: N/A;  Rapid COVID prior - last minute addition to schedule - ttr    ESOPHAGOGASTRODUODENOSCOPY N/A 4/30/2021    Procedure: EGD (ESOPHAGOGASTRODUODENOSCOPY);  Surgeon: Rikki Shabazz MD;  Location: [facility];  Service: Endoscopy;  Laterality: N/A;  EGD/EUS with biopsy within a week is fine. 45min case with me OK. Any campus. -Dr. Shabazz     Review of patient's allergies indicates:  No Known Allergies  Current Outpatient Medications on File  "Prior to Visit   Medication Sig Dispense Refill    acetaminophen (TYLENOL) 325 MG tablet Take 325 mg by mouth every 6 (six) hours as needed for Pain.      amLODIPine (NORVASC) 2.5 MG tablet Take 1 tablet by mouth once daily 90 tablet 2    dicyclomine (BENTYL) 20 mg tablet Take 20 mg by mouth every 6 (six) hours. Uses prn      imatinib (GLEEVEC) 400 MG Tab Take 1 tablet (400 mg total) by mouth once daily. 90 tablet 1    multivitamin capsule Take 1 capsule by mouth once daily.      potassium chloride SA (K-DUR,KLOR-CON) 20 MEQ tablet TAKE 1  BY MOUTH ONCE DAILY 90 tablet 1    pravastatin (PRAVACHOL) 10 MG tablet Take 1 tablet by mouth once daily 90 tablet 3     No current facility-administered medications on file prior to visit.     Social History     Socioeconomic History    Marital status:    Tobacco Use    Smoking status: Some Days     Packs/day: 0.50     Years: 41.00     Pack years: 20.50     Types: Cigarettes     Start date: 5/1/1978    Smokeless tobacco: Never   Substance and Sexual Activity    Alcohol use: Yes     Alcohol/week: 0.0 standard drinks    Drug use: No     Family History   Problem Relation Age of Onset    Hypertension Unknown            ROS:  GENERAL: No fever, chills,  or significant weight changes.   CARDIOVASCULAR: Denies chest pain, PND, orthopnea or reduced exercise tolerance.  ABDOMEN: Appetite fine. Denies diarrhea, abdominal pain, hematemesis or blood in stool.  URINARY: No flank pain, dysuria or hematuria.    Vitals:    07/11/23 1048 07/11/23 1050   BP: (!) 153/78 131/76   Pulse: 88    Temp: 98.4 °F (36.9 °C)    TempSrc: Temporal    Weight: 85.7 kg (189 lb)    Height: 5' 8" (1.727 m)        Wt Readings from Last 3 Encounters:   07/11/23 85.7 kg (189 lb)   01/09/23 84.1 kg (185 lb 6.5 oz)   12/16/22 84.1 kg (185 lb 8.3 oz)       APPEARANCE: Well nourished, well developed, in no acute distress.    HEAD: Normocephalic.  Atraumatic.  EYES:   Right eye: Pupil reactive.  Conjunctiva clear.  "   Left eye: Pupil reactive.  Conjunctiva clear.    NECK: Supple. No bruits.  No JVD.  No cervical lymphadenopathy.  No thyromegaly.    CHEST: Breath sounds clear bilaterally.  Normal respiratory effort  CARDIOVASCULAR: Normal rate.  Regular rhythm.  No murmurs.  No rub.  No gallops.   No edema.  MENTAL STATUS: Alert.  Oriented x 3.

## 2023-07-12 ENCOUNTER — LAB VISIT (OUTPATIENT)
Dept: LAB | Facility: HOSPITAL | Age: 63
End: 2023-07-12
Payer: COMMERCIAL

## 2023-07-12 DIAGNOSIS — R81 GLUCOSURIA: ICD-10-CM

## 2023-07-12 LAB
ESTIMATED AVG GLUCOSE: 94 MG/DL (ref 68–131)
GLUCOSE SERPL-MCNC: 86 MG/DL (ref 70–110)
HBA1C MFR BLD: 4.9 % (ref 4–5.6)

## 2023-07-12 PROCEDURE — 36415 COLL VENOUS BLD VENIPUNCTURE: CPT | Mod: PO | Performed by: FAMILY MEDICINE

## 2023-07-12 PROCEDURE — 83036 HEMOGLOBIN GLYCOSYLATED A1C: CPT | Performed by: FAMILY MEDICINE

## 2023-07-12 PROCEDURE — 82947 ASSAY GLUCOSE BLOOD QUANT: CPT | Performed by: FAMILY MEDICINE

## 2023-07-17 ENCOUNTER — SPECIALTY PHARMACY (OUTPATIENT)
Dept: PHARMACY | Facility: CLINIC | Age: 63
End: 2023-07-17
Payer: COMMERCIAL

## 2023-07-17 DIAGNOSIS — C49.A2 GASTRIC STROMAL TUMOR: ICD-10-CM

## 2023-07-17 RX ORDER — IMATINIB MESYLATE 400 MG/1
400 TABLET, FILM COATED ORAL DAILY
Qty: 90 TABLET | Refills: 1 | Status: ACTIVE | OUTPATIENT
Start: 2023-07-17 | End: 2023-10-16 | Stop reason: SDUPTHER

## 2023-07-17 NOTE — TELEPHONE ENCOUNTER
Outgoing call regardinf Gleevec refill, informed pt contacting provider for refill approval and will follow up once provider respond. Pt states to follow up in a week has enough on hand, but did not know count.

## 2023-07-24 NOTE — TELEPHONE ENCOUNTER
Outgoing call regarding refill on pts Gleevec. Pt stated he has 8-9 days on hand. Informed pt med requiring a PA and OSP will follow up once approved.

## 2023-07-25 NOTE — TELEPHONE ENCOUNTER
Specialty Pharmacy - Refill Coordination  Specialty Pharmacy - Medication/Referral Authorization    Specialty Medication Orders Linked to Encounter      Flowsheet Row Most Recent Value   Medication #1 imatinib (GLEEVEC) 400 MG Tab (Order#[id number], Rx#[id number])            Refill Questions - Documented Responses      Flowsheet Row Most Recent Value   Patient Availability and HIPAA Verification    Does patient want to proceed with activity? Yes   HIPAA/medical authority confirmed? Yes   Relationship to patient of person spoken to? Self   Refill Screening Questions    Changes to allergies? No   Changes to medications? No   New conditions since last clinic visit? No   Unplanned office visit, urgent care, ED, or hospital admission in the last 4 weeks? No   How does patient/caregiver feel medication is working? Good   Financial problems or insurance changes? No   How many doses of your specialty medications were missed in the last 4 weeks? 0   Would patient like to speak to a pharmacist? No   When does the patient need to receive the medication? 08/02/23   Refill Delivery Questions    How will the patient receive the medication? MEDRx   When does the patient need to receive the medication? 08/02/23   Shipping Address Home   Address in [facility] confirmed and updated if neccessary? Yes   Expected Copay ($) 80   Is the patient able to afford the medication copay? Yes   Payment Method invoice (approval required)  [MARTINNO pending]   Days supply of Refill 30   Supplies needed? No supplies needed   Refill activity completed? Yes   Refill activity plan Refill scheduled   Shipment/Pickup Date: 07/28/23            Current Outpatient Medications   Medication Sig    acetaminophen (TYLENOL) 325 MG tablet Take 325 mg by mouth every 6 (six) hours as needed for Pain.    amLODIPine (NORVASC) 2.5 MG tablet Take 1 tablet by mouth once daily    dicyclomine (BENTYL) 20 mg tablet Take 20 mg by mouth every 6 (six) hours. Uses prn     imatinib (GLEEVEC) 400 MG Tab Take 1 tablet (400 mg total) by mouth once daily.    multivitamin capsule Take 1 capsule by mouth once daily.    potassium chloride SA (K-DUR,KLOR-CON) 20 MEQ tablet TAKE 1  BY MOUTH ONCE DAILY    pravastatin (PRAVACHOL) 10 MG tablet Take 1 tablet by mouth once daily   Last reviewed on 7/11/2023 10:48 AM by Cheri Cummings MA    Review of patient's allergies indicates:  No Known Allergies Last reviewed on  7/11/2023 10:47 AM by Cheri Cummings      Tasks added this encounter   No tasks added.   Tasks due within next 3 months   7/25/2023 - Clinical Assessment (6 month recurrence)  7/25/2023 - Refill Coordination Outreach (1 time occurrence)  7/26/2023 - Benefits Investigation     CESAR WARREN, PharmD  [facility]  [address]  [facility] [address]  Phone: [phone]  Fax: [phone]

## 2023-07-25 NOTE — TELEPHONE ENCOUNTER
Specialty Pharmacy - Refill Coordination  Specialty Pharmacy - Medication/Referral Authorization    Specialty Medication Orders Linked to Encounter      Flowsheet Row Most Recent Value   Medication #1 imatinib (GLEEVEC) 400 MG Tab (Order#[id number], Rx#[id number])          Patient Diagnosis   C49.A0 - Malignant gastrointestinal stromal tumor    Agusto Juarez Sr. is a 63 y.o. male, who is followed by the specialty pharmacy service for management and education of his imatinib.  He has been on therapy with imatinib for 25 months.  I have reviewed his electronic medical record and current medication list and determined that specialty medication adjustment Is not needed at this time.    Patient has not experienced adverse events.  He Is adherent reporting 0 missed doses since last review.  Adherence has been encouraged with the following mechanism(s): directed education/daily routine.  He is meeting goals of therapy and will continue treatment.        6/19/2023 5/26/2023 5/2/2023 4/3/2023 3/6/2023 2/2/2023 1/3/2023   Follow Up Review   # of missed doses 0 0 0 0  0 0    0   New Medications? No No No No No No Yes    No   New Conditions? No No No No No No No    No   New Allergies? No No No No No No No    No   Med Effective? Good Good Good Good Good Good Good    Good   Urgent Care? No No  No No No No    No   Requested Pharmacist? No No No No No No No    No         Therapy is appropriate to continue.    Therapy is effective: Yes  On scale of 1 to 10, how does patient rank quality of life? (10 - Best): 10  Recommendations: none at this time.  Review Method: Patient Contact    Patient last seen on 1/9. I have reviewed CMP and CBC labs, no adjustments needed. CT on 1/2023 is stable. Per MD, patient will continue Imatinib 400 mg daily.     Tasks added this encounter   No tasks added.   Tasks due within next 3 months   7/25/2023 - Clinical Assessment (6 month recurrence)  7/25/2023 - Refill Coordination Outreach (1 time  occurrence)  7/26/2023 - Benefits Investigation     CESAR WARREN, PharmD  [facility]  [address]  [facility] [address]  Phone: [phone]  Fax: [phone]

## 2023-08-21 ENCOUNTER — SPECIALTY PHARMACY (OUTPATIENT)
Dept: PHARMACY | Facility: CLINIC | Age: 63
End: 2023-08-21
Payer: COMMERCIAL

## 2023-08-21 NOTE — TELEPHONE ENCOUNTER
Outgoing call regarding Gleevec refill, pt states he has about 15 tablets on hand and denied missing any doses. Informed pt refill too soon and will follow up on  8/28 to schedule refill and delivery.

## 2023-08-24 NOTE — PROGRESS NOTES
Attempted to contact pt for an updated bp reading.  No answer, voice mail not set up.    
Detail Level: Simple
Mr Juarez returned call regarding bp reading. States he checked it over the weekend and sent reading through pt portal     My blood pressure was checked on April 8 in the morning it was 124/76 pulse 87     Remote entry made    
Working HTN Report.    Requested an updated bp reading.  States he is not at home, but agreed to check it later.  Advised him I would call [facility] for reading.    
Detail Level: Generalized
Detail Level: Detailed
Detail Level: Zone

## 2023-08-25 LAB — NONINV COLON CA DNA+OCC BLD SCRN STL QL: POSITIVE

## 2023-08-28 ENCOUNTER — TELEPHONE (OUTPATIENT)
Dept: FAMILY MEDICINE | Facility: CLINIC | Age: 63
End: 2023-08-28
Payer: COMMERCIAL

## 2023-08-28 ENCOUNTER — PATIENT MESSAGE (OUTPATIENT)
Dept: FAMILY MEDICINE | Facility: CLINIC | Age: 63
End: 2023-08-28
Payer: COMMERCIAL

## 2023-08-28 DIAGNOSIS — R19.5 POSITIVE COLORECTAL CANCER SCREENING USING COLOGUARD TEST: ICD-10-CM

## 2023-08-28 DIAGNOSIS — Z12.11 SCREENING FOR COLON CANCER: Primary | ICD-10-CM

## 2023-08-28 NOTE — TELEPHONE ENCOUNTER
Specialty Pharmacy - Refill Coordination    Specialty Medication Orders Linked to Encounter      Flowsheet Row Most Recent Value   Medication #1 imatinib (GLEEVEC) 400 MG Tab (Order#[id number], Rx#[id number])            Refill Questions - Documented Responses      Flowsheet Row Most Recent Value   Patient Availability and HIPAA Verification    Does patient want to proceed with activity? Yes   HIPAA/medical authority confirmed? Yes   Relationship to patient of person spoken to? Self   Refill Screening Questions    Changes to allergies? No   Changes to medications? No   New conditions since last clinic visit? No   Unplanned office visit, urgent care, ED, or hospital admission in the last 4 weeks? No   How does patient/caregiver feel medication is working? Fair   Financial problems or insurance changes? No   How many doses of your specialty medications were missed in the last 4 weeks? 0   Would patient like to speak to a pharmacist? No   When does the patient need to receive the medication? 09/05/23   Refill Delivery Questions    How will the patient receive the medication? MEDRx   When does the patient need to receive the medication? 09/05/23   Shipping Address Home   Address in [facility] confirmed and updated if neccessary? Yes   Expected Copay ($) 80   Is the patient able to afford the medication copay? Yes   Payment Method --  [C[employer]o]   Days supply of Refill 30   Supplies needed? No supplies needed   Refill activity completed? Yes   Refill activity plan Refill scheduled   Shipment/Pickup Date: 08/31/23            Current Outpatient Medications   Medication Sig    acetaminophen (TYLENOL) 325 MG tablet Take 325 mg by mouth every 6 (six) hours as needed for Pain.    amLODIPine (NORVASC) 2.5 MG tablet Take 1 tablet by mouth once daily    dicyclomine (BENTYL) 20 mg tablet Take 20 mg by mouth every 6 (six) hours. Uses prn    imatinib (GLEEVEC) 400 MG Tab Take 1 tablet (400 mg total) by mouth once daily.     multivitamin capsule Take 1 capsule by mouth once daily.    potassium chloride SA (K-DUR,KLOR-CON) 20 MEQ tablet TAKE 1  BY MOUTH ONCE DAILY    pravastatin (PRAVACHOL) 10 MG tablet Take 1 tablet by mouth once daily   Last reviewed on 7/11/2023 10:48 AM by Cheri Cummings MA    Review of patient's allergies indicates:  No Known Allergies Last reviewed on  7/11/2023 10:47 AM by Cheri Cummings      Tasks added this encounter   No tasks added.   Tasks due within next 3 months   8/28/2023 - Refill Coordination Outreach (1 time occurrence)     Milagros Chilel, Patient Care Assistant  [facility]  [address]  [facility] [address]  Phone: [phone]  Fax: [phone]

## 2023-08-30 ENCOUNTER — HOSPITAL ENCOUNTER (OUTPATIENT)
Dept: PREADMISSION TESTING | Facility: HOSPITAL | Age: 63
Discharge: HOME | End: 2023-08-30
Attending: FAMILY MEDICINE
Payer: COMMERCIAL

## 2023-08-30 DIAGNOSIS — Z12.11 SCREENING FOR COLON CANCER: ICD-10-CM

## 2023-08-30 DIAGNOSIS — R19.5 POSITIVE COLORECTAL CANCER SCREENING USING COLOGUARD TEST: ICD-10-CM

## 2023-08-30 RX ORDER — SODIUM, POTASSIUM,MAG SULFATES 17.5-3.13G
1 SOLUTION, RECONSTITUTED, ORAL ORAL DAILY
Qty: 1 KIT | Refills: 0 | Status: SHIPPED | OUTPATIENT
Start: 2023-08-30 | End: 2023-09-01

## 2023-09-07 ENCOUNTER — TELEPHONE (OUTPATIENT)
Dept: HEMATOLOGY/ONCOLOGY | Facility: CLINIC | Age: 63
End: 2023-09-07
Payer: COMMERCIAL

## 2023-09-07 NOTE — TELEPHONE ENCOUNTER
SWER faxed cost transfer in the amount of $80 to specialty pharmacy. SWER called patient to inform this would be the last time assisting with medication cost due to mari funds. Pt did not answer and MAIKOLR was unable to leave a VM due to no mailbox set up. SWER will remain available.

## 2023-09-25 ENCOUNTER — NURSE TRIAGE (OUTPATIENT)
Dept: ADMINISTRATIVE | Facility: CLINIC | Age: 63
End: 2023-09-25
Payer: COMMERCIAL

## 2023-09-25 NOTE — TELEPHONE ENCOUNTER
Reason for Disposition   [1] Caller has URGENT question or concern AND [2] triager unable to answer question    Additional Information   Negative: Shock suspected (e.g., cold/pale/clammy skin, too weak to stand, low BP, rapid pulse)   Negative: Difficult to awaken or acting confused (e.g., disoriented, slurred speech)   Negative: Passed out (i.e., lost consciousness, collapsed and was not responding)   Negative: Sounds like a life-threatening emergency to the triager   Negative: SEVERE abdomen pain (e.g., excruciating)   Negative: SEVERE rectal bleeding (large blood clots; on and off, or constant bleeding)   Negative: SEVERE dizziness (e.g., unable to stand, requires support to walk, feels like passing out now)   Negative: SEVERE vomiting (e.g., 6 or more times/day)   Negative: [1] MODERATE rectal bleeding (small blood clots, passing blood without stool, or toilet water turns red) AND [2] more than once   Negative: [1] MILD-MODERATE abdomen pain AND [2] constant AND [3] present > 2 hours   Negative: [1] Drinking very little AND [2] dehydration suspected (e.g., no urine > 12 hours, very dry mouth, very lightheaded)   Negative: Fever > 100.4 F (38.0 C)   Negative: [1] Abdominal bloating, cramping, nausea, or vomiting while drinking bowel prep AND [2] CANNOT finish bowel prep AND [3] has tried recommended Care Advice    Protocols used: Colonoscopy Symptoms and [id number]  Spoke with pt who reports that he is scheduled for colonoscopy tomorrow. Sates he did not take Gas-x at 12 noon. Asking if ok to take pill now.    Spoke with Dr. Bonilla who advises that pt should take Gas-x pill.    Pt informed, and verbalized understanding

## 2023-09-26 ENCOUNTER — ANESTHESIA EVENT (OUTPATIENT)
Dept: ENDOSCOPY | Facility: HOSPITAL | Age: 63
End: 2023-09-26
Payer: COMMERCIAL

## 2023-09-26 ENCOUNTER — ANESTHESIA (OUTPATIENT)
Dept: ENDOSCOPY | Facility: HOSPITAL | Age: 63
End: 2023-09-26
Payer: COMMERCIAL

## 2023-09-26 ENCOUNTER — HOSPITAL ENCOUNTER (OUTPATIENT)
Facility: HOSPITAL | Age: 63
Discharge: HOME | End: 2023-09-26
Attending: FAMILY MEDICINE | Admitting: FAMILY MEDICINE
Payer: COMMERCIAL

## 2023-09-26 DIAGNOSIS — K55.20 AVM (ARTERIOVENOUS MALFORMATION) OF COLON: ICD-10-CM

## 2023-09-26 DIAGNOSIS — K63.5 POLYP OF COLON, UNSPECIFIED PART OF COLON, UNSPECIFIED TYPE: ICD-10-CM

## 2023-09-26 DIAGNOSIS — R19.5 POSITIVE COLORECTAL CANCER SCREENING USING COLOGUARD TEST: Primary | ICD-10-CM

## 2023-09-26 DIAGNOSIS — K57.30 DIVERTICULOSIS OF COLON: ICD-10-CM

## 2023-09-26 PROCEDURE — 88305 TISSUE EXAM BY PATHOLOGIST: ICD-10-PCS | Mod: 26,,, | Performed by: PATHOLOGY

## 2023-09-26 PROCEDURE — 88305 TISSUE EXAM BY PATHOLOGIST: CPT | Mod: 26,,, | Performed by: PATHOLOGY

## 2023-09-26 PROCEDURE — 45385 COLONOSCOPY W/LESION REMOVAL: CPT | Mod: 59,,, | Performed by: FAMILY MEDICINE

## 2023-09-26 PROCEDURE — 63600175 PHARM REV CODE 636 W HCPCS: Performed by: NURSE ANESTHETIST, CERTIFIED REGISTERED

## 2023-09-26 PROCEDURE — 45388 COLONOSCOPY W/ABLATION: CPT | Performed by: FAMILY MEDICINE

## 2023-09-26 PROCEDURE — 27201089 HC SNARE, DISP (ANY): Performed by: FAMILY MEDICINE

## 2023-09-26 PROCEDURE — 45388: ICD-10-PCS | Mod: ,,, | Performed by: FAMILY MEDICINE

## 2023-09-26 PROCEDURE — 45380 PR COLONOSCOPY,BIOPSY: ICD-10-PCS | Mod: 59,,, | Performed by: FAMILY MEDICINE

## 2023-09-26 PROCEDURE — 45385 PR COLONOSCOPY,REMV LESN,SNARE: ICD-10-PCS | Mod: 59,,, | Performed by: FAMILY MEDICINE

## 2023-09-26 PROCEDURE — 88305 TISSUE EXAM BY PATHOLOGIST: CPT | Mod: 59 | Performed by: PATHOLOGY

## 2023-09-26 PROCEDURE — 45380 COLONOSCOPY AND BIOPSY: CPT | Mod: 59,,, | Performed by: FAMILY MEDICINE

## 2023-09-26 PROCEDURE — 45388 COLONOSCOPY W/ABLATION: CPT | Mod: ,,, | Performed by: FAMILY MEDICINE

## 2023-09-26 PROCEDURE — 25000003 PHARM REV CODE 250: Performed by: NURSE ANESTHETIST, CERTIFIED REGISTERED

## 2023-09-26 PROCEDURE — 45385 COLONOSCOPY W/LESION REMOVAL: CPT | Mod: 59 | Performed by: FAMILY MEDICINE

## 2023-09-26 PROCEDURE — 37000009 HC ANESTHESIA EA ADD 15 MINS: Performed by: FAMILY MEDICINE

## 2023-09-26 PROCEDURE — 27201012 HC FORCEPS, HOT/COLD, DISP: Performed by: FAMILY MEDICINE

## 2023-09-26 PROCEDURE — 37000008 HC ANESTHESIA 1ST 15 MINUTES: Performed by: FAMILY MEDICINE

## 2023-09-26 PROCEDURE — 25000003 PHARM REV CODE 250: Performed by: FAMILY MEDICINE

## 2023-09-26 PROCEDURE — 45380 COLONOSCOPY AND BIOPSY: CPT | Mod: 59 | Performed by: FAMILY MEDICINE

## 2023-09-26 RX ORDER — PROPOFOL 10 MG/ML
VIAL (ML) INTRAVENOUS
Status: DISCONTINUED | OUTPATIENT
Start: 2023-09-26 | End: 2023-09-26

## 2023-09-26 RX ORDER — DEXTROMETHORPHAN/PSEUDOEPHED 2.5-7.5/.8
DROPS ORAL
Status: DISCONTINUED | OUTPATIENT
Start: 2023-09-26 | End: 2023-09-26 | Stop reason: HOSPADM

## 2023-09-26 RX ORDER — LIDOCAINE HYDROCHLORIDE 10 MG/ML
INJECTION, SOLUTION EPIDURAL; INFILTRATION; INTRACAUDAL; PERINEURAL
Status: DISCONTINUED | OUTPATIENT
Start: 2023-09-26 | End: 2023-09-26

## 2023-09-26 RX ADMIN — PROPOFOL 30 MG: 10 INJECTION, EMULSION INTRAVENOUS at 09:09

## 2023-09-26 RX ADMIN — LIDOCAINE HYDROCHLORIDE 50 MG: 10 SOLUTION INTRAVENOUS at 09:09

## 2023-09-26 RX ADMIN — SODIUM CHLORIDE, SODIUM LACTATE, POTASSIUM CHLORIDE, AND CALCIUM CHLORIDE: .6; .31; .03; .02 INJECTION, SOLUTION INTRAVENOUS at 09:09

## 2023-09-26 RX ADMIN — PROPOFOL 90 MG: 10 INJECTION, EMULSION INTRAVENOUS at 09:09

## 2023-09-26 NOTE — ANESTHESIA PREPROCEDURE EVALUATION
09/26/2023  Agusto Juarez is a 63 y.o., male.      Pre-op Assessment    I have reviewed the Patient Summary Reports.     I have reviewed the Nursing Notes. I have reviewed the NPO Status.   I have reviewed the Medications.     Review of Systems  Anesthesia Hx:  No problems with previous Anesthesia  Denies Family Hx of Anesthesia complications.   Denies Personal Hx of Anesthesia complications.   Social:  Smoker    Hematology/Oncology:  Hematology Normal   Oncology Normal     EENT/Dental:EENT/Dental Normal   Cardiovascular:   Hypertension hyperlipidemia ECG has been reviewed.    Pulmonary:  Pulmonary Normal    Renal/:  Renal/ Normal     Hepatic/GI:   Liver Disease,    Musculoskeletal:  Musculoskeletal Normal    Neurological:  Neurology Normal    Endocrine:  Endocrine Normal    Dermatological:  Skin Normal    Psych:  Psychiatric Normal           Physical Exam  General: Well nourished, Cooperative, Alert and Oriented    Airway:  Mallampati: II   Mouth Opening: Normal  TM Distance: Normal  Tongue: Normal  Neck ROM: Normal ROM    Dental:  Intact    Chest/Lungs:  Clear to auscultation, Normal Respiratory Rate    Heart:  Rate: Normal  Rhythm: Regular Rhythm        Anesthesia Plan  Type of Anesthesia, risks & benefits discussed:    Anesthesia Type: MAC  Intra-op Monitoring Plan: Standard ASA Monitors  Induction:  IV  Informed Consent: Informed consent signed with the Patient and all parties understand the risks and agree with anesthesia plan.  All questions answered.   ASA Score: 2  Day of Surgery Review of History & Physical: H&P Update referred to the surgeon/provider.I have interviewed and examined the patient. I have reviewed the patient's H&P dated: There are no significant changes.     Ready For Surgery From Anesthesia Perspective.     .

## 2023-09-26 NOTE — ANESTHESIA POSTPROCEDURE EVALUATION
Anesthesia Post Evaluation    Patient: Agusto Juarez    Procedure(s) Performed: Procedure(s) (LRB):  COLONOSCOPY (N/A)    Final Anesthesia Type: MAC      Patient location during evaluation: PACU  Patient participation: Yes- Able to Participate  Level of consciousness: awake and alert  Post-procedure vital signs: reviewed and stable  Pain management: adequate  Airway patency: patent    PONV status at discharge: No PONV  Anesthetic complications: no      Cardiovascular status: blood pressure returned to baseline  Respiratory status: unassisted and room air  Hydration status: euvolemic  Follow-up not needed.          Vitals Value Taken Time   /76 09/26/23 1007   Temp  09/26/23 1255   Pulse 77 09/26/23 1007   Resp 20 09/26/23 1007   SpO2 100 % 09/26/23 1007         Event Time   Out of Recovery 10:18:41         Pain/Lucian Score: Lucian Score: 10 (9/26/2023 10:07 AM)

## 2023-09-26 NOTE — PROVATION PATIENT INSTRUCTIONS
Discharge Summary/Instructions after an Endoscopic Procedure  Patient Name: Agusto Juarez  Patient MRN: [id number]  Patient YOB: 1960 Tuesday, September 26, 2023 Luis Choi MD  Dear patient,  As a result of recent federal legislation (The Federal Cures Act), you may   receive lab or pathology results from your procedure in your [facility]   account before your physician is able to contact you. Your physician or   their representative will relay the results to you with their   recommendations at their soonest availability.  Thank you,  RESTRICTIONS:  During your procedure today, you received medications for sedation.  These   medications may affect your judgment, balance and coordination.  Therefore,   for 24 hours, you have the following restrictions:   - DO NOT drive a car, operate machinery, make legal/financial decisions,   sign important papers or drink alcohol.    ACTIVITY:  Today: no heavy lifting, straining or running due to procedural   sedation/anesthesia.  The following day: return to full activity including work.  DIET:  Eat and drink normally unless instructed otherwise.     TREATMENT FOR COMMON SIDE EFFECTS:  - Mild abdominal pain, nausea, belching, bloating or excessive gas:  rest,   eat lightly and use a heating pad.  - Sore Throat: treat with throat lozenges and/or gargle with warm salt   water.  - Because air was used during the procedure, expelling large amounts of air   from your rectum or belching is normal.  - If a bowel prep was taken, you may not have a bowel movement for 1-3 days.    This is normal.  SYMPTOMS TO WATCH FOR AND REPORT TO YOUR PHYSICIAN:  1. Abdominal pain or bloating, other than gas cramps.  2. Chest pain.  3. Back pain.  4. Signs of infection such as: chills or fever occurring within 24 hours   after the procedure.  5. Rectal bleeding, which would show as bright red, maroon, or black stools.   (A tablespoon of blood from the rectum is not serious, especially  if   hemorrhoids are present.)  6. Vomiting.  7. Weakness or dizziness.  GO DIRECTLY TO THE NEAREST EMERGENCY ROOM IF YOU HAVE ANY OF THE FOLLOWING:      Difficulty breathing              Chills and/or fever over 101 F   Persistent vomiting and/or vomiting blood   Severe abdominal pain   Severe chest pain   Black, tarry stools   Bleeding- more than one tablespoon   Any other symptom or condition that you feel may need urgent attention  Your doctor recommends these additional instructions:  If any biopsies were taken, your doctors clinic will contact you in 1 to 2   weeks with any results.  - Patient has a contact number available for emergencies.  The signs and   symptoms of potential delayed complications were discussed with the   patient.  Return to normal activities tomorrow.  Written discharge   instructions were provided to the patient.   - Resume previous diet.   - Continue present medications.   - Await pathology results.   - Repeat colonoscopy in 1 year for surveillance.   - Refer to a gastroenterologist for evaluation for possible EMR to remove   the lesion in the ascending colon.  - Discharge patient to home (via wheelchair).  For questions, problems or results please call your physician Luis Choi MD at Work:  [phone]  If you have any questions about the above instructions, call the GI   department at [phone] or call the endoscopy unit at [phone]   from 7am until 3 pm.  [facility], EMERGENCY ROOM PHONE NUMBER:   [phone]  IF A COMPLICATION OR EMERGENCY SITUATION ARISES AND YOU ARE UNABLE TO REACH   YOUR PHYSICIAN - GO DIRECTLY TO THE EMERGENCY ROOM.  I have read or have had read to me these discharge instructions for my   procedure and have received a written copy.  I understand these   instructions and will follow-up with my physician if I have any questions.     __________________________________       _____________________________________  Nurse  Signature                                          Patient/Designated   Responsible Party Signature  Luis Choi MD  9/26/2023 9:55:19 AM  This report has been verified and signed electronically.  Dear patient,  As a result of recent federal legislation (The Federal Cures Act), you may   receive lab or pathology results from your procedure in your [facility]   account before your physician is able to contact you. Your physician or   their representative will relay the results to you with their   recommendations at their soonest availability.  Thank you,  PROVATION

## 2023-09-26 NOTE — H&P
Short Stay Endoscopy History and Physical    PCP - Joe Bentley MD    Procedure - Colonoscopy  ASA - 2  Mallampati - per anesthesia  History of Anesthesia problems - no  Family history Anesthesia problems -  no     HPI:  This is a 63 y.o. male here for evaluation of :   Active Hospital Problems    Diagnosis  POA    *Positive colorectal cancer screening using Cologuard test [R19.5]  Yes      Resolved Hospital Problems   No resolved problems to display.         Health Maintenance         Date Due Completion Date    Pneumococcal Vaccines (Age 0-64) (1 - PCV) Never done ---    HIV Screening Never done ---    Shingles Vaccine (1 of 2) Never done ---    COVID-19 Vaccine (4 - Moderna series) 03/01/2022 1/4/2022    LDCT Lung Screen 06/03/2022 6/3/2021    Influenza Vaccine (1) Never done ---    Lipid Panel 12/16/2023 12/16/2022    Hemoglobin A1c (Diabetic Prevention Screening) 07/12/2026 7/12/2023    Colorectal Cancer Screening 08/16/2026 8/16/2023    TETANUS VACCINE 09/13/2032 9/13/2022            Screening - No  History of polyps - No     Diarrhea - no  Anemia - no  Blood in stools - no  Abdominal pain - no  Other - Pos cologard.    ROS:  CONSTITUTIONAL: Denies weight change,  fatigue, fevers, chills, night sweats.  CARDIOVASCULAR: Denies chest pain, shortness of breath, orthopnea and edema.  RESPIRATORY: Denies cough, hemoptysis, dyspnea, and wheezing.  GI: See HPI.    Medical History:   Past Medical History:   Diagnosis Date    BPH (benign prostatic hypertrophy)     Hyperlipidemia     Hypertension     Malignant gastrointestinal stromal tumor 5/5/2021       Surgical History:   Past Surgical History:   Procedure Laterality Date    ENDOSCOPIC ULTRASOUND OF UPPER GASTROINTESTINAL TRACT N/A 4/30/2021    Procedure: ULTRASOUND, UPPER GI TRACT, ENDOSCOPIC;  Surgeon: Rikki Shabazz MD;  Location: [facility];  Service: Endoscopy;  Laterality: N/A;  Rapid COVID prior - last minute addition to schedule - ttr     ESOPHAGOGASTRODUODENOSCOPY N/A 4/30/2021    Procedure: EGD (ESOPHAGOGASTRODUODENOSCOPY);  Surgeon: Rikki Shabazz MD;  Location: [facility];  Service: Endoscopy;  Laterality: N/A;  EGD/EUS with biopsy within a week is fine. 45min case with me OK. Any campus. -Dr. Shabazz       Family History:   Family History   Problem Relation Age of Onset    Hypertension Unknown        Social History:   Social History     Tobacco Use    Smoking status: Some Days     Current packs/day: 0.50     Average packs/day: 0.5 packs/day for 45.4 years (22.7 ttl pk-yrs)     Types: Cigarettes     Start date: 5/1/1978    Smokeless tobacco: Never   Substance Use Topics    Alcohol use: Yes     Alcohol/week: 0.0 standard drinks of alcohol    Drug use: No       Allergies:   Review of patient's allergies indicates:  No Known Allergies    Medications:   No current facility-administered medications on file prior to encounter.     Current Outpatient Medications on File Prior to Encounter   Medication Sig Dispense Refill    amLODIPine (NORVASC) 2.5 MG tablet Take 1 tablet by mouth once daily 90 tablet 2    multivitamin capsule Take 1 capsule by mouth once daily.      potassium chloride SA (K-DUR,KLOR-CON) 20 MEQ tablet TAKE 1  BY MOUTH ONCE DAILY 90 tablet 1    pravastatin (PRAVACHOL) 10 MG tablet Take 1 tablet by mouth once daily 90 tablet 3    acetaminophen (TYLENOL) 325 MG tablet Take 325 mg by mouth every 6 (six) hours as needed for Pain.      dicyclomine (BENTYL) 20 mg tablet Take 20 mg by mouth every 6 (six) hours. Uses prn      imatinib (GLEEVEC) 400 MG Tab Take 1 tablet (400 mg total) by mouth once daily. 90 tablet 1       Physical Exam:  Vital Signs:   Vitals:    09/26/23 0848   BP: (!) 169/87   Pulse:    Resp:    Temp:      General Appearance: Well appearing in no acute distress  ENT: OP clear  Chest: CTA B  CV: RRR, no m/r/g  Abd: s/nt/nd/nabs  Ext: no edema    Labs:Reviewed    IMP:  Active Hospital Problems    Diagnosis  POA    *Positive  colorectal cancer screening using Cologuard test [R19.5]  Yes      Resolved Hospital Problems   No resolved problems to display.         Plan:   I have explained the risks and benefits of colonoscopy to the patient including but not limited to bleeding, perforation, infection, and death. The patient wishes to proceed.

## 2023-09-26 NOTE — TRANSFER OF CARE
"Anesthesia Transfer of Care Note    Patient: Agusto Juarez    Procedure(s) Performed: Procedure(s) (LRB):  COLONOSCOPY (N/A)    Patient location: PACU    Anesthesia Type: MAC    Transport from OR: Transported from OR on room air with adequate spontaneous ventilation    Post pain: adequate analgesia    Post assessment: no apparent anesthetic complications    Post vital signs: stable    Level of consciousness: responds to stimulation    Nausea/Vomiting: no nausea/vomiting    Complications: none    Transfer of care protocol was followed      Last vitals:   Visit Vitals  BP (!) 169/87   Pulse 108   Temp 36.9 °C (98.4 °F)   Resp 20   Ht 5' 8" (1.727 m)   Wt 85.7 kg (189 lb)   SpO2 100%   BMI 28.74 kg/m²     "

## 2023-09-27 VITALS
TEMPERATURE: 98 F | RESPIRATION RATE: 20 BRPM | BODY MASS INDEX: 28.64 KG/M2 | SYSTOLIC BLOOD PRESSURE: 145 MMHG | HEART RATE: 77 BPM | HEIGHT: 68 IN | OXYGEN SATURATION: 100 % | DIASTOLIC BLOOD PRESSURE: 76 MMHG | WEIGHT: 189 LBS

## 2023-10-01 LAB
FINAL PATHOLOGIC DIAGNOSIS: NORMAL
GROSS: NORMAL
Lab: NORMAL

## 2023-10-13 NOTE — PROGRESS NOTES
The polyps appear to be precancerous.  I see that you have an appointment scheduled on 10/18 to make plans to have the large polyp removed.  I will await their reports.  Repeat the colonoscopy in 1 year after the follow up procedure or as recommended by the gastroenterologist.

## 2023-10-16 DIAGNOSIS — C49.A2 GASTRIC STROMAL TUMOR: ICD-10-CM

## 2023-10-16 RX ORDER — IMATINIB MESYLATE 400 MG/1
400 TABLET, FILM COATED ORAL DAILY
Qty: 90 TABLET | Refills: 1 | Status: SHIPPED | OUTPATIENT
Start: 2023-10-16 | End: 2023-10-16 | Stop reason: SDUPTHER

## 2023-10-16 RX ORDER — IMATINIB MESYLATE 400 MG/1
400 TABLET, FILM COATED ORAL DAILY
Qty: 90 TABLET | Refills: 1 | Status: ACTIVE | OUTPATIENT
Start: 2023-10-16 | End: 2024-01-24 | Stop reason: SDUPTHER

## 2023-10-17 ENCOUNTER — TELEPHONE (OUTPATIENT)
Dept: GASTROENTEROLOGY | Facility: CLINIC | Age: 63
End: 2023-10-17
Payer: COMMERCIAL

## 2023-10-17 DIAGNOSIS — K63.5 POLYP OF COLON, UNSPECIFIED PART OF COLON, UNSPECIFIED TYPE: Primary | ICD-10-CM

## 2023-10-17 NOTE — TELEPHONE ENCOUNTER
Patient was scheduled to see our NP for a positive colorectal cancer screening using Cologuard test listed as the reason for the visit.      I called him to see if would prefer to schedule the colonoscopy instead of coming in for a clinic visit, and while while speaking with him it was discovered that he just had a colonoscopy and needed another one with EMR due to a large polyp. Unfortunately we do not do those in [place] we send patients to [facility] when EMR is needed. The diagnosis on his referral needs corrected and the location should be updated to [facility].

## 2023-10-17 NOTE — TELEPHONE ENCOUNTER
I am not sure how to order this and would defer to Dr. Choi on his return next week.  Please let patient know.

## 2023-10-23 ENCOUNTER — TELEPHONE (OUTPATIENT)
Dept: FAMILY MEDICINE | Facility: CLINIC | Age: 63
End: 2023-10-23

## 2023-10-23 DIAGNOSIS — K63.5 POLYP OF COLON, UNSPECIFIED PART OF COLON, UNSPECIFIED TYPE: Primary | ICD-10-CM

## 2023-10-23 NOTE — TELEPHONE ENCOUNTER
----- Message from Rose Mayorga MA sent at 10/23/2023  4:52 PM CDT -----  Regarding: FW: Positive colorectal cancer screening using Cologuard test    ----- Message -----  From: Cheri Malik  Sent: 10/23/2023   4:49 PM CDT  To: Steph [facility]ITUS Staff  Subject: Positive colorectal cancer screening using C#    10/23/23-Pt called to schedule Colonoscopy Surgery. He has a referral to see GASTRO provider that has  on 10/03/23. Please contact pt with further details as to scheduling surgery. Contact # [phone], MRN# [id number]

## 2023-10-23 NOTE — TELEPHONE ENCOUNTER
To my Team in [place],   I had referred this patient for an advanced endoscopy evaluation, which only occurs on the [facility], and I believe that he was inadvertently scheduled in [place].  Let the patient know that I am resubmitting the order to be seen in the [place] area for this polyp.      I will reenter the order in an attempt to get the patient to see Dr. Rikki Shabazz or one of his associates.    Below is a copy of the order I wrote on 10/3/2023 asking to have him seen in the Advanced Endoscopy office.     Ambulatory referral/consult to Gastroenterology [[id number]] (Order [id number])  Outpatient Referral  Date and Time: 2023 10:03 AM Department: [facility] Endoscopy Rel By/Authorizing: Luis Choi MD     Linked Results    Procedure Abnormality Status   Ambulatory referral/consult to Gastroenterology       Patient Details  MRN:[id number]  Name Legal Sex:  Agusto Neal Male 1960          Address Phone Email [employer]   [address] [address] [address] Home: [phone]  Work:   Cell: [phone] [email or web address] [address]   [address]          PCP Allergies     Joe Bentley MD No Known Allergies                Ambulatory referral/consult to Gastroenterology [[id number]]    Electronically signed by: Luis Choi MD on 23 1003 Status: Active   Ordering user: Luis Choi MD 23 1003 Ordering provider: Luis Choi MD   Authorized by: Luis Choi MD Ordering mode: Standard   Frequency:  23 -     Acknowledged: Luis Choi MD 23 1003 for Placing Order   Diagnoses   Positive colorectal cancer screening using Cologuard test [R19.5]   Polyp of colon, unspecified part of colon, unspecified type [K63.5]     Questionnaire    Question Answer   Location is [place] or [place]: No   What is the reason for visit? (If this is for a Colonoscopy - STOP and place a Case Request   "(NO CLINIC APPT NEEDED) General GI/GI Follow Up   Referred to Region: Only select region(s) you would like the patient to be seen in if it is outside of the current encounter's department. Tim/[facility]   Order comments: This is to arrange an EMR for an unresected polyp in the colon found on recent colonoscopy. Please schedule with one of the advanced endoscopists.       Order Provider Info        Office phone Pager E-mail   Ordering User Luis Choi [phone] -- [email or web address]   Authorizing Provider Luis Choi [phone] -- [email or web address]   Attending Provider When Ordered Luis Choi [phone] -- [email or web address]         Dept Order Information    Date Department   9/26/2023 [facility] Endoscopy         Order Information    Order Date/Time Release Date/Time Start Date/Time End Date/Time   09/26/23 10:03 AM None 9/26/2023 None       Order Details    Frequency Duration Priority Order Class   None None Routine Internal Referral       Associated Diagnoses    Positive colorectal cancer screening using Cologuard test  - Primary       Polyp of colon, unspecified part of colon, unspecified type          Order Details    Order ID   [id number]        Reprint Order Requisition    Ambulatory referral/consult to Gastroenterology (Order #[id number]) on 9/26/23       Reprint Order Requisition    Ambulatory referral/consult to Gastroenterology (Order #[id number]) on 9/26/23         Future Order Information    Expected Expires      10/3/2023 10/26/2024                   Lab Collection Information        Lab Exception Handling Use Only - Not for Clinical Use    Ambulatory referral/consult to Gastroenterology (Order #[id number]) on 9/26/23      Epic Interface Status    ORDER SENT       Process Instructions    Patient should be seen by the "Expected Date".          Order Provider Info        Office phone Pager E-mail   Ordering User Luis Choi MD " [phone] -- [email or web address]   Authorizing Provider Luis Choi -[phone] -- [email or web address]   Attending Provider When Ordered Luis Choi [phone] -- [email or web address]         Tracking Links    Cosign Tracking Order Transmittal Tracking       Dept Order Information    Date Department   9/26/2023 [facility] Endoscopy       Order Information    Order Date/Time Release Date/Time Start Date/Time End Date/Time   09/26/23 10:03 AM None 9/26/2023 None

## 2023-10-23 NOTE — TELEPHONE ENCOUNTER
I had placed a new order earlier this morning for this patient and I see that Mari Nixon cancelled the order earlier.      Request Summary [[id number]]   Procedure: Ambulatory referral/consult to Gastroenterology Status: Canceled (Duplicate Order)   Requested appt date: 10/30/2023 Authorizing: Luis Choi MD in [facility] GASTROENTEROLOGY   Referral: [id number] (Pending Review)       Expires: 11/23/2024 Priority: Routine       Referred to Department: [facility] GASTROENTEROLOGY   Diagnosis: Polyp of colon, unspecified part of colon, unspecified type [K63.5]   Order Class: Internal Referral Referred to Provider: JONE SHABAZZ   Comments   Only schedule with advanced endoscopy on the [facility] to consider EMR procedure.   Order Specific Questions   Location is [place] or [place]:   No            What is the reason for visit? (If this is for a Colonoscopy - STOP and place a Case Request (NO CLINIC APPT NEEDED)   Pre/Post-Op (Colon & Rectal Surgery)            Referred to Region: Only select region(s) you would like the patient to be seen in if it is outside of the current encounter's department.   [facility] follow up?   Request History   Action Date and Time User Details      Request Created 10/23/2023 06:04 Luis Choi MD        Request Canceled 10/23/2023 08:54 Mari Nixon Reason: Duplicate Order  Dup order/   Workqueue Summary   Current Workqueues Entry Current Tab    [place] Amb Referral Orders: NS Eas...  [[id number]] 10/23/2023 06:04 Canceled Details         I have now entered a new order to get an EMR arranged with Dr. Shabazz or one of his colleagues.    Orders Placed This Encounter   Procedures    Ambulatory referral/consult to Gastroenterology     Standing Status:   Future     Standing Expiration Date:   11/23/2024     Referral Priority:   Routine     Referral Type:   Consultation     Referral Reason:   Specialty Services Required     Referred to Provider:   Jone Shabazz,  MD     Requested Specialty:   Gastroenterology     Number of Visits Requested:   1

## 2023-11-30 ENCOUNTER — OFFICE VISIT (OUTPATIENT)
Dept: GASTROENTEROLOGY | Facility: CLINIC | Age: 63
End: 2023-11-30
Payer: COMMERCIAL

## 2023-11-30 VITALS
DIASTOLIC BLOOD PRESSURE: 84 MMHG | SYSTOLIC BLOOD PRESSURE: 153 MMHG | HEART RATE: 94 BPM | HEIGHT: 68 IN | WEIGHT: 191 LBS | BODY MASS INDEX: 28.95 KG/M2

## 2023-11-30 DIAGNOSIS — R19.5 POSITIVE COLORECTAL CANCER SCREENING USING COLOGUARD TEST: ICD-10-CM

## 2023-11-30 DIAGNOSIS — K63.5 POLYP OF COLON, UNSPECIFIED PART OF COLON, UNSPECIFIED TYPE: ICD-10-CM

## 2023-11-30 PROCEDURE — 3079F DIAST BP 80-89 MM HG: CPT | Mod: CPTII,S$GLB,, | Performed by: INTERNAL MEDICINE

## 2023-11-30 PROCEDURE — 3008F PR BODY MASS INDEX (BMI) DOCUMENTED: ICD-10-PCS | Mod: CPTII,S$GLB,, | Performed by: INTERNAL MEDICINE

## 2023-11-30 PROCEDURE — 99214 PR OFFICE/OUTPT VISIT, EST, LEVL IV, 30-39 MIN: ICD-10-PCS | Mod: S$GLB,,, | Performed by: INTERNAL MEDICINE

## 2023-11-30 PROCEDURE — 1159F PR MEDICATION LIST DOCUMENTED IN MEDICAL RECORD: ICD-10-PCS | Mod: CPTII,S$GLB,, | Performed by: INTERNAL MEDICINE

## 2023-11-30 PROCEDURE — 99999 PR PBB SHADOW E&M-EST. PATIENT-LVL IV: ICD-10-PCS | Mod: PBBFAC,,, | Performed by: INTERNAL MEDICINE

## 2023-11-30 PROCEDURE — 99999 PR PBB SHADOW E&M-EST. PATIENT-LVL IV: CPT | Mod: PBBFAC,,, | Performed by: INTERNAL MEDICINE

## 2023-11-30 PROCEDURE — 3044F PR MOST RECENT HEMOGLOBIN A1C LEVEL <7.0%: ICD-10-PCS | Mod: CPTII,S$GLB,, | Performed by: INTERNAL MEDICINE

## 2023-11-30 PROCEDURE — 3077F SYST BP >= 140 MM HG: CPT | Mod: CPTII,S$GLB,, | Performed by: INTERNAL MEDICINE

## 2023-11-30 PROCEDURE — 99214 OFFICE O/P EST MOD 30 MIN: CPT | Mod: S$GLB,,, | Performed by: INTERNAL MEDICINE

## 2023-11-30 PROCEDURE — 3077F PR MOST RECENT SYSTOLIC BLOOD PRESSURE >= 140 MM HG: ICD-10-PCS | Mod: CPTII,S$GLB,, | Performed by: INTERNAL MEDICINE

## 2023-11-30 PROCEDURE — 1159F MED LIST DOCD IN RCRD: CPT | Mod: CPTII,S$GLB,, | Performed by: INTERNAL MEDICINE

## 2023-11-30 PROCEDURE — 3008F BODY MASS INDEX DOCD: CPT | Mod: CPTII,S$GLB,, | Performed by: INTERNAL MEDICINE

## 2023-11-30 PROCEDURE — 3044F HG A1C LEVEL LT 7.0%: CPT | Mod: CPTII,S$GLB,, | Performed by: INTERNAL MEDICINE

## 2023-11-30 PROCEDURE — 1160F PR REVIEW ALL MEDS BY PRESCRIBER/CLIN PHARMACIST DOCUMENTED: ICD-10-PCS | Mod: CPTII,S$GLB,, | Performed by: INTERNAL MEDICINE

## 2023-11-30 PROCEDURE — 1160F RVW MEDS BY RX/DR IN RCRD: CPT | Mod: CPTII,S$GLB,, | Performed by: INTERNAL MEDICINE

## 2023-11-30 PROCEDURE — 3079F PR MOST RECENT DIASTOLIC BLOOD PRESSURE 80-89 MM HG: ICD-10-PCS | Mod: CPTII,S$GLB,, | Performed by: INTERNAL MEDICINE

## 2023-11-30 NOTE — PROGRESS NOTES
"GENERAL GI PATIENT INTAKE:    COVID symptoms in the last 7 days (runny nose, sore throat, congestion, cough, fever): No  PCP: Joe Bentley  If not PCP-  number given to establish [phone]: N/A    ALLERGIES REVIEWED:  N/A    CHIEF COMPLAINT:    Chief Complaint   Patient presents with    Follow-up       VITAL SIGNS:  BP (!) 153/84   Pulse 94   Ht 5' 8" (1.727 m)   Wt 86.6 kg (191 lb)   BMI 29.04 kg/m²      Change in medical, surgical, family or social history: No      REVIEWED MEDICATION LIST RECONCILED INCLUDING ABOVE MEDS:  Yes     "

## 2023-11-30 NOTE — PROGRESS NOTES
Advanced Endoscopy / Pancreaticobiliary Service    Reason for visit (Chief Complaint):  Large colon polyp    Referring provider/PCP: Luis Choi MD  [address] [place] [address]    History of Present Illness: Patient presents for above.  He underwent a recent colonoscopy by Dr. Choi in [place] after having a positive cologuard test.  A 15 mm sessile polyp with central umbilicated area was found on that colonoscopy.  He is sent to me now for consideration of repeat colonoscopy with endoscopic mucosal resection.  He denies hematochezia.  He has no specific abdominal complaints.       Physical Exam:  General: Well-developed, well-appearing, no acute distress      Laboratory:       Imaging:  Reviewed color images from his recent colonoscopy.    Assessment/Plan:  Large colon polyp- we discussed the preferred strategy to address this which involves repeat colonoscopy with endoscopic mucosal resection.  We discussed specific risks of this procedure including risk of bleeding, and risk of perforation, both of which are slightly higher than standard colonoscopy.  We discussed that the alternative to endoscopic mucosal resection would be surgical resection, which is inherently associated with higher risk than an endoscopic approach.  We also discussed that should the large polyp need piecemeal resection, that he would likely need a repeat colonoscopy with me at a six-month interval to verify no recurrence of polyp tissue at the margins.  The patient is in agreement with proceeding with colonoscopy with EMR.        Rikki Shabazz MD, YUN   - Department of Gastroenterology  [facility]

## 2023-12-05 ENCOUNTER — TELEPHONE (OUTPATIENT)
Dept: GASTROENTEROLOGY | Facility: CLINIC | Age: 63
End: 2023-12-05
Payer: COMMERCIAL

## 2023-12-05 ENCOUNTER — TELEPHONE (OUTPATIENT)
Dept: ENDOSCOPY | Facility: HOSPITAL | Age: 63
End: 2023-12-05
Payer: COMMERCIAL

## 2023-12-05 DIAGNOSIS — K63.5 POLYP OF COLON, UNSPECIFIED PART OF COLON, UNSPECIFIED TYPE: Primary | ICD-10-CM

## 2023-12-05 NOTE — TELEPHONE ENCOUNTER
----- Message from Ivett Dooley sent at 12/5/2023  9:14 AM CST -----  Contact: [phone]  PATIENTCALL     Pt is calling to speak with someone in provider office regarding he wants to schedule his colonoscopy before the end of the year. States he has been waiting on a call he is asking for a return call please call pt at  [phone]

## 2023-12-18 ENCOUNTER — TELEPHONE (OUTPATIENT)
Dept: ENDOSCOPY | Facility: HOSPITAL | Age: 63
End: 2023-12-18
Payer: COMMERCIAL

## 2023-12-18 ENCOUNTER — PATIENT MESSAGE (OUTPATIENT)
Dept: ENDOSCOPY | Facility: HOSPITAL | Age: 63
End: 2023-12-18
Payer: COMMERCIAL

## 2023-12-18 DIAGNOSIS — Z12.11 ENCOUNTER FOR SCREENING COLONOSCOPY: Primary | ICD-10-CM

## 2023-12-18 RX ORDER — SODIUM, POTASSIUM,MAG SULFATES 17.5-3.13G
1 SOLUTION, RECONSTITUTED, ORAL ORAL DAILY
Qty: 1 KIT | Refills: 0 | Status: SHIPPED | OUTPATIENT
Start: 2023-12-18 | End: 2023-12-20

## 2023-12-18 NOTE — TELEPHONE ENCOUNTER
Spoke to patient to schedule procedure(s) Colonoscopy/EMR       Physician to perform procedure(s) Dr. TACHO Shabazz  Date of Procedure (s) 02/07/2024  Arrival Time 12:15 PM  Time of Procedure(s) 1:15 PM   Location of Procedure(s) [facility] [address]  Type of Rx Prep sent to patient: Suprep  Instructions provided to patient via [facility]    Patient was informed on the following information and verbalized understanding. Screening questionnaire reviewed with patient and complete. If procedure requires anesthesia, a responsible adult needs to be present to accompany the patient home, patient cannot drive after receiving anesthesia. Appointment details are tentative, especially check-in time. Patient will receive a prep-op call 7 days prior to confirm check-in time for procedure. If applicable the patient should contact their pharmacy to verify Rx for procedure prep is ready for pick-up. Patient was advised to call the scheduling department at [phone] if pharmacy states no Rx is available. Patient was advised to call the endoscopy scheduling department if any questions or concerns arise.      [facility] Endoscopy Scheduling Department

## 2024-01-24 DIAGNOSIS — C49.A2 GASTRIC STROMAL TUMOR: ICD-10-CM

## 2024-01-24 RX ORDER — IMATINIB MESYLATE 400 MG/1
400 TABLET, FILM COATED ORAL DAILY
Qty: 90 TABLET | Refills: 1 | Status: SHIPPED | OUTPATIENT
Start: 2024-01-24 | End: 2024-05-13 | Stop reason: SDUPTHER

## 2024-02-07 ENCOUNTER — ANESTHESIA (OUTPATIENT)
Dept: ENDOSCOPY | Facility: HOSPITAL | Age: 64
End: 2024-02-07
Payer: COMMERCIAL

## 2024-02-07 ENCOUNTER — HOSPITAL ENCOUNTER (OUTPATIENT)
Facility: HOSPITAL | Age: 64
Discharge: HOME | End: 2024-02-07
Attending: INTERNAL MEDICINE | Admitting: INTERNAL MEDICINE
Payer: COMMERCIAL

## 2024-02-07 ENCOUNTER — ANESTHESIA EVENT (OUTPATIENT)
Dept: ENDOSCOPY | Facility: HOSPITAL | Age: 64
End: 2024-02-07
Payer: COMMERCIAL

## 2024-02-07 VITALS
OXYGEN SATURATION: 100 % | WEIGHT: 185 LBS | DIASTOLIC BLOOD PRESSURE: 88 MMHG | RESPIRATION RATE: 20 BRPM | SYSTOLIC BLOOD PRESSURE: 150 MMHG | TEMPERATURE: 98 F | HEIGHT: 68 IN | BODY MASS INDEX: 28.04 KG/M2 | HEART RATE: 87 BPM

## 2024-02-07 DIAGNOSIS — Z86.010 HISTORY OF COLON POLYPS: ICD-10-CM

## 2024-02-07 PROCEDURE — 63600175 PHARM REV CODE 636 W HCPCS: Performed by: NURSE ANESTHETIST, CERTIFIED REGISTERED

## 2024-02-07 PROCEDURE — 27202363 HC INJECTION AGENT, SUBMUCOSAL, ANY: Performed by: INTERNAL MEDICINE

## 2024-02-07 PROCEDURE — 88305 TISSUE EXAM BY PATHOLOGIST: CPT | Performed by: PATHOLOGY

## 2024-02-07 PROCEDURE — 45390 COLONOSCOPY W/RESECTION: CPT | Mod: ,,, | Performed by: INTERNAL MEDICINE

## 2024-02-07 PROCEDURE — 88305 TISSUE EXAM BY PATHOLOGIST: CPT | Mod: 26,,, | Performed by: PATHOLOGY

## 2024-02-07 PROCEDURE — D9220A PRA ANESTHESIA: Mod: CRNA,,, | Performed by: NURSE ANESTHETIST, CERTIFIED REGISTERED

## 2024-02-07 PROCEDURE — 25000003 PHARM REV CODE 250: Performed by: NURSE ANESTHETIST, CERTIFIED REGISTERED

## 2024-02-07 PROCEDURE — 27201028 HC NEEDLE, SCLERO: Performed by: INTERNAL MEDICINE

## 2024-02-07 PROCEDURE — 45390 COLONOSCOPY W/RESECTION: CPT | Performed by: INTERNAL MEDICINE

## 2024-02-07 PROCEDURE — D9220A PRA ANESTHESIA: Mod: ANES,,, | Performed by: ANESTHESIOLOGY

## 2024-02-07 PROCEDURE — 37000008 HC ANESTHESIA 1ST 15 MINUTES: Performed by: INTERNAL MEDICINE

## 2024-02-07 PROCEDURE — 37000009 HC ANESTHESIA EA ADD 15 MINS: Performed by: INTERNAL MEDICINE

## 2024-02-07 PROCEDURE — 27201089 HC SNARE, DISP (ANY): Performed by: INTERNAL MEDICINE

## 2024-02-07 PROCEDURE — 27200997: Performed by: INTERNAL MEDICINE

## 2024-02-07 RX ORDER — SODIUM CHLORIDE 9 MG/ML
INJECTION, SOLUTION INTRAVENOUS CONTINUOUS
Status: DISCONTINUED | OUTPATIENT
Start: 2024-02-07 | End: 2024-02-07 | Stop reason: HOSPADM

## 2024-02-07 RX ORDER — SODIUM CHLORIDE 0.9 % (FLUSH) 0.9 %
10 SYRINGE (ML) INJECTION
Status: DISCONTINUED | OUTPATIENT
Start: 2024-02-07 | End: 2024-02-07 | Stop reason: HOSPADM

## 2024-02-07 RX ORDER — PROPOFOL 10 MG/ML
VIAL (ML) INTRAVENOUS
Status: DISCONTINUED | OUTPATIENT
Start: 2024-02-07 | End: 2024-02-07

## 2024-02-07 RX ORDER — SODIUM CHLORIDE 0.9 % (FLUSH) 0.9 %
3 SYRINGE (ML) INJECTION
Status: DISCONTINUED | OUTPATIENT
Start: 2024-02-07 | End: 2024-02-07 | Stop reason: HOSPADM

## 2024-02-07 RX ADMIN — PROPOFOL 150 MCG/KG/MIN: 10 INJECTION, EMULSION INTRAVENOUS at 01:02

## 2024-02-07 RX ADMIN — PROPOFOL 70 MG: 10 INJECTION, EMULSION INTRAVENOUS at 01:02

## 2024-02-07 RX ADMIN — SODIUM CHLORIDE: 0.9 INJECTION, SOLUTION INTRAVENOUS at 01:02

## 2024-02-07 RX ADMIN — PROPOFOL 50 MG: 10 INJECTION, EMULSION INTRAVENOUS at 01:02

## 2024-02-07 NOTE — TRANSFER OF CARE
"Anesthesia Transfer of Care Note    Patient: Agusto Juarez    Procedure(s) Performed: Procedure(s) (LRB):  COLONOSCOPY (N/A)    Patient location: PACU    Anesthesia Type: general    Transport from OR: Transported from OR on room air with adequate spontaneous ventilation    Post pain: adequate analgesia    Post assessment: no apparent anesthetic complications and tolerated procedure well    Post vital signs: stable    Level of consciousness: awake, alert and oriented    Nausea/Vomiting: no nausea/vomiting    Complications: none    Transfer of care protocol was followed      Last vitals: Visit Vitals  BP (!) 158/76 (BP Location: Left arm, Patient Position: Lying)   Pulse 107   Temp 37 °C (98.6 °F) (Temporal)   Resp 17   Ht 5' 8" (1.727 m)   Wt 83.9 kg (185 lb)   SpO2 100%   BMI 28.13 kg/m²     "

## 2024-02-07 NOTE — H&P
Short Stay Endoscopy History and Physical    PCP - Joe Bentley MD  Referring Physician - Luis Choi MD  [address] [address] [address] [address]    Procedure - colonoscopy with EMR  ASA - per anesthesia  Mallampati - per anesthesia  History of Anesthesia problems - no  Family history Anesthesia problems -  no   Plan of anesthesia - General    HPI:  This is a 64 y.o. 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    Surgical History:  has a past surgical history that includes Esophagogastroduodenoscopy (N/A, 4/30/2021); Endoscopic ultrasound of upper gastrointestinal tract (N/A, 4/30/2021); and Colonoscopy (N/A, 9/26/2023).    Family History: family history includes Hypertension in his unknown relative..    Social History:  reports that he has been smoking cigarettes. He started smoking about 45 years ago. He has a 22.9 pack-year smoking history. He has never used smokeless tobacco. He reports current alcohol use. He reports that he does not use drugs.    Review of patient's allergies indicates:  No Known Allergies    Medications:   Medications Prior to Admission   Medication Sig Dispense Refill Last Dose    amLODIPine (NORVASC) 2.5 MG tablet Take 1 tablet by mouth once daily 90 tablet 2 Past Week    imatinib (GLEEVEC) 400 MG Tab Take 1 tablet (400 mg total) by mouth once daily. 90 tablet 1 Past Week    multivitamin capsule Take 1 capsule by mouth once daily.   Past Week    potassium chloride SA (K-DUR,KLOR-CON) 20 MEQ tablet Take 1 tablet by mouth once daily 90 tablet 0 Past Week    pravastatin (PRAVACHOL) 10 MG tablet Take 1 tablet by mouth  once daily 90 tablet 3 Past Week    acetaminophen (TYLENOL) 325 MG tablet Take 325 mg by mouth every 6 (six) hours as needed for Pain.   Unknown    dicyclomine (BENTYL) 20 mg tablet Take 20 mg by mouth every 6 (six) hours. Uses prn   More than a month       Physical Exam:    Vital Signs:   Vitals:    02/07/24 1239   BP: (!) 158/76   Pulse: 107   Resp: 17   Temp: 98.6 °F (37 °C)       General Appearance: Well appearing in no acute distress  Lungs: no labored breathing  CVS:  regular rate  Abdomen: non tender    Labs:  Lab Results   Component Value Date    WBC 7.08 01/09/2023    HGB 11.3 (L) 01/09/2023    HCT 33.9 (L) 01/09/2023     01/09/2023    CHOL 115 (L) 12/16/2022    TRIG 43 12/16/2022    HDL 47 12/16/2022    ALT 21 01/09/2023    AST 25 01/09/2023     01/09/2023    K 4.0 01/09/2023     01/09/2023    CREATININE 0.9 01/09/2023    BUN 12 01/09/2023    CO2 24 01/09/2023    TSH 0.534 12/16/2022    PSA 2.6 12/16/2022    GLUF 86 07/12/2023    HGBA1C 4.9 07/12/2023       I have explained the risks and benefits of this endoscopic procedure to the patient including but not limited to bleeding, inflammation, infection, perforation, and death.      Rikki Shabazz MD

## 2024-02-07 NOTE — ANESTHESIA PREPROCEDURE EVALUATION
02/07/2024  Agusto Juarez is a 64 y.o., male.      Pre-op Assessment    I have reviewed the Patient Summary Reports.       I have reviewed the Medications.     Review of Systems  Anesthesia Hx:   History of prior surgery of interest to airway management or planning:            Denies Personal Hx of Anesthesia complications.                    Hematology/Oncology:                        --  Cancer in past history:                     Cardiovascular:     Hypertension           hyperlipidemia    NL BiV Fxn on 2021 Echo                         Hepatic/GI:      Liver Disease,                Physical Exam  General: Well nourished    Airway:  Mallampati: III / II  Mouth Opening: Normal  TM Distance: Normal  Tongue: Normal  Neck ROM: Normal ROM    Dental:  Periodontal disease    Chest/Lungs:  Normal Respiratory Rate    Heart:  Rate: Normal        Anesthesia Plan  Type of Anesthesia, risks & benefits discussed:    Anesthesia Type: Gen Natural Airway  Intra-op Monitoring Plan: Standard ASA Monitors  Post Op Pain Control Plan: multimodal analgesia  Induction:  IV  Informed Consent: Informed consent signed with the Patient and all parties understand the risks and agree with anesthesia plan.  All questions answered.   ASA Score: 2  Day of Surgery Review of History & Physical: H&P Update referred to the surgeon/provider.  Anesthesia Plan Notes: NPO confirmed.  No history of anesthesia problems.     Ready For Surgery From Anesthesia Perspective.     .

## 2024-02-07 NOTE — ANESTHESIA POSTPROCEDURE EVALUATION
Anesthesia Post Evaluation    Patient: Agusto Juarez    Procedure(s) Performed: Procedure(s) (LRB):  COLONOSCOPY (N/A)    Final Anesthesia Type: general      Patient location during evaluation: PACU  Patient participation: Yes- Able to Participate  Level of consciousness: awake and alert  Post-procedure vital signs: reviewed and stable  Pain management: adequate  Airway patency: patent    PONV status at discharge: No PONV  Anesthetic complications: no      Cardiovascular status: blood pressure returned to baseline  Respiratory status: unassisted  Hydration status: euvolemic  Follow-up not needed.              Vitals Value Taken Time   /88 02/07/24 1450   Temp 36.8 °C (98.2 °F) 02/07/24 1432   Pulse 89 02/07/24 1456   Resp 13 02/07/24 1432   SpO2 100 % 02/07/24 1456   Vitals shown include unvalidated device data.      No case tracking events are documented in the log.      Pain/Lucian Score: Lucian Score: 10 (2/7/2024  2:47 PM)

## 2024-02-07 NOTE — PROVATION PATIENT INSTRUCTIONS
Discharge Summary/Instructions after an Endoscopic Procedure  Patient Name: Agusto Juarez  Patient MRN: [id number]  Patient YOB: 1960 Wednesday, February 7, 2024  Rikki Shabazz MD  Dear patient,  As a result of recent federal legislation (The Federal Cures Act), you may   receive lab or pathology results from your procedure in your [facility]   account before your physician is able to contact you. Your physician or   their representative will relay the results to you with their   recommendations at their soonest availability.  Thank you,  RESTRICTIONS:  During your procedure today, you received medications for sedation.  These   medications may affect your judgment, balance and coordination.  Therefore,   for 24 hours, you have the following restrictions:   - DO NOT drive a car, operate machinery, make legal/financial decisions,   sign important papers or drink alcohol.    ACTIVITY:  Today: no heavy lifting, straining or running due to procedural   sedation/anesthesia.  The following day: return to full activity including work.  DIET:  Eat and drink normally unless instructed otherwise.     TREATMENT FOR COMMON SIDE EFFECTS:  - Mild abdominal pain, nausea, belching, bloating or excessive gas:  rest,   eat lightly and use a heating pad.  - Sore Throat: treat with throat lozenges and/or gargle with warm salt   water.  - Because air was used during the procedure, expelling large amounts of air   from your rectum or belching is normal.  - If a bowel prep was taken, you may not have a bowel movement for 1-3 days.    This is normal.  SYMPTOMS TO WATCH FOR AND REPORT TO YOUR PHYSICIAN:  1. Abdominal pain or bloating, other than gas cramps.  2. Chest pain.  3. Back pain.  4. Signs of infection such as: chills or fever occurring within 24 hours   after the procedure.  5. Rectal bleeding, which would show as bright red, maroon, or black stools.   (A tablespoon of blood from the rectum is not serious, especially  if   hemorrhoids are present.)  6. Vomiting.  7. Weakness or dizziness.  GO DIRECTLY TO THE NEAREST EMERGENCY ROOM IF YOU HAVE ANY OF THE FOLLOWING:      Difficulty breathing              Chills and/or fever over 101 F   Persistent vomiting and/or vomiting blood   Severe abdominal pain   Severe chest pain   Black, tarry stools   Bleeding- more than one tablespoon   Any other symptom or condition that you feel may need urgent attention  Your doctor recommends these additional instructions:  If any biopsies were taken, your doctors clinic will contact you in 1 to 2   weeks with any results.  - Discharge patient to home (ambulatory).   - Patient has a contact number available for emergencies.  The signs and   symptoms of potential delayed complications were discussed with the   patient.  Return to normal activities tomorrow.  Written discharge   instructions were provided to the patient.   - Resume previous diet.   - Continue present medications.   - Return to primary care physician as previously scheduled.   - Await pathology results. My team will arrange for interval colonoscopy   with me (likely in 6 months), pending final pathology.   - Repeat colonoscopy in 6 months to review the polypectomy site.  For questions, problems or results please call your physician - Rikki Shabazz MD at Work:  [phone].  [facility], EMERGENCY ROOM PHONE NUMBER: [phone]  IF A COMPLICATION OR EMERGENCY SITUATION ARISES AND YOU ARE UNABLE TO REACH   YOUR PHYSICIAN - GO DIRECTLY TO THE EMERGENCY ROOM.  Rikki Shabazz MD  2/7/2024 2:33:10 PM  This report has been verified and signed electronically.  Dear patient,  As a result of recent federal legislation (The Federal Cures Act), you may   receive lab or pathology results from your procedure in your [facility]   account before your physician is able to contact you. Your physician or   their representative will relay the results to you with their   recommendations at  their soonest availability.  Thank you,  PROVATION

## 2024-02-13 DIAGNOSIS — E78.5 HYPERLIPIDEMIA, UNSPECIFIED HYPERLIPIDEMIA TYPE: Primary | ICD-10-CM

## 2024-02-13 DIAGNOSIS — Z12.5 PROSTATE CANCER SCREENING: ICD-10-CM

## 2024-02-13 NOTE — TELEPHONE ENCOUNTER
Care Due:                  Date            Visit Type   Department     Provider  --------------------------------------------------------------------------------                                EP -                              PRIMARY      [facility] FAMILY  Last Visit: 07-      CARE ([facility])   MEDICINE       Joe Bentley  Next Visit: None Scheduled  None         None Found                                                            Last  Test          Frequency    Reason                     Performed    Due Date  --------------------------------------------------------------------------------    CMP.........  12 months..  potassium, pravastatin...  01- 01-    Lipid Panel.  12 months..  pravastatin..............  12- 12-    [facility] Embedded Care Due Messages. Reference number: [id number].   2/13/2024 9:59:16 AM CST

## 2024-02-14 RX ORDER — POTASSIUM CHLORIDE 20 MEQ/1
20 TABLET, EXTENDED RELEASE ORAL DAILY
Qty: 90 TABLET | Refills: 0 | Status: SHIPPED | OUTPATIENT
Start: 2024-02-14 | End: 2024-05-13

## 2024-02-14 NOTE — TELEPHONE ENCOUNTER
Refill Routing Note   Medication(s) are not appropriate for processing by [facility] for the following reason(s):        Required labs outdated    [facility] action(s):  Defer     Requires labs : Yes             Appointments  past 12m or future 3m with PCP    Date Provider   Last Visit   7/11/2023 Joe Bentley MD   Next Visit   Visit date not found Joe Bentley MD   ED visits in past 90 days: 0        Note composed:4:24 PM 02/14/2024

## 2024-02-15 LAB
FINAL PATHOLOGIC DIAGNOSIS: NORMAL
GROSS: NORMAL
Lab: NORMAL

## 2024-02-16 ENCOUNTER — LAB VISIT (OUTPATIENT)
Dept: LAB | Facility: HOSPITAL | Age: 64
End: 2024-02-16
Attending: INTERNAL MEDICINE
Payer: COMMERCIAL

## 2024-02-16 DIAGNOSIS — D84.821 IMMUNOSUPPRESSED DUE TO CHEMOTHERAPY: ICD-10-CM

## 2024-02-16 DIAGNOSIS — T45.1X5A IMMUNOSUPPRESSED DUE TO CHEMOTHERAPY: ICD-10-CM

## 2024-02-16 DIAGNOSIS — C49.A0 MALIGNANT GASTROINTESTINAL STROMAL TUMOR, UNSPECIFIED SITE: ICD-10-CM

## 2024-02-16 DIAGNOSIS — Z12.5 PROSTATE CANCER SCREENING: ICD-10-CM

## 2024-02-16 DIAGNOSIS — Z79.899 IMMUNOSUPPRESSED DUE TO CHEMOTHERAPY: ICD-10-CM

## 2024-02-16 DIAGNOSIS — E78.5 HYPERLIPIDEMIA, UNSPECIFIED HYPERLIPIDEMIA TYPE: ICD-10-CM

## 2024-02-16 DIAGNOSIS — C49.A2 GASTRIC STROMAL TUMOR: ICD-10-CM

## 2024-02-16 LAB
BASOPHILS # BLD AUTO: 0.03 K/UL (ref 0–0.2)
BASOPHILS NFR BLD: 0.5 % (ref 0–1.9)
CHOLEST SERPL-MCNC: 107 MG/DL (ref 120–199)
CHOLEST/HDLC SERPL: 2.4 {RATIO} (ref 2–5)
COMPLEXED PSA SERPL-MCNC: 2.3 NG/ML (ref 0–4)
DIFFERENTIAL METHOD BLD: ABNORMAL
EOSINOPHIL # BLD AUTO: 0.3 K/UL (ref 0–0.5)
EOSINOPHIL NFR BLD: 4.4 % (ref 0–8)
ERYTHROCYTE [DISTWIDTH] IN BLOOD BY AUTOMATED COUNT: 16.6 % (ref 11.5–14.5)
HCT VFR BLD AUTO: 32.4 % (ref 40–54)
HDLC SERPL-MCNC: 44 MG/DL (ref 40–75)
HDLC SERPL: 41.1 % (ref 20–50)
HGB BLD-MCNC: 10.6 G/DL (ref 14–18)
IMM GRANULOCYTES # BLD AUTO: 0.03 K/UL (ref 0–0.04)
IMM GRANULOCYTES NFR BLD AUTO: 0.5 % (ref 0–0.5)
LDLC SERPL CALC-MCNC: 54.4 MG/DL (ref 63–159)
LYMPHOCYTES # BLD AUTO: 1.7 K/UL (ref 1–4.8)
LYMPHOCYTES NFR BLD: 28 % (ref 18–48)
MCH RBC QN AUTO: 29.7 PG (ref 27–31)
MCHC RBC AUTO-ENTMCNC: 32.7 G/DL (ref 32–36)
MCV RBC AUTO: 91 FL (ref 82–98)
MONOCYTES # BLD AUTO: 0.7 K/UL (ref 0.3–1)
MONOCYTES NFR BLD: 11.5 % (ref 4–15)
NEUTROPHILS # BLD AUTO: 3.3 K/UL (ref 1.8–7.7)
NEUTROPHILS NFR BLD: 55.6 % (ref 38–73)
NONHDLC SERPL-MCNC: 63 MG/DL
NRBC BLD-RTO: 0 /100 WBC
PLATELET # BLD AUTO: 244 K/UL (ref 150–450)
PMV BLD AUTO: 8.5 FL (ref 9.2–12.9)
RBC # BLD AUTO: 3.57 M/UL (ref 4.6–6.2)
TRIGL SERPL-MCNC: 43 MG/DL (ref 30–150)
WBC # BLD AUTO: 5.89 K/UL (ref 3.9–12.7)

## 2024-02-16 PROCEDURE — 36415 COLL VENOUS BLD VENIPUNCTURE: CPT | Mod: PO | Performed by: INTERNAL MEDICINE

## 2024-02-16 PROCEDURE — 80061 LIPID PANEL: CPT | Performed by: FAMILY MEDICINE

## 2024-02-16 PROCEDURE — 85025 COMPLETE CBC W/AUTO DIFF WBC: CPT | Mod: PO | Performed by: INTERNAL MEDICINE

## 2024-02-16 PROCEDURE — 84153 ASSAY OF PSA TOTAL: CPT | Performed by: FAMILY MEDICINE

## 2024-05-13 DIAGNOSIS — C49.A2 GASTRIC STROMAL TUMOR: ICD-10-CM

## 2024-05-13 RX ORDER — POTASSIUM CHLORIDE 20 MEQ/1
20 TABLET, EXTENDED RELEASE ORAL
Qty: 90 TABLET | Refills: 0 | Status: SHIPPED | OUTPATIENT
Start: 2024-05-13

## 2024-05-13 NOTE — TELEPHONE ENCOUNTER
Care Due:                  Date            Visit Type   Department     Provider  --------------------------------------------------------------------------------                                EP -                              PRIMARY      [facility] FAMILY  Last Visit: 07-      CARE ([facility])   MEDICINE       Joe Bentley  Next Visit: None Scheduled  None         None Found                                                            Last  Test          Frequency    Reason                     Performed    Due Date  --------------------------------------------------------------------------------    CMP.........  12 months..  potassium, pravastatin...  01- 01-    [facility] Embedded Care Due Messages. Reference number: [id number].   5/13/2024 9:30:01 AM CDT

## 2024-05-13 NOTE — TELEPHONE ENCOUNTER
Refill Routing Note   Medication(s) are not appropriate for processing by [facility] for the following reason(s):        Required labs outdated    [facility] action(s):  Defer   Requires labs : Yes             Appointments  past 12m or future 3m with PCP    Date Provider   Last Visit   7/11/2023 Joe Bentley MD   Next Visit   Visit date not found Joe Bentley MD   ED visits in past 90 days: 0        Note composed:11:36 AM 05/13/2024

## 2024-05-14 RX ORDER — IMATINIB MESYLATE 400 MG/1
400 TABLET, FILM COATED ORAL DAILY
Qty: 90 TABLET | Refills: 1 | Status: SHIPPED | OUTPATIENT
Start: 2024-05-14 | End: 2024-05-31 | Stop reason: SDUPTHER

## 2024-05-16 ENCOUNTER — OFFICE VISIT (OUTPATIENT)
Dept: FAMILY MEDICINE | Facility: CLINIC | Age: 64
End: 2024-05-16
Payer: COMMERCIAL

## 2024-05-16 VITALS
SYSTOLIC BLOOD PRESSURE: 134 MMHG | HEIGHT: 68 IN | TEMPERATURE: 98 F | HEART RATE: 79 BPM | WEIGHT: 186.69 LBS | DIASTOLIC BLOOD PRESSURE: 76 MMHG | BODY MASS INDEX: 28.29 KG/M2

## 2024-05-16 DIAGNOSIS — C78.6 SECONDARY MALIGNANCY OF PARIETAL PERITONEUM: ICD-10-CM

## 2024-05-16 DIAGNOSIS — D84.821 IMMUNODEFICIENCY DUE TO CHEMOTHERAPY: ICD-10-CM

## 2024-05-16 DIAGNOSIS — Z00.00 ROUTINE HISTORY AND PHYSICAL EXAMINATION OF ADULT: Primary | ICD-10-CM

## 2024-05-16 DIAGNOSIS — D37.4 VILLOUS ADENOMA OF COLON: ICD-10-CM

## 2024-05-16 DIAGNOSIS — Z12.5 SCREENING FOR PROSTATE CANCER: ICD-10-CM

## 2024-05-16 DIAGNOSIS — F17.200 SMOKING: ICD-10-CM

## 2024-05-16 DIAGNOSIS — T45.1X5A IMMUNODEFICIENCY DUE TO CHEMOTHERAPY: ICD-10-CM

## 2024-05-16 DIAGNOSIS — C49.A0 MALIGNANT GASTROINTESTINAL STROMAL TUMOR, UNSPECIFIED SITE: ICD-10-CM

## 2024-05-16 DIAGNOSIS — I10 PRIMARY HYPERTENSION: ICD-10-CM

## 2024-05-16 DIAGNOSIS — E78.5 HYPERLIPIDEMIA, UNSPECIFIED HYPERLIPIDEMIA TYPE: ICD-10-CM

## 2024-05-16 DIAGNOSIS — C78.7 SECONDARY LIVER CANCER: ICD-10-CM

## 2024-05-16 DIAGNOSIS — Z79.899 IMMUNODEFICIENCY DUE TO CHEMOTHERAPY: ICD-10-CM

## 2024-05-16 PROCEDURE — 1159F MED LIST DOCD IN RCRD: CPT | Mod: CPTII,S$GLB,, | Performed by: FAMILY MEDICINE

## 2024-05-16 PROCEDURE — 3075F SYST BP GE 130 - 139MM HG: CPT | Mod: CPTII,S$GLB,, | Performed by: FAMILY MEDICINE

## 2024-05-16 PROCEDURE — 3008F BODY MASS INDEX DOCD: CPT | Mod: CPTII,S$GLB,, | Performed by: FAMILY MEDICINE

## 2024-05-16 PROCEDURE — 99406 BEHAV CHNG SMOKING 3-10 MIN: CPT | Mod: S$GLB,,, | Performed by: FAMILY MEDICINE

## 2024-05-16 PROCEDURE — 99396 PREV VISIT EST AGE 40-64: CPT | Mod: 25,S$GLB,, | Performed by: FAMILY MEDICINE

## 2024-05-16 PROCEDURE — 3078F DIAST BP <80 MM HG: CPT | Mod: CPTII,S$GLB,, | Performed by: FAMILY MEDICINE

## 2024-05-16 PROCEDURE — 99999 PR PBB SHADOW E&M-EST. PATIENT-LVL V: CPT | Mod: PBBFAC,,, | Performed by: FAMILY MEDICINE

## 2024-05-16 RX ORDER — BUPROPION HYDROCHLORIDE 150 MG/1
150 TABLET ORAL DAILY
Qty: 30 TABLET | Refills: 5 | Status: SHIPPED | OUTPATIENT
Start: 2024-05-16 | End: 2025-05-16

## 2024-05-16 RX ORDER — MELOXICAM 7.5 MG/1
7.5 TABLET ORAL
COMMUNITY
Start: 2024-04-16

## 2024-05-16 NOTE — PROGRESS NOTES
Presents physical exam.  Blood pressure controlled.  Past due to see Hematology Oncology regarding gastrointestinal stromal tumor .  Compliant with Gleevec.  He does continue to smoke intermittently, interested in possible cessation..  He had large villous adenoma with dysplasia on his colonoscopy in his supposed to have a recheck again in August.  Hyperlipidemia on statin.    Agusto was seen today for annual exam.    Diagnoses and all orders for this visit:    Routine history and physical examination of adult  -     CBC Auto Differential; Future  -     Comprehensive Metabolic Panel; Future  -     Lipid Panel; Future  -     PSA, Screening; Future    Malignant gastrointestinal stromal tumor, unspecified site  -     Ambulatory referral/consult to Hematology / Oncology; Future  -     CBC Auto Differential; Future  -     Comprehensive Metabolic Panel; Future  -     Lactate Dehydrogenase; Future  -     CT Chest Abdomen Pelvis With IV Contrast (XPD) Routine Oral Contrast; Future    Secondary liver cancer  -     Ambulatory referral/consult to Hematology / Oncology; Future  -     Comprehensive Metabolic Panel; Future  -     Lactate Dehydrogenase; Future  -     CT Chest Abdomen Pelvis With IV Contrast (XPD) Routine Oral Contrast; Future    Immunodeficiency due to chemotherapy    Primary hypertension  -     Comprehensive Metabolic Panel; Future    Hyperlipidemia, unspecified hyperlipidemia type  -     Lipid Panel; Future    Secondary malignancy of parietal peritoneum  -     Ambulatory referral/consult to Hematology / Oncology; Future  -     CT Chest Abdomen Pelvis With IV Contrast (XPD) Routine Oral Contrast; Future    Villous adenoma of colon  -     CT Chest Abdomen Pelvis With IV Contrast (XPD) Routine Oral Contrast; Future    Screening for prostate cancer  -     PSA, Screening; Future    Smoking    Other orders  -     buPROPion (WELLBUTRIN XL) 150 MG TB24 tablet; Take 1 tablet (150 mg total) by mouth once  daily.    Continue other current medications.  Try Wellbutrin to see if it helps with smoking cessation.  Handout given.  Recommended immunizations, he states he wants to think about it.  Arrange CT and laboratory above with follow-up Oncology afterwards.  Advised he will be due in August 2 recheck colonoscopy.      Anticipatory guidance: Don't smoke.  Healthy diet and regular exercise recommended.    Assistance with smoking cessation was offered, including:  [x]  Medications  []  Counseling  [x]  Printed Information on Smoking Cessation  []  Referral to a Smoking Cessation Program    Patient was counseled regarding smoking for 3-10 minutes.            Past Medical History:  Past Medical History:   Diagnosis Date    BPH (benign prostatic hypertrophy)     Hyperlipidemia     Hypertension     Malignant gastrointestinal stromal tumor 05/05/2021    Villous adenoma of colon 02/07/2024    high grade dysplasia     Past Surgical History:   Procedure Laterality Date    COLONOSCOPY N/A 9/26/2023    Procedure: COLONOSCOPY;  Surgeon: Luis Choi MD;  Location: [facility];  Service: Endoscopy;  Laterality: N/A;    COLONOSCOPY N/A 2/7/2024    Procedure: COLONOSCOPY;  Surgeon: Rikki Shabazz MD;  Location: [facility] ([address] [place] [address]);  Service: Endoscopy;  Laterality: N/A;  12/18 portal instr- suprep-colonoscopy with EMR with me in the next 4-6 weeks.[facility] only.45 minute case is okay. Shabazz-tt  1/31/24- precall complete - ERW    ENDOSCOPIC ULTRASOUND OF UPPER GASTROINTESTINAL TRACT N/A 4/30/2021    Procedure: ULTRASOUND, UPPER GI TRACT, ENDOSCOPIC;  Surgeon: Rikki Shabazz MD;  Location: [facility];  Service: Endoscopy;  Laterality: N/A;  Rapid COVID prior - last minute addition to schedule - ttr    ESOPHAGOGASTRODUODENOSCOPY N/A 4/30/2021    Procedure: EGD (ESOPHAGOGASTRODUODENOSCOPY);  Surgeon: Rikki Shabazz MD;  Location: [facility];  Service: Endoscopy;  Laterality: N/A;  EGD/EUS with biopsy within a week is fine. 45min case with me  "OK. Any campus. -Dr. Shabazz     Review of patient's allergies indicates:  No Known Allergies  Current Outpatient Medications on File Prior to Visit   Medication Sig Dispense Refill    acetaminophen (TYLENOL) 325 MG tablet Take 325 mg by mouth every 6 (six) hours as needed for Pain.      amLODIPine (NORVASC) 2.5 MG tablet Take 1 tablet by mouth once daily 90 tablet 2    dicyclomine (BENTYL) 20 mg tablet Take 20 mg by mouth every 6 (six) hours. Uses prn      imatinib (GLEEVEC) 400 MG Tab Take 1 tablet (400 mg total) by mouth once daily. 90 tablet 1    meloxicam (MOBIC) 7.5 MG tablet Take 7.5 mg by mouth.      multivitamin capsule Take 1 capsule by mouth once daily.      potassium chloride SA (K-DUR,KLOR-CON) 20 MEQ tablet Take 1 tablet by mouth once daily 90 tablet 0    pravastatin (PRAVACHOL) 10 MG tablet Take 1 tablet by mouth once daily 90 tablet 3     No current facility-administered medications on file prior to visit.     Social History     Socioeconomic History    Marital status:    Tobacco Use    Smoking status: Some Days     Current packs/day: 0.50     Average packs/day: 0.5 packs/day for 46.0 years (23.0 ttl pk-yrs)     Types: Cigarettes     Start date: 5/1/1978    Smokeless tobacco: Never   Substance and Sexual Activity    Alcohol use: Yes     Comment: occasionallly    Drug use: No    Sexual activity: Yes     Family History   Problem Relation Name Age of Onset    Hypertension Unknown             ROS:  GENERAL: No fever, chills,  or significant weight changes.   CARDIOVASCULAR: Denies chest pain, PND, orthopnea or reduced exercise tolerance.  ABDOMEN: Appetite fine. Denies diarrhea, abdominal pain, hematemesis or blood in stool.  URINARY: No flank pain, dysuria or hematuria.        Vitals:    05/16/24 0922 05/16/24 1010   BP: (!) 141/87 134/76   Pulse: 79    Temp: 97.9 °F (36.6 °C)    TempSrc: Temporal    Weight: 84.7 kg (186 lb 11.2 oz)    Height: 5' 8" (1.727 m)      Wt Readings from Last 3 Encounters: "   05/16/24 84.7 kg (186 lb 11.2 oz)   02/07/24 83.9 kg (185 lb)   11/30/23 86.6 kg (191 lb)       OBJECTIVE:   APPEARANCE: Well nourished, well developed, in no acute distress.    HEAD: Normocephalic.  Atraumatic.  No sinus tenderness.  EYES:   Right eye: Pupil reactive.  Conjunctiva clear.    Left eye: Pupil reactive.  Conjunctiva clear.  EOMI.    EARS: TM's intact. Light reflex normal. No retraction or perforation.    NOSE:  clear.  MOUTH & THROAT:  No pharyngeal erythema or exudate. No lesions.  NECK: Supple. No bruits.  No JVD.  No cervical lymphadenopathy.  No thyromegaly.    CHEST: Breath sounds clear bilaterally.  Normal respiratory effort  CARDIOVASCULAR: Normal rate.  Regular rhythm.  No murmurs.  No rub.  No gallops.  ABDOMEN: Bowel sounds normal.  Soft.  No tenderness.  No organomegaly.  PERIPHERAL VASCULAR: No cyanosis.  No clubbing.  No edema.  NEUROLOGIC: No focal findings.  MENTAL STATUS: Alert.  Oriented x 3.

## 2024-05-16 NOTE — PATIENT INSTRUCTIONS
Health Maintenance Due   Topic Date Due    Pneumococcal Vaccines (Age 0-64) (1 of 2 - PCV) Never done    HIV Screening  Never done    Shingles Vaccine (1 of 2) Never done    RSV Vaccine (Age 60+ and Pregnant patients) (1 - 1-dose 60+ series) Never done    COVID-19 Vaccine (4 - 2023-24 season) 09/01/2023    LDCT Lung Screen  01/19/2024    Colorectal Cancer Screening  08/07/2024

## 2024-05-17 ENCOUNTER — LAB VISIT (OUTPATIENT)
Dept: LAB | Facility: HOSPITAL | Age: 64
End: 2024-05-17
Attending: FAMILY MEDICINE
Payer: COMMERCIAL

## 2024-05-17 DIAGNOSIS — I10 PRIMARY HYPERTENSION: ICD-10-CM

## 2024-05-17 DIAGNOSIS — C49.A0 MALIGNANT GASTROINTESTINAL STROMAL TUMOR, UNSPECIFIED SITE: ICD-10-CM

## 2024-05-17 DIAGNOSIS — Z00.00 ROUTINE HISTORY AND PHYSICAL EXAMINATION OF ADULT: ICD-10-CM

## 2024-05-17 DIAGNOSIS — Z12.5 SCREENING FOR PROSTATE CANCER: ICD-10-CM

## 2024-05-17 DIAGNOSIS — E78.5 HYPERLIPIDEMIA, UNSPECIFIED HYPERLIPIDEMIA TYPE: ICD-10-CM

## 2024-05-17 DIAGNOSIS — C78.7 SECONDARY LIVER CANCER: ICD-10-CM

## 2024-05-17 LAB
ALBUMIN SERPL BCP-MCNC: 3.5 G/DL (ref 3.5–5.2)
ALP SERPL-CCNC: 60 U/L (ref 55–135)
ALT SERPL W/O P-5'-P-CCNC: 17 U/L (ref 10–44)
ANION GAP SERPL CALC-SCNC: 9 MMOL/L (ref 8–16)
AST SERPL-CCNC: 26 U/L (ref 10–40)
BASOPHILS # BLD AUTO: 0.02 K/UL (ref 0–0.2)
BASOPHILS NFR BLD: 0.3 % (ref 0–1.9)
BILIRUB SERPL-MCNC: 0.6 MG/DL (ref 0.1–1)
BUN SERPL-MCNC: 11 MG/DL (ref 8–23)
CALCIUM SERPL-MCNC: 8.6 MG/DL (ref 8.7–10.5)
CHLORIDE SERPL-SCNC: 110 MMOL/L (ref 95–110)
CHOLEST SERPL-MCNC: 103 MG/DL (ref 120–199)
CHOLEST/HDLC SERPL: 2.3 {RATIO} (ref 2–5)
CO2 SERPL-SCNC: 21 MMOL/L (ref 23–29)
CREAT SERPL-MCNC: 0.9 MG/DL (ref 0.5–1.4)
DIFFERENTIAL METHOD BLD: ABNORMAL
EOSINOPHIL # BLD AUTO: 0.2 K/UL (ref 0–0.5)
EOSINOPHIL NFR BLD: 3.6 % (ref 0–8)
ERYTHROCYTE [DISTWIDTH] IN BLOOD BY AUTOMATED COUNT: 19 % (ref 11.5–14.5)
EST. GFR  (NO RACE VARIABLE): >60 ML/MIN/1.73 M^2
GLUCOSE SERPL-MCNC: 89 MG/DL (ref 70–110)
HCT VFR BLD AUTO: 33.7 % (ref 40–54)
HDLC SERPL-MCNC: 45 MG/DL (ref 40–75)
HDLC SERPL: 43.7 % (ref 20–50)
HGB BLD-MCNC: 11.2 G/DL (ref 14–18)
IMM GRANULOCYTES # BLD AUTO: 0.01 K/UL (ref 0–0.04)
IMM GRANULOCYTES NFR BLD AUTO: 0.1 % (ref 0–0.5)
LDLC SERPL CALC-MCNC: 48.6 MG/DL (ref 63–159)
LYMPHOCYTES # BLD AUTO: 1.6 K/UL (ref 1–4.8)
LYMPHOCYTES NFR BLD: 24.4 % (ref 18–48)
MCH RBC QN AUTO: 29.9 PG (ref 27–31)
MCHC RBC AUTO-ENTMCNC: 33.2 G/DL (ref 32–36)
MCV RBC AUTO: 90 FL (ref 82–98)
MONOCYTES # BLD AUTO: 0.6 K/UL (ref 0.3–1)
MONOCYTES NFR BLD: 9.6 % (ref 4–15)
NEUTROPHILS # BLD AUTO: 4.1 K/UL (ref 1.8–7.7)
NEUTROPHILS NFR BLD: 62 % (ref 38–73)
NONHDLC SERPL-MCNC: 58 MG/DL
NRBC BLD-RTO: 0 /100 WBC
PLATELET # BLD AUTO: 261 K/UL (ref 150–450)
PMV BLD AUTO: 9.8 FL (ref 9.2–12.9)
POTASSIUM SERPL-SCNC: 4 MMOL/L (ref 3.5–5.1)
PROT SERPL-MCNC: 6.9 G/DL (ref 6–8.4)
RBC # BLD AUTO: 3.74 M/UL (ref 4.6–6.2)
SODIUM SERPL-SCNC: 140 MMOL/L (ref 136–145)
TRIGL SERPL-MCNC: 47 MG/DL (ref 30–150)
WBC # BLD AUTO: 6.68 K/UL (ref 3.9–12.7)

## 2024-05-17 PROCEDURE — 80053 COMPREHEN METABOLIC PANEL: CPT | Performed by: FAMILY MEDICINE

## 2024-05-17 PROCEDURE — 85025 COMPLETE CBC W/AUTO DIFF WBC: CPT | Performed by: FAMILY MEDICINE

## 2024-05-17 PROCEDURE — 80061 LIPID PANEL: CPT | Performed by: FAMILY MEDICINE

## 2024-05-17 PROCEDURE — 36415 COLL VENOUS BLD VENIPUNCTURE: CPT | Mod: PO | Performed by: FAMILY MEDICINE

## 2024-05-20 ENCOUNTER — LAB VISIT (OUTPATIENT)
Dept: LAB | Facility: HOSPITAL | Age: 64
End: 2024-05-20
Attending: FAMILY MEDICINE
Payer: COMMERCIAL

## 2024-05-20 DIAGNOSIS — I10 ESSENTIAL HYPERTENSION: ICD-10-CM

## 2024-05-20 DIAGNOSIS — Z12.5 SCREENING FOR PROSTATE CANCER: ICD-10-CM

## 2024-05-20 LAB
COMPLEXED PSA SERPL-MCNC: 3.2 NG/ML (ref 0–4)
LDH SERPL L TO P-CCNC: 237 U/L (ref 110–260)

## 2024-05-20 PROCEDURE — 36415 COLL VENOUS BLD VENIPUNCTURE: CPT | Mod: PO | Performed by: FAMILY MEDICINE

## 2024-05-20 PROCEDURE — 84153 ASSAY OF PSA TOTAL: CPT | Performed by: FAMILY MEDICINE

## 2024-05-20 PROCEDURE — 83615 LACTATE (LD) (LDH) ENZYME: CPT | Performed by: FAMILY MEDICINE

## 2024-05-24 ENCOUNTER — PATIENT MESSAGE (OUTPATIENT)
Dept: FAMILY MEDICINE | Facility: CLINIC | Age: 64
End: 2024-05-24
Payer: COMMERCIAL

## 2024-05-31 ENCOUNTER — OFFICE VISIT (OUTPATIENT)
Dept: HEMATOLOGY/ONCOLOGY | Facility: CLINIC | Age: 64
End: 2024-05-31
Payer: COMMERCIAL

## 2024-05-31 VITALS
TEMPERATURE: 98 F | HEIGHT: 68 IN | WEIGHT: 186.19 LBS | DIASTOLIC BLOOD PRESSURE: 84 MMHG | HEART RATE: 84 BPM | SYSTOLIC BLOOD PRESSURE: 138 MMHG | OXYGEN SATURATION: 99 % | BODY MASS INDEX: 28.22 KG/M2

## 2024-05-31 DIAGNOSIS — C49.A0 MALIGNANT GASTROINTESTINAL STROMAL TUMOR, UNSPECIFIED SITE: ICD-10-CM

## 2024-05-31 DIAGNOSIS — C49.A2 GASTRIC STROMAL TUMOR: ICD-10-CM

## 2024-05-31 DIAGNOSIS — D84.821 IMMUNODEFICIENCY DUE TO CHEMOTHERAPY: Primary | ICD-10-CM

## 2024-05-31 DIAGNOSIS — T45.1X5A IMMUNODEFICIENCY DUE TO CHEMOTHERAPY: Primary | ICD-10-CM

## 2024-05-31 DIAGNOSIS — Z79.899 IMMUNODEFICIENCY DUE TO CHEMOTHERAPY: Primary | ICD-10-CM

## 2024-05-31 DIAGNOSIS — C78.6 SECONDARY MALIGNANCY OF PARIETAL PERITONEUM: ICD-10-CM

## 2024-05-31 DIAGNOSIS — C78.7 SECONDARY LIVER CANCER: ICD-10-CM

## 2024-05-31 DIAGNOSIS — E78.5 HYPERLIPIDEMIA, UNSPECIFIED HYPERLIPIDEMIA TYPE: ICD-10-CM

## 2024-05-31 DIAGNOSIS — I10 PRIMARY HYPERTENSION: ICD-10-CM

## 2024-05-31 PROCEDURE — 99214 OFFICE O/P EST MOD 30 MIN: CPT | Mod: S$GLB,,, | Performed by: INTERNAL MEDICINE

## 2024-05-31 PROCEDURE — 3008F BODY MASS INDEX DOCD: CPT | Mod: CPTII,S$GLB,, | Performed by: INTERNAL MEDICINE

## 2024-05-31 PROCEDURE — 1159F MED LIST DOCD IN RCRD: CPT | Mod: CPTII,S$GLB,, | Performed by: INTERNAL MEDICINE

## 2024-05-31 PROCEDURE — 3075F SYST BP GE 130 - 139MM HG: CPT | Mod: CPTII,S$GLB,, | Performed by: INTERNAL MEDICINE

## 2024-05-31 PROCEDURE — 3079F DIAST BP 80-89 MM HG: CPT | Mod: CPTII,S$GLB,, | Performed by: INTERNAL MEDICINE

## 2024-05-31 PROCEDURE — 1160F RVW MEDS BY RX/DR IN RCRD: CPT | Mod: CPTII,S$GLB,, | Performed by: INTERNAL MEDICINE

## 2024-05-31 PROCEDURE — 99999 PR PBB SHADOW E&M-EST. PATIENT-LVL V: CPT | Mod: PBBFAC,,, | Performed by: INTERNAL MEDICINE

## 2024-05-31 RX ORDER — IMATINIB MESYLATE 400 MG/1
400 TABLET, FILM COATED ORAL DAILY
Qty: 90 TABLET | Refills: 1 | Status: ACTIVE | OUTPATIENT
Start: 2024-05-31 | End: 2024-06-06

## 2024-05-31 NOTE — PROGRESS NOTES
Subjective:       Patient ID: Agusto Juarez is a 64 y.o. male.    Chief Complaint: Results    HPI:  64-year-old male last seen in January of 2023.  Proximally 18 months ago continues on Gleevec g daily with last imaging done at that point patient denies nausea vomiting fevers chills night sweats does have concerns about cause of medication able to fire medicines a reasonable cost.  ECOG status 1    Past Medical History:   Diagnosis Date    BPH (benign prostatic hypertrophy)     Hyperlipidemia     Hypertension     Malignant gastrointestinal stromal tumor 05/05/2021    Villous adenoma of colon 02/07/2024    high grade dysplasia     Family History   Problem Relation Name Age of Onset    Hypertension Unknown       Social History     Socioeconomic History    Marital status:    Tobacco Use    Smoking status: Some Days     Current packs/day: 0.50     Average packs/day: 0.5 packs/day for 46.1 years (23.0 ttl pk-yrs)     Types: Cigarettes     Start date: 5/1/1978    Smokeless tobacco: Never   Substance and Sexual Activity    Alcohol use: Yes     Comment: occasionallly    Drug use: No    Sexual activity: Yes     Past Surgical History:   Procedure Laterality Date    COLONOSCOPY N/A 9/26/2023    Procedure: COLONOSCOPY;  Surgeon: Luis Choi MD;  Location: [facility];  Service: Endoscopy;  Laterality: N/A;    COLONOSCOPY N/A 2/7/2024    Procedure: COLONOSCOPY;  Surgeon: Rikki Shabazz MD;  Location: [facility] ([address] [place] [address]);  Service: Endoscopy;  Laterality: N/A;  12/18 portal instr- suprep-colonoscopy with EMR with me in the next 4-6 weeks.[facility] only.45 minute case is okay. Harika-tt  1/31/24- precall complete - ERW    ENDOSCOPIC ULTRASOUND OF UPPER GASTROINTESTINAL TRACT N/A 4/30/2021    Procedure: ULTRASOUND, UPPER GI TRACT, ENDOSCOPIC;  Surgeon: Rikki Shabazz MD;  Location: [facility];  Service: Endoscopy;  Laterality: N/A;  Rapid COVID prior - last minute addition to schedule - ttr     "ESOPHAGOGASTRODUODENOSCOPY N/A 4/30/2021    Procedure: EGD (ESOPHAGOGASTRODUODENOSCOPY);  Surgeon: Rikki Shabazz MD;  Location: [facility];  Service: Endoscopy;  Laterality: N/A;  EGD/EUS with biopsy within a week is fine. 45min case with me OK. Any campus. -Dr. Shabazz       Labs:  Lab Results   Component Value Date    WBC 6.68 05/17/2024    HGB 11.2 (L) 05/17/2024    HCT 33.7 (L) 05/17/2024    MCV 90 05/17/2024     05/17/2024     BMP  Lab Results   Component Value Date     05/17/2024    K 4.0 05/17/2024     05/17/2024    CO2 21 (L) 05/17/2024    BUN 11 05/17/2024    CREATININE 0.9 05/17/2024    CALCIUM 8.6 (L) 05/17/2024    ANIONGAP 9 05/17/2024    ESTGFRAFRICA >60 05/17/2022    EGFRNONAA >60 05/17/2022     Lab Results   Component Value Date    ALT 17 05/17/2024    AST 26 05/17/2024    ALKPHOS 60 05/17/2024    BILITOT 0.6 05/17/2024       No results found for: "IRON", "TIBC", "FERRITIN", "SATURATEDIRO"  No results found for: "[id number]"  No results found for: "FOLATE"  Lab Results   Component Value Date    TSH 0.534 12/16/2022         Review of Systems   Constitutional:  Negative for activity change, appetite change, chills, diaphoresis, fatigue, fever and unexpected weight change.   HENT:  Negative for congestion, dental problem, drooling, ear discharge, ear pain, facial swelling, hearing loss, mouth sores, nosebleeds, postnasal drip, rhinorrhea, sinus pressure, sneezing, sore throat, tinnitus, trouble swallowing and voice change.    Eyes:  Negative for photophobia, pain, discharge, redness, itching and visual disturbance.   Respiratory:  Negative for apnea, cough, choking, chest tightness, shortness of breath, wheezing and stridor.    Cardiovascular:  Negative for chest pain, palpitations and leg swelling.   Gastrointestinal:  Negative for abdominal distention, abdominal pain, anal bleeding, blood in stool, constipation, diarrhea, nausea, rectal pain and vomiting.   Endocrine: Negative for cold " intolerance, heat intolerance, polydipsia, polyphagia and polyuria.   Genitourinary:  Negative for decreased urine volume, difficulty urinating, dysuria, enuresis, flank pain, frequency, genital sores, hematuria, penile discharge, penile pain, penile swelling, scrotal swelling, testicular pain and urgency.   Musculoskeletal:  Negative for arthralgias, back pain, gait problem, joint swelling, myalgias, neck pain and neck stiffness.   Skin:  Negative for color change, pallor, rash and wound.   Allergic/Immunologic: Negative for environmental allergies, food allergies and immunocompromised state.   Neurological:  Negative for dizziness, tremors, seizures, syncope, facial asymmetry, speech difficulty, weakness, light-headedness, numbness and headaches.   Hematological:  Negative for adenopathy. Does not bruise/bleed easily.   Psychiatric/Behavioral:  Positive for dysphoric mood. Negative for agitation, behavioral problems, confusion, decreased concentration, hallucinations, self-injury, sleep disturbance and suicidal ideas. The patient is nervous/anxious. The patient is not hyperactive.        Objective:      Physical Exam  Vitals reviewed.   Constitutional:       General: He is not in acute distress.     Appearance: He is well-developed. He is not diaphoretic.   HENT:      Head: Normocephalic.      Right Ear: External ear normal.      Left Ear: External ear normal.      Nose: Nose normal.      Right Sinus: No maxillary sinus tenderness or frontal sinus tenderness.      Left Sinus: No maxillary sinus tenderness or frontal sinus tenderness.      Mouth/Throat:      Pharynx: No oropharyngeal exudate.   Eyes:      General: Lids are normal. No scleral icterus.        Right eye: No discharge.         Left eye: No discharge.      Extraocular Movements:      Right eye: Normal extraocular motion.      Left eye: Normal extraocular motion.      Conjunctiva/sclera:      Right eye: Right conjunctiva is not injected. No hemorrhage.      Left eye: Left conjunctiva is not injected. No hemorrhage.     Pupils: Pupils are equal, round, and reactive to light.   Neck:      Thyroid: No thyromegaly.      Vascular: No JVD.      Trachea: No tracheal deviation.   Cardiovascular:      Rate and Rhythm: Normal rate.   Pulmonary:      Effort: Pulmonary effort is normal. No respiratory distress.      Breath sounds: No stridor.   Abdominal:      General: Bowel sounds are normal.      Palpations: Abdomen is soft. There is no hepatomegaly, splenomegaly or mass.      Tenderness: There is no abdominal tenderness.   Musculoskeletal:         General: No tenderness. Normal range of motion.      Cervical back: Normal range of motion and neck supple.   Lymphadenopathy:      Head:      Right side of head: No posterior auricular or occipital adenopathy.      Left side of head: No posterior auricular or occipital adenopathy.      Cervical: No cervical adenopathy.      Right cervical: No superficial, deep or posterior cervical adenopathy.     Left cervical: No superficial, deep or posterior cervical adenopathy.      Upper Body:      Right upper body: No supraclavicular adenopathy.      Left upper body: No supraclavicular adenopathy.   Skin:     General: Skin is dry.      Findings: No erythema or rash.      Nails: There is no clubbing.   Neurological:      Mental Status: He is alert and oriented to person, place, and time.      Cranial Nerves: No cranial nerve deficit.      Motor: Weakness present.      Coordination: Coordination normal.      Gait: Gait abnormal.   Psychiatric:         Behavior: Behavior normal.         Thought Content: Thought content normal.         Judgment: Judgment normal.           Assessment:      1. Immunodeficiency due to chemotherapy    2. Malignant gastrointestinal stromal tumor, unspecified site    3. Secondary liver cancer    4. Secondary malignancy of parietal peritoneum    5. Primary hypertension    6. Hyperlipidemia, unspecified hyperlipidemia  type    7. Gastric stromal tumor           Med Onc Chart Routing      Follow up with physician . Return to clinic to see me in 3-4 months CBC CMP LDH virtual visit acceptable   Follow up with YESSY    Infusion scheduling note    Injection scheduling note    Labs   Scheduling:  Preferred lab:  Lab interval:  Lab draw in [place]   Imaging    Pharmacy appointment    Other referrals         Referral made for           Plan:      for medical standpoint patient appears to be doing quite well.  He has a CT chest abdomen pelvis scheduled next week I have ask that my name be added to it I had like see the patient back in 3-4 months of virtual visit is acceptable means in with laboratory studies to be done I have recent prescriptions to [facility] to see if they can help with acquisition of medication.  At a reasonable cost.  Discussed implications of answered questions with him        Freeman Dee Jr, MD FACP

## 2024-06-03 ENCOUNTER — TELEPHONE (OUTPATIENT)
Dept: HEMATOLOGY/ONCOLOGY | Facility: CLINIC | Age: 64
End: 2024-06-03
Payer: COMMERCIAL

## 2024-06-03 NOTE — TELEPHONE ENCOUNTER
SW consulted by provider to contact pt. No answer or vm option. SW will attempt call again later this week. SW will remain available.

## 2024-06-05 ENCOUNTER — HOSPITAL ENCOUNTER (OUTPATIENT)
Dept: RADIOLOGY | Facility: HOSPITAL | Age: 64
Discharge: HOME | End: 2024-06-05
Attending: FAMILY MEDICINE
Payer: COMMERCIAL

## 2024-06-05 DIAGNOSIS — D37.4 VILLOUS ADENOMA OF COLON: ICD-10-CM

## 2024-06-05 DIAGNOSIS — C49.A0 MALIGNANT GASTROINTESTINAL STROMAL TUMOR, UNSPECIFIED SITE: ICD-10-CM

## 2024-06-05 DIAGNOSIS — C78.6 SECONDARY MALIGNANCY OF PARIETAL PERITONEUM: ICD-10-CM

## 2024-06-05 DIAGNOSIS — C78.7 SECONDARY LIVER CANCER: ICD-10-CM

## 2024-06-05 PROCEDURE — A9698 NON-RAD CONTRAST MATERIALNOC: HCPCS | Performed by: INTERNAL MEDICINE

## 2024-06-05 PROCEDURE — 71260 CT THORAX DX C+: CPT | Mod: 26,,, | Performed by: RADIOLOGY

## 2024-06-05 PROCEDURE — 74177 CT ABD & PELVIS W/CONTRAST: CPT | Mod: 26,,, | Performed by: RADIOLOGY

## 2024-06-05 PROCEDURE — 74177 CT ABD & PELVIS W/CONTRAST: CPT | Mod: TC

## 2024-06-05 PROCEDURE — 25500020 PHARM REV CODE 255: Performed by: INTERNAL MEDICINE

## 2024-06-05 RX ADMIN — IOHEXOL 100 ML: 350 INJECTION, SOLUTION INTRAVENOUS at 09:06

## 2024-06-05 RX ADMIN — IOHEXOL 1000 ML: 12 SOLUTION ORAL at 09:06

## 2024-06-06 ENCOUNTER — TELEPHONE (OUTPATIENT)
Dept: HEMATOLOGY/ONCOLOGY | Facility: CLINIC | Age: 64
End: 2024-06-06
Payer: COMMERCIAL

## 2024-06-06 ENCOUNTER — TELEPHONE (OUTPATIENT)
Dept: HEMATOLOGY/ONCOLOGY | Facility: CLINIC | Age: 64
End: 2024-06-06

## 2024-06-06 ENCOUNTER — OFFICE VISIT (OUTPATIENT)
Dept: HEMATOLOGY/ONCOLOGY | Facility: CLINIC | Age: 64
End: 2024-06-06
Payer: COMMERCIAL

## 2024-06-06 DIAGNOSIS — C78.7 SECONDARY LIVER CANCER: ICD-10-CM

## 2024-06-06 DIAGNOSIS — Z79.899 IMMUNODEFICIENCY DUE TO CHEMOTHERAPY: ICD-10-CM

## 2024-06-06 DIAGNOSIS — T45.1X5A IMMUNODEFICIENCY DUE TO CHEMOTHERAPY: ICD-10-CM

## 2024-06-06 DIAGNOSIS — C78.6 SECONDARY MALIGNANCY OF PARIETAL PERITONEUM: ICD-10-CM

## 2024-06-06 DIAGNOSIS — D84.821 IMMUNODEFICIENCY DUE TO CHEMOTHERAPY: ICD-10-CM

## 2024-06-06 DIAGNOSIS — C49.A0 MALIGNANT GASTROINTESTINAL STROMAL TUMOR, UNSPECIFIED SITE: Primary | ICD-10-CM

## 2024-06-06 PROCEDURE — 1160F RVW MEDS BY RX/DR IN RCRD: CPT | Mod: CPTII,95,, | Performed by: INTERNAL MEDICINE

## 2024-06-06 PROCEDURE — 99214 OFFICE O/P EST MOD 30 MIN: CPT | Mod: 95,,, | Performed by: INTERNAL MEDICINE

## 2024-06-06 PROCEDURE — 1159F MED LIST DOCD IN RCRD: CPT | Mod: CPTII,95,, | Performed by: INTERNAL MEDICINE

## 2024-06-06 RX ORDER — SUNITINIB MALATE 37.5 MG/1
37.5 CAPSULE ORAL DAILY
Qty: 30 CAPSULE | Refills: 11 | Status: ACTIVE | OUTPATIENT
Start: 2024-06-06 | End: 2024-06-07

## 2024-06-06 NOTE — PLAN OF CARE
DISCONTINUE ON PATHWAY REGIMEN - [place]    [id number]        Imatinib (Gleevec)     **Always confirm dose/schedule in your pharmacy ordering system**    REASON: Disease Progression  PRIOR TREATMENT: [id number]  TREATMENT RESPONSE: Partial Response (SC)    START ON PATHWAY REGIMEN - [place]    [id number]        Sunitinib (Sutent)     **Always confirm dose/schedule in your pharmacy ordering system**    Patient Characteristics:  GIST: Gastric and Omental, Recurrent/Metastatic, Unresectable, Second Line  Histology/Anatomic Site: GIST: Gastric and Omental  Mitotic rate: High  Therapeutic Status: Metastatic  AJCC T Category: T4  AJCC M Category: M0  AJCC N Category: N1  AJCC 8 Stage Grouping: IV  Line of therapy: Second Line    Intent of Therapy:  Non-Curative / Palliative Intent, Not Discussed with Patient

## 2024-06-06 NOTE — TELEPHONE ENCOUNTER
Received referral from Dr. Dee on 6/6. Nurse contacted pt on 6/6, explained role in scheduling appt with Dr. Mesa. Offered the first next available. Scheduled for 6/7 10:00 virtual visit with Dr. Mesa. Provided and confirmed appt date, time, provider. Pt verbalized understanding and confirmed appt. All questions and concerns addressed. Nurse provided clinic number if pt has any questions, concerns, or needs help connecting the VV.

## 2024-06-06 NOTE — PROGRESS NOTES
Subjective:       Patient ID: Agusto Juarez is a 64 y.o. male.    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    high grade dysplasia     Family History   Problem Relation Name Age of Onset    Hypertension Unknown       Social History     Socioeconomic History    Marital status:    Tobacco Use    Smoking status: Some Days     Current packs/day: 0.50     Average packs/day: 0.5 packs/day for 46.1 years (23.0 ttl pk-yrs)     Types: Cigarettes     Start date: 5/1/1978    Smokeless tobacco: Never   Substance and Sexual Activity    Alcohol use: Yes     Comment: occasionallly    Drug use: No    Sexual activity: Yes     Social Determinants of Health     Physical Activity: Unknown (6/6/2024)    Exercise Vital Sign     Days of Exercise per Week: 5 days     Past Surgical History:   Procedure Laterality Date    COLONOSCOPY N/A 9/26/2023    Procedure: COLONOSCOPY;  Surgeon: Luis Choi MD;  Location: [facility];  Service: Endoscopy;  Laterality: N/A;    COLONOSCOPY N/A 2/7/2024    Procedure: COLONOSCOPY;  Surgeon: Rikki Shabazz MD;  Location: [facility] ([address] [place] [address]);  Service: Endoscopy;  Laterality: N/A;  12/18 portal instr- suprep-colonoscopy with EMR with me in the next 4-6 weeks.[facility] only.45 minute case is okay. Harika-tt  1/31/24- precall complete - [facility]    ENDOSCOPIC ULTRASOUND OF UPPER GASTROINTESTINAL TRACT N/A 4/30/2021    Procedure: ULTRASOUND, UPPER GI TRACT, ENDOSCOPIC;  Surgeon: Rikki Shabazz MD;   "Location: [facility] ENDO;  Service: Endoscopy;  Laterality: N/A;  Rapid COVID prior - last minute addition to schedule - ttr    ESOPHAGOGASTRODUODENOSCOPY N/A 4/30/2021    Procedure: EGD (ESOPHAGOGASTRODUODENOSCOPY);  Surgeon: Rikki Shabazz MD;  Location: [facility];  Service: Endoscopy;  Laterality: N/A;  EGD/EUS with biopsy within a week is fine. 45min case with me OK. Any campus. -Dr. Shabazz       Labs:  Lab Results   Component Value Date    WBC 6.68 05/17/2024    HGB 11.2 (L) 05/17/2024    HCT 33.7 (L) 05/17/2024    MCV 90 05/17/2024     05/17/2024     BMP  Lab Results   Component Value Date     05/17/2024    K 4.0 05/17/2024     05/17/2024    CO2 21 (L) 05/17/2024    BUN 11 05/17/2024    CREATININE 0.9 05/17/2024    CALCIUM 8.6 (L) 05/17/2024    ANIONGAP 9 05/17/2024    ESTGFRAFRICA >60 05/17/2022    EGFRNONAA >60 05/17/2022     Lab Results   Component Value Date    ALT 17 05/17/2024    AST 26 05/17/2024    ALKPHOS 60 05/17/2024    BILITOT 0.6 05/17/2024       No results found for: "IRON", "TIBC", "FERRITIN", "SATURATEDIRO"  No results found for: "EQEJNNNN16"  No results found for: "FOLATE"  Lab Results   Component Value Date    TSH 0.534 12/16/2022         Review of Systems   Constitutional:  Negative for activity change, appetite change, chills, diaphoresis, fatigue, fever and unexpected weight change.   HENT:  Negative for congestion, dental problem, drooling, ear discharge, ear pain, facial swelling, hearing loss, mouth sores, nosebleeds, postnasal drip, rhinorrhea, sinus pressure, sneezing, sore throat, tinnitus, trouble swallowing and voice change.    Eyes:  Negative for photophobia, pain, discharge, redness, itching and visual disturbance.   Respiratory:  Negative for apnea, cough, choking, chest tightness, shortness of breath, wheezing and stridor.    Cardiovascular:  Negative for chest pain, palpitations and leg swelling.   Gastrointestinal:  Negative for abdominal distention, abdominal " pain, anal bleeding, blood in stool, constipation, diarrhea, nausea, rectal pain and vomiting.   Endocrine: Negative for cold intolerance, heat intolerance, polydipsia, polyphagia and polyuria.   Genitourinary:  Negative for decreased urine volume, difficulty urinating, dysuria, enuresis, flank pain, frequency, genital sores, hematuria, penile discharge, penile pain, penile swelling, scrotal swelling, testicular pain and urgency.   Musculoskeletal:  Negative for arthralgias, back pain, gait problem, joint swelling, myalgias, neck pain and neck stiffness.   Skin:  Negative for color change, pallor, rash and wound.   Allergic/Immunologic: Negative for environmental allergies, food allergies and immunocompromised state.   Neurological:  Negative for dizziness, tremors, seizures, syncope, facial asymmetry, speech difficulty, weakness, light-headedness, numbness and headaches.   Hematological:  Negative for adenopathy. Does not bruise/bleed easily.   Psychiatric/Behavioral:  Positive for dysphoric mood. Negative for agitation, behavioral problems, confusion, decreased concentration, hallucinations, self-injury, sleep disturbance and suicidal ideas. The patient is nervous/anxious. The patient is not hyperactive.        Objective:      Physical Exam  Constitutional:       Appearance: Normal appearance.             Assessment:      1. Malignant gastrointestinal stromal tumor, unspecified site    2. Secondary liver cancer    3. Secondary malignancy of parietal peritoneum    4. Immunodeficiency due to chemotherapy           Med Onc Chart Routing      Follow up with physician . Return to clinic to see me in 1 month with CBC LDH CMP after starting Sutent   Follow up with YESSY    Infusion scheduling note    Injection scheduling note    Labs   Scheduling:  Preferred lab:  Lab interval:  Patient needs a CT-directed biopsy of liver   Imaging    Pharmacy appointment    Other referrals         Referral to see Dr. Kirstie ROBB  in  [place] or [place] depending wishes also referral for palliative care consultation              Plan:     The patient location is:  Home  The chief complaint leading to consultation is:  Cancer    Visit type: audiovisual    Face to Face time with patient: 25 minutes of total time spent on the encounter, which includes face to face time and non-face to face time preparing to see the patient (eg, review of tests), Obtaining and/or reviewing separately obtained history, Documenting clinical information in the electronic or other health record, Independently interpreting results (not separately reported) and communicating results to the patient/family/caregiver, or Care coordination (not separately reported).         Each patient to whom he or she provides medical services by telemedicine is:  (1) informed of the relationship between the physician and patient and the respective role of any other health care provider with respect to management of the patient; and (2) notified that he or she may decline to receive medical services by telemedicine and may withdraw from such care at any time.    Notes:   History of progressive gastrointestinal stromal cancer.  At this time would like to perform liver biopsy to determine any genetic analysis that his change.  Will start patient after placing patient through via pathways on Sutent 37.5 mg daily will ask to be seen by Dr. Arianne oviedo GI specialist for evaluation in treatment recommendations.  Referral made for palliative care consultation will need to have physical visit with signed consent at some point      Freeman Dee Jr, MD FACP

## 2024-06-06 NOTE — TELEPHONE ENCOUNTER
SW called pt to assess possible needs. Pt stated that he has no needs at this time, but will call SW dept if needs arise. SW will remain available.

## 2024-06-07 ENCOUNTER — OFFICE VISIT (OUTPATIENT)
Dept: HEMATOLOGY/ONCOLOGY | Facility: CLINIC | Age: 64
End: 2024-06-07
Payer: COMMERCIAL

## 2024-06-07 DIAGNOSIS — C78.6 SECONDARY MALIGNANCY OF PARIETAL PERITONEUM: ICD-10-CM

## 2024-06-07 DIAGNOSIS — D49.9 IMMUNODEFICIENCY SECONDARY TO NEOPLASM: ICD-10-CM

## 2024-06-07 DIAGNOSIS — I10 PRIMARY HYPERTENSION: ICD-10-CM

## 2024-06-07 DIAGNOSIS — C78.7 SECONDARY LIVER CANCER: ICD-10-CM

## 2024-06-07 DIAGNOSIS — Z79.899 IMMUNODEFICIENCY DUE TO CHEMOTHERAPY: ICD-10-CM

## 2024-06-07 DIAGNOSIS — D84.821 IMMUNODEFICIENCY DUE TO CHEMOTHERAPY: ICD-10-CM

## 2024-06-07 DIAGNOSIS — C49.A0 MALIGNANT GASTROINTESTINAL STROMAL TUMOR, UNSPECIFIED SITE: Primary | ICD-10-CM

## 2024-06-07 DIAGNOSIS — R53.83 FATIGUE, UNSPECIFIED TYPE: ICD-10-CM

## 2024-06-07 DIAGNOSIS — T45.1X5A IMMUNODEFICIENCY DUE TO CHEMOTHERAPY: ICD-10-CM

## 2024-06-07 DIAGNOSIS — D84.81 IMMUNODEFICIENCY SECONDARY TO NEOPLASM: ICD-10-CM

## 2024-06-07 PROCEDURE — 99215 OFFICE O/P EST HI 40 MIN: CPT | Mod: 95,,, | Performed by: INTERNAL MEDICINE

## 2024-06-07 PROCEDURE — G2211 COMPLEX E/M VISIT ADD ON: HCPCS | Mod: 95,,, | Performed by: INTERNAL MEDICINE

## 2024-06-07 PROCEDURE — 1160F RVW MEDS BY RX/DR IN RCRD: CPT | Mod: CPTII,95,, | Performed by: INTERNAL MEDICINE

## 2024-06-07 PROCEDURE — 1159F MED LIST DOCD IN RCRD: CPT | Mod: CPTII,95,, | Performed by: INTERNAL MEDICINE

## 2024-06-07 RX ORDER — IMATINIB MESYLATE 400 MG/1
400 TABLET, FILM COATED ORAL 2 TIMES DAILY
Qty: 60 TABLET | Refills: 11 | Status: ACTIVE | OUTPATIENT
Start: 2024-06-07 | End: 2025-06-07

## 2024-06-12 ENCOUNTER — OFFICE VISIT (OUTPATIENT)
Dept: PALLIATIVE MEDICINE | Facility: CLINIC | Age: 64
End: 2024-06-12
Payer: COMMERCIAL

## 2024-06-12 DIAGNOSIS — C49.A0 MALIGNANT GASTROINTESTINAL STROMAL TUMOR, UNSPECIFIED SITE: ICD-10-CM

## 2024-06-12 DIAGNOSIS — Z51.5 PALLIATIVE CARE ENCOUNTER: Primary | ICD-10-CM

## 2024-06-12 PROCEDURE — 99497 ADVNCD CARE PLAN 30 MIN: CPT | Mod: 95,,,

## 2024-06-12 PROCEDURE — 99204 OFFICE O/P NEW MOD 45 MIN: CPT | Mod: 95,,,

## 2024-06-12 NOTE — PROGRESS NOTES
Palliative Medicine Clinic Note        Consult Requested By: Dr. Freeman Dee      Reason for Consult: Advance Care Planning    Chief Complaint: Goals of care discussion           ASSESSMENT/PLAN:      Plan/Recommendations:    Problem List Items Addressed This Visit          Oncology    Malignant gastrointestinal stromal tumor     To peritoneum and liver  Followed by Dr. Dee and Dr. Mesa.  On Gleevec 400mg BID.   Pending liver biopsy.   CT C/A/P: 06/05/2024:  Interval disease progression in the abdomen pelvis with worsening peritoneal carcinomatosis and worsening hepatic metastatic disease. No evidence for thoracic metastatic disease.            Palliative Care    Palliative care encounter - Primary       -Code status: Full Code. Does not want to be on life support. He will discuss further with his family.   -HCPOA: SDM: Michelle Juarez. 3 adult children: 2 biological, 1 stepchild.   -GOC: Continue cancer treatment, symptom management, maintain independence and functional status.  -See HPI for further details           Advance Care Planning   Advance Directives:   Living Will: No    [place]POST: No    Do Not Resuscitate Status: No    Medical Power of : No    Agent's Name:  SDM: Spouse: Michelle Juarez   Agent's Contact Number:  [phone]    Decision Making:  Patient answered questions and Family answered questions  Goals of Care: The patient and family endorses that what is most important right now is to focus on remaining as independent as possible, symptom/pain control, and quality of life, even if it means sacrificing a little time    Accordingly, we have decided that the best plan to meet the patient's goals includes continuing with treatment.     Pt stated he wishes to continue cancer treatment, symptom management, maintain independence and functional status.  He hopes he can continue to work and participate in family activities despite cancer diagnosis. He drives a school bus.   He is aware is treatment  "regimen is palliative at this time, but he "wants to remain positive and trust in God".   He has not though about completing a living will. However, he is certain he does not wish to be on life support.   He stated he will discuss further with his family.            Follow up: PRN      Plan discussed with: Patient and his wife.        SUBJECTIVE:      History of Present Illness / Interval History:  Agusto Juarez is 64 y.o. 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 Initially diagnosed in April 2021.  On Gleevec 400mg BID.   Pending liver biopsy.   Followed by Dr. Dee and Dr. Mesa.   Presents to Palliative Care Clinic for advance care planning, and additional support..   Please see hem/onc note for more details on pt's care.       6/12/24     The patient location is: [facility]  The chief complaint leading to consultation is:  Follow-up     Visit type: audiovisual     Face to Face time with patient:  30 minutes  45 minutes of total time spent on the encounter, which includes face to face time and non-face to face time preparing to see the patient (eg, review of tests), Obtaining and/or reviewing separately obtained history, Documenting clinical information in the electronic or other health record, Independently interpreting results (not separately reported) and communicating results to the patient/family/caregiver, or Care coordination (not separately reported).      Each patient to whom he or she provides medical services by telemedicine is:  (1) informed of the relationship between the physician and patient and the respective role of any other health care provider with respect to management of the patient; and (2) notified that he or she may decline to receive medical services by telemedicine and may withdraw from such care at any time.     Notes:   History obtained from: Patient  Pt seen via audiovisual feed. A&O x3. No acute distress. His wife, Ms. Martinez was present " "throughout visit.   Pt reported he is doing well despite cancer diagnosis. He is taking Gleevec twice a day as prescribed.   He is concerned about pain during and after liver biopsy. I have assured him his pain will be well-managed throughout the procedure and after.   He denied anxiety/depression. He states he has good family support if needed.     We discussed advance care planning, goals of care, and code status, Full Code vs. DNR including risks, benefits, and alternatives.  Pt stated he wishes to continue cancer treatment, symptom management, maintain independence and functional status.  He hopes he can continue to work and participate in family activities despite cancer diagnosis. He drives a school bus.   He is aware is treatment regimen is palliative at this time, but he "wants to remain positive and trust in God".   He has not though about completing a living will. However, he is certain he does not wish to be on life support.   He stated he will discuss further with his family.           ROS:  Review of Systems   Constitutional:  Negative for activity change, appetite change, fatigue and unexpected weight change.   HENT:  Negative for nasal congestion, dental problem, trouble swallowing and voice change.    Gastrointestinal:  Negative for abdominal pain, constipation, nausea, rectal pain and vomiting.   Musculoskeletal:  Negative for arthralgias, back pain and myalgias.   Integumentary:  Negative for wound.   Psychiatric/Behavioral:  Negative for agitation, confusion, dysphoric mood, sleep disturbance and suicidal ideas. The patient is not nervous/anxious.        Review of Symptoms      Symptom Assessment (ESAS 0-10 Scale)  Pain:  0  Dyspnea:  0  Anxiety:  0  Nausea:  0  Depression:  0  Anorexia:  0  Fatigue:  0  Insomnia:  0  Restlessness:  0  Agitation:  0     CAM / Delirium:  Negative  Constipation:  Negative  Diarrhea:  Negative    Anxiety:  Is not nervous/anxious  Constipation:  No " constipation    Bowel Management Plan (BMP):  No      Modified Shashi Scale:  0    Performance Status:  100    ECOG Performance Status ndGndrndanddndend:nd nd2nd Living Arrangements:  Lives with spouse    Psychosocial/Cultural:   See Palliative Psychosocial Note: Yes  Drives school bus.     3 adult children.     **Primary  to Follow**  Palliative Care  Consult: Yes    Spiritual:  F - Bri and Belief:  God  I - Importance:  Highly            Medications:    Current Outpatient Medications:     acetaminophen (TYLENOL) 325 MG tablet, Take 325 mg by mouth every 6 (six) hours as needed for Pain., Disp: , Rfl:     amLODIPine (NORVASC) 2.5 MG tablet, Take 1 tablet by mouth once daily, Disp: 90 tablet, Rfl: 2    buPROPion (WELLBUTRIN XL) 150 MG TB24 tablet, Take 1 tablet (150 mg total) by mouth once daily., Disp: 30 tablet, Rfl: 5    dicyclomine (BENTYL) 20 mg tablet, Take 20 mg by mouth every 6 (six) hours. Uses prn, Disp: , Rfl:     imatinib (GLEEVEC) 400 MG Tab, Take 1 tablet (400 mg total) by mouth 2 (two) times a day., Disp: 60 tablet, Rfl: 11    meloxicam (MOBIC) 7.5 MG tablet, Take 7.5 mg by mouth., Disp: , Rfl:     multivitamin capsule, Take 1 capsule by mouth once daily., Disp: , Rfl:     potassium chloride SA (K-DUR,KLOR-CON) 20 MEQ tablet, Take 1 tablet by mouth once daily, Disp: 90 tablet, Rfl: 0    pravastatin (PRAVACHOL) 10 MG tablet, Take 1 tablet by mouth once daily, Disp: 90 tablet, Rfl: 3    sodium,potassium,mag sulfates (SUPREP BOWEL PREP KIT) 17.5-3.13-1.6 gram SolR, Take 177 mLs by mouth once daily. for 2 days, Disp: 1 kit, Rfl: 0      External  database queried on 06/15/2024  by EVETTE MEYER :        Review of patient's allergies indicates:  No Known Allergies        OBJECTIVE:         Physical Exam:  Vitals:      Physical Exam  Vitals (Limited due to virtual visit.) reviewed.   Constitutional:       General: He is not in acute distress.     Appearance: He is  ill-appearing.   HENT:      Head: Normocephalic and atraumatic.   Eyes:      General: No scleral icterus.        Right eye: No discharge.         Left eye: No discharge.      Conjunctiva/sclera: Conjunctivae normal.   Pulmonary:      Effort: Pulmonary effort is normal.   Skin:     Capillary Refill: Capillary refill takes less than 2 seconds.   Neurological:      General: No focal deficit present.      Mental Status: He is alert and oriented to person, place, and time.   Psychiatric:         Attention and Perception: Attention and perception normal.         Mood and Affect: Mood and affect normal.         Speech: Speech normal.         Behavior: Behavior normal. Behavior is cooperative.         Thought Content: Thought content normal.         Cognition and Memory: Cognition and memory normal.           Labs: BMP  Lab Results   Component Value Date     05/17/2024    K 4.0 05/17/2024     05/17/2024    CO2 21 (L) 05/17/2024    BUN 11 05/17/2024    CREATININE 0.9 05/17/2024    CALCIUM 8.6 (L) 05/17/2024    ANIONGAP 9 05/17/2024    EGFRNORACEVR >60.0 05/17/2024     Lab Results   Component Value Date    WBC 6.68 05/17/2024    HGB 11.2 (L) 05/17/2024    HCT 33.7 (L) 05/17/2024    MCV 90 05/17/2024     05/17/2024             Imaging: CT C/A/P: 06/05/2024:    1.  Interval disease progression in the abdomen pelvis with worsening peritoneal carcinomatosis and worsening hepatic metastatic disease.  2.  No evidence for thoracic metastatic disease.       I spent a total of 45 minutes on the day of the visit. This includes face to face time in discussion of goals of care, symptom assessment, coordination of care and emotional support.  This also includes non-face to face time preparing to see the patient (eg, review of tests/imaging), obtaining and/or reviewing separately obtained history, documenting clinical information in the electronic or other health record, independently interpreting results and  communicating results to the patient/family/caregiver, or care coordinator.     Additional 20 min time spent on a voluntary advance care planning and /or goals of care discussion, providing emotional support, formulating and communicating prognosis and exploring burden/benefit of various approaches of treatment.       EVETTE STAPLES NP

## 2024-06-12 NOTE — H&P (VIEW-ONLY)
Palliative Medicine Clinic Note        Consult Requested By: Dr. Freeman Dee      Reason for Consult: Advance Care Planning    Chief Complaint: Goals of care discussion           ASSESSMENT/PLAN:      Plan/Recommendations:    Problem List Items Addressed This Visit          Oncology    Malignant gastrointestinal stromal tumor     To peritoneum and liver  Followed by Dr. Dee and Dr. Mesa.  On Gleevec 400mg BID.   Pending liver biopsy.   CT C/A/P: 06/05/2024:  Interval disease progression in the abdomen pelvis with worsening peritoneal carcinomatosis and worsening hepatic metastatic disease. No evidence for thoracic metastatic disease.            Palliative Care    Palliative care encounter - Primary       -Code status: Full Code. Does not want to be on life support. He will discuss further with his family.   -HCPOA: SDM: Michelle Juarez. 3 adult children: 2 biological, 1 stepchild.   -GOC: Continue cancer treatment, symptom management, maintain independence and functional status.  -See HPI for further details           Advance Care Planning   Advance Directives:   Living Will: No    [place]POST: No    Do Not Resuscitate Status: No    Medical Power of : No    Agent's Name:  SDM: Spouse: Michelle Juarez   Agent's Contact Number:  [phone]    Decision Making:  Patient answered questions and Family answered questions  Goals of Care: The patient and family endorses that what is most important right now is to focus on remaining as independent as possible, symptom/pain control, and quality of life, even if it means sacrificing a little time    Accordingly, we have decided that the best plan to meet the patient's goals includes continuing with treatment.     Pt stated he wishes to continue cancer treatment, symptom management, maintain independence and functional status.  He hopes he can continue to work and participate in family activities despite cancer diagnosis. He drives a school bus.   He is aware is treatment  "regimen is palliative at this time, but he "wants to remain positive and trust in God".   He has not though about completing a living will. However, he is certain he does not wish to be on life support.   He stated he will discuss further with his family.            Follow up: PRN      Plan discussed with: Patient and his wife.        SUBJECTIVE:      History of Present Illness / Interval History:  Agusto Juarez is 64 y.o. 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 Initially diagnosed in April 2021.  On Gleevec 400mg BID.   Pending liver biopsy.   Followed by Dr. Dee and Dr. Mesa.   Presents to Palliative Care Clinic for advance care planning, and additional support..   Please see hem/onc note for more details on pt's care.       6/12/24     The patient location is: [facility]  The chief complaint leading to consultation is:  Follow-up     Visit type: audiovisual     Face to Face time with patient:  30 minutes  45 minutes of total time spent on the encounter, which includes face to face time and non-face to face time preparing to see the patient (eg, review of tests), Obtaining and/or reviewing separately obtained history, Documenting clinical information in the electronic or other health record, Independently interpreting results (not separately reported) and communicating results to the patient/family/caregiver, or Care coordination (not separately reported).      Each patient to whom he or she provides medical services by telemedicine is:  (1) informed of the relationship between the physician and patient and the respective role of any other health care provider with respect to management of the patient; and (2) notified that he or she may decline to receive medical services by telemedicine and may withdraw from such care at any time.     Notes:   History obtained from: Patient  Pt seen via audiovisual feed. A&O x3. No acute distress. His wife, Ms. Martinez was present " "throughout visit.   Pt reported he is doing well despite cancer diagnosis. He is taking Gleevec twice a day as prescribed.   He is concerned about pain during and after liver biopsy. I have assured him his pain will be well-managed throughout the procedure and after.   He denied anxiety/depression. He states he has good family support if needed.     We discussed advance care planning, goals of care, and code status, Full Code vs. DNR including risks, benefits, and alternatives.  Pt stated he wishes to continue cancer treatment, symptom management, maintain independence and functional status.  He hopes he can continue to work and participate in family activities despite cancer diagnosis. He drives a school bus.   He is aware is treatment regimen is palliative at this time, but he "wants to remain positive and trust in God".   He has not though about completing a living will. However, he is certain he does not wish to be on life support.   He stated he will discuss further with his family.           ROS:  Review of Systems   Constitutional:  Negative for activity change, appetite change, fatigue and unexpected weight change.   HENT:  Negative for nasal congestion, dental problem, trouble swallowing and voice change.    Gastrointestinal:  Negative for abdominal pain, constipation, nausea, rectal pain and vomiting.   Musculoskeletal:  Negative for arthralgias, back pain and myalgias.   Integumentary:  Negative for wound.   Psychiatric/Behavioral:  Negative for agitation, confusion, dysphoric mood, sleep disturbance and suicidal ideas. The patient is not nervous/anxious.        Review of Symptoms      Symptom Assessment (ESAS 0-10 Scale)  Pain:  0  Dyspnea:  0  Anxiety:  0  Nausea:  0  Depression:  0  Anorexia:  0  Fatigue:  0  Insomnia:  0  Restlessness:  0  Agitation:  0     CAM / Delirium:  Negative  Constipation:  Negative  Diarrhea:  Negative    Anxiety:  Is not nervous/anxious  Constipation:  No " constipation    Bowel Management Plan (BMP):  No      Modified Shashi Scale:  0    Performance Status:  100    ECOG Performance Status ndGndrndanddndend:nd nd2nd Living Arrangements:  Lives with spouse    Psychosocial/Cultural:   See Palliative Psychosocial Note: Yes  Drives school bus.     3 adult children.     **Primary  to Follow**  Palliative Care  Consult: Yes    Spiritual:  F - Bri and Belief:  God  I - Importance:  Highly            Medications:    Current Outpatient Medications:     acetaminophen (TYLENOL) 325 MG tablet, Take 325 mg by mouth every 6 (six) hours as needed for Pain., Disp: , Rfl:     amLODIPine (NORVASC) 2.5 MG tablet, Take 1 tablet by mouth once daily, Disp: 90 tablet, Rfl: 2    buPROPion (WELLBUTRIN XL) 150 MG TB24 tablet, Take 1 tablet (150 mg total) by mouth once daily., Disp: 30 tablet, Rfl: 5    dicyclomine (BENTYL) 20 mg tablet, Take 20 mg by mouth every 6 (six) hours. Uses prn, Disp: , Rfl:     imatinib (GLEEVEC) 400 MG Tab, Take 1 tablet (400 mg total) by mouth 2 (two) times a day., Disp: 60 tablet, Rfl: 11    meloxicam (MOBIC) 7.5 MG tablet, Take 7.5 mg by mouth., Disp: , Rfl:     multivitamin capsule, Take 1 capsule by mouth once daily., Disp: , Rfl:     potassium chloride SA (K-DUR,KLOR-CON) 20 MEQ tablet, Take 1 tablet by mouth once daily, Disp: 90 tablet, Rfl: 0    pravastatin (PRAVACHOL) 10 MG tablet, Take 1 tablet by mouth once daily, Disp: 90 tablet, Rfl: 3    sodium,potassium,mag sulfates (SUPREP BOWEL PREP KIT) 17.5-3.13-1.6 gram SolR, Take 177 mLs by mouth once daily. for 2 days, Disp: 1 kit, Rfl: 0      External  database queried on 06/15/2024  by EVETTE MEYER :        Review of patient's allergies indicates:  No Known Allergies        OBJECTIVE:         Physical Exam:  Vitals:      Physical Exam  Vitals (Limited due to virtual visit.) reviewed.   Constitutional:       General: He is not in acute distress.     Appearance: He is  ill-appearing.   HENT:      Head: Normocephalic and atraumatic.   Eyes:      General: No scleral icterus.        Right eye: No discharge.         Left eye: No discharge.      Conjunctiva/sclera: Conjunctivae normal.   Pulmonary:      Effort: Pulmonary effort is normal.   Skin:     Capillary Refill: Capillary refill takes less than 2 seconds.   Neurological:      General: No focal deficit present.      Mental Status: He is alert and oriented to person, place, and time.   Psychiatric:         Attention and Perception: Attention and perception normal.         Mood and Affect: Mood and affect normal.         Speech: Speech normal.         Behavior: Behavior normal. Behavior is cooperative.         Thought Content: Thought content normal.         Cognition and Memory: Cognition and memory normal.           Labs: BMP  Lab Results   Component Value Date     05/17/2024    K 4.0 05/17/2024     05/17/2024    CO2 21 (L) 05/17/2024    BUN 11 05/17/2024    CREATININE 0.9 05/17/2024    CALCIUM 8.6 (L) 05/17/2024    ANIONGAP 9 05/17/2024    EGFRNORACEVR >60.0 05/17/2024     Lab Results   Component Value Date    WBC 6.68 05/17/2024    HGB 11.2 (L) 05/17/2024    HCT 33.7 (L) 05/17/2024    MCV 90 05/17/2024     05/17/2024             Imaging: CT C/A/P: 06/05/2024:    1.  Interval disease progression in the abdomen pelvis with worsening peritoneal carcinomatosis and worsening hepatic metastatic disease.  2.  No evidence for thoracic metastatic disease.       I spent a total of 45 minutes on the day of the visit. This includes face to face time in discussion of goals of care, symptom assessment, coordination of care and emotional support.  This also includes non-face to face time preparing to see the patient (eg, review of tests/imaging), obtaining and/or reviewing separately obtained history, documenting clinical information in the electronic or other health record, independently interpreting results and  communicating results to the patient/family/caregiver, or care coordinator.     Additional 20 min time spent on a voluntary advance care planning and /or goals of care discussion, providing emotional support, formulating and communicating prognosis and exploring burden/benefit of various approaches of treatment.       EVETTE STAPLES NP

## 2024-06-14 ENCOUNTER — TELEPHONE (OUTPATIENT)
Dept: ENDOSCOPY | Facility: HOSPITAL | Age: 64
End: 2024-06-14
Payer: COMMERCIAL

## 2024-06-14 ENCOUNTER — PATIENT MESSAGE (OUTPATIENT)
Dept: ENDOSCOPY | Facility: HOSPITAL | Age: 64
End: 2024-06-14
Payer: COMMERCIAL

## 2024-06-14 DIAGNOSIS — K63.5 POLYP OF COLON, UNSPECIFIED PART OF COLON, UNSPECIFIED TYPE: ICD-10-CM

## 2024-06-14 DIAGNOSIS — K63.5 POLYP OF COLON, UNSPECIFIED PART OF COLON, UNSPECIFIED TYPE: Primary | ICD-10-CM

## 2024-06-14 DIAGNOSIS — Z86.010 HISTORY OF COLON POLYPS: Primary | ICD-10-CM

## 2024-06-14 NOTE — TELEPHONE ENCOUNTER
Spoke to patient to schedule procedure(s) Colonoscopy       Physician to perform procedure(s) Dr. TACHO Shabazz  Date of Procedure (s) 8/20/2024  Arrival Time 8:00 AM  Time of Procedure(s) 9:00 AM   Location of Procedure(s) [place] 2nd Floor  Type of Rx Prep sent to patient: Suprep  Instructions provided to patient via [facility]    Patient was informed on the following information and verbalized understanding. Screening questionnaire reviewed with patient and complete. If procedure requires anesthesia, a responsible adult needs to be present to accompany the patient home, patient cannot drive after receiving anesthesia. Appointment details are tentative, especially check-in time. Patient will receive a prep-op call 7 days prior to confirm check-in time for procedure. If applicable the patient should contact their pharmacy to verify Rx for procedure prep is ready for pick-up. Patient was advised to call the scheduling department at [phone] if pharmacy states no Rx is available. Patient was advised to call the endoscopy scheduling department if any questions or concerns arise.      [facility] Scheduling Department

## 2024-06-15 RX ORDER — SODIUM, POTASSIUM,MAG SULFATES 17.5-3.13G
1 SOLUTION, RECONSTITUTED, ORAL ORAL DAILY
Qty: 1 KIT | Refills: 0 | Status: SHIPPED | OUTPATIENT
Start: 2024-06-15 | End: 2024-06-17

## 2024-06-17 ENCOUNTER — TELEPHONE (OUTPATIENT)
Dept: RADIOLOGY | Facility: HOSPITAL | Age: 64
End: 2024-06-17
Payer: COMMERCIAL

## 2024-06-17 NOTE — TELEPHONE ENCOUNTER
Interventional Radiology  Called and spoke with pt. to schedule a liver biopsy.  Patient stated he wants to discuss having the procedure with his wife first.  I gave patient our direct callback number [phone] and he stated he will call me back.

## 2024-06-17 NOTE — TELEPHONE ENCOUNTER
Interventional Radiology  Scheduled 10:30AM liver biopsy for 6/26/24 w/patient and his wife.  Instructed pt. to arrive by 9:00AM to the hospital ([address]/ Entrance 2) off [address] in [place] and check in at Outpatient Registration located on the first floor.  He must have a ride and NPO after midnight the night before.  Pt. denies taking ASA or other blood thinners, NSAIDs, fish oil, or GLP-1/GIP agonists.  I gave patient our direct callback number [phone] and he and his wife verbalized understanding of all instructions.

## 2024-06-18 PROBLEM — Z51.5 PALLIATIVE CARE ENCOUNTER: Status: ACTIVE | Noted: 2024-06-18

## 2024-06-18 NOTE — ASSESSMENT & PLAN NOTE
-Code status: Full Code. Does not want to be on life support. He will discuss further with his family.   -HCPOA: SDM: Michelle Juarez. 3 adult children: 2 biological, 1 stepchild.   -GOC: Continue cancer treatment, symptom management, maintain independence and functional status.  -See HPI for further details

## 2024-06-18 NOTE — ASSESSMENT & PLAN NOTE
To peritoneum and liver  Followed by Dr. Dee and Dr. Mesa.  On Gleevec 400mg BID.   Pending liver biopsy.   CT C/A/P: 06/05/2024:  Interval disease progression in the abdomen pelvis with worsening peritoneal carcinomatosis and worsening hepatic metastatic disease. No evidence for thoracic metastatic disease.

## 2024-06-24 ENCOUNTER — LAB VISIT (OUTPATIENT)
Dept: LAB | Facility: HOSPITAL | Age: 64
End: 2024-06-24
Attending: INTERNAL MEDICINE
Payer: COMMERCIAL

## 2024-06-24 DIAGNOSIS — C49.A0 MALIGNANT GASTROINTESTINAL STROMAL TUMOR, UNSPECIFIED SITE: ICD-10-CM

## 2024-06-24 DIAGNOSIS — R53.83 FATIGUE, UNSPECIFIED TYPE: ICD-10-CM

## 2024-06-24 LAB
ALBUMIN SERPL BCP-MCNC: 3.2 G/DL (ref 3.5–5.2)
ALP SERPL-CCNC: 65 U/L (ref 55–135)
ALT SERPL W/O P-5'-P-CCNC: 20 U/L (ref 10–44)
ANION GAP SERPL CALC-SCNC: 9 MMOL/L (ref 8–16)
AST SERPL-CCNC: 29 U/L (ref 10–40)
BASOPHILS # BLD AUTO: 0.03 K/UL (ref 0–0.2)
BASOPHILS NFR BLD: 0.4 % (ref 0–1.9)
BILIRUB SERPL-MCNC: 0.4 MG/DL (ref 0.1–1)
BUN SERPL-MCNC: 10 MG/DL (ref 8–23)
CALCIUM SERPL-MCNC: 8.6 MG/DL (ref 8.7–10.5)
CHLORIDE SERPL-SCNC: 108 MMOL/L (ref 95–110)
CO2 SERPL-SCNC: 23 MMOL/L (ref 23–29)
CREAT SERPL-MCNC: 1 MG/DL (ref 0.5–1.4)
DIFFERENTIAL METHOD BLD: ABNORMAL
EOSINOPHIL # BLD AUTO: 0.2 K/UL (ref 0–0.5)
EOSINOPHIL NFR BLD: 2.8 % (ref 0–8)
ERYTHROCYTE [DISTWIDTH] IN BLOOD BY AUTOMATED COUNT: 19 % (ref 11.5–14.5)
EST. GFR  (NO RACE VARIABLE): >60 ML/MIN/1.73 M^2
GLUCOSE SERPL-MCNC: 167 MG/DL (ref 70–110)
HCT VFR BLD AUTO: 31.2 % (ref 40–54)
HGB BLD-MCNC: 10.4 G/DL (ref 14–18)
IMM GRANULOCYTES # BLD AUTO: 0.01 K/UL (ref 0–0.04)
IMM GRANULOCYTES NFR BLD AUTO: 0.1 % (ref 0–0.5)
LYMPHOCYTES # BLD AUTO: 1.8 K/UL (ref 1–4.8)
LYMPHOCYTES NFR BLD: 25.3 % (ref 18–48)
MCH RBC QN AUTO: 29.8 PG (ref 27–31)
MCHC RBC AUTO-ENTMCNC: 33.3 G/DL (ref 32–36)
MCV RBC AUTO: 89 FL (ref 82–98)
MONOCYTES # BLD AUTO: 0.6 K/UL (ref 0.3–1)
MONOCYTES NFR BLD: 8 % (ref 4–15)
NEUTROPHILS # BLD AUTO: 4.5 K/UL (ref 1.8–7.7)
NEUTROPHILS NFR BLD: 63.5 % (ref 38–73)
NRBC BLD-RTO: 0 /100 WBC
PLATELET # BLD AUTO: 211 K/UL (ref 150–450)
PMV BLD AUTO: 9.3 FL (ref 9.2–12.9)
POTASSIUM SERPL-SCNC: 3.1 MMOL/L (ref 3.5–5.1)
PROT SERPL-MCNC: 6.2 G/DL (ref 6–8.4)
RBC # BLD AUTO: 3.49 M/UL (ref 4.6–6.2)
SODIUM SERPL-SCNC: 140 MMOL/L (ref 136–145)
TSH SERPL DL<=0.005 MIU/L-ACNC: 0.68 UIU/ML (ref 0.4–4)
WBC # BLD AUTO: 7.03 K/UL (ref 3.9–12.7)

## 2024-06-24 PROCEDURE — 80053 COMPREHEN METABOLIC PANEL: CPT | Performed by: INTERNAL MEDICINE

## 2024-06-24 PROCEDURE — 85025 COMPLETE CBC W/AUTO DIFF WBC: CPT | Mod: PO | Performed by: INTERNAL MEDICINE

## 2024-06-24 PROCEDURE — 36415 COLL VENOUS BLD VENIPUNCTURE: CPT | Mod: PO | Performed by: INTERNAL MEDICINE

## 2024-06-24 PROCEDURE — 84443 ASSAY THYROID STIM HORMONE: CPT | Performed by: INTERNAL MEDICINE

## 2024-06-25 NOTE — PROGRESS NOTES
Pre-procedure phone call made at this time. Informed pt to be NPO after midnight, show up to [facility] on [address] at 0930, either have a ride with them or have a phone number for the person driving them home so that we can get in contact with them to keep them updated. Answered all questions that the pt had and pt verbalized understanding of all discussed.

## 2024-06-26 ENCOUNTER — HOSPITAL ENCOUNTER (OUTPATIENT)
Dept: RADIOLOGY | Facility: HOSPITAL | Age: 64
Discharge: HOME | End: 2024-06-26
Attending: PHYSICIAN ASSISTANT
Payer: COMMERCIAL

## 2024-06-26 ENCOUNTER — HOSPITAL ENCOUNTER (OUTPATIENT)
Dept: RADIOLOGY | Facility: HOSPITAL | Age: 64
Discharge: HOME | End: 2024-06-26
Attending: INTERNAL MEDICINE
Payer: COMMERCIAL

## 2024-06-26 VITALS
BODY MASS INDEX: 31.07 KG/M2 | DIASTOLIC BLOOD PRESSURE: 70 MMHG | HEIGHT: 68 IN | OXYGEN SATURATION: 99 % | WEIGHT: 205 LBS | HEART RATE: 80 BPM | RESPIRATION RATE: 16 BRPM | SYSTOLIC BLOOD PRESSURE: 125 MMHG

## 2024-06-26 DIAGNOSIS — K76.9 LIVER LESION: ICD-10-CM

## 2024-06-26 DIAGNOSIS — C49.A0 MALIGNANT GASTROINTESTINAL STROMAL TUMOR, UNSPECIFIED SITE: ICD-10-CM

## 2024-06-26 LAB
ALBUMIN SERPL BCP-MCNC: 3.4 G/DL (ref 3.5–5.2)
ALP SERPL-CCNC: 74 U/L (ref 55–135)
ALT SERPL W/O P-5'-P-CCNC: 24 U/L (ref 10–44)
ANION GAP SERPL CALC-SCNC: 10 MMOL/L (ref 8–16)
AST SERPL-CCNC: 32 U/L (ref 10–40)
BILIRUB SERPL-MCNC: 0.6 MG/DL (ref 0.1–1)
BUN SERPL-MCNC: 12 MG/DL (ref 8–23)
CALCIUM SERPL-MCNC: 8.6 MG/DL (ref 8.7–10.5)
CHLORIDE SERPL-SCNC: 111 MMOL/L (ref 95–110)
CO2 SERPL-SCNC: 22 MMOL/L (ref 23–29)
CREAT SERPL-MCNC: 1 MG/DL (ref 0.5–1.4)
EST. GFR  (NO RACE VARIABLE): >60 ML/MIN/1.73 M^2
GLUCOSE SERPL-MCNC: 102 MG/DL (ref 70–110)
INR PPP: 1.2 (ref 0.8–1.2)
POTASSIUM SERPL-SCNC: 3.1 MMOL/L (ref 3.5–5.1)
PROT SERPL-MCNC: 6.6 G/DL (ref 6–8.4)
PROTHROMBIN TIME: 13.4 SEC (ref 9–12.5)
SODIUM SERPL-SCNC: 143 MMOL/L (ref 136–145)

## 2024-06-26 PROCEDURE — 25000003 PHARM REV CODE 250: Performed by: STUDENT IN AN ORGANIZED HEALTH CARE EDUCATION/TRAINING PROGRAM

## 2024-06-26 PROCEDURE — 80053 COMPREHEN METABOLIC PANEL: CPT | Performed by: INTERNAL MEDICINE

## 2024-06-26 PROCEDURE — 63600175 PHARM REV CODE 636 W HCPCS: Performed by: STUDENT IN AN ORGANIZED HEALTH CARE EDUCATION/TRAINING PROGRAM

## 2024-06-26 PROCEDURE — 47000 NEEDLE BIOPSY OF LIVER PERQ: CPT

## 2024-06-26 PROCEDURE — 47000 NEEDLE BIOPSY OF LIVER PERQ: CPT | Mod: ,,, | Performed by: STUDENT IN AN ORGANIZED HEALTH CARE EDUCATION/TRAINING PROGRAM

## 2024-06-26 PROCEDURE — 77012 CT SCAN FOR NEEDLE BIOPSY: CPT | Mod: 26,,, | Performed by: STUDENT IN AN ORGANIZED HEALTH CARE EDUCATION/TRAINING PROGRAM

## 2024-06-26 PROCEDURE — 85610 PROTHROMBIN TIME: CPT | Performed by: INTERNAL MEDICINE

## 2024-06-26 RX ORDER — MIDAZOLAM HYDROCHLORIDE 1 MG/ML
INJECTION, SOLUTION INTRAMUSCULAR; INTRAVENOUS CODE/TRAUMA/SEDATION MEDICATION
Status: COMPLETED | OUTPATIENT
Start: 2024-06-26 | End: 2024-06-26

## 2024-06-26 RX ORDER — FENTANYL CITRATE 50 UG/ML
INJECTION, SOLUTION INTRAMUSCULAR; INTRAVENOUS CODE/TRAUMA/SEDATION MEDICATION
Status: COMPLETED | OUTPATIENT
Start: 2024-06-26 | End: 2024-06-26

## 2024-06-26 RX ORDER — KETOROLAC TROMETHAMINE 30 MG/ML
30 INJECTION, SOLUTION INTRAMUSCULAR; INTRAVENOUS ONCE
Status: COMPLETED | OUTPATIENT
Start: 2024-06-26 | End: 2024-06-26

## 2024-06-26 RX ORDER — LIDOCAINE HYDROCHLORIDE 10 MG/ML
INJECTION INFILTRATION; PERINEURAL CODE/TRAUMA/SEDATION MEDICATION
Status: COMPLETED | OUTPATIENT
Start: 2024-06-26 | End: 2024-06-26

## 2024-06-26 RX ADMIN — FENTANYL CITRATE 50 MCG: 50 INJECTION, SOLUTION INTRAMUSCULAR; INTRAVENOUS at 11:06

## 2024-06-26 RX ADMIN — FENTANYL CITRATE 50 MCG: 50 INJECTION, SOLUTION INTRAMUSCULAR; INTRAVENOUS at 10:06

## 2024-06-26 RX ADMIN — KETOROLAC TROMETHAMINE 30 MG: 30 INJECTION, SOLUTION INTRAMUSCULAR; INTRAVENOUS at 01:06

## 2024-06-26 RX ADMIN — LIDOCAINE HYDROCHLORIDE 5 ML: 10 INJECTION, SOLUTION INFILTRATION; PERINEURAL at 10:06

## 2024-06-26 RX ADMIN — MIDAZOLAM 1 MG: 1 INJECTION INTRAMUSCULAR; INTRAVENOUS at 11:06

## 2024-06-26 RX ADMIN — MIDAZOLAM 1 MG: 1 INJECTION INTRAMUSCULAR; INTRAVENOUS at 10:06

## 2024-06-26 NOTE — DISCHARGE SUMMARY
[facility])  Discharge Note  Short Stay    CT Biopsy Liver (xpd)      OUTCOME: Patient tolerated treatment/procedure well without complication and is now ready for discharge.    DISPOSITION: Home or Self Care    FINAL DIAGNOSIS:  <principal problem not specified>    FOLLOWUP: In clinic    DISCHARGE INSTRUCTIONS:  No discharge procedures on file.      Clinical Reference Documents Added to Patient Instructions         Document    LIVER BIOPSY DISCHARGE INSTRUCTIONS (ENGLISH)            TIME SPENT ON DISCHARGE: 15 minutes    Pre Op Diagnosis: liver mass     Post Op Diagnosis: same     Procedure:  biopsy     Procedure performed by: Tyler DILLON, Lavinia MONET     Written Informed Consent Obtained: Yes     Specimen Removed:  yes     Estimated Blood Loss:  minimal     Findings: Local anesthesia     Sedation:  yes     The patient tolerated the procedure well and there were no complications.      Disposition:  F/U in clinic or with ordering physician    Discharge instructions:  Light activity for 24 hours.  Remove band aid in 24 hours.  No baths (showers are appropriate).      Sterile technique was performed in the RUQ, lidocaine was used as a local anesthetic.  Multiple samples taken percutaneously from the liver mass.  Pt tolerated the procedure well without immediate complications.  Please see radiologist report for details. F/u with PCP and/or ordering physician.

## 2024-06-27 NOTE — PROGRESS NOTES
"                                                         PROGRESS NOTE    Subjective:       Patient ID: Agusto Juarez is a 64 y.o. male.    Chief Complaint: follow up for stage IV GIST    Diagnosis:  Metastatic GIST, KIT/PDGFRA/BRAF/SDH wild-type, diagnosed in April 2021    Oncologic History:  1.Mr Juarez is a 63 yo man with HTN and high grade dysplasia of villous adenoma of the colon who first saw me on 6/7/24 for further management of gastric GIST. 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 CT A/P 4/26/21 showed "Large upper abdominal mass which may arise from the stomach or pancreas and may involve the liver, duodenum, spleen, and gallbladder. There are also multiple nodular masses throughout the peritoneum and mesentery consistent with peritoneal carcinomatosis." Biopsy of gastric mass and perigastric LN showed grade 1 GIST. Mixed spindle cell and epithelioid type. Mitotic rate cannot be determined. No necrosis. Presumed low-grade (G1). Given 20 cm with low mitotic count, likely moderate risk. Strata test at that time was negative for any mutation. GIST panel was negative.es.  He started gleevec 400 mg daily at that time. CT in Oct 2021 and April 2022 showed response to gleevec with reduction of tumor size. CT in Jan 2023 showed stable disease. He had CT C/A/P done on 6/5/24. It showed "Interval disease progression in the abdomen pelvis with worsening peritoneal carcinomatosis and worsening hepatic metastatic disease." He presents today for a second opinion. He saw Dr Dee yesterday. Sutent was prescribed. Patient remembers being told in the past he can take two 400 mg gleevec a day when it is not working. He started 400 mg bid yesterday and feels better.   Recc staying on 400 mg bid (since 6/6/24) with close follow up scan.     Interval History:   Mr Juarez returns for follow up. Has been taking gleevec 400 mg bid since 6/6/24. " Tolerated well so far with no significant side effects. He has been taking K-Dur 20 mEq daily for a long time. He does have skin color lightening with gleevec. No diarrhea/N/V.    ECO    ROS:   A ten-point system review is obtained and negative except for what was stated in the Interval History.     Physical Examination:   Vital signs reviewed.   General: well hydrated, well developed, in no acute distress  HEENT: normocephalic, EOMI, anicteric sclerae  Neck: supple, no JVD, thyromegaly, cervical or supraclavicular lymphadenopathy  Lungs: clear breath sounds bilaterally, no wheezing, rales, or rhonchi  Heart: RRR, no M/R/G  Abdomen: soft, no tenderness, non-distended, no hepatosplenomegaly, mass, or hernia. BS present  Extremities: no clubbing, cyanosis, or edema  Skin: no rash, ulcer, or open wounds  Neuro: alert and oriented x 4, no focal neuro deficit  Psych: pleasant and appropriate mood and affect    Objective:     Laboratory Data:  Labs reviewed. K 3.1. tempus blood drawn today. Had liver met biopsy on 24 for tempus tissue    Imaging Data:  CT C/A/P 24:  1.  Interval disease progression in the abdomen pelvis with worsening peritoneal carcinomatosis and worsening hepatic metastatic disease.     2.  No evidence for thoracic metastatic disease.       Assessment and Plan:     1. Malignant gastrointestinal stromal tumor, unspecified site    2. Secondary malignant neoplasm of liver    3. Secondary malignancy of parietal peritoneum    4. Immunodeficiency due to chemotherapy    5. Immunodeficiency secondary to neoplasm    6. Primary hypertension    7. Hypokalemia      1-5  - Mr Juarez is a 65 yo man with metastatic wild-type gastric GIST with met to the peritoneum and liver. Diagnosed in 2021. Has been on gleevec 400 mg daily since. Had good response initially. Recent CT 24 showed progression of disease. Patient started gleevec 400 mg bid by himself on 24.  - doing well. Continue with  gleevec 400 mg bid  - rebiopsied liver met for tempus  - tempus blood from today drawn  - return in 3 weeks to monitor labs  - CT in August  - patient asked a lot of good questions including what the plan is if CT shows progression. Discussed if CT shows stable disease, will c/w gleevec 400 mg bid. If there is progression, will do sutent 37.5 mg daily    6  - BP controlled  - c/w current medication    7.  - on K-Dur 20 mEq daily for a long time  - asked patient to take 40 mEq daily for 5 days, followed by 20 mEq daily    Follow-up:     RTC in 3 weeks  Knows to call in the interval if any problems arise.    Electronically signed by Tawny Mesa

## 2024-06-28 ENCOUNTER — OFFICE VISIT (OUTPATIENT)
Dept: HEMATOLOGY/ONCOLOGY | Facility: CLINIC | Age: 64
End: 2024-06-28
Payer: COMMERCIAL

## 2024-06-28 ENCOUNTER — LAB VISIT (OUTPATIENT)
Dept: LAB | Facility: HOSPITAL | Age: 64
End: 2024-06-28
Attending: INTERNAL MEDICINE
Payer: COMMERCIAL

## 2024-06-28 VITALS
DIASTOLIC BLOOD PRESSURE: 78 MMHG | RESPIRATION RATE: 18 BRPM | HEIGHT: 68 IN | BODY MASS INDEX: 28.37 KG/M2 | HEART RATE: 85 BPM | OXYGEN SATURATION: 99 % | SYSTOLIC BLOOD PRESSURE: 133 MMHG | WEIGHT: 187.19 LBS

## 2024-06-28 DIAGNOSIS — D84.821 IMMUNODEFICIENCY DUE TO CHEMOTHERAPY: ICD-10-CM

## 2024-06-28 DIAGNOSIS — C49.A0 MALIGNANT GASTROINTESTINAL STROMAL TUMOR, UNSPECIFIED SITE: Primary | ICD-10-CM

## 2024-06-28 DIAGNOSIS — T45.1X5A IMMUNODEFICIENCY DUE TO CHEMOTHERAPY: ICD-10-CM

## 2024-06-28 DIAGNOSIS — R53.83 FATIGUE, UNSPECIFIED TYPE: ICD-10-CM

## 2024-06-28 DIAGNOSIS — C49.A0 MALIGNANT GASTROINTESTINAL STROMAL TUMOR, UNSPECIFIED SITE: ICD-10-CM

## 2024-06-28 DIAGNOSIS — I10 PRIMARY HYPERTENSION: ICD-10-CM

## 2024-06-28 DIAGNOSIS — D84.81 IMMUNODEFICIENCY SECONDARY TO NEOPLASM: ICD-10-CM

## 2024-06-28 DIAGNOSIS — E87.6 HYPOKALEMIA: ICD-10-CM

## 2024-06-28 DIAGNOSIS — C78.7 SECONDARY MALIGNANT NEOPLASM OF LIVER: ICD-10-CM

## 2024-06-28 DIAGNOSIS — D49.9 IMMUNODEFICIENCY SECONDARY TO NEOPLASM: ICD-10-CM

## 2024-06-28 DIAGNOSIS — C78.6 SECONDARY MALIGNANCY OF PARIETAL PERITONEUM: ICD-10-CM

## 2024-06-28 DIAGNOSIS — Z79.899 IMMUNODEFICIENCY DUE TO CHEMOTHERAPY: ICD-10-CM

## 2024-06-28 LAB — TEMPUS BLOOD ADD-ON: NORMAL

## 2024-06-28 PROCEDURE — 99999 PR PBB SHADOW E&M-EST. PATIENT-LVL IV: CPT | Mod: PBBFAC,,, | Performed by: INTERNAL MEDICINE

## 2024-06-28 PROCEDURE — 36415 COLL VENOUS BLD VENIPUNCTURE: CPT | Performed by: INTERNAL MEDICINE

## 2024-06-28 RX ORDER — TOBRAMYCIN AND DEXAMETHASONE 3; 1 MG/ML; MG/ML
SUSPENSION/ DROPS OPHTHALMIC
COMMUNITY
Start: 2024-06-03

## 2024-06-28 RX ORDER — HYDROCODONE BITARTRATE AND ACETAMINOPHEN 10; 325 MG/1; MG/1
1 TABLET ORAL
COMMUNITY
Start: 2024-06-04

## 2024-06-28 NOTE — Clinical Note
Check CBC, CMP, TSH at [facility] on 7/18, virtual with me on 7/19. CT C/A/P, CBC, CMP, TSH on 8/7, see me on 8/9

## 2024-07-18 ENCOUNTER — LAB VISIT (OUTPATIENT)
Dept: LAB | Facility: HOSPITAL | Age: 64
End: 2024-07-18
Attending: INTERNAL MEDICINE
Payer: COMMERCIAL

## 2024-07-18 DIAGNOSIS — C49.A0 MALIGNANT GASTROINTESTINAL STROMAL TUMOR, UNSPECIFIED SITE: ICD-10-CM

## 2024-07-18 DIAGNOSIS — R53.83 FATIGUE, UNSPECIFIED TYPE: ICD-10-CM

## 2024-07-18 LAB
ALBUMIN SERPL BCP-MCNC: 3.3 G/DL (ref 3.5–5.2)
ALP SERPL-CCNC: 88 U/L (ref 55–135)
ALT SERPL W/O P-5'-P-CCNC: 23 U/L (ref 10–44)
ANION GAP SERPL CALC-SCNC: 10 MMOL/L (ref 8–16)
AST SERPL-CCNC: 30 U/L (ref 10–40)
BASOPHILS # BLD AUTO: 0.04 K/UL (ref 0–0.2)
BASOPHILS NFR BLD: 0.8 % (ref 0–1.9)
BILIRUB SERPL-MCNC: 0.5 MG/DL (ref 0.1–1)
BUN SERPL-MCNC: 9 MG/DL (ref 8–23)
CALCIUM SERPL-MCNC: 8.8 MG/DL (ref 8.7–10.5)
CHLORIDE SERPL-SCNC: 109 MMOL/L (ref 95–110)
CO2 SERPL-SCNC: 21 MMOL/L (ref 23–29)
CREAT SERPL-MCNC: 1.1 MG/DL (ref 0.5–1.4)
DIFFERENTIAL METHOD BLD: ABNORMAL
EOSINOPHIL # BLD AUTO: 0.2 K/UL (ref 0–0.5)
EOSINOPHIL NFR BLD: 3.4 % (ref 0–8)
ERYTHROCYTE [DISTWIDTH] IN BLOOD BY AUTOMATED COUNT: 19.8 % (ref 11.5–14.5)
EST. GFR  (NO RACE VARIABLE): >60 ML/MIN/1.73 M^2
GLUCOSE SERPL-MCNC: 151 MG/DL (ref 70–110)
HCT VFR BLD AUTO: 29.6 % (ref 40–54)
HGB BLD-MCNC: 9.9 G/DL (ref 14–18)
IMM GRANULOCYTES # BLD AUTO: 0.01 K/UL (ref 0–0.04)
IMM GRANULOCYTES NFR BLD AUTO: 0.2 % (ref 0–0.5)
LYMPHOCYTES # BLD AUTO: 1.4 K/UL (ref 1–4.8)
LYMPHOCYTES NFR BLD: 25.4 % (ref 18–48)
MCH RBC QN AUTO: 30.5 PG (ref 27–31)
MCHC RBC AUTO-ENTMCNC: 33.4 G/DL (ref 32–36)
MCV RBC AUTO: 91 FL (ref 82–98)
MONOCYTES # BLD AUTO: 0.5 K/UL (ref 0.3–1)
MONOCYTES NFR BLD: 9 % (ref 4–15)
NEUTROPHILS # BLD AUTO: 3.3 K/UL (ref 1.8–7.7)
NEUTROPHILS NFR BLD: 61.4 % (ref 38–73)
NRBC BLD-RTO: 0 /100 WBC
PLATELET # BLD AUTO: 249 K/UL (ref 150–450)
PMV BLD AUTO: 9.9 FL (ref 9.2–12.9)
POTASSIUM SERPL-SCNC: 3.7 MMOL/L (ref 3.5–5.1)
PROT SERPL-MCNC: 6.4 G/DL (ref 6–8.4)
RBC # BLD AUTO: 3.25 M/UL (ref 4.6–6.2)
SODIUM SERPL-SCNC: 140 MMOL/L (ref 136–145)
TSH SERPL DL<=0.005 MIU/L-ACNC: 0.67 UIU/ML (ref 0.4–4)
WBC # BLD AUTO: 5.31 K/UL (ref 3.9–12.7)

## 2024-07-18 PROCEDURE — 36415 COLL VENOUS BLD VENIPUNCTURE: CPT | Mod: PO | Performed by: INTERNAL MEDICINE

## 2024-07-18 PROCEDURE — 80053 COMPREHEN METABOLIC PANEL: CPT | Performed by: INTERNAL MEDICINE

## 2024-07-18 PROCEDURE — 84443 ASSAY THYROID STIM HORMONE: CPT | Performed by: INTERNAL MEDICINE

## 2024-07-18 PROCEDURE — 85025 COMPLETE CBC W/AUTO DIFF WBC: CPT | Mod: PO | Performed by: INTERNAL MEDICINE

## 2024-07-19 ENCOUNTER — OFFICE VISIT (OUTPATIENT)
Dept: HEMATOLOGY/ONCOLOGY | Facility: CLINIC | Age: 64
End: 2024-07-19
Payer: COMMERCIAL

## 2024-07-19 ENCOUNTER — PATIENT MESSAGE (OUTPATIENT)
Dept: HEMATOLOGY/ONCOLOGY | Facility: CLINIC | Age: 64
End: 2024-07-19
Payer: COMMERCIAL

## 2024-07-19 DIAGNOSIS — E87.6 HYPOKALEMIA: ICD-10-CM

## 2024-07-19 DIAGNOSIS — D49.9 IMMUNODEFICIENCY SECONDARY TO NEOPLASM: ICD-10-CM

## 2024-07-19 DIAGNOSIS — C78.6 SECONDARY MALIGNANCY OF PARIETAL PERITONEUM: ICD-10-CM

## 2024-07-19 DIAGNOSIS — C78.7 SECONDARY MALIGNANT NEOPLASM OF LIVER: ICD-10-CM

## 2024-07-19 DIAGNOSIS — Z79.899 IMMUNODEFICIENCY DUE TO CHEMOTHERAPY: ICD-10-CM

## 2024-07-19 DIAGNOSIS — I10 PRIMARY HYPERTENSION: ICD-10-CM

## 2024-07-19 DIAGNOSIS — R11.0 NAUSEA: ICD-10-CM

## 2024-07-19 DIAGNOSIS — D84.81 IMMUNODEFICIENCY SECONDARY TO NEOPLASM: ICD-10-CM

## 2024-07-19 DIAGNOSIS — C49.A0 MALIGNANT GASTROINTESTINAL STROMAL TUMOR, UNSPECIFIED SITE: Primary | ICD-10-CM

## 2024-07-19 DIAGNOSIS — T45.1X5A IMMUNODEFICIENCY DUE TO CHEMOTHERAPY: ICD-10-CM

## 2024-07-19 DIAGNOSIS — D84.821 IMMUNODEFICIENCY DUE TO CHEMOTHERAPY: ICD-10-CM

## 2024-07-19 PROCEDURE — 1159F MED LIST DOCD IN RCRD: CPT | Mod: CPTII,95,, | Performed by: INTERNAL MEDICINE

## 2024-07-19 PROCEDURE — 1160F RVW MEDS BY RX/DR IN RCRD: CPT | Mod: CPTII,95,, | Performed by: INTERNAL MEDICINE

## 2024-07-19 PROCEDURE — 99215 OFFICE O/P EST HI 40 MIN: CPT | Mod: 95,,, | Performed by: INTERNAL MEDICINE

## 2024-07-19 RX ORDER — ONDANSETRON 8 MG/1
8 TABLET, ORALLY DISINTEGRATING ORAL EVERY 8 HOURS PRN
Qty: 30 TABLET | Refills: 2 | Status: SHIPPED | OUTPATIENT
Start: 2024-07-19

## 2024-07-19 NOTE — PROGRESS NOTES
"The patient location is: home  The chief complaint leading to consultation is: follow up for GIST    Visit type: audiovisual    Face to Face time with patient: 15 min  40 minutes of total time spent on the encounter, which includes face to face time and non-face to face time preparing to see the patient (eg, review of tests), Obtaining and/or reviewing separately obtained history, Documenting clinical information in the electronic or other health record, Independently interpreting results (not separately reported) and communicating results to the patient/family/caregiver, or Care coordination (not separately reported).         Each patient to whom he or she provides medical services by telemedicine is:  (1) informed of the relationship between the physician and patient and the respective role of any other health care provider with respect to management of the patient; and (2) notified that he or she may decline to receive medical services by telemedicine and may withdraw from such care at any time.    Notes:                                                              PROGRESS NOTE    Subjective:       Patient ID: Agusto Juarez is a 64 y.o. male.    Chief Complaint: follow up for stage IV GIST    Diagnosis:  Metastatic GIST, KIT/PDGFRA/BRAF/SDH wild-type, diagnosed in April 2021  [facility] blood 6/7/24: positive for KIT exon 13 p.V654A mutation, TMB 8.6. MSI high not detected    Oncologic History:  1.Mr Juarez is a 63 yo man with HTN and high grade dysplasia of villous adenoma of the colon who first saw me on 6/7/24 for further management of gastric GIST. 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 CT A/P 4/26/21 showed "Large upper abdominal mass which may arise from the stomach or pancreas and may involve the liver, duodenum, spleen, and gallbladder. There are also multiple nodular masses throughout the peritoneum and mesentery consistent with " "peritoneal carcinomatosis." Biopsy of gastric mass and perigastric LN showed grade 1 GIST. Mixed spindle cell and epithelioid type. Mitotic rate cannot be determined. No necrosis. Presumed low-grade (G1). Given 20 cm with low mitotic count, likely moderate risk. Strata test at that time was negative for any mutation. GIST panel was negative.es.  He started gleevec 400 mg daily at that time. CT in Oct 2021 and 2022 showed response to gleevec with reduction of tumor size. CT in 2023 showed stable disease. He had CT C/A/P done on 24. It showed "Interval disease progression in the abdomen pelvis with worsening peritoneal carcinomatosis and worsening hepatic metastatic disease." He presents today for a second opinion. He saw Dr Dee yesterday. Sutent was prescribed. Patient remembers being told in the past he can take two 400 mg gleevec a day when it is not working. He started 400 mg bid yesterday and feels better.   Recc staying on 400 mg bid (since 24) with close follow up scan.     Interval History:   Mr Juarez returns for follow up. Has been taking gleevec 400 mg bid since 24. Tolerated well so far. He does have nausea sometimes.     ECO    ROS:   A ten-point system review is obtained and negative except for what was stated in the Interval History.     Physical Examination:    General: well hydrated, well developed, in no acute distress  HEENT: normocephalic, EOMI, anicteric sclerae  Neuro: alert and oriented x 4  Psych: pleasant and appropriate mood and affect    Objective:     Laboratory Data:  Labs reviewed. CBC, CMP unremarkable    Imaging Data:  CT C/A/P 24:  1.  Interval disease progression in the abdomen pelvis with worsening peritoneal carcinomatosis and worsening hepatic metastatic disease.     2.  No evidence for thoracic metastatic disease.       Assessment and Plan:     1. Malignant gastrointestinal stromal tumor, unspecified site    2. Secondary malignant neoplasm of liver "    3. Secondary malignancy of parietal peritoneum    4. Immunodeficiency due to chemotherapy    5. Immunodeficiency secondary to neoplasm    6. Primary hypertension    7. Hypokalemia    8. Nausea        1-5  - Mr Juarez is a 65 yo man with metastatic wild-type gastric GIST with met to the peritoneum and liver. Diagnosed in April 2021. Has been on gleevec 400 mg daily since. Had good response initially. Recent CT 6/5/24 showed progression of disease. Patient started gleevec 400 mg bid by himself on 6/6/24.  - doing well. Continue with gleevec 400 mg bid  - reviewed tempus blood results for patient. He does have a sensitizing KIT mutation  - c/w gleevec 400 mg bid  - reviewed biopsy results of liver with patient. Metastatic GIST. Will send for tempus tissue. Messaged tempus nurse  - patient asked if I have heard of biomarker testing. Discussed with patient tempus is a type of biomarker testing that tests for DNA of the cancer  - return in 3 weeks with restaging CT    6  - monitor BP    7.  - K normal now    8.  - zofran sent    Follow-up:     RTC in 3 weeks  Knows to call in the interval if any problems arise.    Electronically signed by Tawny Mesa

## 2024-08-05 DIAGNOSIS — R89.8 ABNORMAL GENETIC TEST: Primary | ICD-10-CM

## 2024-08-06 ENCOUNTER — TELEPHONE (OUTPATIENT)
Dept: HEMATOLOGY/ONCOLOGY | Facility: CLINIC | Age: 64
End: 2024-08-06
Payer: COMMERCIAL

## 2024-08-07 ENCOUNTER — HOSPITAL ENCOUNTER (OUTPATIENT)
Dept: RADIOLOGY | Facility: HOSPITAL | Age: 64
Discharge: HOME | End: 2024-08-07
Attending: INTERNAL MEDICINE
Payer: COMMERCIAL

## 2024-08-07 DIAGNOSIS — C78.6 SECONDARY MALIGNANCY OF PARIETAL PERITONEUM: ICD-10-CM

## 2024-08-07 DIAGNOSIS — C49.A0 MALIGNANT GASTROINTESTINAL STROMAL TUMOR, UNSPECIFIED SITE: ICD-10-CM

## 2024-08-07 DIAGNOSIS — C78.7 SECONDARY MALIGNANT NEOPLASM OF LIVER: ICD-10-CM

## 2024-08-07 PROCEDURE — 71260 CT THORAX DX C+: CPT | Mod: 26,,, | Performed by: RADIOLOGY

## 2024-08-07 PROCEDURE — A9698 NON-RAD CONTRAST MATERIALNOC: HCPCS | Performed by: INTERNAL MEDICINE

## 2024-08-07 PROCEDURE — 74177 CT ABD & PELVIS W/CONTRAST: CPT | Mod: 26,,, | Performed by: RADIOLOGY

## 2024-08-07 PROCEDURE — 74177 CT ABD & PELVIS W/CONTRAST: CPT | Mod: TC

## 2024-08-07 PROCEDURE — 25500020 PHARM REV CODE 255: Performed by: INTERNAL MEDICINE

## 2024-08-07 RX ADMIN — IOHEXOL 100 ML: 350 INJECTION, SOLUTION INTRAVENOUS at 11:08

## 2024-08-07 RX ADMIN — IOHEXOL 1000 ML: 12 SOLUTION ORAL at 11:08

## 2024-08-09 ENCOUNTER — OFFICE VISIT (OUTPATIENT)
Dept: HEMATOLOGY/ONCOLOGY | Facility: CLINIC | Age: 64
End: 2024-08-09
Payer: COMMERCIAL

## 2024-08-09 VITALS
WEIGHT: 183.31 LBS | DIASTOLIC BLOOD PRESSURE: 76 MMHG | HEIGHT: 68 IN | TEMPERATURE: 99 F | OXYGEN SATURATION: 99 % | SYSTOLIC BLOOD PRESSURE: 135 MMHG | BODY MASS INDEX: 27.78 KG/M2 | HEART RATE: 81 BPM

## 2024-08-09 DIAGNOSIS — D84.81 IMMUNODEFICIENCY SECONDARY TO NEOPLASM: ICD-10-CM

## 2024-08-09 DIAGNOSIS — D49.9 IMMUNODEFICIENCY SECONDARY TO NEOPLASM: ICD-10-CM

## 2024-08-09 DIAGNOSIS — T45.1X5A IMMUNODEFICIENCY DUE TO CHEMOTHERAPY: ICD-10-CM

## 2024-08-09 DIAGNOSIS — Z79.899 IMMUNODEFICIENCY DUE TO CHEMOTHERAPY: ICD-10-CM

## 2024-08-09 DIAGNOSIS — C49.A0 MALIGNANT GASTROINTESTINAL STROMAL TUMOR, UNSPECIFIED SITE: Primary | ICD-10-CM

## 2024-08-09 DIAGNOSIS — C78.7 SECONDARY MALIGNANT NEOPLASM OF LIVER: ICD-10-CM

## 2024-08-09 DIAGNOSIS — C78.6 SECONDARY MALIGNANCY OF PARIETAL PERITONEUM: ICD-10-CM

## 2024-08-09 DIAGNOSIS — I10 ESSENTIAL HYPERTENSION: ICD-10-CM

## 2024-08-09 DIAGNOSIS — I10 PRIMARY HYPERTENSION: ICD-10-CM

## 2024-08-09 DIAGNOSIS — D84.821 IMMUNODEFICIENCY DUE TO CHEMOTHERAPY: ICD-10-CM

## 2024-08-09 PROCEDURE — 99999 PR PBB SHADOW E&M-EST. PATIENT-LVL IV: CPT | Mod: PBBFAC,,, | Performed by: INTERNAL MEDICINE

## 2024-08-09 RX ORDER — POTASSIUM CHLORIDE 20 MEQ/1
20 TABLET, EXTENDED RELEASE ORAL
Qty: 90 TABLET | Refills: 3 | Status: SHIPPED | OUTPATIENT
Start: 2024-08-09

## 2024-08-09 RX ORDER — SUNITINIB MALATE 37.5 MG/1
37.5 CAPSULE ORAL DAILY
Qty: 30 CAPSULE | Refills: 11 | Status: ACTIVE | OUTPATIENT
Start: 2024-08-09

## 2024-08-12 ENCOUNTER — TELEPHONE (OUTPATIENT)
Dept: HEMATOLOGY/ONCOLOGY | Facility: CLINIC | Age: 64
End: 2024-08-12
Payer: COMMERCIAL

## 2024-08-12 ENCOUNTER — CLINICAL SUPPORT (OUTPATIENT)
Dept: HEMATOLOGY/ONCOLOGY | Facility: CLINIC | Age: 64
End: 2024-08-12
Payer: COMMERCIAL

## 2024-08-12 DIAGNOSIS — Z71.83 ENCOUNTER FOR NONPROCREATIVE GENETIC COUNSELING: Primary | ICD-10-CM

## 2024-08-12 DIAGNOSIS — C49.A0 MALIGNANT GASTROINTESTINAL STROMAL TUMOR, UNSPECIFIED SITE: ICD-10-CM

## 2024-08-12 DIAGNOSIS — R89.8 ABNORMAL GENETIC TEST: ICD-10-CM

## 2024-08-13 ENCOUNTER — TELEPHONE (OUTPATIENT)
Dept: GASTROENTEROLOGY | Facility: CLINIC | Age: 64
End: 2024-08-13
Payer: COMMERCIAL

## 2024-08-13 NOTE — TELEPHONE ENCOUNTER
Spoke to Mr Juarez for pre-call to confirm scheduled Colonoscopy and patient verbalized understanding of the following:       Date of Procedure (s)  verified 8/20/2024  Arrival Time 8:00 AM verified.  Location of Procedure(s) [address] verified.  NPO status reinforced. Ok to continue clear liquids up until 4 hours prior to the Endoscopy procedure.     Pt confirmed receipt of prep instructions and Rx prep (if applicable).  Instructions provided to patient via [facility]  Pt confirmed ride home after procedure if procedure requires anesthesia.   Pre-call screening questionnaire reviewed and completed with patient.   Appointment details are tentative, including check-in time.  If the patient begins taking any blood thinning medications, injectable weight loss/diabetes medications (other than insulin), or Adipex (phentermine) patient was instructed to contact the endoscopy scheduling department as soon as possible.  Patient was advised to call the endoscopy scheduling department if any questions or concerns arise.       [facility]oscopy Scheduling Department

## 2024-08-13 NOTE — PROGRESS NOTES
"Cancer Genetics  Hereditary and High-Risk Clinic  Department of Hematology and Oncology  [facility]    [facility]    Date of Service:  24  Visit Provider:  Stormy Erwin MS, [facility]  Collaborating Physician:  Gretta Gutierres MD    Patient ID  Name: Agusto Juarez    : 1960    MRN: [id number]      Referring Provider:   Tawny Mesa MD  [address] [place] [address]    Televisit Information  The patient location is:  [facility] .    The chief complaint leading to consultation is:  As below.    Visit type: audio only.    The reason for the audio-only service rather than synchronous-audio-and-video virtual visit was related to technical difficulties.      Face-to-face time with patient:  Approximately 43 minutes.    Approximately 87 minutes in total were spent on this encounter, which includes face-to-face time and non-face-to-face time preparing to see the patient (e.g., review of tests), obtaining and/or reviewing separately obtained history, documenting clinical information in the electronic or other health record, independently interpreting results (not separately reported) and communicating results to the patient/family/caregiver, or coordinating care (not separately reported).    Each patient provided medical services by telemedicine is:  (1) informed of the relationship between the physician and patient and the respective role of any other health care provider with respect to management of the patient; and (2) notified that he or she may decline to receive medical services by telemedicine and may withdraw from such care at any time.    SUBJECTIVE      Chief Complaint: Genetic Evaluation (Possibly germline variant identified on [facility] testing)    History of Present Illness (HPI):  Agusto Juarez ("Agusto"), 64 y.o., assigned male sex at birth, is established with the [facility] but new to me.  He was referred by Dr. 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    Age:  64 y.o.   Race and ethnicity:  Black or , Not  or /a  Weight:  183 lb  Height:  5'8"  Previous germline cancer genetic testing:  No    Cancer History: GIST diagnosed at 61  He was first diagnosed with metastatic GIST in April 2021 when he presented with poor oral intake, pain, weight loss. CT A/P 4/26/21 showed "Large upper abdominal mass which may arise from the stomach or pancreas and may involve the liver, duodenum, spleen, and gallbladder. There are also multiple nodular masses throughout the peritoneum and mesentery consistent with peritoneal carcinomatosis." Biopsy of gastric mass and perigastric LN showed grade 1 GIST. Mixed spindle cell and epithelioid type. Mitotic rate cannot be determined. No necrosis. Presumed low-grade (G1). Given 20 cm with low mitotic count, likely moderate risk. Strata test at that time was negative for any mutation. GIST panel was negative.  He started gleevec 400 mg daily at that time. CT in Oct 2021 and April 2022 showed response to gleevec with reduction of tumor size. CT in Jan 2023 showed stable disease. He had CT C/A/P done on 6/5/24. It showed "Interval disease progression in the abdomen pelvis with worsening peritoneal carcinomatosis and worsening hepatic metastatic disease." Sutent was prescribed. Patient remembers being told in the past he can take two 400 mg gleevec a day when it is not working. Has been taking gleevec 400 mg bid since 6/6/24. 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 This variant is listed as c.859C>T, p.Q287*, [id number]. This variant is in ClinVar as a likely pathogenic/pathogenic variant.      Hormone Use  Testosterone: No    GI History  Upper GI screening: " 2021  Most recent colonoscopy: 2024  Colon polyp:  Yes   2024: one 25 mm polyp in the descending colon  2023: Two <1mm sessile polyps were found in the cecum.  A 3 mm pedunculated polyp was found in the proximal descending colon. A 15 mm sessile polyp was found in the descending colon - not removed due to broad base and umbilicated center. Two 2-3mm pedunculated and sessile polyps were found in the sigmoid colon. Three 1mm flat and sessile polyps were found in the sigmoid colon. A less than 1 mm sessile polyp was found in the sigmoid colon.   Pancreatitis:  No    Prostate History  Enlarged prostate identified on colonoscopy  PSA: 2024 - 3.2    Dermatologic History  No issues reported  Skin cancer screening: None    Focused Social History  Alcohol use: Occasionally   Tobacco use: Current smoker  Total number of years smoked:  46 years  Average number of packs smoked:  0.5 pack/day  = 23 pack-years    ONCOLOGY PEDIGREE  Ancestry: Black/   Ashkenazi Gnosticism:  No  Consanguinity:  No  Hereditary cancer genetic testing in blood relatives:  No    Family Cancer Pedigree:  Cancer Pedigree     Agusto reported his  family history of cancer as follows:   Father with lung cancer, .     COUNSELING      Pre-test cancer genetic counseling was conducted.        Causes of Cancer:  Cancer occurs when cells grow out of control. Some genes help protect against cancer by controlling the growth of cells. However, mutations (problems) in these genes can prevent them from working properly, increasing the risk of developing cancer.  Sporadic Cancer: Most people who get cancer have sporadic cancer. Sporadic cancer is caused by mutations that occur during the lifetime. These mutations may be caused by aging, environmental exposures (ex. UV radiation, carcinogens), lifestyle (ex. smoking, drinking alcohol, sunbathing, poor diet), other medical conditions (ex. hepatitis, HPV, ulcerative colitis), or  other factors.   Hereditary Cancer: A small percentage (5-10%) of people who develop cancer were born with a mutation already in one of the cancer protection genes.  Familial Cancer: Cancer can also cluster in families that do not have an identifiable mutation. This may be due to shared environmental or lifestyle factors or genetic risk factors that have not been identified or cannot be detected using current technology or panels.     The likelihood of having a hereditary cancer risk depends on many factors including who in the family had cancer, what type of cancer they had, their age at diagnosis, cancer specifics (such as MMR status of an endometrial or colon cancer or type of breast cancer), and previous genetic testing in the family. Typically, the chance is higher for families that have multiple people with the same or related cancers, an individual with multiple cancers, younger ages of cancer, and certain types of cancer (such as pancreatic or triple negative breast cancer).      Possible Results:    Positive (pathogenic or likely pathogenic variant): A genetic variant was found that is suspected or known to impact the function of the gene. The impact of a positive result on an individual's risk of cancer varies based on the gene, specific variant, individual's sex assigned at birth, personal cancer history, other health history (such as surgical history), and family history. A positive result can impact screening and risk management recommendations. However, there are not always available guidelines for management based on a specific gene variant. Family history and personal risk factors should always be considered. Sometimes, a positive result can also have treatment or reproductive implications.   Negative: No clinically significant variants were reported in the tested genes. A negative result does not indicate that an individual cannot develop cancer or even that the individual is at average risk. An  individual may still be at an increased risk for cancer based on personal risk factors or family history. Additionally, there could be a hereditary cancer predisposition that was not included in a chosen panel or is not detected with current technology.   Variant of Uncertain Significance (VUS): A variant was found. However, the lab does not have enough information to determine if the variant is benign (harmless) or pathogenic (impacts the function of the gene). The laboratory may update (reclassify) the variant over time as more information becomes available. When reclassified, most variants of uncertain significance are reclassified to benign/likely benign. Typically, it is not recommended to  based on the presence of a VUS. The chance of finding a VUS varies based on the test performed. Generally, the chance of finding a VUS increases with the number of genes tested and decreases with the amount of testing of that gene (genes that are tested more frequently or for a longer period of time have a lower VUS rate).     Genetic Mutation Inheritance:  When an individual has a gene mutation, their first-degree relatives (parents, children, and siblings) each have a 50% chance of carrying the same mutation. Other, more distant blood relatives can also be at risk of carrying the same mutation. At-risk relatives of an individual with a mutation should consider genetic testing to help determine their risk for cancer.     Genetic Discrimination: The Genetic Information Nondiscrimination Act of 2008 (ANG) is a [place] federal law that provides some protections against the use of an individual's genetic information by their health insurer and by their employer. Title I of ANG prohibits most health insurers (except for insurance obtained through a job with the  or the Federal Employees Health Benefits Plan) from utilizing an individual's genetic information to make decisions regarding insurance  eligibility or premium charges. Title II of ANG prohibits covered entities from requesting or requiring the genetic information of employees and applicants and from using said information to make employment decisions. This does not apply to employers with fewer than 15 employees or to the .  ANG also does not protect individuals from genetic discrimination by any other type of policy or entity, including but not limited to life insurance, disability insurance, long-term care insurance,  benefits, and  Health Services benefits.    Genetic Testing Options:   Various genetic testing panel options were discussed along with associated benefits, limitations, and risks.   There are several issues to consider regarding multi-gene testing:  Insurance coverage can vary depending on the genetic test panel(s) ordered.  There are limited data and a lack of clear guidelines regarding degree of cancer risk associated with some of the genes assessed and how to communicate and manage risk for carriers of these genes; this issue is compounded by the low incidence rates of hereditary disease; multi-gene tests include moderate-penetrance genes, and they often also include low-penetrance genes for which there are little available data regarding degree of cancer risk and guidelines for risk management.  Increased likelihood of detecting a genetic variant of unknown significance (VUS).  Increased chance of finding genotypically distinct cell lines (i.e., genetic mosaicism) with next-generation sequencing; clones of non-cancerous cell containing certain genetic mutations have been found in healthy adults undergoing multi-gene testing; this phenomenon can often be attributed to clonal hematopoiesis, a condition in which a hematopoietic stem cell begins making blood cells with the same acquired mutation.    The option for DNA only vs DNA+RNA was discussed. Adding RNA has a small chance of helping to clarify a VUS or  detecting genetic variants that DNA only cannot (deep intronic variants). However, it must be conducted on a blood sample (not saliva).     Genetic Testing Guidelines: Agusto's [facility] testing identified a potentially germline variant that could have implications for Agusto's relatives if germline. He understands that this is unlikely to impact his own management but could be important for relatives. Therefore, Agusto was offered germline hereditary cancer genetic testing to confirm the finding. Agusto was concerned about cost so he decided to defer testing at this time while I get a benefits estimate from the laboratory.     ASSESSMENT / PLAN      Follow-up: I will obtain a benefits estimate from the lab and then follow-up with Agusto to see if he is interested in pursuing this testing.     Agusto appeared to have a good understanding of the information. Questions were encouraged and answered to the patient's satisfaction, and he verbalized understanding of the information and agreement with the plan.   Agusto is welcome to contact me if he has any questions, concerns, or updates to his family history.     This assessment is based on the history and reports provided, as well as the current scientific knowledge regarding cancer genetics.     Stormy Erwin, MS, [facility]  Senior Genetic Counselor  Department of Hematology and Oncology  [facility]    [facility]    CC:  Dr. Tawny Mesa

## 2024-08-16 ENCOUNTER — PATIENT MESSAGE (OUTPATIENT)
Dept: ADMINISTRATIVE | Facility: OTHER | Age: 64
End: 2024-08-16
Payer: COMMERCIAL

## 2024-08-16 ENCOUNTER — TELEPHONE (OUTPATIENT)
Dept: HEMATOLOGY/ONCOLOGY | Facility: CLINIC | Age: 64
End: 2024-08-16
Payer: COMMERCIAL

## 2024-08-17 NOTE — TELEPHONE ENCOUNTER
"Called Agusto Juarez to discuss benefits estimate from [employer] (case #[id number]). They estimated "$200  subjected to medical necessity and meeting criteria (Verified BCBS Deductible remaining $.00 Covered at 80% OOP Max Remaining $996.31)." Through [employer], the cost would be $100.     Agusto Juarez decided against testing at this point due to cost. He was encouraged to call back if he has questions or is interested in pursuing testing in the future.   Stormy Erwin, MS, [facility]  Senior Genetic Counselor       "

## 2024-08-19 ENCOUNTER — TELEPHONE (OUTPATIENT)
Dept: PHARMACY | Facility: CLINIC | Age: 64
End: 2024-08-19
Payer: COMMERCIAL

## 2024-08-19 NOTE — TELEPHONE ENCOUNTER
[facility] Chart Review & Patient Outreach Details For Medication Adherence Project    Reason for Outreach Encounter: 3rd Party payor non-compliance report ([employer], BCBS, C, etc)  2.  Patient Outreach Method: Reviewed Patient Chart  3.   Medication in question: pravastatin 10 mg    LAST FILLED: 8/11/24 for 90 day supply  Hyperlipidemia Medications               pravastatin (PRAVACHOL) 10 MG tablet Take 1 tablet by mouth once daily               4.  Reviewed and or Updates Made To: Patient Chart  5. Outreach Outcomes and/or actions taken: Patient filled medication and is on track to be adherent

## 2024-08-20 ENCOUNTER — HOSPITAL ENCOUNTER (OUTPATIENT)
Facility: HOSPITAL | Age: 64
Discharge: HOME | End: 2024-08-20
Attending: INTERNAL MEDICINE | Admitting: INTERNAL MEDICINE
Payer: COMMERCIAL

## 2024-08-20 ENCOUNTER — ANESTHESIA (OUTPATIENT)
Dept: ENDOSCOPY | Facility: HOSPITAL | Age: 64
End: 2024-08-20
Payer: COMMERCIAL

## 2024-08-20 ENCOUNTER — ANESTHESIA EVENT (OUTPATIENT)
Dept: ENDOSCOPY | Facility: HOSPITAL | Age: 64
End: 2024-08-20
Payer: COMMERCIAL

## 2024-08-20 VITALS
OXYGEN SATURATION: 99 % | TEMPERATURE: 98 F | HEART RATE: 84 BPM | HEIGHT: 68 IN | WEIGHT: 183 LBS | SYSTOLIC BLOOD PRESSURE: 176 MMHG | BODY MASS INDEX: 27.74 KG/M2 | DIASTOLIC BLOOD PRESSURE: 80 MMHG | RESPIRATION RATE: 20 BRPM

## 2024-08-20 DIAGNOSIS — Z86.010 HX OF COLONIC POLYP: ICD-10-CM

## 2024-08-20 PROCEDURE — 37000008 HC ANESTHESIA 1ST 15 MINUTES: Performed by: INTERNAL MEDICINE

## 2024-08-20 PROCEDURE — 88305 TISSUE EXAM BY PATHOLOGIST: CPT | Mod: 26,,, | Performed by: STUDENT IN AN ORGANIZED HEALTH CARE EDUCATION/TRAINING PROGRAM

## 2024-08-20 PROCEDURE — 45380 COLONOSCOPY AND BIOPSY: CPT | Mod: 33 | Performed by: INTERNAL MEDICINE

## 2024-08-20 PROCEDURE — 63600175 PHARM REV CODE 636 W HCPCS: Performed by: REGISTERED NURSE

## 2024-08-20 PROCEDURE — 27201012 HC FORCEPS, HOT/COLD, DISP: Performed by: INTERNAL MEDICINE

## 2024-08-20 PROCEDURE — 45380 COLONOSCOPY AND BIOPSY: CPT | Mod: 33,,, | Performed by: INTERNAL MEDICINE

## 2024-08-20 PROCEDURE — 25000003 PHARM REV CODE 250: Performed by: REGISTERED NURSE

## 2024-08-20 PROCEDURE — 37000009 HC ANESTHESIA EA ADD 15 MINS: Performed by: INTERNAL MEDICINE

## 2024-08-20 PROCEDURE — 88305 TISSUE EXAM BY PATHOLOGIST: CPT | Performed by: STUDENT IN AN ORGANIZED HEALTH CARE EDUCATION/TRAINING PROGRAM

## 2024-08-20 RX ORDER — LIDOCAINE HYDROCHLORIDE 20 MG/ML
INJECTION INTRAVENOUS
Status: DISCONTINUED | OUTPATIENT
Start: 2024-08-20 | End: 2024-08-20

## 2024-08-20 RX ORDER — SODIUM CHLORIDE 9 MG/ML
INJECTION, SOLUTION INTRAVENOUS CONTINUOUS
Status: DISCONTINUED | OUTPATIENT
Start: 2024-08-20 | End: 2024-08-20 | Stop reason: HOSPADM

## 2024-08-20 RX ORDER — GLUCAGON 1 MG
1 KIT INJECTION
Status: DISCONTINUED | OUTPATIENT
Start: 2024-08-20 | End: 2024-08-20 | Stop reason: HOSPADM

## 2024-08-20 RX ORDER — PROPOFOL 10 MG/ML
VIAL (ML) INTRAVENOUS
Status: DISCONTINUED | OUTPATIENT
Start: 2024-08-20 | End: 2024-08-20

## 2024-08-20 RX ORDER — SODIUM CHLORIDE 0.9 % (FLUSH) 0.9 %
10 SYRINGE (ML) INJECTION
Status: DISCONTINUED | OUTPATIENT
Start: 2024-08-20 | End: 2024-08-20 | Stop reason: HOSPADM

## 2024-08-20 RX ADMIN — GLYCOPYRROLATE 0.2 MG: 0.2 INJECTION, SOLUTION INTRAMUSCULAR; INTRAVENOUS at 09:08

## 2024-08-20 RX ADMIN — PROPOFOL 30 MG: 10 INJECTION, EMULSION INTRAVENOUS at 09:08

## 2024-08-20 RX ADMIN — LIDOCAINE HYDROCHLORIDE 100 MG: 20 INJECTION INTRAVENOUS at 09:08

## 2024-08-20 RX ADMIN — PROPOFOL 50 MG: 10 INJECTION, EMULSION INTRAVENOUS at 09:08

## 2024-08-20 RX ADMIN — SODIUM CHLORIDE: 9 INJECTION, SOLUTION INTRAVENOUS at 08:08

## 2024-08-20 NOTE — H&P
Short Stay Endoscopy History and Physical    PCP - Joe Bentley MD  Referring Physician - Rikki Shabazz MD  [address] [place] [address]    Procedure - Colonoscopy  ASA - per anesthesia  Mallampati - per anesthesia  History of Anesthesia problems - no  Family history Anesthesia problems -  no   Plan of anesthesia - General    HPI:  This is a 64 y.o. male here for evaluation of: hx of EMR of large colon adenoma    Reflux - no  Dysphagia - no  Abdominal pain - no  Diarrhea - no    ROS:  Constitutional: No fevers, chills, No weight loss  CV: No chest pain  Pulm: No cough, No shortness of breath  GI: see HPI    Medical History:  has a past medical history of BPH (benign prostatic hypertrophy), Hyperlipidemia, Hypertension, Malignant gastrointestinal stromal tumor (05/05/2021), and Villous adenoma of colon (02/07/2024).    Surgical History:  has a past surgical history that includes Esophagogastroduodenoscopy (N/A, 4/30/2021); Endoscopic ultrasound of upper gastrointestinal tract (N/A, 4/30/2021); Colonoscopy (N/A, 9/26/2023); and Colonoscopy (N/A, 2/7/2024).    Family History: family history includes Hypertension in an other family member; Lung cancer (age of onset: 60 - 69) in his father..    Social History:  reports that he has been smoking cigarettes. He started smoking about 46 years ago. He has a 23.2 pack-year smoking history. He has never used smokeless tobacco. He reports current alcohol use. He reports that he does not use drugs.    Review of patient's allergies indicates:  No Known Allergies    Medications:   Medications Prior to Admission   Medication Sig Dispense Refill Last Dose    acetaminophen (TYLENOL) 325 MG tablet Take 325 mg by mouth every 6 (six) hours as needed for Pain.   Past Week    amLODIPine (NORVASC) 2.5 MG tablet Take 1 tablet by mouth once daily 90 tablet 2 Past Week    buPROPion (WELLBUTRIN XL) 150 MG TB24 tablet Take 1 tablet (150 mg total) by mouth once daily. 30  tablet 5 Past Week    multivitamin capsule Take 1 capsule by mouth once daily.   Past Week    potassium chloride SA (K-DUR,KLOR-CON) 20 MEQ tablet Take 1 tablet by mouth once daily 90 tablet 3 Past Week    pravastatin (PRAVACHOL) 10 MG tablet Take 1 tablet by mouth once daily 90 tablet 3 Past Week    SUNItinib malate (SUTENT) 37.5 mg Cap Take 1 capsule (37.5 mg total) by mouth once daily. 30 capsule 11 Past Week    HYDROcodone-acetaminophen (NORCO)  mg per tablet Take 1 tablet by mouth. (Patient not taking: Reported on 8/9/2024)       ondansetron (ZOFRAN-ODT) 8 MG TbDL Take 1 tablet (8 mg total) by mouth every 8 (eight) hours as needed (nausea). 30 tablet 2     tobramycin-dexAMETHasone 0.3-0.1% (TOBRADEX) 0.3-0.1 % DrpS INSTILL 1 DROP INTO LEFT EYE 4 TIMES DAILY          Physical Exam:    Vital Signs:   Vitals:    08/20/24 0835   BP: (!) 172/99   Pulse: 97   Resp: 16   Temp: 99.1 °F (37.3 °C)       General Appearance: Well appearing in no acute distress  Lungs: no labored breathing  CVS:  regular rate  Abdomen: non tender    Labs:  Lab Results   Component Value Date    WBC 5.60 08/07/2024    HGB 9.3 (L) 08/07/2024    HCT 27.6 (L) 08/07/2024     08/07/2024    CHOL 103 (L) 05/17/2024    TRIG 47 05/17/2024    HDL 45 05/17/2024    ALT 19 08/07/2024    AST 28 08/07/2024     08/07/2024    K 3.7 08/07/2024     (H) 08/07/2024    CREATININE 1.1 08/07/2024    BUN 10 08/07/2024    CO2 24 08/07/2024    TSH 0.965 08/07/2024    PSA 3.2 05/20/2024    INR 1.2 06/26/2024    GLUF 86 07/12/2023    HGBA1C 4.9 07/12/2023       I have explained the risks and benefits of this endoscopic procedure to the patient including but not limited to bleeding, inflammation, infection, perforation, and death.      Rikki Shabazz MD

## 2024-08-20 NOTE — TRANSFER OF CARE
"Anesthesia Transfer of Care Note    Patient: Agusto Juarez    Procedure(s) Performed: Procedure(s) (LRB):  COLONOSCOPY (N/A)    Patient location: [facility]    Anesthesia Type: general    Transport from OR: Transported from OR on room air with adequate spontaneous ventilation    Post pain: adequate analgesia    Post assessment: no apparent anesthetic complications and tolerated procedure well    Post vital signs: stable    Level of consciousness: sedated and responds to stimulation    Nausea/Vomiting: no nausea/vomiting    Complications: none    Transfer of care protocol was followed    Last vitals: Visit Vitals  BP (!) 172/99 (Patient Position: Lying)   Pulse 97   Temp 37.3 °C (99.1 °F) (Temporal)   Resp 16   Ht 5' 8" (1.727 m)   Wt 83 kg (183 lb)   SpO2 99%   BMI 27.83 kg/m²     "

## 2024-08-20 NOTE — ANESTHESIA POSTPROCEDURE EVALUATION
Anesthesia Post Evaluation    Patient: Agusto Juarez    Procedure(s) Performed: Procedure(s) (LRB):  COLONOSCOPY (N/A)    Final Anesthesia Type: general      Patient location during evaluation: [facility]  Patient participation: Yes- Able to Participate  Level of consciousness: awake and alert  Post-procedure vital signs: reviewed and stable  Pain management: adequate  Airway patency: patent  KAI mitigation strategies: Multimodal analgesia  PONV status at discharge: No PONV  Anesthetic complications: no      Cardiovascular status: blood pressure returned to baseline, hemodynamically stable and stable  Respiratory status: unassisted, room air and spontaneous ventilation  Hydration status: euvolemic  Follow-up not needed.              Vitals Value Taken Time   /75 08/20/24 0947   Temp 37.1 °C (98.8 °F) 08/20/24 0945   Pulse 75 08/20/24 0955   Resp 20 08/20/24 0955   SpO2 99 % 08/20/24 0955   Vitals shown include unfiled device data.      No case tracking events are documented in the log.      Pain/Lucian Score: Lucian Score: 9 (8/20/2024  9:45 AM)

## 2024-08-20 NOTE — PROVATION PATIENT INSTRUCTIONS
Discharge Summary/Instructions after an Endoscopic Procedure  Patient Name: Agusto Juarez  Patient MRN: [id number]  Patient YOB: 1960 Tuesday, August 20, 2024  Rikki Shabazz MD  Dear patient,  As a result of recent federal legislation (The Federal Cures Act), you may   receive lab or pathology results from your procedure in your [facility]   account before your physician is able to contact you. Your physician or   their representative will relay the results to you with their   recommendations at their soonest availability.  Thank you,  RESTRICTIONS:  During your procedure today, you received medications for sedation.  These   medications may affect your judgment, balance and coordination.  Therefore,   for 24 hours, you have the following restrictions:   - DO NOT drive a car, operate machinery, make legal/financial decisions,   sign important papers or drink alcohol.    ACTIVITY:  Today: no heavy lifting, straining or running due to procedural   sedation/anesthesia.  The following day: return to full activity including work.  DIET:  Eat and drink normally unless instructed otherwise.     TREATMENT FOR COMMON SIDE EFFECTS:  - Mild abdominal pain, nausea, belching, bloating or excessive gas:  rest,   eat lightly and use a heating pad.  - Sore Throat: treat with throat lozenges and/or gargle with warm salt   water.  - Because air was used during the procedure, expelling large amounts of air   from your rectum or belching is normal.  - If a bowel prep was taken, you may not have a bowel movement for 1-3 days.    This is normal.  SYMPTOMS TO WATCH FOR AND REPORT TO YOUR PHYSICIAN:  1. Abdominal pain or bloating, other than gas cramps.  2. Chest pain.  3. Back pain.  4. Signs of infection such as: chills or fever occurring within 24 hours   after the procedure.  5. Rectal bleeding, which would show as bright red, maroon, or black stools.   (A tablespoon of blood from the rectum is not serious, especially if    hemorrhoids are present.)  6. Vomiting.  7. Weakness or dizziness.  GO DIRECTLY TO THE NEAREST EMERGENCY ROOM IF YOU HAVE ANY OF THE FOLLOWING:      Difficulty breathing              Chills and/or fever over 101 F   Persistent vomiting and/or vomiting blood   Severe abdominal pain   Severe chest pain   Black, tarry stools   Bleeding- more than one tablespoon   Any other symptom or condition that you feel may need urgent attention  Your doctor recommends these additional instructions:  If any biopsies were taken, your doctors clinic will contact you in 1 to 2   weeks with any results.  - Discharge patient to home (ambulatory).   - Patient has a contact number available for emergencies.  The signs and   symptoms of potential delayed complications were discussed with the   patient.  Return to normal activities tomorrow.  Written discharge   instructions were provided to the patient.   - Resume previous diet.   - Continue present medications.   - Return to primary care physician as previously scheduled.   - Repeat colonoscopy in 3 years for surveillance. This can be done at   [facility].  - If pathology from scar site shows recurrent adenoma-> will arrange for a   repeat colonoscopy with me in next 3-4 months.  For questions, problems or results please call your physician - Rikki Shabazz MD at Work:  [phone].  [facility], EMERGENCY ROOM PHONE NUMBER: [phone]  IF A COMPLICATION OR EMERGENCY SITUATION ARISES AND YOU ARE UNABLE TO REACH   YOUR PHYSICIAN - GO DIRECTLY TO THE EMERGENCY ROOM.  Rikki Shabazz MD  8/20/2024 9:56:04 AM  This report has been verified and signed electronically.  Dear patient,  As a result of recent federal legislation (The Federal Cures Act), you may   receive lab or pathology results from your procedure in your [facility]   account before your physician is able to contact you. Your physician or   their representative will relay the results to you with their    recommendations at their soonest availability.  Thank you,  PROVATION

## 2024-08-20 NOTE — ANESTHESIA PREPROCEDURE EVALUATION
[facility]  Anesthesia Pre-Operative Evaluation         Patient Name/: Agusto Juarez, 1960  MRN: [id number]    SUBJECTIVE:     Pre-operative evaluation for Procedure(s) (LRB):  COLONOSCOPY (N/A)     2024    Agusto Juarez is a 64 y.o. male w/ a significant PMHx of HTN, HLD, and metastatic GIST.    Patient now presents for the above procedure(s).    ________________________________________  Results for orders placed during the hospital encounter of 21    Echo    Interpretation Summary  · Concentric remodeling and normal systolic function.  · The left ventricular global longitudinal strain is -18.5%.  · The estimated ejection fraction is 60%.  · Normal left ventricular diastolic function.  · With normal right ventricular systolic function.  · Normal central venous pressure (3 mmHg).    Malignant gastrointestinal stromal tumor    Baseline echo 6-3-21  Biplane=65%  4D LVQ=70%  GPLS=-18.5%    ________________________________________    LDA:        Drips:       Patient Active Problem List   Diagnosis    Hypertension    Hyperlipidemia    Smoking    Malignant gastrointestinal stromal tumor    Immunodeficiency due to chemotherapy    Secondary liver cancer    Secondary malignancy of parietal peritoneum    Positive colorectal cancer screening using Cologuard test    Colon polyps    AVM (arteriovenous malformation) of colon    Diverticulosis of colon    Villous adenoma of colon    Palliative care encounter       Review of patient's allergies indicates:  No Known Allergies    Current Inpatient Medications:       No current facility-administered medications on file prior to encounter.     Current Outpatient Medications on File Prior to Encounter   Medication Sig Dispense Refill    acetaminophen (TYLENOL) 325 MG tablet Take 325 mg by mouth every 6 (six) hours as needed for Pain.      amLODIPine (NORVASC) 2.5 MG tablet Take 1 tablet by mouth once daily 90 tablet 2    buPROPion (WELLBUTRIN XL)  150 MG TB24 tablet Take 1 tablet (150 mg total) by mouth once daily. 30 tablet 5    multivitamin capsule Take 1 capsule by mouth once daily.      pravastatin (PRAVACHOL) 10 MG tablet Take 1 tablet by mouth once daily 90 tablet 3       Past Surgical History:   Procedure Laterality Date    COLONOSCOPY N/A 9/26/2023    Procedure: COLONOSCOPY;  Surgeon: Luis Choi MD;  Location: [facility];  Service: Endoscopy;  Laterality: N/A;    COLONOSCOPY N/A 2/7/2024    Procedure: COLONOSCOPY;  Surgeon: Rikki Shabazz MD;  Location: [facility] (2ND FLR);  Service: Endoscopy;  Laterality: N/A;  12/18 portal instr- suprep-colonoscopy with EMR with me in the next 4-6 weeks.[facility] only.45 minute case is okay. Shabazz-tt  1/31/24- precall complete - [facility]    ENDOSCOPIC ULTRASOUND OF UPPER GASTROINTESTINAL TRACT N/A 4/30/2021    Procedure: ULTRASOUND, UPPER GI TRACT, ENDOSCOPIC;  Surgeon: Rikki Shabazz MD;  Location: [facility];  Service: Endoscopy;  Laterality: N/A;  Rapid COVID prior - last minute addition to schedule - ttr    ESOPHAGOGASTRODUODENOSCOPY N/A 4/30/2021    Procedure: EGD (ESOPHAGOGASTRODUODENOSCOPY);  Surgeon: Rikki Shabazz MD;  Location: [facility];  Service: Endoscopy;  Laterality: N/A;  EGD/EUS with biopsy within a week is fine. 45min case with me OK. Any campus. -Dr. Shabazz       Social History:  Tobacco Use: High Risk (8/9/2024)    Patient History     Smoking Tobacco Use: Some Days     Smokeless Tobacco Use: Never     Passive Exposure: Not on file       Alcohol Use: Alcohol Misuse (6/6/2024)    AUDIT-C     Frequency of Alcohol Consumption: 2-4 times a month     Average Number of Drinks: 1 or 2     Frequency of Binge Drinking: Monthly       OBJECTIVE:     Vital Signs Range:  BMI Readings from Last 1 Encounters:   08/09/24 27.87 kg/m²               Significant Labs:        Component Value Date/Time    WBC 5.60 08/07/2024 0949    HGB 9.3 (L) 08/07/2024 0949    HCT 27.6 (L) 08/07/2024 0949     08/07/2024 0949    NA  144 08/07/2024 0949    K 3.7 08/07/2024 0949     (H) 08/07/2024 0949    CO2 24 08/07/2024 0949    GLU 99 08/07/2024 0949    BUN 10 08/07/2024 0949    CREATININE 1.1 08/07/2024 0949    CALCIUM 8.5 (L) 08/07/2024 0949    ALBUMIN 3.3 (L) 08/07/2024 0949    PROT 6.4 08/07/2024 0949    ALKPHOS 99 08/07/2024 0949    BILITOT 0.5 08/07/2024 0949    AST 28 08/07/2024 0949    ALT 19 08/07/2024 0949    INR 1.2 06/26/2024 0938    HGBA1C 4.9 07/12/2023 0828        Please see Results Review for additional labs.     Diagnostic Studies: No relevant studies.    EKG:   Results for orders placed or performed during the hospital encounter of 05/17/22   EKG 12-lead    Collection Time: 05/17/22  6:59 PM    Narrative    Test Reason : R42,    Vent. Rate : 091 BPM     Atrial Rate : 091 BPM     P-R Int : 166 ms          QRS Dur : 096 ms      QT Int : 356 ms       P-R-T Axes : 064 112 006 degrees     QTc Int : 437 ms    Normal sinus rhythm  Left posterior fascicular block  Abnormal ECG  When compared with ECG of 16-MAR-2010 10:05,  Previous ECG has undetermined rhythm, needs review  Confirmed by JAROCHO HILL MD ([id number]) on 5/18/2022 12:43:49 PM    Referred By: [email or web address]   SELF           Confirmed By:JAROCHO HILL MD       ECHO:  See subjective, if available.      ASSESSMENT/PLAN:                                                                                                                  08/20/2024  Agusto Juarez is a 64 y.o., male.      Pre-op Assessment    I have reviewed the Patient Summary Reports.     I have reviewed the Nursing Notes.    I have reviewed the Medications.     Review of Systems  Anesthesia Hx:  No problems with previous Anesthesia   History of prior surgery of interest to airway management or planning:          Denies Family Hx of Anesthesia complications.    Denies Personal Hx of Anesthesia complications.                    Hematology/Oncology:                      Current/Recent Cancer.                 Cardiovascular:     Hypertension       Denies Angina.        ECG has been reviewed.                          Pulmonary:       Denies Shortness of breath.  Denies Recent URI.                 Renal/:  Renal/ Normal                 Hepatic/GI:      Denies GERD. Liver Disease,            Neurological:    Denies CVA.    Denies Seizures.                                Endocrine:  Endocrine Normal Denies Diabetes.               Physical Exam  General: Well nourished    Airway:  Mallampati: III / II  Mouth Opening: Normal  TM Distance: Normal  Tongue: Normal  Neck ROM: Normal ROM    Dental:  Periodontal disease        Anesthesia Plan  Type of Anesthesia, risks & benefits discussed:    Anesthesia Type: Gen Natural Airway  Intra-op Monitoring Plan: Standard ASA Monitors  Post Op Pain Control Plan: multimodal analgesia and IV/PO Opioids PRN  Induction:  IV  Informed Consent: Informed consent signed with the Patient and all parties understand the risks and agree with anesthesia plan.  All questions answered.   ASA Score: 3  Day of Surgery Review of History & Physical: H&P Update referred to the surgeon/provider.    Ready For Surgery From Anesthesia Perspective.     .

## 2024-08-22 ENCOUNTER — HOSPITAL ENCOUNTER (OUTPATIENT)
Dept: CARDIOLOGY | Facility: HOSPITAL | Age: 64
Discharge: HOME | End: 2024-08-22
Attending: INTERNAL MEDICINE
Payer: COMMERCIAL

## 2024-08-22 VITALS
HEIGHT: 68 IN | HEART RATE: 76 BPM | WEIGHT: 183 LBS | SYSTOLIC BLOOD PRESSURE: 176 MMHG | DIASTOLIC BLOOD PRESSURE: 80 MMHG | BODY MASS INDEX: 27.74 KG/M2

## 2024-08-22 DIAGNOSIS — C49.A0 MALIGNANT GASTROINTESTINAL STROMAL TUMOR, UNSPECIFIED SITE: ICD-10-CM

## 2024-08-22 LAB
AORTIC ROOT ANNULUS: 2.83 CM
ASCENDING AORTA: 2.94 CM
AV INDEX (PROSTH): 0.78
AV MEAN GRADIENT: 5 MMHG
AV PEAK GRADIENT: 9 MMHG
AV VALVE AREA BY VELOCITY RATIO: 2.42 CM²
AV VALVE AREA: 2.53 CM²
AV VELOCITY RATIO: 0.75
BSA FOR ECHO PROCEDURE: 2 M2
CV ECHO LV RWT: 0.47 CM
DOP CALC AO PEAK VEL: 1.54 M/S
DOP CALC AO VTI: 27.1 CM
DOP CALC LVOT AREA: 3.2 CM2
DOP CALC LVOT DIAMETER: 2.03 CM
DOP CALC LVOT PEAK VEL: 1.15 M/S
DOP CALC LVOT STROKE VOLUME: 68.58 CM3
DOP CALC RVOT PEAK VEL: 0.8 M/S
DOP CALC RVOT VTI: 16.8 CM
DOP CALCLVOT PEAK VEL VTI: 21.2 CM
E WAVE DECELERATION TIME: 260.69 MSEC
E/A RATIO: 0.63
E/E' RATIO: 8.25 M/S
ECHO LV POSTERIOR WALL: 1.04 CM (ref 0.6–1.1)
FRACTIONAL SHORTENING: 37 % (ref 28–44)
GLOBAL LONGITUIDAL STRAIN: 19.4 %
INTERVENTRICULAR SEPTUM: 1.03 CM (ref 0.6–1.1)
IVRT: 99.9 MSEC
LA MAJOR: 4.21 CM
LA MINOR: 3.89 CM
LA WIDTH: 3.5 CM
LEFT ATRIUM AREA SYSTOLIC (APICAL 2 CHAMBER): 12.82 CM2
LEFT ATRIUM AREA SYSTOLIC (APICAL 4 CHAMBER): 13.44 CM2
LEFT ATRIUM SIZE: 3.15 CM
LEFT ATRIUM VOLUME INDEX MOD: 16.8 ML/M2
LEFT ATRIUM VOLUME INDEX: 19.2 ML/M2
LEFT ATRIUM VOLUME MOD: 33.07 CM3
LEFT ATRIUM VOLUME: 37.89 CM3
LEFT INTERNAL DIMENSION IN SYSTOLE: 2.83 CM (ref 2.1–4)
LEFT VENTRICLE DIASTOLIC VOLUME INDEX: 45.85 ML/M2
LEFT VENTRICLE DIASTOLIC VOLUME: 90.33 ML
LEFT VENTRICLE END SYSTOLIC VOLUME APICAL 2 CHAMBER: 31.56 ML
LEFT VENTRICLE END SYSTOLIC VOLUME APICAL 4 CHAMBER: 33.46 ML
LEFT VENTRICLE MASS INDEX: 80 G/M2
LEFT VENTRICLE SYSTOLIC VOLUME INDEX: 15.4 ML/M2
LEFT VENTRICLE SYSTOLIC VOLUME: 30.27 ML
LEFT VENTRICULAR INTERNAL DIMENSION IN DIASTOLE: 4.46 CM (ref 3.5–6)
LEFT VENTRICULAR MASS: 158.45 G
LV LATERAL E/E' RATIO: 11 M/S
LV SEPTAL E/E' RATIO: 6.6 M/S
LVED V (TEICH): 90.33 ML
LVES V (TEICH): 30.27 ML
LVOT MG: 3.23 MMHG
LVOT MV: 0.85 CM/S
MV PEAK A VEL: 1.04 M/S
MV PEAK E VEL: 0.66 M/S
MV STENOSIS PRESSURE HALF TIME: 75.6 MS
MV VALVE AREA P 1/2 METHOD: 2.91 CM2
OHS CV RV/LV RATIO: 0.69 CM
OHS QRS DURATION: 90 MS
OHS QTC CALCULATION: 415 MS
PULM VEIN S/D RATIO: 1.89
PV MEAN GRADIENT: 2 MMHG
PV PEAK D VEL: 0.37 M/S
PV PEAK GRADIENT: 5 MMHG
PV PEAK S VEL: 0.7 M/S
PV PEAK VELOCITY: 1.13 M/S
RA MAJOR: 4.1 CM
RA PRESSURE ESTIMATED: 3 MMHG
RA WIDTH: 2.97 CM
RIGHT VENTRICULAR END-DIASTOLIC DIMENSION: 3.06 CM
SINUS: 2.49 CM
STJ: 2.14 CM
TDI LATERAL: 0.06 M/S
TDI SEPTAL: 0.1 M/S
TDI: 0.08 M/S
Z-SCORE OF LEFT VENTRICULAR DIMENSION IN END DIASTOLE: -2.39
Z-SCORE OF LEFT VENTRICULAR DIMENSION IN END SYSTOLE: -1.63

## 2024-08-22 PROCEDURE — 93306 TTE W/DOPPLER COMPLETE: CPT | Mod: 26,,, | Performed by: INTERNAL MEDICINE

## 2024-08-22 PROCEDURE — 93356 MYOCRD STRAIN IMG SPCKL TRCK: CPT

## 2024-08-22 PROCEDURE — 93306 TTE W/DOPPLER COMPLETE: CPT

## 2024-08-22 PROCEDURE — 93356 MYOCRD STRAIN IMG SPCKL TRCK: CPT | Mod: ,,, | Performed by: INTERNAL MEDICINE

## 2024-08-22 PROCEDURE — 93010 ELECTROCARDIOGRAM REPORT: CPT | Mod: ,,, | Performed by: INTERNAL MEDICINE

## 2024-08-22 PROCEDURE — 93005 ELECTROCARDIOGRAM TRACING: CPT

## 2024-08-23 ENCOUNTER — OFFICE VISIT (OUTPATIENT)
Dept: HEMATOLOGY/ONCOLOGY | Facility: CLINIC | Age: 64
End: 2024-08-23
Payer: COMMERCIAL

## 2024-08-23 ENCOUNTER — LAB VISIT (OUTPATIENT)
Dept: LAB | Facility: HOSPITAL | Age: 64
End: 2024-08-23
Attending: INTERNAL MEDICINE
Payer: COMMERCIAL

## 2024-08-23 VITALS
WEIGHT: 174.63 LBS | HEIGHT: 68 IN | SYSTOLIC BLOOD PRESSURE: 150 MMHG | OXYGEN SATURATION: 98 % | BODY MASS INDEX: 26.47 KG/M2 | DIASTOLIC BLOOD PRESSURE: 89 MMHG | TEMPERATURE: 99 F | HEART RATE: 78 BPM

## 2024-08-23 DIAGNOSIS — C78.7 SECONDARY MALIGNANT NEOPLASM OF LIVER: ICD-10-CM

## 2024-08-23 DIAGNOSIS — C78.6 SECONDARY MALIGNANCY OF PARIETAL PERITONEUM: ICD-10-CM

## 2024-08-23 DIAGNOSIS — C49.A0 MALIGNANT GASTROINTESTINAL STROMAL TUMOR, UNSPECIFIED SITE: ICD-10-CM

## 2024-08-23 DIAGNOSIS — D64.9 ANEMIA, UNSPECIFIED TYPE: Primary | ICD-10-CM

## 2024-08-23 DIAGNOSIS — C78.7 SECONDARY LIVER CANCER: ICD-10-CM

## 2024-08-23 LAB
ALBUMIN SERPL BCP-MCNC: 3.3 G/DL (ref 3.5–5.2)
ALP SERPL-CCNC: 117 U/L (ref 55–135)
ALT SERPL W/O P-5'-P-CCNC: 21 U/L (ref 10–44)
ANION GAP SERPL CALC-SCNC: 9 MMOL/L (ref 8–16)
AST SERPL-CCNC: 28 U/L (ref 10–40)
BASOPHILS # BLD AUTO: 0.03 K/UL (ref 0–0.2)
BASOPHILS NFR BLD: 0.4 % (ref 0–1.9)
BILIRUB SERPL-MCNC: 0.6 MG/DL (ref 0.1–1)
BUN SERPL-MCNC: 10 MG/DL (ref 8–23)
CALCIUM SERPL-MCNC: 8.7 MG/DL (ref 8.7–10.5)
CHLORIDE SERPL-SCNC: 108 MMOL/L (ref 95–110)
CO2 SERPL-SCNC: 25 MMOL/L (ref 23–29)
CREAT SERPL-MCNC: 1.1 MG/DL (ref 0.5–1.4)
DIFFERENTIAL METHOD BLD: ABNORMAL
EOSINOPHIL # BLD AUTO: 0.2 K/UL (ref 0–0.5)
EOSINOPHIL NFR BLD: 2.5 % (ref 0–8)
ERYTHROCYTE [DISTWIDTH] IN BLOOD BY AUTOMATED COUNT: 16.4 % (ref 11.5–14.5)
EST. GFR  (NO RACE VARIABLE): >60 ML/MIN/1.73 M^2
GLUCOSE SERPL-MCNC: 138 MG/DL (ref 70–110)
HCT VFR BLD AUTO: 26.8 % (ref 40–54)
HGB BLD-MCNC: 8.8 G/DL (ref 14–18)
IMM GRANULOCYTES # BLD AUTO: 0.01 K/UL (ref 0–0.04)
IMM GRANULOCYTES NFR BLD AUTO: 0.1 % (ref 0–0.5)
LYMPHOCYTES # BLD AUTO: 1.9 K/UL (ref 1–4.8)
LYMPHOCYTES NFR BLD: 28.2 % (ref 18–48)
MCH RBC QN AUTO: 29.8 PG (ref 27–31)
MCHC RBC AUTO-ENTMCNC: 32.8 G/DL (ref 32–36)
MCV RBC AUTO: 91 FL (ref 82–98)
MONOCYTES # BLD AUTO: 0.8 K/UL (ref 0.3–1)
MONOCYTES NFR BLD: 12.5 % (ref 4–15)
NEUTROPHILS # BLD AUTO: 3.8 K/UL (ref 1.8–7.7)
NEUTROPHILS NFR BLD: 56.3 % (ref 38–73)
NRBC BLD-RTO: 0 /100 WBC
PHOSPHATE SERPL-MCNC: 2.2 MG/DL (ref 2.7–4.5)
PLATELET # BLD AUTO: 260 K/UL (ref 150–450)
PMV BLD AUTO: 9.6 FL (ref 9.2–12.9)
POTASSIUM SERPL-SCNC: 3.7 MMOL/L (ref 3.5–5.1)
PROT SERPL-MCNC: 6.9 G/DL (ref 6–8.4)
RBC # BLD AUTO: 2.95 M/UL (ref 4.6–6.2)
SODIUM SERPL-SCNC: 142 MMOL/L (ref 136–145)
T4 FREE SERPL-MCNC: 1.13 NG/DL (ref 0.71–1.51)
TSH SERPL DL<=0.005 MIU/L-ACNC: 0.29 UIU/ML (ref 0.4–4)
WBC # BLD AUTO: 6.74 K/UL (ref 3.9–12.7)

## 2024-08-23 PROCEDURE — 84100 ASSAY OF PHOSPHORUS: CPT | Performed by: INTERNAL MEDICINE

## 2024-08-23 PROCEDURE — 85025 COMPLETE CBC W/AUTO DIFF WBC: CPT | Performed by: INTERNAL MEDICINE

## 2024-08-23 PROCEDURE — 80053 COMPREHEN METABOLIC PANEL: CPT | Performed by: INTERNAL MEDICINE

## 2024-08-23 PROCEDURE — 84443 ASSAY THYROID STIM HORMONE: CPT | Performed by: INTERNAL MEDICINE

## 2024-08-23 PROCEDURE — 36415 COLL VENOUS BLD VENIPUNCTURE: CPT | Performed by: INTERNAL MEDICINE

## 2024-08-23 PROCEDURE — 99999 PR PBB SHADOW E&M-EST. PATIENT-LVL IV: CPT | Mod: PBBFAC,,, | Performed by: NURSE PRACTITIONER

## 2024-08-23 PROCEDURE — 84439 ASSAY OF FREE THYROXINE: CPT | Performed by: INTERNAL MEDICINE

## 2024-08-23 NOTE — ASSESSMENT & PLAN NOTE
Cancer Staging   Malignant gastrointestinal stromal tumor  Staging form: Gastrointestinal Stromal Tumor - Gastric and Omental GIST, AJCC 8th Edition  - Clinical stage from 5/22/2021: Stage IV (cT4, cN1, cM0, Mitotic Rate: High) - Signed by Francisco Johnston MD on 5/22/2021    Most recent CT c/a/p with progression. Patient d/c'd Gleevec. Initiated SUTENT 1 week ago. Denies adverse events. Labs scheduled 9/5. Visit with Dr. Mesa 9/6/2024. Keep follow up appointment as previously scheduled

## 2024-08-23 NOTE — PROGRESS NOTES
Subjective:       Patient ID: Agusto Juarez is a 64 y.o. male.    Chief Complaint: Review labs. Recent chemo change     Cancer Staging   Malignant gastrointestinal stromal tumor  Staging form: Gastrointestinal Stromal Tumor - Gastric and Omental GIST, AJCC 8th Edition  - Clinical stage from 5/22/2021: Stage IV (cT4, cN1, cM0, Mitotic Rate: High) - Signed by Francisco Johnston MD on 5/22/2021      HPI: 64 y.o male presenting today for follow up of his GIST recently initiated Sutent 1 week ago. Recent EKG and ECHO unremarkable. He notes prior reduced appetite while on Gleevec in setting of disease progression. He notes recently increased appetite doing well. He notes tolerating well without adverse events.     Social History     Socioeconomic History    Marital status:    Tobacco Use    Smoking status: Some Days     Current packs/day: 0.50     Average packs/day: 0.5 packs/day for 46.3 years (23.2 ttl pk-yrs)     Types: Cigarettes     Start date: 5/1/1978    Smokeless tobacco: Never   Substance and Sexual Activity    Alcohol use: Yes     Comment: occasionallly    Drug use: No    Sexual activity: Yes     Social Determinants of Health     Physical Activity: Unknown (6/6/2024)    Exercise Vital Sign     Days of Exercise per Week: 5 days       Past Medical History:   Diagnosis Date    BPH (benign prostatic hypertrophy)     Hyperlipidemia     Hypertension     Malignant gastrointestinal stromal tumor 05/05/2021    Villous adenoma of colon 02/07/2024    high grade dysplasia       Family History   Problem Relation Name Age of Onset    Hypertension Other      Lung cancer Father  60 - 69       Past Surgical History:   Procedure Laterality Date    COLONOSCOPY N/A 9/26/2023    Procedure: COLONOSCOPY;  Surgeon: Luis Choi MD;  Location: [facility];  Service: Endoscopy;  Laterality: N/A;    COLONOSCOPY N/A 2/7/2024    Procedure: COLONOSCOPY;  Surgeon: Rikki Shabazz MD;  Location: [facility] ([facility]  FLR);  Service: Endoscopy;  Laterality: N/A;  12/18 portal instr- suprep-colonoscopy with EMR with me in the next 4-6 weeks.[facility] only.45 minute case is okay. Harika-tt  1/31/24- precall complete - [facility]    COLONOSCOPY N/A 8/20/2024    Procedure: COLONOSCOPY;  Surgeon: Rikki Shabazz MD;  Location: [facility] (2ND FLR);  Service: Endoscopy;  Laterality: N/A;  6/14 portal-suprep- colonoscopy with me in 6 months to follow-up prior EMR. Can be scheduled as a 45min case. [facility] only. [id number]  8/13 SWP PRECALL COMPLETE-RT    ENDOSCOPIC ULTRASOUND OF UPPER GASTROINTESTINAL TRACT N/A 4/30/2021    Procedure: ULTRASOUND, UPPER GI TRACT, ENDOSCOPIC;  Surgeon: Rikki Shaabzz MD;  Location: [facility];  Service: Endoscopy;  Laterality: N/A;  Rapid COVID prior - last minute addition to schedule - ttr    ESOPHAGOGASTRODUODENOSCOPY N/A 4/30/2021    Procedure: EGD (ESOPHAGOGASTRODUODENOSCOPY);  Surgeon: Rikki Shabazz MD;  Location: [facility];  Service: Endoscopy;  Laterality: N/A;  EGD/EUS with biopsy within a week is fine. 45min case with me OK. Any campus. -Dr. Shabazz       Review of Systems   Constitutional:  Positive for unexpected weight change. Negative for activity change, appetite change, chills, fatigue and fever.   HENT:  Negative for congestion, mouth sores, nosebleeds, sore throat, trouble swallowing and voice change.    Respiratory:  Negative for cough, chest tightness, shortness of breath and wheezing.    Cardiovascular:  Negative for chest pain and leg swelling.   Gastrointestinal:  Negative for abdominal distention, abdominal pain, blood in stool, constipation, diarrhea, nausea and vomiting.   Genitourinary:  Negative for difficulty urinating, dysuria and hematuria.   Musculoskeletal:  Negative for arthralgias, back pain and myalgias.   Skin:  Negative for pallor, rash and wound.   Neurological:  Negative for dizziness, syncope, weakness and headaches.   Hematological:  Negative for adenopathy. Does not bruise/bleed easily.  "  Psychiatric/Behavioral:  The patient is nervous/anxious.          Medication List with Changes/Refills   Current Medications    ACETAMINOPHEN (TYLENOL) 325 MG TABLET    Take 325 mg by mouth every 6 (six) hours as needed for Pain.    AMLODIPINE (NORVASC) 2.5 MG TABLET    Take 1 tablet by mouth once daily    BUPROPION (WELLBUTRIN XL) 150 MG TB24 TABLET    Take 1 tablet (150 mg total) by mouth once daily.    HYDROCODONE-ACETAMINOPHEN (NORCO)  MG PER TABLET    Take 1 tablet by mouth.    MULTIVITAMIN CAPSULE    Take 1 capsule by mouth once daily.    ONDANSETRON (ZOFRAN-ODT) 8 MG TBDL    Take 1 tablet (8 mg total) by mouth every 8 (eight) hours as needed (nausea).    POTASSIUM CHLORIDE SA (K-DUR,KLOR-CON) 20 MEQ TABLET    Take 1 tablet by mouth once daily    PRAVASTATIN (PRAVACHOL) 10 MG TABLET    Take 1 tablet by mouth once daily    SUNITINIB MALATE (SUTENT) 37.5 MG CAP    Take 1 capsule (37.5 mg total) by mouth once daily.    TOBRAMYCIN-DEXAMETHASONE 0.3-0.1% (TOBRADEX) 0.3-0.1 % DRPS    INSTILL 1 DROP INTO LEFT EYE 4 TIMES DAILY     Objective:     Vitals:    08/23/24 1506   BP: (!) 150/89   Pulse: 78   Temp: 98.7 °F (37.1 °C)     Lab Results   Component Value Date    WBC 6.74 08/23/2024    HGB 8.8 (L) 08/23/2024    HCT 26.8 (L) 08/23/2024    MCV 91 08/23/2024     08/23/2024       BMP  Lab Results   Component Value Date     08/23/2024    K 3.7 08/23/2024     08/23/2024    CO2 25 08/23/2024    BUN 10 08/23/2024    CREATININE 1.1 08/23/2024    CALCIUM 8.7 08/23/2024    ANIONGAP 9 08/23/2024    EGFRNORACEVR >60 08/23/2024     Lab Results   Component Value Date    ALT 21 08/23/2024    AST 28 08/23/2024    ALKPHOS 117 08/23/2024    BILITOT 0.6 08/23/2024     No results found for: "UIBC", "IRON", "TRANS", "TRANSFERRIN", "TIBC", "LABIRON", "FESATURATED"   No results found for: "MOLXYPSW70"  No results found for: "FOLATE"      Physical Exam  Vitals reviewed.   Constitutional:       Appearance: He is " well-developed.   HENT:      Head: Normocephalic.      Right Ear: External ear normal.      Left Ear: External ear normal.      Nose: Nose normal.   Eyes:      General: Lids are normal. No scleral icterus.        Right eye: No discharge.         Left eye: No discharge.      Conjunctiva/sclera: Conjunctivae normal.   Neck:      Thyroid: No thyroid mass.   Cardiovascular:      Rate and Rhythm: Normal rate and regular rhythm.      Heart sounds: Normal heart sounds.   Pulmonary:      Effort: Pulmonary effort is normal. No respiratory distress.      Breath sounds: Normal breath sounds. No wheezing or rales.   Abdominal:      General: There is no distension.      Tenderness: There is no abdominal tenderness.   Genitourinary:     Comments: deferred  Musculoskeletal:         General: Normal range of motion.      Cervical back: Normal range of motion.   Lymphadenopathy:      Head:      Right side of head: No submandibular, preauricular or posterior auricular adenopathy.      Left side of head: No submandibular, preauricular or posterior auricular adenopathy.   Skin:     General: Skin is warm and dry.   Neurological:      Mental Status: He is alert and oriented to person, place, and time.   Psychiatric:         Speech: Speech normal.         Behavior: Behavior normal. Behavior is cooperative.         Thought Content: Thought content normal.        Assessment:     Problem List Items Addressed This Visit          Oncology    Malignant gastrointestinal stromal tumor      Cancer Staging   Malignant gastrointestinal stromal tumor  Staging form: Gastrointestinal Stromal Tumor - Gastric and Omental GIST, AJCC 8th Edition  - Clinical stage from 5/22/2021: Stage IV (cT4, cN1, cM0, Mitotic Rate: High) - Signed by Francisco Jonhston MD on 5/22/2021    Most recent CT c/a/p with progression. Patient d/c'd Gleevec. Initiated SUTENT 1 week ago. Denies adverse events. Labs scheduled 9/5. Visit with Dr. Mesa 9/6/2024. Keep follow up  appointment as previously scheduled         Secondary liver cancer     Continue Sutent         Secondary malignancy of parietal peritoneum     Continue Sutent         Anemia - Primary     Gradual decline in hemoglobin recently. Most recent hg 8.8. Baseline hemoglobin 10-11 range         Relevant Orders    Beta 2 Microglobulin, Serum    Copper, Serum    Erythropoietin    Ferritin    Folate    Haptoglobin    Immunofixation Electrophoresis    Immunoglobulin Free LT Chains Blood    Immunoglobulins (IgG, IgA, IgM) Quantitative    Iron and TIBC    Lactate Dehydrogenase    Pathologist Interpretation Differential    Protein Electrophoresis, Serum    Vitamin B12         Plan:     Anemia, unspecified type  -     Beta 2 Microglobulin, Serum; Future; Expected date: 08/23/2024  -     Copper, Serum; Future; Expected date: 08/23/2024  -     Erythropoietin; Future; Expected date: 08/23/2024  -     Ferritin; Future; Expected date: 08/23/2024  -     Folate; Future; Expected date: 08/23/2024  -     Haptoglobin; Future; Expected date: 08/23/2024  -     Immunofixation Electrophoresis; Future; Expected date: 08/23/2024  -     Immunoglobulin Free LT Chains Blood; Future; Expected date: 08/23/2024  -     Immunoglobulins (IgG, IgA, IgM) Quantitative; Future; Expected date: 08/23/2024  -     Iron and TIBC; Future; Expected date: 08/23/2024  -     Lactate Dehydrogenase; Future; Expected date: 08/23/2024  -     Pathologist Interpretation Differential; Future; Expected date: 08/23/2024  -     Protein Electrophoresis, Serum; Future; Expected date: 08/23/2024  -     Vitamin B12; Future; Expected date: 08/23/2024    Malignant gastrointestinal stromal tumor, unspecified site    Secondary liver cancer    Secondary malignancy of parietal peritoneum        Med Onc Chart Routing      Follow up with physician . Already scheduled   Follow up with YESSY    Infusion scheduling note    Injection scheduling note    Labs    Imaging    Pharmacy appointment     Other referrals              XAVIER Mohamud-C

## 2024-08-23 NOTE — ASSESSMENT & PLAN NOTE
Gradual decline in hemoglobin recently. Most recent hg 8.8. Baseline hemoglobin 10-11 range   Taltz Counseling: I discussed with the patient the risks of ixekizumab including but not limited to immunosuppression, serious infections, worsening of inflammatory bowel disease and drug reactions.  The patient understands that monitoring is required including a PPD at baseline and must alert us or the primary physician if symptoms of infection or other concerning signs are noted.

## 2024-08-27 ENCOUNTER — DOCUMENTATION ONLY (OUTPATIENT)
Dept: HEMATOLOGY/ONCOLOGY | Facility: CLINIC | Age: 64
End: 2024-08-27
Payer: COMMERCIAL

## 2024-08-27 ENCOUNTER — TELEPHONE (OUTPATIENT)
Dept: FAMILY MEDICINE | Facility: CLINIC | Age: 64
End: 2024-08-27
Payer: COMMERCIAL

## 2024-08-27 DIAGNOSIS — I10 ESSENTIAL HYPERTENSION: Primary | ICD-10-CM

## 2024-08-27 RX ORDER — AMLODIPINE BESYLATE 5 MG/1
5 TABLET ORAL DAILY
Qty: 90 TABLET | Refills: 0 | Status: SHIPPED | OUTPATIENT
Start: 2024-08-27 | End: 2024-08-27

## 2024-08-27 RX ORDER — AMLODIPINE BESYLATE 10 MG/1
10 TABLET ORAL DAILY
Qty: 90 TABLET | Refills: 3 | Status: SHIPPED | OUTPATIENT
Start: 2024-08-27 | End: 2025-08-27

## 2024-08-27 NOTE — TELEPHONE ENCOUNTER
Received message as below.  Recommend increase amlodipine 5 mg daily.  Prescription sent to pharmacy.  He can double up on any leftover 2.5 mg pills.  Have him follow-up with me in a month.

## 2024-08-27 NOTE — TELEPHONE ENCOUNTER
----- Message from Andreea Henley RN sent at 8/27/2024 10:54 AM CDT -----  Regarding: Elevated B/P   Good Morning,  My name is Andreea Henley RN and I help monitor the [facility]  Program at the [facility].  Mr. Romeo B/P has been running high, and he did state he has been having some headaches lately.  His reading this morning is 146/103.  I'm sending a few of his recent readings he has submitted also.  Elevated B/P is listed as one of the side effects of Sutent and I know the Gleevec was D/C'd and he was changed to Sutent this month.  He is currently taking Norvasc 2.5 daily.    I told him I would be sending you a note to make you aware, and he was appreciative.    Thank you very much.  Andreea                                        8/18/2024 8/22/2024 8/23/2024 8/24/2024 8/25/2024 8/26/2024     Recent Readings                                                                                                                                         SBP (mmHg)            120                155 (H)          153 (H)          142 (H)          156 (H)          172 (H)                                           182 (H)          154 (H)                                                      153 (H)          149 (H)          DBP (mmHg)            78                  97 (H)            102 (H)          101 (H)          99 (H)            97 (H)                                             97 (H)            102 (H)                                                      97 (H)            97 (H)            Pulse (bpm)              88                  81                  90                  94                  92                  84                                                   80                  94                                                              87                  88                                                     8/27/2024     Recent Readings                            SBP (mmHg)            146 (H)          DBP (mmHg)            103 (H)          Pulse (bpm)              98                    Legend:  (H) High

## 2024-08-27 NOTE — PROGRESS NOTES
Care Companion Intervention    Reason for intervention: Hypertension  Comment: Pt states has had a few headaches lately.    Intervention: Other intervention (comment)  Comment: Called pt to check on him.  He does admit to having some headaches recently but states otherwise doing well.  I sent a msg to his Oncologist and PCP to alert them of his recent readings and the headaches.  He was recently changed from Gleevec to Sutent which carries a side effect of Hypertension.  His only B/P med is Norvasc 2.5 mg.  I let him know I was sending them a msg so he wouldn't be surprised if he heard from them re: this.  I will continue to monitor.

## 2024-08-28 LAB
FINAL PATHOLOGIC DIAGNOSIS: NORMAL
GROSS: NORMAL
Lab: NORMAL

## 2024-08-29 ENCOUNTER — PATIENT MESSAGE (OUTPATIENT)
Dept: ADMINISTRATIVE | Facility: OTHER | Age: 64
End: 2024-08-29
Payer: COMMERCIAL

## 2024-09-04 ENCOUNTER — PATIENT MESSAGE (OUTPATIENT)
Dept: ADMINISTRATIVE | Facility: OTHER | Age: 64
End: 2024-09-04
Payer: COMMERCIAL

## 2024-09-05 ENCOUNTER — LAB VISIT (OUTPATIENT)
Dept: LAB | Facility: HOSPITAL | Age: 64
End: 2024-09-05
Attending: INTERNAL MEDICINE
Payer: COMMERCIAL

## 2024-09-05 ENCOUNTER — PATIENT MESSAGE (OUTPATIENT)
Dept: ADMINISTRATIVE | Facility: OTHER | Age: 64
End: 2024-09-05
Payer: COMMERCIAL

## 2024-09-05 DIAGNOSIS — D64.9 ANEMIA, UNSPECIFIED TYPE: ICD-10-CM

## 2024-09-05 DIAGNOSIS — R53.83 FATIGUE, UNSPECIFIED TYPE: ICD-10-CM

## 2024-09-05 DIAGNOSIS — C49.A0 MALIGNANT GASTROINTESTINAL STROMAL TUMOR, UNSPECIFIED SITE: ICD-10-CM

## 2024-09-05 LAB
ALBUMIN SERPL BCP-MCNC: 3.4 G/DL (ref 3.5–5.2)
ALP SERPL-CCNC: 150 U/L (ref 55–135)
ALT SERPL W/O P-5'-P-CCNC: 33 U/L (ref 10–44)
ANION GAP SERPL CALC-SCNC: 9 MMOL/L (ref 8–16)
AST SERPL-CCNC: 34 U/L (ref 10–40)
B2 MICROGLOB SERPL-MCNC: 1.8 UG/ML (ref 0–2.5)
BASOPHILS # BLD AUTO: 0.01 K/UL (ref 0–0.2)
BASOPHILS NFR BLD: 0.2 % (ref 0–1.9)
BILIRUB SERPL-MCNC: 0.6 MG/DL (ref 0.1–1)
BUN SERPL-MCNC: 9 MG/DL (ref 8–23)
CALCIUM SERPL-MCNC: 9.6 MG/DL (ref 8.7–10.5)
CHLORIDE SERPL-SCNC: 106 MMOL/L (ref 95–110)
CO2 SERPL-SCNC: 26 MMOL/L (ref 23–29)
CREAT SERPL-MCNC: 1 MG/DL (ref 0.5–1.4)
DIFFERENTIAL METHOD BLD: ABNORMAL
EOSINOPHIL # BLD AUTO: 0.2 K/UL (ref 0–0.5)
EOSINOPHIL NFR BLD: 3 % (ref 0–8)
ERYTHROCYTE [DISTWIDTH] IN BLOOD BY AUTOMATED COUNT: 16.2 % (ref 11.5–14.5)
EST. GFR  (NO RACE VARIABLE): >60 ML/MIN/1.73 M^2
FERRITIN SERPL-MCNC: 99 NG/ML (ref 20–300)
FOLATE SERPL-MCNC: 13.9 NG/ML (ref 4–24)
GLUCOSE SERPL-MCNC: 83 MG/DL (ref 70–110)
HAPTOGLOB SERPL-MCNC: 205 MG/DL (ref 30–250)
HCT VFR BLD AUTO: 33.7 % (ref 40–54)
HGB BLD-MCNC: 10.9 G/DL (ref 14–18)
IGA SERPL-MCNC: 341 MG/DL (ref 40–350)
IGG SERPL-MCNC: 1817 MG/DL (ref 650–1600)
IGM SERPL-MCNC: 61 MG/DL (ref 50–300)
IMM GRANULOCYTES # BLD AUTO: 0.01 K/UL (ref 0–0.04)
IMM GRANULOCYTES NFR BLD AUTO: 0.2 % (ref 0–0.5)
IRON SERPL-MCNC: 36 UG/DL (ref 45–160)
LDH SERPL L TO P-CCNC: 271 U/L (ref 110–260)
LYMPHOCYTES # BLD AUTO: 2 K/UL (ref 1–4.8)
LYMPHOCYTES NFR BLD: 31.8 % (ref 18–48)
MCH RBC QN AUTO: 28.9 PG (ref 27–31)
MCHC RBC AUTO-ENTMCNC: 32.3 G/DL (ref 32–36)
MCV RBC AUTO: 89 FL (ref 82–98)
MONOCYTES # BLD AUTO: 0.5 K/UL (ref 0.3–1)
MONOCYTES NFR BLD: 8.4 % (ref 4–15)
NEUTROPHILS # BLD AUTO: 3.6 K/UL (ref 1.8–7.7)
NEUTROPHILS NFR BLD: 56.6 % (ref 38–73)
NRBC BLD-RTO: 0 /100 WBC
PATH REV BLD -IMP: NORMAL
PLATELET # BLD AUTO: 234 K/UL (ref 150–450)
PMV BLD AUTO: 9.9 FL (ref 9.2–12.9)
POTASSIUM SERPL-SCNC: 4.4 MMOL/L (ref 3.5–5.1)
PROT SERPL-MCNC: 8.1 G/DL (ref 6–8.4)
RBC # BLD AUTO: 3.77 M/UL (ref 4.6–6.2)
SATURATED IRON: 9 % (ref 20–50)
SODIUM SERPL-SCNC: 141 MMOL/L (ref 136–145)
TOTAL IRON BINDING CAPACITY: 391 UG/DL (ref 250–450)
TRANSFERRIN SERPL-MCNC: 264 MG/DL (ref 200–375)
TSH SERPL DL<=0.005 MIU/L-ACNC: 0.79 UIU/ML (ref 0.4–4)
VIT B12 SERPL-MCNC: 561 PG/ML (ref 210–950)
WBC # BLD AUTO: 6.33 K/UL (ref 3.9–12.7)

## 2024-09-05 PROCEDURE — 82728 ASSAY OF FERRITIN: CPT | Performed by: NURSE PRACTITIONER

## 2024-09-05 PROCEDURE — 86334 IMMUNOFIX E-PHORESIS SERUM: CPT | Performed by: NURSE PRACTITIONER

## 2024-09-05 PROCEDURE — 84443 ASSAY THYROID STIM HORMONE: CPT | Performed by: INTERNAL MEDICINE

## 2024-09-05 PROCEDURE — 84165 PROTEIN E-PHORESIS SERUM: CPT | Performed by: NURSE PRACTITIONER

## 2024-09-05 PROCEDURE — 83615 LACTATE (LD) (LDH) ENZYME: CPT | Performed by: NURSE PRACTITIONER

## 2024-09-05 PROCEDURE — 82746 ASSAY OF FOLIC ACID SERUM: CPT | Performed by: NURSE PRACTITIONER

## 2024-09-05 PROCEDURE — 85025 COMPLETE CBC W/AUTO DIFF WBC: CPT | Mod: PO | Performed by: INTERNAL MEDICINE

## 2024-09-05 PROCEDURE — 36415 COLL VENOUS BLD VENIPUNCTURE: CPT | Mod: PO | Performed by: NURSE PRACTITIONER

## 2024-09-05 PROCEDURE — 83521 IG LIGHT CHAINS FREE EACH: CPT | Mod: 59 | Performed by: NURSE PRACTITIONER

## 2024-09-05 PROCEDURE — 84165 PROTEIN E-PHORESIS SERUM: CPT | Mod: 26,,, | Performed by: PATHOLOGY

## 2024-09-05 PROCEDURE — 84466 ASSAY OF TRANSFERRIN: CPT | Performed by: NURSE PRACTITIONER

## 2024-09-05 PROCEDURE — 83010 ASSAY OF HAPTOGLOBIN QUANT: CPT | Performed by: NURSE PRACTITIONER

## 2024-09-05 PROCEDURE — 82525 ASSAY OF COPPER: CPT | Performed by: NURSE PRACTITIONER

## 2024-09-05 PROCEDURE — 86334 IMMUNOFIX E-PHORESIS SERUM: CPT | Mod: 26,,, | Performed by: PATHOLOGY

## 2024-09-05 PROCEDURE — 82232 ASSAY OF BETA-2 PROTEIN: CPT | Performed by: NURSE PRACTITIONER

## 2024-09-05 PROCEDURE — 80053 COMPREHEN METABOLIC PANEL: CPT | Performed by: INTERNAL MEDICINE

## 2024-09-05 PROCEDURE — 82784 ASSAY IGA/IGD/IGG/IGM EACH: CPT | Performed by: NURSE PRACTITIONER

## 2024-09-05 PROCEDURE — 82668 ASSAY OF ERYTHROPOIETIN: CPT | Performed by: NURSE PRACTITIONER

## 2024-09-05 PROCEDURE — 82607 VITAMIN B-12: CPT | Performed by: NURSE PRACTITIONER

## 2024-09-06 ENCOUNTER — OFFICE VISIT (OUTPATIENT)
Dept: HEMATOLOGY/ONCOLOGY | Facility: CLINIC | Age: 64
End: 2024-09-06
Payer: COMMERCIAL

## 2024-09-06 DIAGNOSIS — C78.6 SECONDARY MALIGNANCY OF PARIETAL PERITONEUM: ICD-10-CM

## 2024-09-06 DIAGNOSIS — D49.9 IMMUNODEFICIENCY SECONDARY TO NEOPLASM: ICD-10-CM

## 2024-09-06 DIAGNOSIS — Z79.899 IMMUNODEFICIENCY DUE TO DRUGS: ICD-10-CM

## 2024-09-06 DIAGNOSIS — I15.8 OTHER SECONDARY HYPERTENSION: ICD-10-CM

## 2024-09-06 DIAGNOSIS — R68.81 EARLY SATIETY: ICD-10-CM

## 2024-09-06 DIAGNOSIS — C49.A0 MALIGNANT GASTROINTESTINAL STROMAL TUMOR, UNSPECIFIED SITE: Primary | ICD-10-CM

## 2024-09-06 DIAGNOSIS — D84.81 IMMUNODEFICIENCY SECONDARY TO NEOPLASM: ICD-10-CM

## 2024-09-06 DIAGNOSIS — D84.821 IMMUNODEFICIENCY DUE TO DRUGS: ICD-10-CM

## 2024-09-06 DIAGNOSIS — C78.7 SECONDARY LIVER CANCER: ICD-10-CM

## 2024-09-06 LAB
ALBUMIN SERPL ELPH-MCNC: 3.53 G/DL (ref 3.35–5.55)
ALPHA1 GLOB SERPL ELPH-MCNC: 0.4 G/DL (ref 0.17–0.41)
ALPHA2 GLOB SERPL ELPH-MCNC: 0.78 G/DL (ref 0.43–0.99)
B-GLOBULIN SERPL ELPH-MCNC: 1 G/DL (ref 0.5–1.1)
GAMMA GLOB SERPL ELPH-MCNC: 1.79 G/DL (ref 0.67–1.58)
INTERPRETATION SERPL IFE-IMP: NORMAL
KAPPA LC SER QL IA: 2.98 MG/DL (ref 0.33–1.94)
KAPPA LC/LAMBDA SER IA: 0.42 (ref 0.26–1.65)
LAMBDA LC SER QL IA: 7.13 MG/DL (ref 0.57–2.63)
PROT SERPL-MCNC: 7.5 G/DL (ref 6–8.4)

## 2024-09-06 RX ORDER — LISINOPRIL 40 MG/1
40 TABLET ORAL DAILY
Qty: 90 TABLET | Refills: 3 | Status: SHIPPED | OUTPATIENT
Start: 2024-09-06 | End: 2025-09-06

## 2024-09-06 NOTE — PROGRESS NOTES
"The patient location is: home in [place]  The chief complaint leading to consultation is: follow up for stage IV GIST    Visit type: audiovirtual    Face to Face time with patient: 15 min  40 minutes of total time spent on the encounter, which includes face to face time and non-face to face time preparing to see the patient (eg, review of tests), Obtaining and/or reviewing separately obtained history, Documenting clinical information in the electronic or other health record, Independently interpreting results (not separately reported) and communicating results to the patient/family/caregiver, or Care coordination (not separately reported).         Each patient to whom he or she provides medical services by telemedicine is:  (1) informed of the relationship between the physician and patient and the respective role of any other health care provider with respect to management of the patient; and (2) notified that he or she may decline to receive medical services by telemedicine and may withdraw from such care at any time.    Notes:                                                                PROGRESS NOTE    Subjective:       Patient ID: Agusto Juarez is a 64 y.o. male.    Chief Complaint: follow up for stage IV GIST    Diagnosis:  Metastatic GIST, KIT/PDGFRA/BRAF/SDH wild-type, diagnosed in April 2021  Tempus blood 6/7/24: positive for KIT exon 13 p.[id number] mutation, TMB 8.6. 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 CT A/P 4/26/21 showed "Large upper abdominal mass which may arise from the stomach or pancreas " "and may involve the liver, duodenum, spleen, and gallbladder. There are also multiple nodular masses throughout the peritoneum and mesentery consistent with peritoneal carcinomatosis." Biopsy of gastric mass and perigastric LN showed grade 1 GIST. Mixed spindle cell and epithelioid type. Mitotic rate cannot be determined. No necrosis. Presumed low-grade (G1). Given 20 cm with low mitotic count, likely moderate risk. Strata test at that time was negative for any mutation. GIST panel was negative.es.  He started gleevec 400 mg daily at that time. CT in Oct 2021 and 2022 showed response to gleevec with reduction of tumor size. CT in 2023 showed stable disease. He had CT C/A/P done on 24. It showed "Interval disease progression in the abdomen pelvis with worsening peritoneal carcinomatosis and worsening hepatic metastatic disease." He presents today for a second opinion. He saw Dr Dee yesterday. Sutent was prescribed. Patient remembers being told in the past he can take two 400 mg gleevec a day when it is not working. He started 400 mg bid yesterday and feels better.   Recc staying on 400 mg bid (since 24) with close follow up scan.   2. CT C/A/P 24 showed progression. Sutent 37.5 mg daily started around 24.     Interval History:   Mr Juarez returns for follow up. He has been taking sutent 37.5 mg daily for about 2-3 days. Prior early satiety resolved soon after he started sutent. Tolerating it very well with no significant side effects. BP has been quite high. We started amlodipine 10 mg daily a week ago. He has been taking it. BP still high.      ECO    ROS:   A ten-point system review is obtained and negative except for what was stated in the Interval History.     Physical Examination:    Vital signs reviewed.   General: well hydrated, well developed, in no acute distress  HEENT: normocephalic, EOMI, anicteric sclerae  Neck: supple, no JVD, thyromegaly, cervical or supraclavicular " lymphadenopathy  Lungs: clear breath sounds bilaterally, no wheezing, rales, or rhonchi  Heart: RRR, no M/R/G  Abdomen: soft, no tenderness, non-distended, no hepatosplenomegaly, mass, or hernia. BS present  Extremities: no clubbing, cyanosis, or edema  Skin: no rash, ulcer, or open wounds  Neuro: alert and oriented x 4, no focal neuro deficit  Psych: pleasant and appropriate mood and affect    Objective:     Laboratory Data:  Labs reviewed with patient. CBC, CMP adequate for treatment.     Imaging Data:  CT C/A/P 6/5/24:  1.  Interval disease progression in the abdomen pelvis with worsening peritoneal carcinomatosis and worsening hepatic metastatic disease.     2.  No evidence for thoracic metastatic disease.     CT C/A/P 8/7/24:  Impression:     Interval progression of disease with enlarging left upper quadrant mass and hepatic metastatic lesions, and increased size and number of peritoneal carcinomatosis lesions.     Mild narrowing of the GE junction.  Consider gastrostomy tube placement.     No evidence of intrathoracic metastatic disease.    Assessment and Plan:     1. Malignant gastrointestinal stromal tumor, unspecified site    2. Secondary liver cancer    3. Secondary malignancy of parietal peritoneum    4. Immunodeficiency secondary to neoplasm    5. Immunodeficiency due to drugs    6. Other secondary hypertension    7. Early satiety        1-5  - Mr Juarez is a 63 yo man with metastatic gastric GIST with met to the peritoneum and liver. Diagnosed in April 2021. Has been on gleevec 400 mg daily since. Had good response initially. Recent CT 6/5/24 showed progression of disease. Patient started gleevec 400 mg bid by himself on 6/6/24. Tempus showed KIT exon 11 and exon 13 mutations.   - CT C/A/P 8/7/24 showed progression. Sutent 37.5 mg daily started around 8/16/24.  - doing well. Tolerating sutent 37.5 mg daily. Main side effect is high BP. He has been taking amlodipine 10 mg for a week. BP still high. Will  add lisinopril 40 mg daily. Asked patient to continue to monitor BP at home tid.   - follow up in 1 week  - symptoms improving    6.  - home log reviewed. BP still high after starting amlodipine 10 mg daily  - add lisinopril 40 mg daily  - return in 1 week to review home BP and adjust BP meds    7.  - from cancer  - improving. C/w sutent      Follow-up:     RTC in 1 week  Knows to call in the interval if any problems arise.    Electronically signed by Tawny Meas

## 2024-09-06 NOTE — Clinical Note
See YESSY in one week (virtual) with local CBC, CMP, TSH, phos 1 day prior. See me in 2 weeks (virtual) with local CBC, CMP, TSH, phos 1 day prior

## 2024-09-07 ENCOUNTER — PATIENT MESSAGE (OUTPATIENT)
Dept: ADMINISTRATIVE | Facility: OTHER | Age: 64
End: 2024-09-07
Payer: COMMERCIAL

## 2024-09-09 ENCOUNTER — OFFICE VISIT (OUTPATIENT)
Dept: HEMATOLOGY/ONCOLOGY | Facility: CLINIC | Age: 64
End: 2024-09-09
Payer: COMMERCIAL

## 2024-09-09 ENCOUNTER — PATIENT MESSAGE (OUTPATIENT)
Dept: HEMATOLOGY/ONCOLOGY | Facility: CLINIC | Age: 64
End: 2024-09-09

## 2024-09-09 DIAGNOSIS — E78.5 HYPERLIPIDEMIA, UNSPECIFIED HYPERLIPIDEMIA TYPE: ICD-10-CM

## 2024-09-09 DIAGNOSIS — I10 PRIMARY HYPERTENSION: ICD-10-CM

## 2024-09-09 DIAGNOSIS — T45.1X5A IMMUNODEFICIENCY DUE TO CHEMOTHERAPY: ICD-10-CM

## 2024-09-09 DIAGNOSIS — C78.6 SECONDARY MALIGNANCY OF PARIETAL PERITONEUM: ICD-10-CM

## 2024-09-09 DIAGNOSIS — Z79.899 IMMUNODEFICIENCY DUE TO CHEMOTHERAPY: ICD-10-CM

## 2024-09-09 DIAGNOSIS — D50.0 IRON DEFICIENCY ANEMIA DUE TO CHRONIC BLOOD LOSS: Primary | ICD-10-CM

## 2024-09-09 DIAGNOSIS — K55.20 AVM (ARTERIOVENOUS MALFORMATION) OF COLON: ICD-10-CM

## 2024-09-09 DIAGNOSIS — C49.A0 MALIGNANT GASTROINTESTINAL STROMAL TUMOR, UNSPECIFIED SITE: ICD-10-CM

## 2024-09-09 DIAGNOSIS — D84.821 IMMUNODEFICIENCY DUE TO CHEMOTHERAPY: ICD-10-CM

## 2024-09-09 DIAGNOSIS — C78.7 SECONDARY LIVER CANCER: ICD-10-CM

## 2024-09-09 LAB
COPPER SERPL-MCNC: 1548 UG/L (ref 665–1480)
EPO SERPL-ACNC: 117.6 MIU/ML (ref 2.6–18.5)
PATHOLOGIST INTERPRETATION IFE: NORMAL
PATHOLOGIST INTERPRETATION SPE: NORMAL

## 2024-09-09 PROCEDURE — 99214 OFFICE O/P EST MOD 30 MIN: CPT | Mod: 95,,, | Performed by: INTERNAL MEDICINE

## 2024-09-09 PROCEDURE — 4010F ACE/ARB THERAPY RXD/TAKEN: CPT | Mod: CPTII,95,, | Performed by: INTERNAL MEDICINE

## 2024-09-09 RX ORDER — HEPARIN 100 UNIT/ML
500 SYRINGE INTRAVENOUS
OUTPATIENT
Start: 2024-09-09

## 2024-09-09 RX ORDER — SODIUM CHLORIDE 0.9 % (FLUSH) 0.9 %
10 SYRINGE (ML) INJECTION
OUTPATIENT
Start: 2024-09-09

## 2024-09-09 RX ORDER — SODIUM CHLORIDE 0.9 % (FLUSH) 0.9 %
10 SYRINGE (ML) INJECTION
OUTPATIENT
Start: 2024-09-16

## 2024-09-09 RX ORDER — HEPARIN 100 UNIT/ML
500 SYRINGE INTRAVENOUS
OUTPATIENT
Start: 2024-09-16

## 2024-09-09 NOTE — PROGRESS NOTES
Subjective:       Patient ID: Agusto Juarez is a 64 y.o. male.    Chief Complaint: Results, Anemia, and Cancer    HPI:  64-year-old male with relapsed refractory gastrointestinal stromal tumor.  Patient recently started on Sutent 37.5 mg documented iron deficiency previous diagnosis of AVM.  Patient returns for review ECOG status 1 seen in virtual visit has been seen by Dr. Mesa in GI Medical Oncology    Past Medical History:   Diagnosis Date    BPH (benign prostatic hypertrophy)     Hyperlipidemia     Hypertension     Malignant gastrointestinal stromal tumor 05/05/2021    Villous adenoma of colon 02/07/2024    high grade dysplasia     Family History   Problem Relation Name Age of Onset    Hypertension Other      Lung cancer Father  60 - 69     Social History     Socioeconomic History    Marital status:    Tobacco Use    Smoking status: Some Days     Current packs/day: 0.50     Average packs/day: 0.5 packs/day for 46.4 years (23.2 ttl pk-yrs)     Types: Cigarettes     Start date: 5/1/1978    Smokeless tobacco: Never   Substance and Sexual Activity    Alcohol use: Yes     Comment: occasionallly    Drug use: No    Sexual activity: Yes     Social Determinants of Health     Physical Activity: Unknown (6/6/2024)    Exercise Vital Sign     Days of Exercise per Week: 5 days     Past Surgical History:   Procedure Laterality Date    COLONOSCOPY N/A 9/26/2023    Procedure: COLONOSCOPY;  Surgeon: Luis Choi MD;  Location: [facility];  Service: Endoscopy;  Laterality: N/A;    COLONOSCOPY N/A 2/7/2024    Procedure: COLONOSCOPY;  Surgeon: Rikki Shabazz MD;  Location: [facility] ([address] [place] [address]);  Service: Endoscopy;  Laterality: N/A;  12/18 portal instr- suprep-colonoscopy with EMR with me in the next 4-6 weeks.[facility] only.45 minute case is okay. Harika-tt  1/31/24- precall complete - ERW    COLONOSCOPY N/A 8/20/2024    Procedure: COLONOSCOPY;  Surgeon: Rikki Shabazz MD;  Location: [facility] ([address] [place] [address]);  Service: Endoscopy;   Laterality: N/A;  6/14 portal-suprep- colonoscopy with me in 6 months to follow-up prior EMR. Can be scheduled as a 45min case. [facility] only. shabazz-tt  8/13 SWP PRECALL COMPLETE-RT    ENDOSCOPIC ULTRASOUND OF UPPER GASTROINTESTINAL TRACT N/A 4/30/2021    Procedure: ULTRASOUND, UPPER GI TRACT, ENDOSCOPIC;  Surgeon: Rikki Shabazz MD;  Location: [facility];  Service: Endoscopy;  Laterality: N/A;  Rapid COVID prior - last minute addition to schedule - ttr    ESOPHAGOGASTRODUODENOSCOPY N/A 4/30/2021    Procedure: EGD (ESOPHAGOGASTRODUODENOSCOPY);  Surgeon: Rikki Shabazz MD;  Location: [facility] END[facility];  Service: Endoscopy;  Laterality: N/A;  EGD/EUS with biopsy within a week is fine. 45min case with me OK. Any campus. -Dr. Shabazz       Labs:  Lab Results   Component Value Date    WBC 6.33 09/05/2024    HGB 10.9 (L) 09/05/2024    HCT 33.7 (L) 09/05/2024    MCV 89 09/05/2024     09/05/2024     BMP  Lab Results   Component Value Date     09/05/2024    K 4.4 09/05/2024     09/05/2024    CO2 26 09/05/2024    BUN 9 09/05/2024    CREATININE 1.0 09/05/2024    CALCIUM 9.6 09/05/2024    ANIONGAP 9 09/05/2024    ESTGFRAFRICA >60 05/17/2022    EGFRNONAA >60 05/17/2022     Lab Results   Component Value Date    ALT 33 09/05/2024    AST 34 09/05/2024    ALKPHOS 150 (H) 09/05/2024    BILITOT 0.6 09/05/2024       Lab Results   Component Value Date    IRON 36 (L) 09/05/2024    TIBC 391 09/05/2024    FERRITIN 99 09/05/2024     Lab Results   Component Value Date    KPNMOWYE30 561 09/05/2024     Lab Results   Component Value Date    FOLATE 13.9 09/05/2024     Lab Results   Component Value Date    TSH 0.786 09/05/2024         Review of Systems   Constitutional:  Negative for activity change, appetite change, chills, diaphoresis, fatigue, fever and unexpected weight change.   HENT:  Negative for congestion, dental problem, drooling, ear discharge, ear pain, facial swelling, hearing loss, mouth sores, nosebleeds, postnasal drip,  rhinorrhea, sinus pressure, sneezing, sore throat, tinnitus, trouble swallowing and voice change.    Eyes:  Negative for photophobia, pain, discharge, redness, itching and visual disturbance.   Respiratory:  Negative for apnea, cough, choking, chest tightness, shortness of breath, wheezing and stridor.    Cardiovascular:  Negative for chest pain, palpitations and leg swelling.   Gastrointestinal:  Negative for abdominal distention, abdominal pain, anal bleeding, blood in stool, constipation, diarrhea, nausea, rectal pain and vomiting.   Endocrine: Negative for cold intolerance, heat intolerance, polydipsia, polyphagia and polyuria.   Genitourinary:  Negative for decreased urine volume, difficulty urinating, dysuria, enuresis, flank pain, frequency, genital sores, hematuria, penile discharge, penile pain, penile swelling, scrotal swelling, testicular pain and urgency.   Musculoskeletal:  Negative for arthralgias, back pain, gait problem, joint swelling, myalgias, neck pain and neck stiffness.   Skin:  Negative for color change, pallor, rash and wound.   Allergic/Immunologic: Negative for environmental allergies, food allergies and immunocompromised state.   Neurological:  Negative for dizziness, tremors, seizures, syncope, facial asymmetry, speech difficulty, weakness, light-headedness, numbness and headaches.   Hematological:  Negative for adenopathy. Does not bruise/bleed easily.   Psychiatric/Behavioral:  Negative for agitation, behavioral problems, confusion, decreased concentration, dysphoric mood, hallucinations, self-injury, sleep disturbance and suicidal ideas. The patient is not nervous/anxious and is not hyperactive.        Objective:      Physical Exam  Constitutional:       Appearance: Normal appearance.   Neurological:      Mental Status: He is alert.             Assessment:      1. Iron deficiency anemia due to chronic blood loss    2. Malignant gastrointestinal stromal tumor, unspecified site    3.  Immunodeficiency due to chemotherapy    4. Primary hypertension    5. Hyperlipidemia, unspecified hyperlipidemia type    6. Secondary liver cancer    7. Secondary malignancy of parietal peritoneum    8. AVM (arteriovenous malformation) of colon           Med Onc Chart Routing      Follow up with physician . Return in 2 months with CBC serum iron TIBC ferritin LDH C-reactive protein and CT chest abdomen pelvis   Follow up with YESSY    Infusion scheduling note    Injection scheduling note Feraheme x2   Labs    Imaging    Pharmacy appointment    Other referrals         Referral palliative care              Plan:     The patient location is:  Home  The chief complaint leading to consultation is:  Cancer anemia    Visit type: audiovisual    Face to Face time with patient: 25 minutes of total time spent on the encounter, which includes face to face time and non-face to face time preparing to see the patient (eg, review of tests), Obtaining and/or reviewing separately obtained history, Documenting clinical information in the electronic or other health record, Independently interpreting results (not separately reported) and communicating results to the patient/family/caregiver, or Care coordination (not separately reported).         Each patient to whom he or she provides medical services by telemedicine is:  (1) informed of the relationship between the physician and patient and the respective role of any other health care provider with respect to management of the patient; and (2) notified that he or she may decline to receive medical services by telemedicine and may withdraw from such care at any time.    Notes:   Reviewed information with him reviewed note from Dr. Mesa.  At this time continue with Sutent 37.5 mg will reimage in 2 months.  Treat with intravenous iron today.  Follow-up with me at that time referral made for advanced care planning  Advance Care Planning Patient aware of referral for advanced care planning          Freeman Dee Jr, MD FACP

## 2024-09-12 ENCOUNTER — TELEPHONE (OUTPATIENT)
Dept: HEMATOLOGY/ONCOLOGY | Facility: CLINIC | Age: 64
End: 2024-09-12
Payer: COMMERCIAL

## 2024-09-12 ENCOUNTER — LAB VISIT (OUTPATIENT)
Dept: LAB | Facility: HOSPITAL | Age: 64
End: 2024-09-12
Attending: INTERNAL MEDICINE
Payer: COMMERCIAL

## 2024-09-12 DIAGNOSIS — C49.A0 MALIGNANT GASTROINTESTINAL STROMAL TUMOR, UNSPECIFIED SITE: ICD-10-CM

## 2024-09-12 LAB
ALBUMIN SERPL BCP-MCNC: 3 G/DL (ref 3.5–5.2)
ALP SERPL-CCNC: 154 U/L (ref 55–135)
ALT SERPL W/O P-5'-P-CCNC: 39 U/L (ref 10–44)
ANION GAP SERPL CALC-SCNC: 9 MMOL/L (ref 8–16)
AST SERPL-CCNC: 38 U/L (ref 10–40)
BASOPHILS # BLD AUTO: 0.03 K/UL (ref 0–0.2)
BASOPHILS NFR BLD: 0.5 % (ref 0–1.9)
BILIRUB SERPL-MCNC: 0.7 MG/DL (ref 0.1–1)
BUN SERPL-MCNC: 10 MG/DL (ref 8–23)
CALCIUM SERPL-MCNC: 9.1 MG/DL (ref 8.7–10.5)
CHLORIDE SERPL-SCNC: 107 MMOL/L (ref 95–110)
CO2 SERPL-SCNC: 25 MMOL/L (ref 23–29)
CREAT SERPL-MCNC: 1.1 MG/DL (ref 0.5–1.4)
DIFFERENTIAL METHOD BLD: ABNORMAL
EOSINOPHIL # BLD AUTO: 0.3 K/UL (ref 0–0.5)
EOSINOPHIL NFR BLD: 5.6 % (ref 0–8)
ERYTHROCYTE [DISTWIDTH] IN BLOOD BY AUTOMATED COUNT: 16.4 % (ref 11.5–14.5)
EST. GFR  (NO RACE VARIABLE): >60 ML/MIN/1.73 M^2
GLUCOSE SERPL-MCNC: 128 MG/DL (ref 70–110)
HCT VFR BLD AUTO: 33.6 % (ref 40–54)
HGB BLD-MCNC: 10.7 G/DL (ref 14–18)
IMM GRANULOCYTES # BLD AUTO: 0.01 K/UL (ref 0–0.04)
IMM GRANULOCYTES NFR BLD AUTO: 0.2 % (ref 0–0.5)
LYMPHOCYTES # BLD AUTO: 1.7 K/UL (ref 1–4.8)
LYMPHOCYTES NFR BLD: 30.3 % (ref 18–48)
MCH RBC QN AUTO: 29.3 PG (ref 27–31)
MCHC RBC AUTO-ENTMCNC: 31.8 G/DL (ref 32–36)
MCV RBC AUTO: 92 FL (ref 82–98)
MONOCYTES # BLD AUTO: 0.4 K/UL (ref 0.3–1)
MONOCYTES NFR BLD: 7.9 % (ref 4–15)
NEUTROPHILS # BLD AUTO: 3.1 K/UL (ref 1.8–7.7)
NEUTROPHILS NFR BLD: 55.7 % (ref 38–73)
NRBC BLD-RTO: 0 /100 WBC
PHOSPHATE SERPL-MCNC: 3.2 MG/DL (ref 2.7–4.5)
PLATELET # BLD AUTO: 224 K/UL (ref 150–450)
PMV BLD AUTO: 9.8 FL (ref 9.2–12.9)
POTASSIUM SERPL-SCNC: 4 MMOL/L (ref 3.5–5.1)
PROT SERPL-MCNC: 7.5 G/DL (ref 6–8.4)
RBC # BLD AUTO: 3.65 M/UL (ref 4.6–6.2)
SODIUM SERPL-SCNC: 141 MMOL/L (ref 136–145)
TSH SERPL DL<=0.005 MIU/L-ACNC: 0.91 UIU/ML (ref 0.4–4)
WBC # BLD AUTO: 5.55 K/UL (ref 3.9–12.7)

## 2024-09-12 PROCEDURE — 84443 ASSAY THYROID STIM HORMONE: CPT | Performed by: INTERNAL MEDICINE

## 2024-09-12 PROCEDURE — 80053 COMPREHEN METABOLIC PANEL: CPT | Performed by: INTERNAL MEDICINE

## 2024-09-12 PROCEDURE — 85025 COMPLETE CBC W/AUTO DIFF WBC: CPT | Mod: PO | Performed by: INTERNAL MEDICINE

## 2024-09-12 PROCEDURE — 84100 ASSAY OF PHOSPHORUS: CPT | Performed by: INTERNAL MEDICINE

## 2024-09-12 PROCEDURE — 36415 COLL VENOUS BLD VENIPUNCTURE: CPT | Mod: PO | Performed by: INTERNAL MEDICINE

## 2024-09-12 NOTE — TELEPHONE ENCOUNTER
----- Message from Ava De La O sent at 9/12/2024  1:19 PM CDT -----  Type:  Needs Medical Advice    Who Called: Pt   Symptoms (please be specific): scheduling infusion    Would the patient rather a call back or a response via [facility]? Call back   Best Call Back Number: [phone]  Additional Information: Please be advised, pt states that he never received a call regarding scheduling infusion w/ Dr Dee and where he needs to go, pt states he wants to get it scheduled w/ Dr. Mesa and if he could get a call back as soon as possible

## 2024-09-12 NOTE — TELEPHONE ENCOUNTER
Returned call to patient.  Patient asking to be scheduled for iron infusion.  Infusions approved by insurance and patient wants to be scheduled at [facility].  Will send a message to infusion staff.

## 2024-09-13 ENCOUNTER — OFFICE VISIT (OUTPATIENT)
Dept: HEMATOLOGY/ONCOLOGY | Facility: CLINIC | Age: 64
End: 2024-09-13
Payer: COMMERCIAL

## 2024-09-13 ENCOUNTER — DOCUMENTATION ONLY (OUTPATIENT)
Dept: PALLIATIVE MEDICINE | Facility: CLINIC | Age: 64
End: 2024-09-13
Payer: COMMERCIAL

## 2024-09-13 ENCOUNTER — TELEPHONE (OUTPATIENT)
Dept: INFUSION THERAPY | Facility: HOSPITAL | Age: 64
End: 2024-09-13
Payer: COMMERCIAL

## 2024-09-13 ENCOUNTER — PATIENT MESSAGE (OUTPATIENT)
Dept: ADMINISTRATIVE | Facility: OTHER | Age: 64
End: 2024-09-13
Payer: COMMERCIAL

## 2024-09-13 DIAGNOSIS — C49.A0 MALIGNANT GASTROINTESTINAL STROMAL TUMOR, UNSPECIFIED SITE: Primary | ICD-10-CM

## 2024-09-13 DIAGNOSIS — D50.0 IRON DEFICIENCY ANEMIA DUE TO CHRONIC BLOOD LOSS: ICD-10-CM

## 2024-09-13 DIAGNOSIS — C78.7 SECONDARY LIVER CANCER: ICD-10-CM

## 2024-09-13 DIAGNOSIS — C78.6 SECONDARY MALIGNANCY OF PARIETAL PERITONEUM: ICD-10-CM

## 2024-09-13 DIAGNOSIS — I15.8 OTHER SECONDARY HYPERTENSION: ICD-10-CM

## 2024-09-13 NOTE — PROGRESS NOTES
Palliative Referral Nurse Note:    Nurse reached out to pt for scheduling , no answer, unable to leave voice message regarding scheduled apt. Pt can discuss r/s date / time if this apt doesn't work.

## 2024-09-13 NOTE — ASSESSMENT & PLAN NOTE
Previously on Gleevec 400mg po daily with positive response until 6/2024 when scans showed progression. Was instructed to start Sutent but increased Gleevec to BID instead. Staging imaging done 2 months later revealed progression; started Sutent 37.5mg po daily with plans to rescan in early 10/2024.

## 2024-09-13 NOTE — PROGRESS NOTES
"Subjective:       Patient ID: Agusto Juarez is a 64 y.o. male.    Chief Complaint:   1. Malignant gastrointestinal stromal tumor, unspecified site  Stage IV (cT4, cN1, cM0, Mitotic Rate: High)       2. Secondary malignancy of parietal peritoneum        3. Secondary liver cancer          Current Treatment:  Sutent 37.5mg po daily     Treatment History:  OP IMATINIB DAILY - GIST 400mg po daily; increased to BID in 6/2024     HPI: This is a 64-year-old  male with hypertension, hyperlipidemia, benign prostatic hyperplasia was recently seen for hernia by Dr. Glass.  CT scan showed a large mass that surrounded the stomach, duodenum, pancreas with involvement of the liver. He subsequently underwent endoscopic ultrasound with biopsy showing GIST measuring up to 20 cm.     He denies abnormal bleeding including epistaxis, hemoptysis, hematemesis, hematochezia, melena or hematuria.  Most recent CBC showed hemoglobin above 10 grams/deciliter.     Denies fever, chills, nausea or vomiting, chest pain, shortness of breath, diarrhea or dysuria. He however complains of abdominal pain.    He was started on Gleevec. CT C/A/P in 4/2022 revealed continued reduction in size of abdominal mass; RML pulmonary nodule stable. CT in 1/2023 showed stable disease. He had CT C/A/P done on 6/5/2024 that showed "interval disease progression in the abdomen pelvis with worsening peritoneal carcinomatosis and worsening hepatic metastatic disease." He saw Dr. Dee who started him on Sutent. The next day, he saw Dr. Mesa for a 2nd opinion. He reported having started taking Gleevec BID as he recalled being told he could do that if the Gleevec was not effective at daily dosing.      CT in 8/2024 revealed progression, and he was started on Sutent 37.5mg daily with plans to repeat staging imaging after 6 weeks of treatment. He began to experience elevated BP after starting Sutent. Was taking amlodipine 10mg but BP remained elevated. He was " started on lisinopril 40mg last week with instructions to monitor and log BP TID at home. He was also found to be iron deficient and was ordered IV iron.     His primary Hematologist/Oncologist is  Dr. Mesa .    Interval History: Patient presents for follow up on BP. He presents at home and reports adherence to amlodipine and lisinopril; he takes lisinopril about 11am and amlodipine in the evenings. BP has improved with DBP staying below 100 now. Most recent logged BP in [facility] is 129/85. Advised him to continue both BP meds. He also reports adherence to Sutent and has no complaints regarding it; he has been taking it for about a month. He states he has been fatigued at times and has headaches some days which he attributes to sinus congestion. He reports improvement with taking Claritin daily. He has postnasal drip at times; advised him to try Flonase for this. He states his cough is productive with yellow sputum. Patient to receive IV Feraheme for iron deficiency. He is currently scheduled for follow up with Dr. Mesa in 1 week with labs. He is aware of this appointment.     Reviewed labs with patient:   CBC:   Recent Labs   Lab 09/12/24  1034   WBC 5.55   RBC 3.65 L   Hemoglobin 10.7 L   Hematocrit 33.6 L   Platelets 224   MCV 92   MCH 29.3   MCHC 31.8 L     CMP:  Recent Labs   Lab 09/12/24  1034   Glucose 128 H   Calcium 9.1   Albumin 3.0 L   Total Protein 7.5   Sodium 141   Potassium 4.0   CO2 25   Chloride 107   BUN 10   Creatinine 1.1   Alkaline Phosphatase 154 H   ALT 39   AST 38   Total Bilirubin 0.7     The patient location is: [place]  The chief complaint leading to consultation is: GIST    Visit type: audiovisual    Face to Face time with patient: 25 minutes  71 minutes of total time spent on the encounter, which includes face to face time and non-face to face time preparing to see the patient (eg, review of tests), Obtaining and/or reviewing separately obtained history, Documenting clinical information in the  electronic or other health record, Independently interpreting results (not separately reported) and communicating results to the patient/family/caregiver, or Care coordination (not separately reported).     Each patient to whom he or she provides medical services by telemedicine is:  (1) informed of the relationship between the physician and patient and the respective role of any other health care provider with respect to management of the patient; and (2) notified that he or she may decline to receive medical services by telemedicine and may withdraw from such care at any time.    Social History     Socioeconomic History    Marital status:    Tobacco Use    Smoking status: Some Days     Current packs/day: 0.50     Average packs/day: 0.5 packs/day for 46.4 years (23.2 ttl pk-yrs)     Types: Cigarettes     Start date: 5/1/1978    Smokeless tobacco: Never   Substance and Sexual Activity    Alcohol use: Yes     Comment: occasionallly    Drug use: No    Sexual activity: Yes     Social Determinants of Health     Physical Activity: Unknown (6/6/2024)    Exercise Vital Sign     Days of Exercise per Week: 5 days     Past Medical History:   Diagnosis Date    BPH (benign prostatic hypertrophy)     Hyperlipidemia     Hypertension     Malignant gastrointestinal stromal tumor 05/05/2021    Villous adenoma of colon 02/07/2024    high grade dysplasia     Family History   Problem Relation Name Age of Onset    Hypertension Other      Lung cancer Father  60 - 69     Past Surgical History:   Procedure Laterality Date    COLONOSCOPY N/A 9/26/2023    Procedure: COLONOSCOPY;  Surgeon: Luis Choi MD;  Location: [facility];  Service: Endoscopy;  Laterality: N/A;    COLONOSCOPY N/A 2/7/2024    Procedure: COLONOSCOPY;  Surgeon: Rikki Shabazz MD;  Location: [facility] ([address] [place] [address]);  Service: Endoscopy;  Laterality: N/A;  12/18 portal instr- suprep-colonoscopy with EMR with me in the next 4-6 weeks.[facility] only.45 minute case is okay.  "Shabazz-tt  1/31/24- precall complete - ERW    COLONOSCOPY N/A 8/20/2024    Procedure: COLONOSCOPY;  Surgeon: Rikki Shabazz MD;  Location: [facility] ([address] [place] [address]);  Service: Endoscopy;  Laterality: N/A;  6/14 portal-suprep- colonoscopy with me in 6 months to follow-up prior EMR. Can be scheduled as a 45min case. [facility] only. shabazz-tt  8/13 SWP PRECALL COMPLETE-RT    ENDOSCOPIC ULTRASOUND OF UPPER GASTROINTESTINAL TRACT N/A 4/30/2021    Procedure: ULTRASOUND, UPPER GI TRACT, ENDOSCOPIC;  Surgeon: Rikki Shabazz MD;  Location: [facility];  Service: Endoscopy;  Laterality: N/A;  Rapid COVID prior - last minute addition to schedule - ttr    ESOPHAGOGASTRODUODENOSCOPY N/A 4/30/2021    Procedure: EGD (ESOPHAGOGASTRODUODENOSCOPY);  Surgeon: Rikki Shabazz MD;  Location: [facility];  Service: Endoscopy;  Laterality: N/A;  EGD/EUS with biopsy within a week is fine. 45min case with me OK. Any campus. -Dr. Shabazz     Review of Systems   Constitutional:  Positive for fatigue ("sometimes"). Negative for appetite change.   HENT:  Positive for postnasal drip ("sometimes") and sinus pressure/congestion (improved with Claritin). Negative for mouth sores, rhinorrhea and sore throat.    Eyes: Negative.    Respiratory:  Positive for cough (with yellow sputum). Negative for shortness of breath.    Cardiovascular: Negative.    Gastrointestinal:  Negative for constipation, diarrhea, nausea and vomiting.   Endocrine: Positive for cold intolerance ("most of the time").   Genitourinary: Negative.    Musculoskeletal: Negative.    Integumentary:  Negative.   Allergic/Immunologic: Negative.    Neurological:  Positive for headaches ("sinus headache", "not every day"). Negative for dizziness, weakness, light-headedness and numbness.   Hematological: Negative.    Psychiatric/Behavioral: Negative.         Medication List with Changes/Refills   Current Medications    ACETAMINOPHEN (TYLENOL) 325 MG TABLET    Take 325 mg by mouth every 6 (six) hours as needed for " Pain.    AMLODIPINE (NORVASC) 10 MG TABLET    Take 1 tablet (10 mg total) by mouth once daily.    BUPROPION (WELLBUTRIN XL) 150 MG TB24 TABLET    Take 1 tablet (150 mg total) by mouth once daily.    HYDROCODONE-ACETAMINOPHEN (NORCO)  MG PER TABLET    Take 1 tablet by mouth.    LISINOPRIL (PRINIVIL,ZESTRIL) 40 MG TABLET    Take 1 tablet (40 mg total) by mouth once daily.    MULTIVITAMIN CAPSULE    Take 1 capsule by mouth once daily.    ONDANSETRON (ZOFRAN-ODT) 8 MG TBDL    Take 1 tablet (8 mg total) by mouth every 8 (eight) hours as needed (nausea).    POTASSIUM CHLORIDE SA (K-DUR,KLOR-CON) 20 MEQ TABLET    Take 1 tablet by mouth once daily    PRAVASTATIN (PRAVACHOL) 10 MG TABLET    Take 1 tablet by mouth once daily    SUNITINIB MALATE (SUTENT) 37.5 MG CAP    Take 1 capsule (37.5 mg total) by mouth once daily.    TOBRAMYCIN-DEXAMETHASONE 0.3-0.1% (TOBRADEX) 0.3-0.1 % DRPS    INSTILL 1 DROP INTO LEFT EYE 4 TIMES DAILY     Objective:   There were no vitals filed for this visit.  Physical Exam     Unable to assess due to virtual visit.    (0) Fully active, able to carry on all predisease performance without restriction  Assessment:     Problem List Items Addressed This Visit          Cardiac/Vascular    Hypertension     BP elevated as side effect of Sutent. Currently on amlodipine 10mg po daily and lisinopril 40mg po daily with improvement in overall BP especially DBP. Advised to continue.             Oncology    Malignant gastrointestinal stromal tumor - Primary     Previously on Gleevec 400mg po daily with positive response until 6/2024 when scans showed progression. Was instructed to start Sutent but increased Gleevec to BID instead. Staging imaging done 2 months later revealed progression; started Sutent 37.5mg po daily with plans to rescan in early 10/2024.          Secondary liver cancer    Secondary malignancy of parietal peritoneum    Iron deficiency anemia due to chronic blood loss     Noted on recent  iron studies. Plan to treat with Feraheme IV x 2 doses 1 week apart. Patient currently awaiting scheduling.           Plan:     Malignant gastrointestinal stromal tumor, unspecified site    Secondary malignancy of parietal peritoneum    Secondary liver cancer    Other secondary hypertension    Iron deficiency anemia due to chronic blood loss    Labs reviewed; stable anemia & CMP.   Continue amlodipine and lisinopril as prescribed.   Continue Sutent as directed.   Awaiting scheduling of Feraheme IV x 2 doses 1 week apart.   Follow up in 1 week with Dr. Mesa with  Phos, CBC, Comprehensive Metabolic Panel, and TSH.    Route Chart for Scheduling    Med Onc Chart Routing      Follow up with physician 1 week. Dr. Mesa (already scheduled)   Follow up with YESSY    Infusion scheduling note   Feraheme x 2 doses 1 week apart (pt awaiting scheduling)   Injection scheduling note    Labs CBC, CMP, phosphorus and TSH   Scheduling:  Preferred lab:  Lab interval:  in 1 week (already scheduled)   Imaging None      Pharmacy appointment No pharmacy appointment needed      Other referrals       No additional referrals needed             I will review assessment/plan with collaborating physician.      XAVIER Sal

## 2024-09-13 NOTE — ASSESSMENT & PLAN NOTE
BP elevated as side effect of Sutent. Currently on amlodipine 10mg po daily and lisinopril 40mg po daily with improvement in overall BP especially DBP. Advised to continue.

## 2024-09-13 NOTE — ASSESSMENT & PLAN NOTE
Noted on recent iron studies. Plan to treat with Feraheme IV x 2 doses 1 week apart. Patient currently awaiting scheduling.

## 2024-09-19 ENCOUNTER — LAB VISIT (OUTPATIENT)
Dept: LAB | Facility: HOSPITAL | Age: 64
End: 2024-09-19
Attending: INTERNAL MEDICINE
Payer: COMMERCIAL

## 2024-09-19 DIAGNOSIS — C49.A0 MALIGNANT GASTROINTESTINAL STROMAL TUMOR, UNSPECIFIED SITE: ICD-10-CM

## 2024-09-19 LAB
ALBUMIN SERPL BCP-MCNC: 3 G/DL (ref 3.5–5.2)
ALP SERPL-CCNC: 175 U/L (ref 55–135)
ALT SERPL W/O P-5'-P-CCNC: 34 U/L (ref 10–44)
ANION GAP SERPL CALC-SCNC: 9 MMOL/L (ref 8–16)
AST SERPL-CCNC: 36 U/L (ref 10–40)
BASOPHILS # BLD AUTO: 0.02 K/UL (ref 0–0.2)
BASOPHILS NFR BLD: 0.4 % (ref 0–1.9)
BILIRUB SERPL-MCNC: 0.6 MG/DL (ref 0.1–1)
BUN SERPL-MCNC: 10 MG/DL (ref 8–23)
CALCIUM SERPL-MCNC: 9.1 MG/DL (ref 8.7–10.5)
CHLORIDE SERPL-SCNC: 106 MMOL/L (ref 95–110)
CO2 SERPL-SCNC: 25 MMOL/L (ref 23–29)
CREAT SERPL-MCNC: 1.1 MG/DL (ref 0.5–1.4)
DIFFERENTIAL METHOD BLD: ABNORMAL
EOSINOPHIL # BLD AUTO: 0.1 K/UL (ref 0–0.5)
EOSINOPHIL NFR BLD: 2.3 % (ref 0–8)
ERYTHROCYTE [DISTWIDTH] IN BLOOD BY AUTOMATED COUNT: 17.1 % (ref 11.5–14.5)
EST. GFR  (NO RACE VARIABLE): >60 ML/MIN/1.73 M^2
GLUCOSE SERPL-MCNC: 86 MG/DL (ref 70–110)
HCT VFR BLD AUTO: 32.8 % (ref 40–54)
HGB BLD-MCNC: 10.4 G/DL (ref 14–18)
IMM GRANULOCYTES # BLD AUTO: 0.01 K/UL (ref 0–0.04)
IMM GRANULOCYTES NFR BLD AUTO: 0.2 % (ref 0–0.5)
LYMPHOCYTES # BLD AUTO: 2 K/UL (ref 1–4.8)
LYMPHOCYTES NFR BLD: 38.3 % (ref 18–48)
MCH RBC QN AUTO: 28.7 PG (ref 27–31)
MCHC RBC AUTO-ENTMCNC: 31.7 G/DL (ref 32–36)
MCV RBC AUTO: 90 FL (ref 82–98)
MONOCYTES # BLD AUTO: 0.5 K/UL (ref 0.3–1)
MONOCYTES NFR BLD: 9 % (ref 4–15)
NEUTROPHILS # BLD AUTO: 2.7 K/UL (ref 1.8–7.7)
NEUTROPHILS NFR BLD: 50 % (ref 38–73)
NRBC BLD-RTO: 0 /100 WBC
PHOSPHATE SERPL-MCNC: 3.2 MG/DL (ref 2.7–4.5)
PLATELET # BLD AUTO: 237 K/UL (ref 150–450)
PMV BLD AUTO: 10.1 FL (ref 9.2–12.9)
POTASSIUM SERPL-SCNC: 4.2 MMOL/L (ref 3.5–5.1)
PROT SERPL-MCNC: 7.6 G/DL (ref 6–8.4)
RBC # BLD AUTO: 3.63 M/UL (ref 4.6–6.2)
SODIUM SERPL-SCNC: 140 MMOL/L (ref 136–145)
TSH SERPL DL<=0.005 MIU/L-ACNC: 0.95 UIU/ML (ref 0.4–4)
WBC # BLD AUTO: 5.33 K/UL (ref 3.9–12.7)

## 2024-09-19 PROCEDURE — 84443 ASSAY THYROID STIM HORMONE: CPT | Performed by: INTERNAL MEDICINE

## 2024-09-19 PROCEDURE — 84100 ASSAY OF PHOSPHORUS: CPT | Performed by: INTERNAL MEDICINE

## 2024-09-19 PROCEDURE — 80053 COMPREHEN METABOLIC PANEL: CPT | Performed by: INTERNAL MEDICINE

## 2024-09-19 PROCEDURE — 36415 COLL VENOUS BLD VENIPUNCTURE: CPT | Mod: PO | Performed by: INTERNAL MEDICINE

## 2024-09-19 PROCEDURE — 85025 COMPLETE CBC W/AUTO DIFF WBC: CPT | Mod: PO | Performed by: INTERNAL MEDICINE

## 2024-09-20 ENCOUNTER — OFFICE VISIT (OUTPATIENT)
Dept: HEMATOLOGY/ONCOLOGY | Facility: CLINIC | Age: 64
End: 2024-09-20
Payer: COMMERCIAL

## 2024-09-20 DIAGNOSIS — C49.A0 MALIGNANT GASTROINTESTINAL STROMAL TUMOR, UNSPECIFIED SITE: Primary | ICD-10-CM

## 2024-09-20 DIAGNOSIS — D84.821 IMMUNODEFICIENCY DUE TO CHEMOTHERAPY: ICD-10-CM

## 2024-09-20 DIAGNOSIS — D84.81 IMMUNODEFICIENCY SECONDARY TO NEOPLASM: ICD-10-CM

## 2024-09-20 DIAGNOSIS — C78.6 SECONDARY MALIGNANCY OF PARIETAL PERITONEUM: ICD-10-CM

## 2024-09-20 DIAGNOSIS — I15.8 OTHER SECONDARY HYPERTENSION: ICD-10-CM

## 2024-09-20 DIAGNOSIS — Z79.899 IMMUNODEFICIENCY DUE TO CHEMOTHERAPY: ICD-10-CM

## 2024-09-20 DIAGNOSIS — D49.9 IMMUNODEFICIENCY SECONDARY TO NEOPLASM: ICD-10-CM

## 2024-09-20 DIAGNOSIS — C78.7 SECONDARY LIVER CANCER: ICD-10-CM

## 2024-09-20 DIAGNOSIS — T45.1X5A IMMUNODEFICIENCY DUE TO CHEMOTHERAPY: ICD-10-CM

## 2024-09-20 RX ORDER — CARVEDILOL 6.25 MG/1
6.25 TABLET ORAL 2 TIMES DAILY WITH MEALS
Qty: 60 TABLET | Refills: 11 | Status: SHIPPED | OUTPATIENT
Start: 2024-09-20 | End: 2024-09-20

## 2024-09-20 RX ORDER — CARVEDILOL 6.25 MG/1
6.25 TABLET ORAL 2 TIMES DAILY WITH MEALS
Qty: 60 TABLET | Refills: 11 | Status: SHIPPED | OUTPATIENT
Start: 2024-09-20 | End: 2025-09-20

## 2024-09-20 NOTE — PROGRESS NOTES
"The patient location is: home in [place]  The chief complaint leading to consultation is: follow up for stage IV GIST    Visit type: audiovirtual    Face to Face time with patient: 15 min  40 minutes of total time spent on the encounter, which includes face to face time and non-face to face time preparing to see the patient (eg, review of tests), Obtaining and/or reviewing separately obtained history, Documenting clinical information in the electronic or other health record, Independently interpreting results (not separately reported) and communicating results to the patient/family/caregiver, or Care coordination (not separately reported).         Each patient to whom he or she provides medical services by telemedicine is:  (1) informed of the relationship between the physician and patient and the respective role of any other health care provider with respect to management of the patient; and (2) notified that he or she may decline to receive medical services by telemedicine and may withdraw from such care at any time.    Notes:                                                                PROGRESS NOTE    Subjective:       Patient ID: Agusto Juarez is a 64 y.o. male.    Chief Complaint: follow up for stage IV GIST    Diagnosis:  Metastatic GIST, KIT/PDGFRA/BRAF/SDH wild-type, diagnosed in April 2021  [facility] blood 6/7/24: positive for KIT exon 13 p.[id number] mutation, TMB 8.6. MSI high not detected  [facility] tissue: positive for KIT exon 11 and exon 13 mutations, CDKN2A copy number loss, CDKN2B copy number loss, germline NTHL1 mutation    Oncologic History:  1.Mr Juarez is a 63 yo man with HTN and high grade dysplasia of villous adenoma of the colon who first saw me on 6/7/24 for further management of gastric GIST. 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 CT A/P 4/26/21 showed "Large upper abdominal mass which may arise from the stomach or pancreas " "and may involve the liver, duodenum, spleen, and gallbladder. There are also multiple nodular masses throughout the peritoneum and mesentery consistent with peritoneal carcinomatosis." Biopsy of gastric mass and perigastric LN showed grade 1 GIST. Mixed spindle cell and epithelioid type. Mitotic rate cannot be determined. No necrosis. Presumed low-grade (G1). Given 20 cm with low mitotic count, likely moderate risk. Strata test at that time was negative for any mutation. GIST panel was negative.es.  He started gleevec 400 mg daily at that time. CT in Oct 2021 and 2022 showed response to gleevec with reduction of tumor size. CT in 2023 showed stable disease. He had CT C/A/P done on 24. It showed "Interval disease progression in the abdomen pelvis with worsening peritoneal carcinomatosis and worsening hepatic metastatic disease." He presents today for a second opinion. He saw Dr Dee yesterday. Sutent was prescribed. Patient remembers being told in the past he can take two 400 mg gleevec a day when it is not working. He started 400 mg bid yesterday and feels better.   Recc staying on 400 mg bid (since 24) with close follow up scan.   2. CT C/A/P 24 showed progression. Sutent 37.5 mg daily started around 24.     Interval History:   Mr Juarez returns for follow up. He has been taking sutent 37.5 mg daily for a month. Physically, he does not feel much side effects. Checks BP at home. Takes lisinopril 40 mg daily and amlodipine 10 mg daily.     ECO    ROS:   A ten-point system review is obtained and negative except for what was stated in the Interval History.     Physical Examination:    General: well hydrated, well developed, in no acute distress  HEENT: normocephalic, EOMI, anicteric sclerae  Neuro: alert and oriented x 4  Psych: pleasant and appropriate mood and affect    Objective:     Laboratory Data:  Labs reviewed. CBC, CMP adequate for treatment    Imaging Data:  CT C/A/P " 6/5/24:  1.  Interval disease progression in the abdomen pelvis with worsening peritoneal carcinomatosis and worsening hepatic metastatic disease.     2.  No evidence for thoracic metastatic disease.     CT C/A/P 8/7/24:  Impression:     Interval progression of disease with enlarging left upper quadrant mass and hepatic metastatic lesions, and increased size and number of peritoneal carcinomatosis lesions.     Mild narrowing of the GE junction.  Consider gastrostomy tube placement.     No evidence of intrathoracic metastatic disease.    Assessment and Plan:     1. Malignant gastrointestinal stromal tumor, unspecified site    2. Secondary malignancy of parietal peritoneum    3. Secondary liver cancer    4. Immunodeficiency due to chemotherapy    5. Immunodeficiency secondary to neoplasm    6. Other secondary hypertension        1-5  - Mr Juarez is a 65 yo man with metastatic gastric GIST with met to the peritoneum and liver. Diagnosed in April 2021. Has been on gleevec 400 mg daily since. Had good response initially. Recent CT 6/5/24 showed progression of disease. Patient started gleevec 400 mg bid by himself on 6/6/24. [facility] showed KIT exon 11 and exon 13 mutations.   - CT C/A/P 8/7/24 showed progression. Sutent 37.5 mg daily started around 8/16/24.  - doing well. Tolerating sutent 37.5 mg daily very well. Prior early satiety has resolved. Main side effect is high BP.   - c/w lisinopril 40 mg daily and amlodipine 10 mg daily  - reviewed BP log. Add coreg 6.25 mg bid  - c/w home monitoring of BP. RTC in 2 weeks for BP log review and med adjustments  - messaged Dr Bentley re updates    6.  - see above    Follow-up:     RTC in 2 weeks  Knows to call in the interval if any problems arise.    Electronically signed by Tawny Mesa

## 2024-09-26 ENCOUNTER — INFUSION (OUTPATIENT)
Dept: INFUSION THERAPY | Facility: HOSPITAL | Age: 64
End: 2024-09-26
Attending: FAMILY MEDICINE
Payer: COMMERCIAL

## 2024-09-26 VITALS
DIASTOLIC BLOOD PRESSURE: 91 MMHG | RESPIRATION RATE: 17 BRPM | TEMPERATURE: 98 F | SYSTOLIC BLOOD PRESSURE: 152 MMHG | OXYGEN SATURATION: 98 % | HEART RATE: 63 BPM

## 2024-09-26 DIAGNOSIS — D50.0 IRON DEFICIENCY ANEMIA DUE TO CHRONIC BLOOD LOSS: Primary | ICD-10-CM

## 2024-09-26 PROCEDURE — 25000003 PHARM REV CODE 250: Performed by: INTERNAL MEDICINE

## 2024-09-26 PROCEDURE — 96374 THER/PROPH/DIAG INJ IV PUSH: CPT

## 2024-09-26 PROCEDURE — 63600175 PHARM REV CODE 636 W HCPCS: Mod: JZ,JG | Performed by: INTERNAL MEDICINE

## 2024-09-26 RX ADMIN — FERUMOXYTOL 510 MG: 510 INJECTION INTRAVENOUS at 08:09

## 2024-09-27 ENCOUNTER — PATIENT MESSAGE (OUTPATIENT)
Dept: HEMATOLOGY/ONCOLOGY | Facility: CLINIC | Age: 64
End: 2024-09-27
Payer: COMMERCIAL

## 2024-10-02 ENCOUNTER — DOCUMENTATION ONLY (OUTPATIENT)
Dept: HEMATOLOGY/ONCOLOGY | Facility: CLINIC | Age: 64
End: 2024-10-02
Payer: COMMERCIAL

## 2024-10-02 ENCOUNTER — PATIENT MESSAGE (OUTPATIENT)
Dept: HEMATOLOGY/ONCOLOGY | Facility: CLINIC | Age: 64
End: 2024-10-02
Payer: COMMERCIAL

## 2024-10-02 ENCOUNTER — OFFICE VISIT (OUTPATIENT)
Dept: FAMILY MEDICINE | Facility: CLINIC | Age: 64
End: 2024-10-02
Payer: COMMERCIAL

## 2024-10-02 VITALS
BODY MASS INDEX: 25.39 KG/M2 | DIASTOLIC BLOOD PRESSURE: 90 MMHG | HEART RATE: 76 BPM | WEIGHT: 167.5 LBS | HEIGHT: 68 IN | TEMPERATURE: 98 F | SYSTOLIC BLOOD PRESSURE: 159 MMHG

## 2024-10-02 DIAGNOSIS — I10 ESSENTIAL HYPERTENSION: Primary | ICD-10-CM

## 2024-10-02 DIAGNOSIS — C49.A0 MALIGNANT GASTROINTESTINAL STROMAL TUMOR, UNSPECIFIED SITE: ICD-10-CM

## 2024-10-02 DIAGNOSIS — I15.9 SECONDARY HYPERTENSION: ICD-10-CM

## 2024-10-02 PROCEDURE — 99999 PR PBB SHADOW E&M-EST. PATIENT-LVL IV: CPT | Mod: PBBFAC,,, | Performed by: FAMILY MEDICINE

## 2024-10-02 PROCEDURE — 99214 OFFICE O/P EST MOD 30 MIN: CPT | Mod: S$GLB,,, | Performed by: FAMILY MEDICINE

## 2024-10-02 PROCEDURE — 3008F BODY MASS INDEX DOCD: CPT | Mod: CPTII,S$GLB,, | Performed by: FAMILY MEDICINE

## 2024-10-02 PROCEDURE — G2211 COMPLEX E/M VISIT ADD ON: HCPCS | Mod: S$GLB,,, | Performed by: FAMILY MEDICINE

## 2024-10-02 PROCEDURE — 3077F SYST BP >= 140 MM HG: CPT | Mod: CPTII,S$GLB,, | Performed by: FAMILY MEDICINE

## 2024-10-02 PROCEDURE — 4010F ACE/ARB THERAPY RXD/TAKEN: CPT | Mod: CPTII,S$GLB,, | Performed by: FAMILY MEDICINE

## 2024-10-02 PROCEDURE — 1159F MED LIST DOCD IN RCRD: CPT | Mod: CPTII,S$GLB,, | Performed by: FAMILY MEDICINE

## 2024-10-02 PROCEDURE — 3080F DIAST BP >= 90 MM HG: CPT | Mod: CPTII,S$GLB,, | Performed by: FAMILY MEDICINE

## 2024-10-02 RX ORDER — CARVEDILOL 12.5 MG/1
12.5 TABLET ORAL 2 TIMES DAILY WITH MEALS
Qty: 60 TABLET | Refills: 1 | Status: SHIPPED | OUTPATIENT
Start: 2024-10-02 | End: 2025-10-02

## 2024-10-02 NOTE — PATIENT INSTRUCTIONS
Current Outpatient Medications on File Prior to Visit   Medication Sig Dispense     amLODIPine (NORVASC) 10 MG tablet Take 1 tablet (10 mg total) by mouth once daily. 90 tablet AM    buPROPion (WELLBUTRIN XL) 150 MG TB24 tablet Take 1 tablet (150 mg total) by mouth once daily. 30 tablet AM    HYDROcodone-acetaminophen (NORCO)  mg per tablet Take 1 tablet by mouth.  As needed    lisinopriL (PRINIVIL,ZESTRIL) 40 MG tablet Take 1 tablet (40 mg total) by mouth once daily. 90 tablet AM    multivitamin capsule Take 1 capsule by mouth once daily.  AM    ondansetron (ZOFRAN-ODT) 8 MG TbDL Take 1 tablet (8 mg total) by mouth every 8 (eight) hours as needed (nausea). 30 tablet As needed    potassium chloride SA (K-DUR,KLOR-CON) 20 MEQ tablet Take 1 tablet by mouth once daily 90 tablet AM    pravastatin (PRAVACHOL) 10 MG tablet Take 1 tablet by mouth once daily 90 tablet PM    SUNItinib malate (SUTENT) 37.5 mg Cap Take 1 capsule (37.5 mg total) by mouth once daily. 30 capsule AM    carvediloL (COREG) 12.5 MG tablet Take 1 tablet (6.25 mg total) by mouth 2 (two) times daily with meals. 60 tablet AM, PM     No current facility-administered medications on file prior to visit.       Health Maintenance Due   Topic Date Due    Pneumococcal Vaccines (Age 0-64) (1 of 2 - PCV) Never done    HIV Screening  Never done    Shingles Vaccine (1 of 2) Never done    RSV Vaccine (Age 60+ and Pregnant patients) (1 - Risk 60-74 years 1-dose series) Never done    Influenza Vaccine (1) Never done    COVID-19 Vaccine (4 - 2024-25 season) 09/01/2024

## 2024-10-02 NOTE — PROGRESS NOTES
History of Present Illness    Mr. Juarez reports ongoing issues with high blood pressure felt likely related to Sutent he is using for cancer  despite recent medication changes. He has been monitoring his blood pressure daily at home. Carvedilol was initiated approximately 2 weeks ago (9/20). The patient expresses difficulty managing his complex medication regimen.    The patient reports recent improvement in energy levels following iron infusion therapy. Prior to the infusion, he had severe fatigue and limited mobility around his house. Since receiving the first dose of iron last week, he notes a significant increase in energy and improved mobility. A second iron infusion is scheduled for tomorrow.        Agusto was seen today for follow-up and hypertension.    Diagnoses and all orders for this visit:    Essential hypertension  -     MYC E-Visit    Secondary hypertension  -     MYC E-Visit    Malignant gastrointestinal stromal tumor, unspecified site    Other orders  -     carvediloL (COREG) 12.5 MG tablet; Take 1 tablet (12.5 mg total) by mouth 2 (two) times daily with meals.      Increase carvedilol.  Gave him instructions to take medications so that he only has to take medications once in the morning and once in the evening. E visit for blood pressure check in a month.      I spent a total of 34 minutes on the day of the visit.  This includes face to face time and non-face to face time preparing to see the patient (eg, review of tests), obtaining and/or reviewing separately obtained history, documenting clinical information in the electronic or other health record, independently interpreting results and communicating results to the patient/family/caregiver, or care coordinator.    Past Medical History:  Past Medical History:   Diagnosis Date    BPH (benign prostatic hypertrophy)     Hyperlipidemia     Hypertension     Malignant gastrointestinal stromal tumor 05/05/2021    Villous adenoma of colon 02/07/2024     high grade dysplasia     Past Surgical History:   Procedure Laterality Date    COLONOSCOPY N/A 9/26/2023    Procedure: COLONOSCOPY;  Surgeon: Luis Choi MD;  Location: [facility];  Service: Endoscopy;  Laterality: N/A;    COLONOSCOPY N/A 2/7/2024    Procedure: COLONOSCOPY;  Surgeon: Rikki Shabazz MD;  Location: [facility] (2ND FLR);  Service: Endoscopy;  Laterality: N/A;  12/18 portal instr- suprep-colonoscopy with EMR with me in the next 4-6 weeks.[facility] only.45 minute case is okay. [id number]-[id number]  1/31/24- precall complete - [facility]    COLONOSCOPY N/A 8/20/2024    Procedure: COLONOSCOPY;  Surgeon: Rikki Shabazz MD;  Location: [facility] (2ND FLR);  Service: Endoscopy;  Laterality: N/A;  6/14 portal-suprep- colonoscopy with me in 6 months to follow-up prior EMR. Can be scheduled as a 45min case. [facility] only. [id number]  8/13 SWP PRECALL COMPLETE-RT    ENDOSCOPIC ULTRASOUND OF UPPER GASTROINTESTINAL TRACT N/A 4/30/2021    Procedure: ULTRASOUND, UPPER GI TRACT, ENDOSCOPIC;  Surgeon: Rikki Shabazz MD;  Location: [facility];  Service: Endoscopy;  Laterality: N/A;  Rapid COVID prior - last minute addition to schedule - ttr    ESOPHAGOGASTRODUODENOSCOPY N/A 4/30/2021    Procedure: EGD (ESOPHAGOGASTRODUODENOSCOPY);  Surgeon: Rikki Shabazz MD;  Location: [facility];  Service: Endoscopy;  Laterality: N/A;  EGD/EUS with biopsy within a week is fine. 45min case with me OK. Any campus. -Dr. Shabazz     Review of patient's allergies indicates:  No Known Allergies  Current Outpatient Medications on File Prior to Visit   Medication Sig Dispense Refill    acetaminophen (TYLENOL) 325 MG tablet Take 325 mg by mouth every 6 (six) hours as needed for Pain.      amLODIPine (NORVASC) 10 MG tablet Take 1 tablet (10 mg total) by mouth once daily. 90 tablet 3    buPROPion (WELLBUTRIN XL) 150 MG TB24 tablet Take 1 tablet (150 mg total) by mouth once daily. 30 tablet 5    HYDROcodone-acetaminophen (NORCO)  mg per tablet Take 1 tablet by mouth.       lisinopriL (PRINIVIL,ZESTRIL) 40 MG tablet Take 1 tablet (40 mg total) by mouth once daily. 90 tablet 3    multivitamin capsule Take 1 capsule by mouth once daily.      ondansetron (ZOFRAN-ODT) 8 MG TbDL Take 1 tablet (8 mg total) by mouth every 8 (eight) hours as needed (nausea). 30 tablet 2    potassium chloride SA (K-DUR,KLOR-CON) 20 MEQ tablet Take 1 tablet by mouth once daily 90 tablet 3    pravastatin (PRAVACHOL) 10 MG tablet Take 1 tablet by mouth once daily 90 tablet 3    SUNItinib malate (SUTENT) 37.5 mg Cap Take 1 capsule (37.5 mg total) by mouth once daily. 30 capsule 11    tobramycin-dexAMETHasone 0.3-0.1% (TOBRADEX) 0.3-0.1 % DrpS INSTILL 1 DROP INTO LEFT EYE 4 TIMES DAILY      [DISCONTINUED] carvediloL (COREG) 6.25 MG tablet Take 1 tablet (6.25 mg total) by mouth 2 (two) times daily with meals. 60 tablet 11     No current facility-administered medications on file prior to visit.     Social History     Socioeconomic History    Marital status:    Tobacco Use    Smoking status: Some Days     Current packs/day: 0.50     Average packs/day: 0.5 packs/day for 46.4 years (23.2 ttl pk-yrs)     Types: Cigarettes     Start date: 5/1/1978    Smokeless tobacco: Never   Substance and Sexual Activity    Alcohol use: Yes     Comment: occasionallly    Drug use: No    Sexual activity: Yes     Social Drivers of Health     Physical Activity: Unknown (6/6/2024)    Exercise Vital Sign     Days of Exercise per Week: 5 days     Family History   Problem Relation Name Age of Onset    Hypertension Other      Lung cancer Father  60 - 69           ROS:  GENERAL: No fever, chills,  or significant weight changes.   CARDIOVASCULAR: Denies chest pain, PND, orthopnea or reduced exercise tolerance.  ABDOMEN: Appetite fine. Denies diarrhea, abdominal pain, hematemesis or blood in stool.  URINARY: No flank pain, dysuria or hematuria.    Vitals:    10/02/24 0910   BP: (!) 159/90   Pulse: 76   Temp: 97.9 °F (36.6 °C)   TempSrc:  "Temporal   Weight: 76 kg (167 lb 8 oz)   Height: 5' 8" (1.727 m)       Wt Readings from Last 3 Encounters:   10/02/24 76 kg (167 lb 8 oz)   08/23/24 79.2 kg (174 lb 9.7 oz)   08/22/24 83 kg (183 lb)       APPEARANCE: Well nourished, well developed, in no acute distress.    HEAD: Normocephalic.  Atraumatic.  EYES:   Right eye: Pupil reactive.  Conjunctiva clear.    Left eye: Pupil reactive.  Conjunctiva clear.    NECK: Supple. No bruits.  No JVD.  No cervical lymphadenopathy.  No thyromegaly.    CHEST: Breath sounds clear bilaterally.  Normal respiratory effort  CARDIOVASCULAR: Normal rate.  Regular rhythm.  No murmurs.  No rub.  No gallops.   No edema.  MENTAL STATUS: Alert.  Oriented x 3.  "

## 2024-10-03 ENCOUNTER — LAB VISIT (OUTPATIENT)
Dept: LAB | Facility: HOSPITAL | Age: 64
End: 2024-10-03
Attending: INTERNAL MEDICINE
Payer: COMMERCIAL

## 2024-10-03 ENCOUNTER — INFUSION (OUTPATIENT)
Dept: INFUSION THERAPY | Facility: HOSPITAL | Age: 64
End: 2024-10-03
Attending: FAMILY MEDICINE
Payer: COMMERCIAL

## 2024-10-03 ENCOUNTER — OFFICE VISIT (OUTPATIENT)
Dept: PALLIATIVE MEDICINE | Facility: CLINIC | Age: 64
End: 2024-10-03
Payer: COMMERCIAL

## 2024-10-03 VITALS
RESPIRATION RATE: 16 BRPM | RESPIRATION RATE: 18 BRPM | SYSTOLIC BLOOD PRESSURE: 168 MMHG | TEMPERATURE: 98 F | TEMPERATURE: 98 F | HEART RATE: 65 BPM | DIASTOLIC BLOOD PRESSURE: 89 MMHG | BODY MASS INDEX: 25.42 KG/M2 | OXYGEN SATURATION: 98 % | HEART RATE: 70 BPM | DIASTOLIC BLOOD PRESSURE: 85 MMHG | HEIGHT: 68 IN | WEIGHT: 167.75 LBS | OXYGEN SATURATION: 99 % | SYSTOLIC BLOOD PRESSURE: 142 MMHG

## 2024-10-03 DIAGNOSIS — D50.0 IRON DEFICIENCY ANEMIA DUE TO CHRONIC BLOOD LOSS: Primary | ICD-10-CM

## 2024-10-03 DIAGNOSIS — C49.A0 MALIGNANT GASTROINTESTINAL STROMAL TUMOR, UNSPECIFIED SITE: Primary | ICD-10-CM

## 2024-10-03 DIAGNOSIS — C49.A0 MALIGNANT GASTROINTESTINAL STROMAL TUMOR, UNSPECIFIED SITE: ICD-10-CM

## 2024-10-03 DIAGNOSIS — R63.4 WEIGHT LOSS, UNINTENTIONAL: ICD-10-CM

## 2024-10-03 DIAGNOSIS — Z51.5 PALLIATIVE CARE ENCOUNTER: ICD-10-CM

## 2024-10-03 LAB
ALBUMIN SERPL BCP-MCNC: 2.8 G/DL (ref 3.5–5.2)
ALP SERPL-CCNC: 164 U/L (ref 55–135)
ALT SERPL W/O P-5'-P-CCNC: 23 U/L (ref 10–44)
ANION GAP SERPL CALC-SCNC: 8 MMOL/L (ref 8–16)
AST SERPL-CCNC: 27 U/L (ref 10–40)
BASOPHILS # BLD AUTO: 0.01 K/UL (ref 0–0.2)
BASOPHILS NFR BLD: 0.3 % (ref 0–1.9)
BILIRUB SERPL-MCNC: 0.6 MG/DL (ref 0.1–1)
BUN SERPL-MCNC: 8 MG/DL (ref 8–23)
CALCIUM SERPL-MCNC: 9 MG/DL (ref 8.7–10.5)
CHLORIDE SERPL-SCNC: 107 MMOL/L (ref 95–110)
CO2 SERPL-SCNC: 27 MMOL/L (ref 23–29)
CREAT SERPL-MCNC: 1.1 MG/DL (ref 0.5–1.4)
DIFFERENTIAL METHOD BLD: ABNORMAL
EOSINOPHIL # BLD AUTO: 0.1 K/UL (ref 0–0.5)
EOSINOPHIL NFR BLD: 2.6 % (ref 0–8)
ERYTHROCYTE [DISTWIDTH] IN BLOOD BY AUTOMATED COUNT: 19.1 % (ref 11.5–14.5)
EST. GFR  (NO RACE VARIABLE): >60 ML/MIN/1.73 M^2
GLUCOSE SERPL-MCNC: 93 MG/DL (ref 70–110)
HCT VFR BLD AUTO: 33.2 % (ref 40–54)
HGB BLD-MCNC: 10.7 G/DL (ref 14–18)
IMM GRANULOCYTES # BLD AUTO: 0.01 K/UL (ref 0–0.04)
IMM GRANULOCYTES NFR BLD AUTO: 0.3 % (ref 0–0.5)
LYMPHOCYTES # BLD AUTO: 1.1 K/UL (ref 1–4.8)
LYMPHOCYTES NFR BLD: 26.9 % (ref 18–48)
MCH RBC QN AUTO: 29.9 PG (ref 27–31)
MCHC RBC AUTO-ENTMCNC: 32.2 G/DL (ref 32–36)
MCV RBC AUTO: 93 FL (ref 82–98)
MONOCYTES # BLD AUTO: 0.5 K/UL (ref 0.3–1)
MONOCYTES NFR BLD: 13.3 % (ref 4–15)
NEUTROPHILS # BLD AUTO: 2.2 K/UL (ref 1.8–7.7)
NEUTROPHILS NFR BLD: 56.6 % (ref 38–73)
NRBC BLD-RTO: 1 /100 WBC
PHOSPHATE SERPL-MCNC: 3 MG/DL (ref 2.7–4.5)
PLATELET # BLD AUTO: 181 K/UL (ref 150–450)
PMV BLD AUTO: 8.8 FL (ref 9.2–12.9)
POTASSIUM SERPL-SCNC: 3.6 MMOL/L (ref 3.5–5.1)
PROT SERPL-MCNC: 7.2 G/DL (ref 6–8.4)
RBC # BLD AUTO: 3.58 M/UL (ref 4.6–6.2)
SODIUM SERPL-SCNC: 142 MMOL/L (ref 136–145)
TSH SERPL DL<=0.005 MIU/L-ACNC: 0.91 UIU/ML (ref 0.4–4)
WBC # BLD AUTO: 3.91 K/UL (ref 3.9–12.7)

## 2024-10-03 PROCEDURE — 85025 COMPLETE CBC W/AUTO DIFF WBC: CPT | Performed by: INTERNAL MEDICINE

## 2024-10-03 PROCEDURE — 96374 THER/PROPH/DIAG INJ IV PUSH: CPT

## 2024-10-03 PROCEDURE — 84100 ASSAY OF PHOSPHORUS: CPT | Performed by: INTERNAL MEDICINE

## 2024-10-03 PROCEDURE — 63600175 PHARM REV CODE 636 W HCPCS: Mod: JZ,JG | Performed by: INTERNAL MEDICINE

## 2024-10-03 PROCEDURE — 84443 ASSAY THYROID STIM HORMONE: CPT | Performed by: INTERNAL MEDICINE

## 2024-10-03 PROCEDURE — 25000003 PHARM REV CODE 250: Performed by: INTERNAL MEDICINE

## 2024-10-03 PROCEDURE — 99999 PR PBB SHADOW E&M-EST. PATIENT-LVL V: CPT | Mod: PBBFAC,,,

## 2024-10-03 PROCEDURE — 80053 COMPREHEN METABOLIC PANEL: CPT | Performed by: INTERNAL MEDICINE

## 2024-10-03 PROCEDURE — 36415 COLL VENOUS BLD VENIPUNCTURE: CPT | Performed by: INTERNAL MEDICINE

## 2024-10-03 RX ADMIN — FERUMOXYTOL 510 MG: 510 INJECTION INTRAVENOUS at 08:10

## 2024-10-03 NOTE — ASSESSMENT & PLAN NOTE
"  -Code status: Full Code. Does not want to be on life support. He will discuss further with his family.   -HCPOA: SDM: Michelle Juarez: [phone]. 3 adult children: 2 biological, 1 stepchild.   -GOC: Continue cancer treatment, symptom management, maintain independence and functional status. Pt wishes to "stay positive".   -See HPI for further details   "

## 2024-10-03 NOTE — PROGRESS NOTES
"Palliative Medicine Clinic Note  Follow-up        Consult Requested By: Dr. Freeman Dee      Reason for Consult: Advance Care Planning    Chief Complaint: Progressive unintentional weight loss/Goals of care discussion           ASSESSMENT/PLAN:      Plan/Recommendations:    Problem List Items Addressed This Visit          Oncology    Malignant gastrointestinal stromal tumor - Primary     To peritoneum and liver  Followed by Dr. Dee and Dr. Mesa.  On Sunitinib  Previously on Gleevec 400mg BID. Stopped due to disease progression.   CT C/A/P: CT C/A/P: 08/07/2024: Interval progression of disease with enlarging left upper quadrant mass and hepatic metastatic lesions, and increased size and number of peritoneal carcinomatosis lesions.  Mild narrowing of the GE junction.  Consider gastrostomy tube placement.  No evidence of intrathoracic metastatic disease.         Relevant Orders    Ambulatory referral/consult to Hematology/Oncology/Nutrition       Endocrine    Weight loss, unintentional     Discussed with pt  this may be related to cancer diagnosis  Pt's appetite remains intact and he is drinking Ensure regularly.   Counseled on eating high protein diet to prevent muscle loss  Nutritionist referral placed.          Relevant Orders    Ambulatory referral/consult to Hematology/Oncology/Nutrition       Palliative Care    Palliative care encounter       -Code status: Full Code. Does not want to be on life support. He will discuss further with his family.   -HCPOA: SDM: Michelle Juarez: [phone]. 3 adult children: 2 biological, 1 stepchild.   -GOC: Continue cancer treatment, symptom management, maintain independence and functional status. Pt wishes to "stay positive".   -See HPI for further details          Relevant Orders    Ambulatory referral/consult to Hematology/Oncology/Nutrition         Advance Care Planning   Advance Directives:   Living Will: No    [place]POST: No    Do Not Resuscitate Status: No    Medical Power " "of : No    Agent's Name:  SDM: Spouse: Michelle Juarez   Agent's Contact Number:  [phone]    Decision Making:  Patient answered questions and Family answered questions  Goals of Care: The patient and family endorses that what is most important right now is to focus on remaining as independent as possible, symptom/pain control, and quality of life, even if it means sacrificing a little time    Accordingly, we have decided that the best plan to meet the patient's goals includes continuing with treatment.     Pt stated he wishes to continue cancer treatment, symptom management, maintain independence and functional status.  He hopes he can continue to work and participate in family activities despite cancer diagnosis. He drives a school bus.   He is aware his treatment regimen is palliative at this time, but he "wants to remain positive and trust in God".   He has not thought about completing a living will. However, he is certain he does not wish to be on life support.   He stated he will discuss further with his family.            Follow up: PRN     Plan discussed with: Patient       SUBJECTIVE:      History of Present Illness / Interval History:  Agusto Juarez is 64 y.o. 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 Initially diagnosed in April 2021.  On Sunitinib  Previously on Gleevec 400mg BID. Stopped due to disease progression.   Followed by Dr. Dee and Dr. Mesa.   Presents to [facility]iative [facility] Clinic for advance care planning, and additional support..   Please see hem/onc note for more details on pt's care.       10/3/2024: History obtained from: Patient and Medical record.   Pt attended clinic alone. He was in no acute distress. Elevated /85. Asymptomatic on room air.   He reported progressive unintentional weight loss despite intact appetite and drinking Ensure daily.   No other symptoms. Knee pain is not active at this time, and he is not taking Norco. He is not " "taking Wellbutrin at this time. He denied anxiety/depression.   He wishes to discuss healthy weight gain strategies with nutritionist.   We briefly discussed ACP documents. Pt opted to take copies home and discuss with his family prior to completing. He will return completed forms to PM clinic.   He notes he is able to work and his functional status remains intact. He wishes to "stay positive".       Past Visits:     06/12/2024:   The patient location is: [facility]  The chief complaint leading to consultation is:  Follow-up     Visit type: audiovisual     Face to Face time with patient:  30 minutes  45 minutes of total time spent on the encounter, which includes face to face time and non-face to face time preparing to see the patient (eg, review of tests), Obtaining and/or reviewing separately obtained history, Documenting clinical information in the electronic or other health record, Independently interpreting results (not separately reported) and communicating results to the patient/family/caregiver, or Care coordination (not separately reported).      Each patient to whom he or she provides medical services by telemedicine is:  (1) informed of the relationship between the physician and patient and the respective role of any other health care provider with respect to management of the patient; and (2) notified that he or she may decline to receive medical services by telemedicine and may withdraw from such care at any time.     Notes:   History obtained from: Patient  Pt seen via audiovisual feed. A&O x3. No acute distress. His wife, Ms. Martinez was present throughout visit.   Pt reported he is doing well despite cancer diagnosis. He is taking Gleevec twice a day as prescribed.   He is concerned about pain during and after liver biopsy. I have assured him his pain will be well-managed throughout the procedure and after.   He denied anxiety/depression. He states he has good family support if needed.     We " "discussed advance care planning, goals of care, and code status, Full Code vs. DNR including risks, benefits, and alternatives.  Pt stated he wishes to continue cancer treatment, symptom management, maintain independence and functional status.  He hopes he can continue to work and participate in family activities despite cancer diagnosis. He drives a school bus.   He is aware his treatment regimen is palliative at this time, but he "wants to remain positive and trust in God".   He has not thought about completing a living will. However, he is certain he does not wish to be on life support.   He stated he will discuss further with his family.           ROS:  Review of Systems   Constitutional:  Positive for unexpected weight change (Progressive unintentional). Negative for activity change, appetite change and fatigue.   HENT:  Negative for nasal congestion, dental problem, trouble swallowing and voice change.    Gastrointestinal:  Negative for abdominal pain, constipation, nausea, rectal pain and vomiting.   Musculoskeletal:  Negative for arthralgias, back pain and myalgias.   Integumentary:  Negative for wound.   Psychiatric/Behavioral:  Negative for agitation, confusion, dysphoric mood, sleep disturbance and suicidal ideas. The patient is not nervous/anxious.        Review of Symptoms      Symptom Assessment (ESAS 0-10 Scale)  Pain:  0  Dyspnea:  0  Anxiety:  0  Nausea:  0  Depression:  0  Anorexia:  0  Fatigue:  0  Insomnia:  0  Restlessness:  0  Agitation:  0     CAM / Delirium:  Negative  Constipation:  Negative  Diarrhea:  Negative    Anxiety:  Is not nervous/anxious  Constipation:  No constipation    Bowel Management Plan (BMP):  No      Modified Shashi Scale:  0    Performance Status:  100    ECOG Performance Status ndGndrndanddndend:nd nd2nd Living Arrangements:  Lives with spouse    Psychosocial/Cultural:   See Palliative Psychosocial Note: Yes  Drives school bus.     3 adult children.     **Primary  to " Follow**  Palliative Care  Consult: Yes    Spiritual:  F - Bri and Belief:  God  I - Importance:  Highly            Medications:    Current Outpatient Medications:     acetaminophen (TYLENOL) 325 MG tablet, Take 325 mg by mouth every 6 (six) hours as needed for Pain., Disp: , Rfl:     amLODIPine (NORVASC) 10 MG tablet, Take 1 tablet (10 mg total) by mouth once daily., Disp: 90 tablet, Rfl: 3    buPROPion (WELLBUTRIN XL) 150 MG TB24 tablet, Take 1 tablet (150 mg total) by mouth once daily., Disp: 30 tablet, Rfl: 5    carvediloL (COREG) 12.5 MG tablet, Take 1 tablet (12.5 mg total) by mouth 2 (two) times daily with meals., Disp: 60 tablet, Rfl: 1    HYDROcodone-acetaminophen (NORCO)  mg per tablet, Take 1 tablet by mouth., Disp: , Rfl:     lisinopriL (PRINIVIL,ZESTRIL) 40 MG tablet, Take 1 tablet (40 mg total) by mouth once daily., Disp: 90 tablet, Rfl: 3    multivitamin capsule, Take 1 capsule by mouth once daily., Disp: , Rfl:     ondansetron (ZOFRAN-ODT) 8 MG TbDL, Take 1 tablet (8 mg total) by mouth every 8 (eight) hours as needed (nausea)., Disp: 30 tablet, Rfl: 2    potassium chloride SA (K-DUR,KLOR-CON) 20 MEQ tablet, Take 1 tablet by mouth once daily, Disp: 90 tablet, Rfl: 3    pravastatin (PRAVACHOL) 10 MG tablet, Take 1 tablet by mouth once daily, Disp: 90 tablet, Rfl: 3    SUNItinib malate (SUTENT) 37.5 mg Cap, Take 1 capsule (37.5 mg total) by mouth once daily., Disp: 30 capsule, Rfl: 11    tobramycin-dexAMETHasone 0.3-0.1% (TOBRADEX) 0.3-0.1 % DrpS, INSTILL 1 DROP INTO LEFT EYE 4 TIMES DAILY, Disp: , Rfl:   No current facility-administered medications for this visit.      External  database queried on 10/03/2024  by EVETTE MEYER :      Review of patient's allergies indicates:  No Known Allergies        OBJECTIVE:         Physical Exam:  Vitals:      Physical Exam  Vitals and nursing note reviewed.   Constitutional:       General: He is not in acute distress.      Appearance: He is ill-appearing.      Comments: Appears to have lost more weight compared to previous visit in June.    HENT:      Head: Normocephalic and atraumatic.      Nose: Nose normal. No congestion or rhinorrhea.      Mouth/Throat:      Mouth: Mucous membranes are moist.      Pharynx: Oropharynx is clear. No oropharyngeal exudate or posterior oropharyngeal erythema.   Eyes:      General: No scleral icterus.        Right eye: No discharge.         Left eye: No discharge.      Conjunctiva/sclera: Conjunctivae normal.      Pupils: Pupils are equal, round, and reactive to light.   Cardiovascular:      Rate and Rhythm: Normal rate and regular rhythm.      Pulses: Normal pulses.      Heart sounds: Normal heart sounds. No murmur heard.     No gallop.   Pulmonary:      Effort: Pulmonary effort is normal. No respiratory distress.      Breath sounds: Normal breath sounds. No stridor. No wheezing.   Chest:      Chest wall: No tenderness.   Abdominal:      General: Bowel sounds are normal. There is no distension.      Palpations: Abdomen is soft.      Tenderness: There is no abdominal tenderness.   Genitourinary:     Comments: Deferred  Musculoskeletal:         General: No swelling or tenderness. Normal range of motion.      Cervical back: Normal range of motion and neck supple.      Right lower leg: No edema.      Left lower leg: No edema.   Skin:     General: Skin is warm and dry.      Capillary Refill: Capillary refill takes less than 2 seconds.      Coloration: Skin is not jaundiced or pale.      Findings: No rash.   Neurological:      Mental Status: He is alert and oriented to person, place, and time.      Motor: No weakness.   Psychiatric:         Mood and Affect: Mood normal.         Behavior: Behavior normal.         Thought Content: Thought content normal.         Judgment: Judgment normal.           Labs: BMP  Lab Results   Component Value Date     10/03/2024    K 3.6 10/03/2024     10/03/2024     CO2 27 10/03/2024    BUN 8 10/03/2024    CREATININE 1.1 10/03/2024    CALCIUM 9.0 10/03/2024    ANIONGAP 8 10/03/2024    EGFRNORACEVR >60 10/03/2024     Lab Results   Component Value Date    WBC 3.91 10/03/2024    HGB 10.7 (L) 10/03/2024    HCT 33.2 (L) 10/03/2024    MCV 93 10/03/2024     10/03/2024             Imaging: CT C/A/P: 08/07/2024: Interval progression of disease with enlarging left upper quadrant mass and hepatic metastatic lesions, and increased size and number of peritoneal carcinomatosis lesions.  Mild narrowing of the GE junction.  Consider gastrostomy tube placement.  No evidence of intrathoracic metastatic disease.       I spent a total of 45 minutes on the day of the visit. This includes face to face time in discussion of goals of care, symptom assessment, coordination of care and emotional support.  This also includes non-face to face time preparing to see the patient (eg, review of tests/imaging), obtaining and/or reviewing separately obtained history, documenting clinical information in the electronic or other health record, independently interpreting results and communicating results to the patient/family/caregiver, or care coordinator.     Additional 20 min time spent on a voluntary advance care planning and /or goals of care discussion, providing emotional support, formulating and communicating prognosis and exploring burden/benefit of various approaches of treatment.       EVETTE STAPLES NP

## 2024-10-03 NOTE — PATIENT INSTRUCTIONS
"Please bring health care power of  back to the palliative care clinic  We will complete the "witness" part  You do not have to make an appointment to bring the form back.    "

## 2024-10-03 NOTE — PLAN OF CARE
Problem: Adult Inpatient Plan of Care  Goal: Plan of Care Review  Outcome: Progressing  Flowsheets (Taken 10/3/2024 1054)  Plan of Care Reviewed With: patient  Goal: Patient-Specific Goal (Individualized)  Outcome: Progressing  Flowsheets (Taken 10/3/2024 1054)  Individualized Care Needs: snack, drink, reclined  Anxieties, Fears or Concerns: none  Goal: Optimal Comfort and Wellbeing  Outcome: Progressing  Intervention: Provide Person-Centered Care  Flowsheets (Taken 10/3/2024 1054)  Trust Relationship/Rapport:   care explained   choices provided   emotional support provided   empathic listening provided   questions answered   questions encouraged   reassurance provided   thoughts/feelings acknowledged

## 2024-10-03 NOTE — ASSESSMENT & PLAN NOTE
To peritoneum and liver  Followed by Dr. Dee and Dr. Mesa.  On Sunitinib  Previously on Gleevec 400mg BID. Stopped due to disease progression.   CT C/A/P: CT C/A/P: 08/07/2024: Interval progression of disease with enlarging left upper quadrant mass and hepatic metastatic lesions, and increased size and number of peritoneal carcinomatosis lesions.  Mild narrowing of the GE junction.  Consider gastrostomy tube placement.  No evidence of intrathoracic metastatic disease.

## 2024-10-03 NOTE — PROGRESS NOTES
Care Companion Intervention    Reason for intervention: Hypertension  Comment:  B/P med recently increased with change to Sutent.  Msg'd pt to take a reading before he goes to bed.      Intervention: Other intervention (comment)  Comment: Pt is to take another reading before retiring tonight.  Also urged him to go to the ER if he starts with headache, blurred vision, chest pain, dizziness, weakness.  I will continue to monitor, and will msg his physician with recent readings.

## 2024-10-03 NOTE — ASSESSMENT & PLAN NOTE
Discussed with pt  this may be related to cancer diagnosis  Pt's appetite remains intact and he is drinking Ensure regularly.   Counseled on eating high protein diet to prevent muscle loss  Nutritionist referral placed.

## 2024-10-04 ENCOUNTER — OFFICE VISIT (OUTPATIENT)
Dept: HEMATOLOGY/ONCOLOGY | Facility: CLINIC | Age: 64
End: 2024-10-04
Payer: COMMERCIAL

## 2024-10-04 ENCOUNTER — CLINICAL SUPPORT (OUTPATIENT)
Dept: NUTRITION | Facility: CLINIC | Age: 64
End: 2024-10-04
Payer: COMMERCIAL

## 2024-10-04 DIAGNOSIS — T45.1X5A IMMUNODEFICIENCY DUE TO CHEMOTHERAPY: ICD-10-CM

## 2024-10-04 DIAGNOSIS — C78.6 SECONDARY MALIGNANCY OF PARIETAL PERITONEUM: ICD-10-CM

## 2024-10-04 DIAGNOSIS — D49.9 IMMUNODEFICIENCY SECONDARY TO NEOPLASM: ICD-10-CM

## 2024-10-04 DIAGNOSIS — D84.821 IMMUNODEFICIENCY DUE TO CHEMOTHERAPY: ICD-10-CM

## 2024-10-04 DIAGNOSIS — D84.81 IMMUNODEFICIENCY SECONDARY TO NEOPLASM: ICD-10-CM

## 2024-10-04 DIAGNOSIS — I10 PRIMARY HYPERTENSION: ICD-10-CM

## 2024-10-04 DIAGNOSIS — R63.4 WEIGHT LOSS, UNINTENTIONAL: ICD-10-CM

## 2024-10-04 DIAGNOSIS — C78.7 SECONDARY LIVER CANCER: ICD-10-CM

## 2024-10-04 DIAGNOSIS — Z79.899 IMMUNODEFICIENCY DUE TO CHEMOTHERAPY: ICD-10-CM

## 2024-10-04 DIAGNOSIS — C49.A0 MALIGNANT GASTROINTESTINAL STROMAL TUMOR, UNSPECIFIED SITE: Primary | ICD-10-CM

## 2024-10-04 DIAGNOSIS — I15.8 OTHER SECONDARY HYPERTENSION: ICD-10-CM

## 2024-10-04 DIAGNOSIS — Z51.5 PALLIATIVE CARE ENCOUNTER: ICD-10-CM

## 2024-10-04 NOTE — PATIENT INSTRUCTIONS
Recommendations:   Make meals/snacks high in calories and protein (chicken/tuna/egg salad, avocado, nuts/peanut butter, fatty fish, bryce, flax, oils)    Supplement with Ensure Plus or Boost Plus   Add 300-500 Calories per day extra to try to gain weight at a rate of 1/2 lb to 1 lb per week.   Ensure balanced eating pattern of 3 meals, 1-2 snacks daily. Avoid skipped meals.    Eat fiber rich foods, 3-4 portions daily, to help with bowel regularity.

## 2024-10-04 NOTE — PROGRESS NOTES
"The patient location is: home in [place]  The chief complaint leading to consultation is: follow up for stage IV GIST    Visit type: audiovirtual    Face to Face time with patient: 20 min  40 minutes of total time spent on the encounter, which includes face to face time and non-face to face time preparing to see the patient (eg, review of tests), Obtaining and/or reviewing separately obtained history, Documenting clinical information in the electronic or other health record, Independently interpreting results (not separately reported) and communicating results to the patient/family/caregiver, or Care coordination (not separately reported).         Each patient to whom he or she provides medical services by telemedicine is:  (1) informed of the relationship between the physician and patient and the respective role of any other health care provider with respect to management of the patient; and (2) notified that he or she may decline to receive medical services by telemedicine and may withdraw from such care at any time.    Notes:                                                                PROGRESS NOTE    Subjective:       Patient ID: Agusto Juarez is a 64 y.o. male.    Chief Complaint: follow up for stage IV GIST    Diagnosis:  Metastatic GIST, KIT/PDGFRA/BRAF/SDH wild-type, diagnosed in April 2021  [facility] blood 6/7/24: positive for KIT exon 13 p.V654A mutation, TMB 8.6. 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 CT A/P 4/26/21 showed "Large upper abdominal mass which may arise from the stomach or pancreas " "and may involve the liver, duodenum, spleen, and gallbladder. There are also multiple nodular masses throughout the peritoneum and mesentery consistent with peritoneal carcinomatosis." Biopsy of gastric mass and perigastric LN showed grade 1 GIST. Mixed spindle cell and epithelioid type. Mitotic rate cannot be determined. No necrosis. Presumed low-grade (G1). Given 20 cm with low mitotic count, likely moderate risk. Strata test at that time was negative for any mutation. GIST panel was negative.es.  He started gleevec 400 mg daily at that time. CT in Oct 2021 and 2022 showed response to gleevec with reduction of tumor size. CT in 2023 showed stable disease. He had CT C/A/P done on 24. It showed "Interval disease progression in the abdomen pelvis with worsening peritoneal carcinomatosis and worsening hepatic metastatic disease." He presents today for a second opinion. He saw Dr Dee yesterday. Sutent was prescribed. Patient remembers being told in the past he can take two 400 mg gleevec a day when it is not working. He started 400 mg bid yesterday and feels better.   Recc staying on 400 mg bid (since 24) with close follow up scan.   2. CT C/A/P 24 showed progression. Sutent 37.5 mg daily started around 24.     Interval History:   Mr Juarez returns for follow up. He has been taking sutent 37.5 mg daily. No side effects physically. However, BP still high. On amlodipine, lisinopril and coreg. Coreg was increased to 12.5 mg bid on 10/2/24. DBP sometimes still high.     ECO    ROS:   A ten-point system review is obtained and negative except for what was stated in the Interval History.     Physical Examination:    Home BP log reviewed. DBP still high.   General: well hydrated, well developed, in no acute distress  HEENT: normocephalic, EOMI, anicteric sclerae  Neuro: alert and oriented x 4  Psych: pleasant and appropriate mood and affect    Objective:     Laboratory Data:  Labs reviewed. " CBC, CMP adequate for treatment. Phos and TSH normal    Imaging Data:  CT C/A/P 6/5/24:  1.  Interval disease progression in the abdomen pelvis with worsening peritoneal carcinomatosis and worsening hepatic metastatic disease.     2.  No evidence for thoracic metastatic disease.     CT C/A/P 8/7/24:  Impression:     Interval progression of disease with enlarging left upper quadrant mass and hepatic metastatic lesions, and increased size and number of peritoneal carcinomatosis lesions.     Mild narrowing of the GE junction.  Consider gastrostomy tube placement.     No evidence of intrathoracic metastatic disease.    Assessment and Plan:     1. Malignant gastrointestinal stromal tumor, unspecified site    2. Secondary malignancy of parietal peritoneum    3. Secondary liver cancer    4. Immunodeficiency due to chemotherapy    5. Immunodeficiency secondary to neoplasm    6. Other secondary hypertension    7. Iron deficiency anemia due to chronic blood loss    8. Primary hypertension        1-5  - Mr Juarez is a 63 yo man with metastatic gastric GIST with met to the peritoneum and liver. Diagnosed in April 2021. Has been on gleevec 400 mg daily since. Had good response initially. Recent CT 6/5/24 showed progression of disease. Patient started gleevec 400 mg bid by himself on 6/6/24. [facility] showed KIT exon 11 and exon 13 mutations.   - CT C/A/P 8/7/24 showed progression. Sutent 37.5 mg daily started around 8/16/24.  - doing well. Tolerating sutent 37.5 mg daily very well. Prior early satiety has resolved. Main side effect is high BP.   - c/w lisinopril 40 mg daily and amlodipine 10 mg daily  - reviewed BP log. Coreg was increased to 12.5 mg bid 2 days ago. DBP still elevated  - c/w BP meds. Recheck BP log in 1 week. If BP especially DBP still elevated after being on coreg 12.5 mg bid for a week, can increase coreg to 25 mg bid.   - RTC in 1 week    6.7  - see above      Follow-up:     RTC in 1 week  Knows to call in the  interval if any problems arise.    Electronically signed by Tawny Mesa                   Detail Level: Detailed Other (Free Text): Ln2 Note Text (......Xxx Chief Complaint.): This diagnosis correlates with the

## 2024-10-04 NOTE — PROGRESS NOTES
"Oncology Nutrition Assessment for Medical Nutrition Therapy Initial Visit  Consultation Time: 45 Minutes  Referring Provider:  Ms. Tracey Lewis NP  Reason for Nutrition Consult: New Patient - Nutrition Counseling and Education , Nutrition related questions, and Weight Loss  The patient location is: home. The chief complaint leading to consultation is: Gastrointestinal stromal tumor.   Visit type: audiovisual   Face to Face time with patient: 30 mintues 45 minutes of total time spent on the encounter, which includes face to face time and non-face to face time preparing to see the patient (eg, review of tests), Obtaining and/or reviewing separately obtained history, Documenting clinical information in the electronic or other health record, Independently interpreting results (not separately reported) and communicating results to the patient/family/caregiver, or Care coordination (not separately reported).    Each patient to whom he or she provides medical services by telemedicine is:  (1) informed of the relationship between the physician and patient and the respective role of any other health care provider with respect to management of the patient; and (2) notified that he or she may decline to receive medical services by telemedicine and may withdraw from such care at any time. Note below:     Nutrition Assessment      Patient Information:    Agusto Juarez  : 1960   64 y.o. male    Allergies/Intolerances: No known food allergies  Social Data: lives with spouse/significant Other.   Anthropometrics:     Weight:   76.1 kg (167 lb 12 oz)                                Height:   5'8" (1.73 m)    BMI: 25.5            Usual BW: 190 lb 1 year ago  Weight Change: loss of 20 pounds or 10% of weight in the past year      Supplements/Vitamins:    MVI/Supp: No  Drug/Nutrient interactions: No Activity Level:     Low Active     Form of Activity: activities of daily living , walking      Malnutrition Assessment: "   Nutrition Risk: Patient does not meet at least 2 characteristics of the ASPEN/AND criteria at this time, but may be at risk due to significant weight loss.      Food/Nutrition-related history:    Diet/PO Recall:   Appetite: Fair  Fluid Intake: Adequate  Diet Recall:  Breakfast: grits and toast  Lunch: hamburger, sometimes fries or peanut butter crackers or peanut butter and jelly sandwich   Dinner: sesame chicken or beef and broccoli with rice   Snacks: 2-3x/day: chocolate chip cookies, [employer] ice cream  Drinks: water, Sprite  ONS: Ensure Complete 1x/day  Servings of F/V per day: 2-3x/day  Eating out: 3-4x/week.   Cultural/Spiritual/Personal Preferences: No Preferences     GI Symptoms: early satiety - feeling of fullness after eating a very small amount or weight loss 20 lbs. within the last 1 year(s)   Oncology Nutrition Symptoms: weight loss and feel full quickly              Difficulty chewing or swallowing?  No    Patient Notes/Reports: Pt referred for a Nutrition Consultation related to New Patient - Nutrition Counseling and Education , Nutrition related questions, and Weight Loss. Patient has a medical diagnosis of gastrointestinal stromal tumors. Currently on chemo pill which patient reports is helping his cancer. Mr. Juarez reports eating well now but that it's hard to eat extra to gain weight due to large portions pushing on the tumors. Encouraged at least 300 - 500 extra Calories daily beyond his normal diet in order to gain weight. Will meet again in 6 weeks to see if he has been able to do that. Also encouraged more fiber intake to help empty his stomach daily.     Medical Tests and Procedures:  Patient Active Problem List   Diagnosis    Hypertension    Hyperlipidemia    Smoking    Malignant gastrointestinal stromal tumor    Immunodeficiency due to chemotherapy    Secondary liver cancer    Secondary malignancy of parietal peritoneum    Positive colorectal cancer screening using Cologuard test     Colon polyps    AVM (arteriovenous malformation) of colon    Diverticulosis of colon    Villous adenoma of colon    Palliative care encounter    Anemia    Iron deficiency anemia due to chronic blood loss    Weight loss, unintentional      Past Medical History:   Diagnosis Date    BPH (benign prostatic hypertrophy)     Hyperlipidemia     Hypertension     Malignant gastrointestinal stromal tumor 05/05/2021    Villous adenoma of colon 02/07/2024    high grade dysplasia     Past Surgical History:   Procedure Laterality Date    COLONOSCOPY N/A 9/26/2023    Procedure: COLONOSCOPY;  Surgeon: Luis Choi MD;  Location: [facility];  Service: Endoscopy;  Laterality: N/A;    COLONOSCOPY N/A 2/7/2024    Procedure: COLONOSCOPY;  Surgeon: Rikki Shabazz MD;  Location: [facility] (2ND FLR);  Service: Endoscopy;  Laterality: N/A;  12/18 portal instr- suprep-colonoscopy with EMR with me in the next 4-6 weeks.[facility] only.45 minute case is okay. Shabazz-tt  1/31/24- precall complete - ERW    COLONOSCOPY N/A 8/20/2024    Procedure: COLONOSCOPY;  Surgeon: Rikki Shabazz MD;  [facility]  Service: Endoscopy;  Laterality: N/A;  6/14 portal-suprep- colonoscopy with me in 6 months to follow-up prior EMR. Can be scheduled as a 45min case. [facility] only. shabazz-tt  8/13 SWP PRECALL COMPLETE-RT    ENDOSCOPIC ULTRASOUND OF UPPER GASTROINTESTINAL TRACT N/A 4/30/2021    Procedure: ULTRASOUND, UPPER GI TRACT, ENDOSCOPIC;  Surgeon: Rikki Shabazz MD;  Location: [facility];  Service: Endoscopy;  Laterality: N/A;  Rapid COVID prior - last minute addition to schedule - ttr    ESOPHAGOGASTRODUODENOSCOPY N/A 4/30/2021    Procedure: EGD (ESOPHAGOGASTRODUODENOSCOPY);  Surgeon: Rikki Shabazz MD;  Location: [facility];  Service: Endoscopy;  Laterality: N/A;  EGD/EUS with biopsy within a week is fine. 45min case with me OK. Any campus. -Dr. Shabazz       Current Outpatient Medications   Medication Instructions    acetaminophen (TYLENOL) 325 mg, Every  6 hours PRN    amLODIPine (NORVASC) 10 mg, Oral, Daily    buPROPion (WELLBUTRIN XL) 150 mg, Oral, Daily    carvediloL (COREG) 12.5 mg, Oral, 2 times daily with meals    HYDROcodone-acetaminophen (NORCO)  mg per tablet 1 tablet    lisinopriL (PRINIVIL,ZESTRIL) 40 mg, Oral, Daily    multivitamin capsule 1 capsule, Daily    ondansetron (ZOFRAN-ODT) 8 mg, Oral, Every 8 hours PRN    potassium chloride SA (K-DUR,KLOR-CON) 20 MEQ tablet 20 mEq, Oral    pravastatin (PRAVACHOL) 10 MG tablet Take 1 tablet by mouth once daily    SUNItinib malate (SUTENT) 37.5 mg, Oral, Daily    tobramycin-dexAMETHasone 0.3-0.1% (TOBRADEX) 0.3-0.1 % DrpS INSTILL 1 DROP INTO LEFT EYE 4 TIMES DAILY      Labs:  Reviewed - followed by MD mejia        Nutrition Diagnosis    Nutrition Problem: inadequate protein/energy intake  Etiology (related to): tumor location  Signs/Symptoms (as evidenced by): weight loss    Nutrition Intervention      Estimated Energy/Fluid Requirements:   Weight used: CBW 76 kg  Calories: 7196-9913 kcal/day (30-32 kcal/kg)  Protein: 91 g/day (1.2 g/kg)  Fluid:  2440-5263 mL/day (1 mL/kcal)   Recommendations:   Make meals/snacks high in calories and protein (chicken/tuna/egg salad, avocado, nuts/peanut butter, fatty fish, bryce, flax, oils )    Supplement with Ensure Plus or Boost Plus   Add 300-500 Calories per day extra to try to gain weight at a rate of 1/2 lb to 1 lb per week.   Ensure balanced eating pattern of 3 meals, 1-2 snacks daily. Avoid skipped meals.    Eat fiber rich foods, 3-4 portions daily, to help with bowel regularity.      Education Needs Satisfied: yes   Education Materials Provided / Reviewed:   High-Calorie, High-Protein Diet  and High-Calorie Food List and Snack Ideas   Nutrition During Your Cancer Treatment  High-Calorie Recipe Collection   Nutrition Plan  Barriers to Learning: none identified  Patient and/ or Family Verbalizes understanding: yes      Nutrition Monitoring and Evaluation    Monitor:  energy intake, weight, and diet education needs     Goals:   1: Adequate intake of calories and protein to promote weight gain   Indicator: Weight/BMI    2: Adherence to nutrition recommendations for improved symptom management  Indicator: Diet Recall     Follow up In 6 weeks     Communication to referring provider/care team: note available in chart  Signature: Roro Ruth, MPH, RD, LDN

## 2024-10-04 NOTE — Clinical Note
Virtual visit with YESSY on 10/11 to review BP log. Virtual with me on 10/18 with CBC, CMP, phos, TSH 1 day prior,

## 2024-10-07 ENCOUNTER — TELEPHONE (OUTPATIENT)
Dept: HEMATOLOGY/ONCOLOGY | Facility: CLINIC | Age: 64
End: 2024-10-07
Payer: COMMERCIAL

## 2024-10-07 NOTE — TELEPHONE ENCOUNTER
----- Message from Candy sent at 10/7/2024 10:57 AM CDT -----  Regarding: Please call pt.  Please call pt. He has appt. On Friday at the [place] and wants to make it virtual [phone]

## 2024-10-08 ENCOUNTER — DOCUMENTATION ONLY (OUTPATIENT)
Dept: HEMATOLOGY/ONCOLOGY | Facility: CLINIC | Age: 64
End: 2024-10-08
Payer: COMMERCIAL

## 2024-10-08 DIAGNOSIS — I15.8 OTHER SECONDARY HYPERTENSION: Primary | ICD-10-CM

## 2024-10-08 RX ORDER — CARVEDILOL 25 MG/1
25 TABLET ORAL 2 TIMES DAILY
Qty: 60 TABLET | Refills: 11 | Status: SHIPPED | OUTPATIENT
Start: 2024-10-08 | End: 2025-10-08

## 2024-10-08 NOTE — PROGRESS NOTES
Care Companion Intervention    Reason for intervention: Hypertension  Comment:  Dr. Mesa and Dr. Bentley are aware of pt's elevated B/P and have been addressing the issue and working on med adjustments.    Intervention: Other intervention (comment)  Comment: Msg'd both MDs and attached today's readings for them both.  I will continue to monitor.

## 2024-10-08 NOTE — PROGRESS NOTES
Care Companion Intervention    Reason for intervention: Hypertension  Comment:  Today's readings sent to both the pt's PCP and Oncologist (Dr. Bentley and Dr. Mesa)        Intervention: Pt has been watched carefully by both as he is on an oral chemotherapy that has the potential to elevate B/P.  Medication has been adjusted and will possibly be changed again as recommended by his PCP following an oncology visit late this week.  This is ok'd by Dr. Mesa who sent new RX for a increased dose but asked that I call pt and instruct him to wait until after the visit Friday before picking it up.      Comment: I spoke with Mr. Juarez and explained that Dr. Mesa wants him to wait until after this Friday's visit to begin taking an increased dose of Coreg to make sure that is what is absolutely recommended and agreed on.  He verbalized understanding and stated he will do so.  I asked how he was feeling, and he stated he feels good.  States he had a little dizzy spell earlier today but has learned to just sit down and relax for a few minutes and it passes.  States no other c/o's.  States he appreciates the calls and will let us know if there is anything we can do for him or if there are any questions or concerns.

## 2024-10-09 NOTE — PROGRESS NOTES
"Subjective:       Patient ID: Agusto Juarez is a 64 y.o. male.    Chief Complaint:   1. Malignant gastrointestinal stromal tumor, unspecified site  Stage IV (cT4, cN1, cM0, Mitotic Rate: High)       2. Secondary malignancy of parietal peritoneum        3. Secondary liver cancer          Current Treatment:  Sutent 37.5mg po daily     Treatment History:  OP IMATINIB DAILY - GIST 400mg po daily; increased to BID in 6/2024     HPI: This is a 64-year-old  male with hypertension, hyperlipidemia, benign prostatic hyperplasia was recently seen for hernia by Dr. Glass.  CT scan showed a large mass that surrounded the stomach, duodenum, pancreas with involvement of the liver. He subsequently underwent endoscopic ultrasound with biopsy showing GIST measuring up to 20 cm.     He denies abnormal bleeding including epistaxis, hemoptysis, hematemesis, hematochezia, melena or hematuria.  Most recent CBC showed hemoglobin above 10 grams/deciliter.     Denies fever, chills, nausea or vomiting, chest pain, shortness of breath, diarrhea or dysuria. He however complains of abdominal pain.    He was started on Gleevec. CT C/A/P in 4/2022 revealed continued reduction in size of abdominal mass; RML pulmonary nodule stable. CT in 1/2023 showed stable disease. He had CT C/A/P done on 6/5/2024 that showed "interval disease progression in the abdomen pelvis with worsening peritoneal carcinomatosis and worsening hepatic metastatic disease." He saw Dr. Dee who started him on Sutent. The next day, he saw Dr. Mesa for a 2nd opinion. He reported having started taking Gleevec BID as he recalled being told he could do that if the Gleevec was not effective at daily dosing.      CT in 8/2024 revealed progression, and he was started on Sutent 37.5mg daily with plans to repeat staging imaging after 6 weeks of treatment. He began to experience elevated BP after starting Sutent. Was taking amlodipine 10mg but BP remained elevated. He was " started on lisinopril 40mg last week with instructions to monitor and log BP TID at home. He was also found to be iron deficient and was ordered IV iron.     His primary Hematologist/Oncologist is  Dr. Mesa .    Interval History: Patient presents for follow up on BP. He is currently on amlodipine 10mg po daily, lisinopril 40mg po daily, and Coreg 12.5mg BID. He presents alone at home and reports adherence to amlodipine, lisinopril, and Coreg. He reports most recent /101. He states his PCP Dr. Bentley advised against increasing Coreg to 25mg as he had only been on it for a couple of weeks. Advised him to increase Coreg to 25mg as instructed by Dr. Mesa since his BP is remaining elevated. He is amenable to this. He also states his PCP advised him to take all his BP meds at the same time; patient is hesitant to do so. Discussed the risk of BP dropping too low if he takes his meds all at once. Advised him to continue the dosing scheduling and just increase Coreg dose. Instructed him to continue to log his BP and will have him follow up in 1 week to re-evaluate. Will check iron studies in about 5 weeks; no sooner.     Reviewed labs with patient:   CBC:   Recent Labs   Lab 10/03/24  0813   WBC 3.91   RBC 3.58 L   Hemoglobin 10.7 L   Hematocrit 33.2 L   Platelets 181   MCV 93   MCH 29.9   MCHC 32.2     CMP:  Recent Labs   Lab 10/03/24  0813   Glucose 93   Calcium 9.0   Albumin 2.8 L   Total Protein 7.2   Sodium 142   Potassium 3.6   CO2 27   Chloride 107   BUN 8   Creatinine 1.1   Alkaline Phosphatase 164 H   ALT 23   AST 27   Total Bilirubin 0.6     The patient location is: [place]  The chief complaint leading to consultation is: GIST    Visit type: audiovisual    Face to Face time with patient: 27 minutes  34 minutes of total time spent on the encounter, which includes face to face time and non-face to face time preparing to see the patient (eg, review of tests), Obtaining and/or reviewing separately obtained history,  Documenting clinical information in the electronic or other health record, Independently interpreting results (not separately reported) and communicating results to the patient/family/caregiver, or Care coordination (not separately reported).     Each patient to whom he or she provides medical services by telemedicine is:  (1) informed of the relationship between the physician and patient and the respective role of any other health care provider with respect to management of the patient; and (2) notified that he or she may decline to receive medical services by telemedicine and may withdraw from such care at any time.    Social History     Socioeconomic History    Marital status:    Tobacco Use    Smoking status: Some Days     Current packs/day: 0.50     Average packs/day: 0.5 packs/day for 46.4 years (23.2 ttl pk-yrs)     Types: Cigarettes     Start date: 5/1/1978    Smokeless tobacco: Never   Substance and Sexual Activity    Alcohol use: Yes     Comment: occasionallly    Drug use: No    Sexual activity: Yes     Social Drivers of Health     Physical Activity: Unknown (6/6/2024)    Exercise Vital Sign     Days of Exercise per Week: 5 days     Past Medical History:   Diagnosis Date    BPH (benign prostatic hypertrophy)     Hyperlipidemia     Hypertension     Malignant gastrointestinal stromal tumor 05/05/2021    Villous adenoma of colon 02/07/2024    high grade dysplasia     Family History   Problem Relation Name Age of Onset    Hypertension Other      Lung cancer Father  60 - 69     Past Surgical History:   Procedure Laterality Date    COLONOSCOPY N/A 9/26/2023    Procedure: COLONOSCOPY;  Surgeon: Luis Choi MD;  Location: [facility];  Service: Endoscopy;  Laterality: N/A;    COLONOSCOPY N/A 2/7/2024    Procedure: COLONOSCOPY;  Surgeon: Rikki Shabazz MD;  Location: [facility] ([address] [place] [address]);  Service: Endoscopy;  Laterality: N/A;  12/18 portal instr- suprep-colonoscopy with EMR with me in the next 4-6  "weeks.[facility] only.45 minute case is okay. Shabazz-tt  1/31/24- precall complete - ERW    COLONOSCOPY N/A 8/20/2024    Procedure: COLONOSCOPY;  Surgeon: Rikki Shabazz MD;  [facility]  Service: Endoscopy;  Laterality: N/A;  6/14 portal-suprep- colonoscopy with me in 6 months to follow-up prior EMR. Can be scheduled as a 45min case. [facility] only. shabazz-tt  8/13 SWP PRECALL COMPLETE-RT    ENDOSCOPIC ULTRASOUND OF UPPER GASTROINTESTINAL TRACT N/A 4/30/2021    Procedure: ULTRASOUND, UPPER GI TRACT, ENDOSCOPIC;  Surgeon: Rikki Shabazz MD;  Location: [facility];  Service: Endoscopy;  Laterality: N/A;  Rapid COVID prior - last minute addition to schedule - ttr    ESOPHAGOGASTRODUODENOSCOPY N/A 4/30/2021    Procedure: EGD (ESOPHAGOGASTRODUODENOSCOPY);  Surgeon: Rikki Shabazz MD;  Location: [facility];  Service: Endoscopy;  Laterality: N/A;  EGD/EUS with biopsy within a week is fine. 45min case with me OK. Any campus. -Dr. Shabazz     Review of Systems   Constitutional:  Positive for fatigue ("sometimes"). Negative for appetite change.   HENT:  Positive for postnasal drip ("sometimes") and sinus pressure/congestion (improved with Claritin). Negative for mouth sores, rhinorrhea and sore throat.    Eyes: Negative.    Respiratory:  Positive for cough (with yellow sputum). Negative for shortness of breath.    Cardiovascular: Negative.    Gastrointestinal:  Negative for constipation, diarrhea, nausea and vomiting.   Endocrine: Positive for cold intolerance ("most of the time").   Genitourinary: Negative.    Musculoskeletal: Negative.    Integumentary:  Negative.   Allergic/Immunologic: Negative.    Neurological:  Positive for headaches ("sinus headache", "not every day"). Negative for dizziness, weakness, light-headedness and numbness.   Hematological: Negative.    Psychiatric/Behavioral: Negative.         Medication List with Changes/Refills   Current Medications    ACETAMINOPHEN (TYLENOL) 325 MG TABLET    Take 325 mg by " mouth every 6 (six) hours as needed for Pain.    AMLODIPINE (NORVASC) 10 MG TABLET    Take 1 tablet (10 mg total) by mouth once daily.    BUPROPION (WELLBUTRIN XL) 150 MG TB24 TABLET    Take 1 tablet (150 mg total) by mouth once daily.    CARVEDILOL (COREG) 25 MG TABLET    Take 1 tablet (25 mg total) by mouth 2 (two) times daily.    HYDROCODONE-ACETAMINOPHEN (NORCO)  MG PER TABLET    Take 1 tablet by mouth.    LISINOPRIL (PRINIVIL,ZESTRIL) 40 MG TABLET    Take 1 tablet (40 mg total) by mouth once daily.    MULTIVITAMIN CAPSULE    Take 1 capsule by mouth once daily.    ONDANSETRON (ZOFRAN-ODT) 8 MG TBDL    Take 1 tablet (8 mg total) by mouth every 8 (eight) hours as needed (nausea).    POTASSIUM CHLORIDE SA (K-DUR,KLOR-CON) 20 MEQ TABLET    Take 1 tablet by mouth once daily    PRAVASTATIN (PRAVACHOL) 10 MG TABLET    Take 1 tablet by mouth once daily    SUNITINIB MALATE (SUTENT) 37.5 MG CAP    Take 1 capsule (37.5 mg total) by mouth once daily.    TOBRAMYCIN-DEXAMETHASONE 0.3-0.1% (TOBRADEX) 0.3-0.1 % DRPS    INSTILL 1 DROP INTO LEFT EYE 4 TIMES DAILY     Objective:   There were no vitals filed for this visit.  Physical Exam     Unable to assess due to virtual visit.    (0) Fully active, able to carry on all predisease performance without restriction  Assessment:     Problem List Items Addressed This Visit          Oncology    Malignant gastrointestinal stromal tumor - Primary     Previously on Gleevec 400mg po daily with positive response until 6/2024 when scans showed progression. Was instructed to start Sutent but increased Gleevec to BID instead. Staging imaging done 2 months later revealed progression; started Sutent 37.5mg po daily with plans to rescan in early 10/2024.          Secondary liver cancer    Secondary malignancy of parietal peritoneum     Plan:     Malignant gastrointestinal stromal tumor, unspecified site    Secondary malignancy of parietal peritoneum    Secondary liver cancer    Labs  reviewed; stable anemia & CMP.   Continue amlodipine and lisinopril as prescribed.   Continue Sutent as directed.   Increase Coreg to 25mg po BID; prescription already sent to patient's pharmacy.   Follow up in 1 week with  BP log .    Route Chart for Scheduling    Med Onc Chart Routing      Follow up with physician    Follow up with YESSY 1 week. virtual   Infusion scheduling note    Injection scheduling note    Labs    Imaging None      Pharmacy appointment No pharmacy appointment needed      Other referrals       No additional referrals needed             I will review assessment/plan with collaborating physician.      XAVIER Sal

## 2024-10-11 ENCOUNTER — DOCUMENTATION ONLY (OUTPATIENT)
Dept: HEMATOLOGY/ONCOLOGY | Facility: CLINIC | Age: 64
End: 2024-10-11
Payer: COMMERCIAL

## 2024-10-11 ENCOUNTER — OFFICE VISIT (OUTPATIENT)
Dept: HEMATOLOGY/ONCOLOGY | Facility: CLINIC | Age: 64
End: 2024-10-11
Payer: COMMERCIAL

## 2024-10-11 DIAGNOSIS — C49.A0 MALIGNANT GASTROINTESTINAL STROMAL TUMOR, UNSPECIFIED SITE: Primary | ICD-10-CM

## 2024-10-11 DIAGNOSIS — C78.6 SECONDARY MALIGNANCY OF PARIETAL PERITONEUM: ICD-10-CM

## 2024-10-11 DIAGNOSIS — C78.7 SECONDARY LIVER CANCER: ICD-10-CM

## 2024-10-11 NOTE — PROGRESS NOTES
Care Companion Intervention    Reason for intervention: Hypertension  Comment:  Pt seen virtually today.    Intervention: Medication change and Education provided to patient by NP.  Comment:  Coreg dose increased to 25 mg BID and pt instructed to maintain current schedule of                      Meds also according to note of today's visit.  We will continue to monitor via care                      .

## 2024-10-16 ENCOUNTER — TELEPHONE (OUTPATIENT)
Dept: HEMATOLOGY/ONCOLOGY | Facility: CLINIC | Age: 64
End: 2024-10-16
Payer: COMMERCIAL

## 2024-10-16 NOTE — TELEPHONE ENCOUNTER
Pt requested earlier time but no opening.  He said he'll keep appt as is and if he can't stay the full 20 min, he'll let MD know    ----- Message from Radha sent at 10/16/2024  9:14 AM CDT -----  Contact: pt  Type:  Sooner Apointment Time Request    Caller is requesting a sooner appointment.  Caller declined first available appointment listed below.  Caller will not accept being placed on the waitlist and is requesting a message be sent to doctor.  Name of Caller: pt   When is the first available appointment?pt is rebecca for virtual @ 1:40p on 10/18 but need earlier time same day  Symptoms: 2 wk prior lab  Would the patient rather a call back or a response via [facility]? Phone  Best Call Back Number: [phone]  Additional Information:

## 2024-10-17 PROCEDURE — 99285 EMERGENCY DEPT VISIT HI MDM: CPT

## 2024-10-17 RX ORDER — MORPHINE SULFATE 4 MG/ML
4 INJECTION, SOLUTION INTRAMUSCULAR; INTRAVENOUS
Status: COMPLETED | OUTPATIENT
Start: 2024-10-18 | End: 2024-10-18

## 2024-10-17 RX ORDER — ONDANSETRON HYDROCHLORIDE 2 MG/ML
4 INJECTION, SOLUTION INTRAVENOUS
Status: COMPLETED | OUTPATIENT
Start: 2024-10-18 | End: 2024-10-18

## 2024-10-18 ENCOUNTER — HOSPITAL ENCOUNTER (EMERGENCY)
Facility: HOSPITAL | Age: 64
Discharge: HOME | End: 2024-10-18
Attending: EMERGENCY MEDICINE
Payer: COMMERCIAL

## 2024-10-18 VITALS
TEMPERATURE: 99 F | RESPIRATION RATE: 16 BRPM | DIASTOLIC BLOOD PRESSURE: 91 MMHG | SYSTOLIC BLOOD PRESSURE: 144 MMHG | BODY MASS INDEX: 25.18 KG/M2 | OXYGEN SATURATION: 95 % | HEART RATE: 76 BPM | WEIGHT: 166.13 LBS | HEIGHT: 68 IN

## 2024-10-18 DIAGNOSIS — C49.A2 GASTRIC STROMAL TUMOR: ICD-10-CM

## 2024-10-18 DIAGNOSIS — R10.10 UPPER ABDOMINAL PAIN: Primary | ICD-10-CM

## 2024-10-18 DIAGNOSIS — C78.6 METASTASIS TO PERITONEAL CAVITY: ICD-10-CM

## 2024-10-18 DIAGNOSIS — Z85.89 HX OF KNOWN METASTASIS TO LIVER: ICD-10-CM

## 2024-10-18 DIAGNOSIS — R10.13 POSTPRANDIAL EPIGASTRIC PAIN: ICD-10-CM

## 2024-10-18 LAB
ALBUMIN SERPL BCP-MCNC: 3 G/DL (ref 3.5–5.2)
ALP SERPL-CCNC: 174 U/L (ref 40–150)
ALT SERPL W/O P-5'-P-CCNC: 33 U/L (ref 10–44)
ANION GAP SERPL CALC-SCNC: 10 MMOL/L (ref 8–16)
APTT PPP: 32.5 SEC (ref 21–32)
AST SERPL-CCNC: 34 U/L (ref 10–40)
BASOPHILS # BLD AUTO: 0.01 K/UL (ref 0–0.2)
BASOPHILS NFR BLD: 0.2 % (ref 0–1.9)
BILIRUB SERPL-MCNC: 0.9 MG/DL (ref 0.1–1)
BILIRUB UR QL STRIP: NEGATIVE
BUN SERPL-MCNC: 11 MG/DL (ref 8–23)
CALCIUM SERPL-MCNC: 9.7 MG/DL (ref 8.7–10.5)
CHLORIDE SERPL-SCNC: 102 MMOL/L (ref 95–110)
CLARITY UR: CLEAR
CO2 SERPL-SCNC: 25 MMOL/L (ref 23–29)
COLOR UR: YELLOW
CREAT SERPL-MCNC: 1.2 MG/DL (ref 0.5–1.4)
DIFFERENTIAL METHOD BLD: ABNORMAL
EOSINOPHIL # BLD AUTO: 0 K/UL (ref 0–0.5)
EOSINOPHIL NFR BLD: 0.4 % (ref 0–8)
ERYTHROCYTE [DISTWIDTH] IN BLOOD BY AUTOMATED COUNT: 21.6 % (ref 11.5–14.5)
EST. GFR  (NO RACE VARIABLE): >60 ML/MIN/1.73 M^2
GLUCOSE SERPL-MCNC: 138 MG/DL (ref 70–110)
GLUCOSE UR QL STRIP: NEGATIVE
HCT VFR BLD AUTO: 39.1 % (ref 40–54)
HCV AB SERPL QL IA: NEGATIVE
HEP C VIRUS HOLD SPECIMEN: NORMAL
HGB BLD-MCNC: 12.7 G/DL (ref 14–18)
HGB UR QL STRIP: NEGATIVE
HIV 1+2 AB+HIV1 P24 AG SERPL QL IA: NEGATIVE
IMM GRANULOCYTES # BLD AUTO: 0 K/UL (ref 0–0.04)
IMM GRANULOCYTES NFR BLD AUTO: 0 % (ref 0–0.5)
INR PPP: 1.3 (ref 0.8–1.2)
KETONES UR QL STRIP: NEGATIVE
LACTATE SERPL-SCNC: 1.8 MMOL/L (ref 0.5–2.2)
LEUKOCYTE ESTERASE UR QL STRIP: NEGATIVE
LIPASE SERPL-CCNC: 35 U/L (ref 4–60)
LYMPHOCYTES # BLD AUTO: 1.2 K/UL (ref 1–4.8)
LYMPHOCYTES NFR BLD: 22.4 % (ref 18–48)
MCH RBC QN AUTO: 30.7 PG (ref 27–31)
MCHC RBC AUTO-ENTMCNC: 32.5 G/DL (ref 32–36)
MCV RBC AUTO: 94 FL (ref 82–98)
MONOCYTES # BLD AUTO: 0.4 K/UL (ref 0.3–1)
MONOCYTES NFR BLD: 6.5 % (ref 4–15)
NEUTROPHILS # BLD AUTO: 3.8 K/UL (ref 1.8–7.7)
NEUTROPHILS NFR BLD: 70.5 % (ref 38–73)
NITRITE UR QL STRIP: NEGATIVE
NRBC BLD-RTO: 0 /100 WBC
PH UR STRIP: 6 [PH] (ref 5–8)
PLATELET # BLD AUTO: 221 K/UL (ref 150–450)
PMV BLD AUTO: 9.5 FL (ref 9.2–12.9)
POTASSIUM SERPL-SCNC: 4.1 MMOL/L (ref 3.5–5.1)
PROT SERPL-MCNC: 8 G/DL (ref 6–8.4)
PROT UR QL STRIP: ABNORMAL
PROTHROMBIN TIME: 15.2 SEC (ref 9–12.5)
RBC # BLD AUTO: 4.14 M/UL (ref 4.6–6.2)
SODIUM SERPL-SCNC: 137 MMOL/L (ref 136–145)
SP GR UR STRIP: 1.02 (ref 1–1.03)
URN SPEC COLLECT METH UR: ABNORMAL
UROBILINOGEN UR STRIP-ACNC: ABNORMAL EU/DL
WBC # BLD AUTO: 5.36 K/UL (ref 3.9–12.7)

## 2024-10-18 PROCEDURE — 83605 ASSAY OF LACTIC ACID: CPT | Performed by: NURSE PRACTITIONER

## 2024-10-18 PROCEDURE — A9698 NON-RAD CONTRAST MATERIALNOC: HCPCS | Performed by: EMERGENCY MEDICINE

## 2024-10-18 PROCEDURE — 63600175 PHARM REV CODE 636 W HCPCS: Performed by: NURSE PRACTITIONER

## 2024-10-18 PROCEDURE — 83690 ASSAY OF LIPASE: CPT | Performed by: EMERGENCY MEDICINE

## 2024-10-18 PROCEDURE — 96375 TX/PRO/DX INJ NEW DRUG ADDON: CPT

## 2024-10-18 PROCEDURE — 80053 COMPREHEN METABOLIC PANEL: CPT | Performed by: NURSE PRACTITIONER

## 2024-10-18 PROCEDURE — 81003 URINALYSIS AUTO W/O SCOPE: CPT | Performed by: NURSE PRACTITIONER

## 2024-10-18 PROCEDURE — 85730 THROMBOPLASTIN TIME PARTIAL: CPT | Performed by: NURSE PRACTITIONER

## 2024-10-18 PROCEDURE — 86803 HEPATITIS C AB TEST: CPT | Performed by: EMERGENCY MEDICINE

## 2024-10-18 PROCEDURE — 87389 HIV-1 AG W/HIV-1&-2 AB AG IA: CPT | Performed by: EMERGENCY MEDICINE

## 2024-10-18 PROCEDURE — 85025 COMPLETE CBC W/AUTO DIFF WBC: CPT | Performed by: NURSE PRACTITIONER

## 2024-10-18 PROCEDURE — 96374 THER/PROPH/DIAG INJ IV PUSH: CPT

## 2024-10-18 PROCEDURE — 25500020 PHARM REV CODE 255: Performed by: EMERGENCY MEDICINE

## 2024-10-18 PROCEDURE — 85610 PROTHROMBIN TIME: CPT | Performed by: NURSE PRACTITIONER

## 2024-10-18 RX ORDER — OXYCODONE HYDROCHLORIDE 10 MG/1
10 TABLET ORAL EVERY 8 HOURS PRN
Qty: 12 TABLET | Refills: 0 | Status: SHIPPED | OUTPATIENT
Start: 2024-10-18

## 2024-10-18 RX ORDER — DOCUSATE SODIUM 100 MG/1
100 CAPSULE, LIQUID FILLED ORAL 2 TIMES DAILY
Qty: 60 CAPSULE | Refills: 0 | Status: SHIPPED | OUTPATIENT
Start: 2024-10-18

## 2024-10-18 RX ADMIN — MORPHINE SULFATE 4 MG: 4 INJECTION INTRAVENOUS at 03:10

## 2024-10-18 RX ADMIN — IOHEXOL 100 ML: 350 INJECTION, SOLUTION INTRAVENOUS at 06:10

## 2024-10-18 RX ADMIN — IOHEXOL 1000 ML: 12 SOLUTION ORAL at 06:10

## 2024-10-18 RX ADMIN — ONDANSETRON 4 MG: 2 INJECTION INTRAMUSCULAR; INTRAVENOUS at 03:10

## 2024-10-18 NOTE — ED PROVIDER NOTES
SCRIBE #1 NOTE: IManinder, am scribing for, and in the presence of, John Jamison MD. I have scribed the HPI, ROS, and PEx.    SCRIBE #2 NOTE: I, Hollis Boyle, am scribing for, and in the presence of,  Viri Mancia MD. I have scribed the remaining portions of the note not scribed by Scribe #1.      History     Chief Complaint   Patient presents with    Abdominal Pain     Pt complaining of generalized abdominal pain that started about 2 days ago. -N/-V/+D x 1 day     Review of patient's allergies indicates:  No Known Allergies      History of Present Illness     HPI    10/18/2024, 3:23 AM  History obtained from the patient      History of Present Illness: Agusto Juarez is a 64 y.o. male patient with a PMHx of metastatic malignant gastrointestinal stromal tumor who presents to the Emergency Department for evaluation of postprandial generalized abdominal cramping which onset gradually 2 days ago. Patient states he started having cramping that was different and worse than his usually abdominal cramping after eating ice cream. He states due to his CA, he tolerates small amount of PO intake at a time at baseline. Symptoms are constant and moderate to severe in severity. No mitigating or exacerbating factors reported. Patient denies any N/V/D, fever, weakness, and all other sxs at this time. He notes he is no longer passing gas. He is on oral chemotherapy and is followed by Dr. Dee with Hem/Onc. No further complaints or concerns at this time.       Arrival mode: Personal vehicle    PCP: Joe Bentley MD        Past Medical History:  Past Medical History:   Diagnosis Date    BPH (benign prostatic hypertrophy)     Hyperlipidemia     Hypertension     Malignant gastrointestinal stromal tumor 05/05/2021    Villous adenoma of colon 02/07/2024    high grade dysplasia       Past Surgical History:  Past Surgical History:   Procedure Laterality Date    COLONOSCOPY N/A 9/26/2023    Procedure: COLONOSCOPY;  Surgeon:  Luis Choi MD;  Location: [facility];  Service: Endoscopy;  Laterality: N/A;    COLONOSCOPY N/A 2/7/2024    Procedure: COLONOSCOPY;  Surgeon: Rikki Shabazz MD;  Location: [facility] (2ND FLR);  Service: Endoscopy;  Laterality: N/A;  12/18 portal instr- suprep-colonoscopy with EMR with me in the next 4-6 weeks.[facility] only.45 minute case is okay. Harika-tt  1/31/24- precall complete - ER[facility]    COLONOSCOPY N/A 8/20/2024    Procedure: COLONOSCOPY;  Surgeon: Rikki Shabazz MD;  Location: [facility] (2ND FLR);  Service: Endoscopy;  Laterality: N/A;  6/14 portal-suprep- colonoscopy with me in 6 months to follow-up prior EMR. Can be scheduled as a 45min case. [facility] only. [id number]  8/13 SWP PRECALL COMPLETE-RT    ENDOSCOPIC ULTRASOUND OF UPPER GASTROINTESTINAL TRACT N/A 4/30/2021    Procedure: ULTRASOUND, UPPER GI TRACT, ENDOSCOPIC;  Surgeon: Rikki Shabazz MD;  Location: [facility];  Service: Endoscopy;  Laterality: N/A;  Rapid COVID prior - last minute addition to schedule - ttr    ESOPHAGOGASTRODUODENOSCOPY N/A 4/30/2021    Procedure: EGD (ESOPHAGOGASTRODUODENOSCOPY);  Surgeon: Rikki Shabazz MD;  Location: [facility];  Service: Endoscopy;  Laterality: N/A;  EGD/EUS with biopsy within a week is fine. 45min case with me OK. Any campus. -Dr. Shabazz         Family History:  Family History   Problem Relation Name Age of Onset    Hypertension Other      Lung cancer Father  60 - 69       Social History:  Social History     Tobacco Use    Smoking status: Some Days     Current packs/day: 0.50     Average packs/day: 0.5 packs/day for 46.5 years (23.2 ttl pk-yrs)     Types: Cigarettes     Start date: 5/1/1978    Smokeless tobacco: Never   Substance and Sexual Activity    Alcohol use: Yes     Comment: occasionallly    Drug use: No    Sexual activity: Yes        Review of Systems     Review of Systems   Constitutional:  Negative for fever.   HENT:  Negative for sore throat.    Respiratory:  Negative for shortness of breath.   "  Cardiovascular:  Negative for chest pain.   Gastrointestinal:  Positive for abdominal pain (generalized cramping). Negative for diarrhea, nausea and vomiting.   Genitourinary:  Negative for dysuria.   Musculoskeletal:  Negative for back pain.   Skin:  Negative for rash.   Neurological:  Negative for weakness.   Hematological:  Does not bruise/bleed easily.   All other systems reviewed and are negative.       Physical Exam     Initial Vitals [10/17/24 2346]   BP Pulse Resp Temp SpO2   (!) 147/88 81 18 99.1 °F (37.3 °C) 98 %      MAP       --          Physical Exam   Nursing Notes and Vital Signs Reviewed.  Constitutional: Patient is in NAD. Well-developed and well-nourished.  Head: Atraumatic. Normocephalic.  Eyes: PERRL. EOM intact. Conjunctivae are not pale. No scleral icterus.  ENT: Mucous membranes are moist. Oropharynx is clear and symmetric.    Neck: Supple. Full ROM. No lymphadenopathy.  Cardiovascular: Regular rate. Regular rhythm. No murmurs, rubs, or gallops. Distal pulses are 2+ and symmetric.  Pulmonary/Chest: No respiratory distress. Clear to auscultation bilaterally. No wheezing or rales.  Abdominal: Soft and non-distended.  There is no tenderness.  No rebound, guarding, or rigidity. Good bowel sounds.  Genitourinary: No CVA tenderness  Musculoskeletal: Moves all extremities. No obvious deformities. No edema. No calf tenderness.  Skin: Warm and dry.  Neurological:  Alert, awake, and appropriate.  Normal speech.  No acute focal neurological deficits are appreciated.  Psychiatric: Normal affect. Good eye contact. Appropriate in content.     ED Course   Procedures  ED Vital Signs:  Vitals:    10/17/24 2346 10/18/24 0340 10/18/24 0356 10/18/24 0436   BP: (!) 147/88  (!) 153/93 117/77   Pulse: 81   75   Resp: 18 16     Temp: 99.1 °F (37.3 °C)      TempSrc: Oral      SpO2: 98%  95% 96%   Weight: 75.3 kg (166 lb 1.6 oz)      Height: 5' 8" (1.727 m)       10/18/24 0606 10/18/24 0700 10/18/24 0813   BP: (!) " 146/89 (!) 146/90 (!) 150/93   Pulse: 78 77 76   Resp:  16 16   Temp:  99.3 °F (37.4 °C)    TempSrc:  Oral    SpO2: 97% 97% 97%   Weight:      Height:          Abnormal Lab Results:  Labs Reviewed   CBC W/ AUTO DIFFERENTIAL - Abnormal       Result Value    WBC 5.36      RBC 4.14 (*)     Hemoglobin 12.7 (*)     Hematocrit 39.1 (*)     MCV 94      MCH 30.7      MCHC 32.5      RDW 21.6 (*)     Platelets 221      MPV 9.5      Immature Granulocytes 0.0      Gran # (ANC) 3.8      Immature Grans (Abs) 0.00      Lymph # 1.2      Mono # 0.4      Eos # 0.0      Baso # 0.01      nRBC 0      Gran % 70.5      Lymph % 22.4      Mono % 6.5      Eosinophil % 0.4      Basophil % 0.2      Differential Method Automated      Narrative:     Release to patient->Immediate   COMPREHENSIVE METABOLIC PANEL - Abnormal    Sodium 137      Potassium 4.1      Chloride 102      CO2 25      Glucose 138 (*)     BUN 11      Creatinine 1.2      Calcium 9.7      Total Protein 8.0      Albumin 3.0 (*)     Total Bilirubin 0.9      Alkaline Phosphatase 174 (*)     AST 34      ALT 33      eGFR >60      Anion Gap 10      Narrative:     Release to patient->Immediate   URINALYSIS, REFLEX TO URINE CULTURE - Abnormal    Specimen UA Urine, Clean Catch      Color, UA Yellow      Appearance, UA Clear      pH, UA 6.0      Specific Gravity, UA 1.020      Protein, UA Trace (*)     Glucose, UA Negative      Ketones, UA Negative      Bilirubin (UA) Negative      Occult Blood UA Negative      Nitrite, UA Negative      Urobilinogen, UA 4.0-6.0 (*)     Leukocytes, UA Negative      Narrative:     Specimen Source->Urine   APTT - Abnormal    aPTT 32.5 (*)     Narrative:     Release to patient->Immediate   PROTIME-INR - Abnormal    Prothrombin Time 15.2 (*)     INR 1.3 (*)     Narrative:     Release to patient->Immediate   HIV 1 / 2 ANTIBODY    HIV 1/2 Ag/Ab Negative      Narrative:     Release to patient->Immediate   HEPATITIS C ANTIBODY    Hepatitis C Ab Negative       Narrative:     Release to patient->Immediate   HEP C VIRUS HOLD SPECIMEN    HEP C Virus Hold Specimen Hold for HCV sendout      Narrative:     Release to patient->Immediate   LACTIC ACID, PLASMA    Lactate (Lactic Acid) 1.8     LIPASE   LIPASE    Lipase 35      Narrative:     Release to patient->Immediate        All Lab Results:  Results for orders placed or performed during the hospital encounter of 10/18/24   Urinalysis, Reflex to Urine Culture Urine, Clean Catch    Collection Time: 10/18/24 12:14 AM    Specimen: Urine, Clean Catch   Result Value Ref Range    Specimen UA Urine, Clean Catch     Color, UA Yellow Yellow, Straw, Jaelyn    Appearance, UA Clear Clear    pH, UA 6.0 5.0 - 8.0    Specific Gravity, UA 1.020 1.005 - 1.030    Protein, UA Trace (A) Negative    Glucose, UA Negative Negative    Ketones, UA Negative Negative    Bilirubin (UA) Negative Negative    Occult Blood UA Negative Negative    Nitrite, UA Negative Negative    Urobilinogen, UA 4.0-6.0 (A) <2.0 EU/dL    Leukocytes, UA Negative Negative   HIV 1/2 Ag/Ab (4th Gen)    Collection Time: 10/18/24  3:32 AM   Result Value Ref Range    HIV 1/2 Ag/Ab Negative Negative   Hepatitis C Antibody    Collection Time: 10/18/24  3:32 AM   Result Value Ref Range    Hepatitis C Ab Negative Negative   HCV Virus Hold Specimen    Collection Time: 10/18/24  3:32 AM   Result Value Ref Range    HEP C Virus Hold Specimen Hold for HCV sendout    CBC auto differential    Collection Time: 10/18/24  3:32 AM   Result Value Ref Range    WBC 5.36 3.90 - 12.70 K/uL    RBC 4.14 (L) 4.60 - 6.20 M/uL    Hemoglobin 12.7 (L) 14.0 - 18.0 g/dL    Hematocrit 39.1 (L) 40.0 - 54.0 %    MCV 94 82 - 98 fL    MCH 30.7 27.0 - 31.0 pg    MCHC 32.5 32.0 - 36.0 g/dL    RDW 21.6 (H) 11.5 - 14.5 %    Platelets 221 150 - 450 K/uL    MPV 9.5 9.2 - 12.9 fL    Immature Granulocytes 0.0 0.0 - 0.5 %    Gran # (ANC) 3.8 1.8 - 7.7 K/uL    Immature Grans (Abs) 0.00 0.00 - 0.04 K/uL    Lymph # 1.2 1.0 - 4.8  K/uL    Mono # 0.4 0.3 - 1.0 K/uL    Eos # 0.0 0.0 - 0.5 K/uL    Baso # 0.01 0.00 - 0.20 K/uL    nRBC 0 0 /100 WBC    Gran % 70.5 38.0 - 73.0 %    Lymph % 22.4 18.0 - 48.0 %    Mono % 6.5 4.0 - 15.0 %    Eosinophil % 0.4 0.0 - 8.0 %    Basophil % 0.2 0.0 - 1.9 %    Differential Method Automated    Comprehensive metabolic panel    Collection Time: 10/18/24  3:32 AM   Result Value Ref Range    Sodium 137 136 - 145 mmol/L    Potassium 4.1 3.5 - 5.1 mmol/L    Chloride 102 95 - 110 mmol/L    CO2 25 23 - 29 mmol/L    Glucose 138 (H) 70 - 110 mg/dL    BUN 11 8 - 23 mg/dL    Creatinine 1.2 0.5 - 1.4 mg/dL    Calcium 9.7 8.7 - 10.5 mg/dL    Total Protein 8.0 6.0 - 8.4 g/dL    Albumin 3.0 (L) 3.5 - 5.2 g/dL    Total Bilirubin 0.9 0.1 - 1.0 mg/dL    Alkaline Phosphatase 174 (H) 40 - 150 U/L    AST 34 10 - 40 U/L    ALT 33 10 - 44 U/L    eGFR >60 >60 mL/min/1.73 m^2    Anion Gap 10 8 - 16 mmol/L   Lactic acid, plasma    Collection Time: 10/18/24  3:32 AM   Result Value Ref Range    Lactate (Lactic Acid) 1.8 0.5 - 2.2 mmol/L   APTT    Collection Time: 10/18/24  3:32 AM   Result Value Ref Range    aPTT 32.5 (H) 21.0 - 32.0 sec   Protime-INR    Collection Time: 10/18/24  3:32 AM   Result Value Ref Range    Prothrombin Time 15.2 (H) 9.0 - 12.5 sec    INR 1.3 (H) 0.8 - 1.2   Lipase    Collection Time: 10/18/24  3:32 AM   Result Value Ref Range    Lipase 35 4 - 60 U/L     *Note: Due to a large number of results and/or encounters for the requested time period, some results have not been displayed. A complete set of results can be found in Results Review.         Imaging Results:  Imaging Results              CT Abdomen Pelvis With IV Contrast Routine Oral Contrast (Final result)  Result time 10/18/24 07:06:01      Final result by Julius McdermottGlen), MD (10/18/24 07:06:01)                   Impression:      Worsening of primary mass in the left upper outer and metastasis involving the upper abdomen with increasing compression on  the stomach NG junction which likely is causing patient's symptoms of early postprandial satiety.    All CT scans at this facility use dose modulation, iterative reconstructions, and/or weight base dosing when appropriate to reduce radiation dose to as low as reasonably achievable      Electronically signed by: Julius Mcdermott MD  Date:    10/18/2024  Time:    07:06               Narrative:    EXAMINATION:  CT ABDOMEN PELVIS WITH IV CONTRAST    CLINICAL HISTORY:  Abdominal pain, acute, nonlocalized;    TECHNIQUE:  Postcontrast images were obtained    COMPARISON:  CT abdomen pelvis 08/07/2024    FINDINGS:  Lung bases are clear.    There is interval enlargement of a left anterior diaphragmatic lymph node measuring 2.0 x 1.8 cm.    Extensive metastatic disease involving the liver with interval enlargement.  For example, anterior segment right lobe liver lesion now measures 5.7 x 4.8 cm.  Previous measurements 4.8 x 4.2 cm.  Additionally, large mass in the left upper quadrant, now measures 13.6 x 12.9 cm.  Previous measurement 12.7 x 9.9 cm.  It appears to extend from the fundus of the stomach exophytically and involves the spleen.  Previous measurement 12.7 x 9.9 cm.  There is compression on the GE junction which is narrowed.  Additionally, there is extensive other in masses seen throughout the upper abdomen involving the peritoneum and or omentum some of which appear calcified which also appear significantly increased in size.  No ascites.  No evidence of bowel obstruction.  No retroperitoneal adenopathy.  Atherosclerosis of the abdominal aorta with ectasia of the iliac arteries appears stable.  The bladder is unremarkable.  No pelvic adenopathy or mass.  Skeleton is intact.                                      The Emergency Provider reviewed the vital signs and test results, which are outlined above.     ED Discussion     6:00 AM: Dr. Jamison transfers care of patient to Dr. Mancia pending imaging results.    8:25  AM: Case discussed with Dr. Beard, recommends prescribing Oxycodone, will have primary oncologist reach out for follow up. Reassessed pt at this time. Discussed with pt all pertinent ED information and results. Discussed pt dx and plan of tx. Gave pt all f/u and return to the ED instructions. All questions and concerns were addressed at this time. Pt expresses understanding of information and instructions, and is comfortable with plan to discharge. Pt is stable for discharge.    I discussed with patient and/or family/caretaker that evaluation in the ED does not suggest any emergent or life threatening medical conditions requiring immediate intervention beyond what was provided in the ED, and I believe patient is safe for discharge.  Regardless, an unremarkable evaluation in the ED does not preclude the development or presence of a serious of life threatening condition. As such, patient was instructed to return immediately for any worsening or change in current symptoms.      ED Course as of 10/18/24 0826   Fri Oct 18, 2024   0656 WBC: 5.36 [AB]   0656 Hemoglobin(!): 12.7 [AB]   0656 Hematocrit(!): 39.1 [AB]   0656 Sodium: 137 [AB]   0656 Lipase: 35 [AB]   0656 CO2: 25 [AB]   0656 Potassium: 4.1 [AB]   0656 BUN: 11 [AB]   0656 Creatinine: 1.2 [AB]   0656 AST: 34 [AB]   0656 ALT: 33 [AB]   0656 BILIRUBIN TOTAL: 0.9 [AB]   0656 Lactic Acid Level: 1.8  Lab work reviewed and otherwise normal, no concerns for infection, lipase and lactate normal. CT pending [AB]      ED Course User Index  [AB] Viri Mancia MD     Medical Decision Making  DDX: 1. Gastritis 2. Obstruction 3. Esophagitis 4. Malignancy related pain    CT abdomen shows advancement of malignancy with compression on esophagus likely source of post prandial pain, patient not vomiting, he is tolerating po, case discussed with oncology, patient safe for discharge will follow up outpatient.     Amount and/or Complexity of Data Reviewed  Labs: ordered.  Decision-making details documented in ED Course.  Radiology: ordered. Decision-making details documented in ED Course.  Discussion of management or test interpretation with external provider(s): Heme/Onc consulted    Risk  OTC drugs.  Prescription drug management.  Decision regarding hospitalization.  Diagnosis or treatment significantly limited by social determinants of health.                 ED Medication(s):  Medications   morphine injection 4 mg (4 mg Intravenous Given 10/18/24 0340)   ondansetron injection 4 mg (4 mg Intravenous Given 10/18/24 0340)   iohexoL (OMNIPAQUE 350) injection 100 mL (100 mLs Intravenous Given 10/18/24 0620)   iohexoL (OMNIPAQUE 12) oral solution 1,000 mL (1,000 mLs Oral Given 10/18/24 0620)       New Prescriptions    DOCUSATE SODIUM (COLACE) 100 MG CAPSULE    Take 1 capsule (100 mg total) by mouth 2 (two) times daily.    OXYCODONE (ROXICODONE) 10 MG TAB IMMEDIATE RELEASE TABLET    Take 1 tablet (10 mg total) by mouth every 8 (eight) hours as needed for Pain.        Follow-up Information       Tawny Mesa MD. Schedule an appointment as soon as possible for a visit in 2 days.    Specialty: Hematology and Oncology  Why: Return to the Emergency Room, If symptoms worsen  Contact information:  [address] [id number]  [phone]                                 Scribe Attestation:   Scribe #1: I performed the above scribed service and the documentation accurately describes the services I performed. I attest to the accuracy of the note.     Attending:   Physician Attestation Statement for Scribe #1: I, John Jamison MD, personally performed the services described in this documentation, as scribed by Maninder Ford, in my presence, and it is both accurate and complete.       Scribe Attestation:   Scribe #2: I performed the above scribed service and the documentation accurately describes the services I performed. I attest to the accuracy of the note.    Attending  Attestation:           Physician Attestation for Scribe:    Physician Attestation Statement for Scribe #2: I, Viri Mancia MD, reviewed documentation, as scribed by Hollis Boyle in my presence, and it is both accurate and complete. I also acknowledge and confirm the content of the note done by Scribe #1.           Clinical Impression       ICD-10-CM ICD-9-CM   1. Upper abdominal pain  R10.10 789.09   2. Postprandial epigastric pain  R10.13 789.06   3. Gastric stromal tumor  C49.A2 238.1   4. 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 Hx of known metastasis to liver  Z85.89 V10.07       Disposition:   Disposition: Discharged  Condition: Stable         Viri Mancia MD  10/21/24 1318

## 2024-10-18 NOTE — FIRST PROVIDER EVALUATION
Medical screening examination initiated.  I have conducted a focused provider triage encounter, findings are as follows:    Brief history of present illness:  Abdominal cancer patient complaining of 2 days of abdominal pain which is unusual for him.  Patient on chemo    There were no vitals filed for this visit.    Pertinent physical exam:  Appears jaundice mild distention in triage    Brief workup plan:  Labs    Preliminary workup initiated; this workup will be continued and followed by the physician or advanced practice provider that is assigned to the patient when roomed.

## 2024-10-21 ENCOUNTER — OFFICE VISIT (OUTPATIENT)
Dept: HEMATOLOGY/ONCOLOGY | Facility: CLINIC | Age: 64
End: 2024-10-21
Payer: COMMERCIAL

## 2024-10-21 DIAGNOSIS — D84.81 IMMUNODEFICIENCY SECONDARY TO NEOPLASM: ICD-10-CM

## 2024-10-21 DIAGNOSIS — C49.A0 MALIGNANT GASTROINTESTINAL STROMAL TUMOR, UNSPECIFIED SITE: Primary | ICD-10-CM

## 2024-10-21 DIAGNOSIS — T45.1X5A IMMUNODEFICIENCY DUE TO CHEMOTHERAPY: ICD-10-CM

## 2024-10-21 DIAGNOSIS — D49.9 IMMUNODEFICIENCY SECONDARY TO NEOPLASM: ICD-10-CM

## 2024-10-21 DIAGNOSIS — D84.821 IMMUNODEFICIENCY DUE TO CHEMOTHERAPY: ICD-10-CM

## 2024-10-21 DIAGNOSIS — C78.7 SECONDARY LIVER CANCER: ICD-10-CM

## 2024-10-21 DIAGNOSIS — C78.6 SECONDARY MALIGNANCY OF PARIETAL PERITONEUM: ICD-10-CM

## 2024-10-21 DIAGNOSIS — I15.8 OTHER SECONDARY HYPERTENSION: ICD-10-CM

## 2024-10-21 DIAGNOSIS — G89.3 CANCER RELATED PAIN: ICD-10-CM

## 2024-10-21 DIAGNOSIS — Z79.899 IMMUNODEFICIENCY DUE TO CHEMOTHERAPY: ICD-10-CM

## 2024-10-21 PROCEDURE — 1160F RVW MEDS BY RX/DR IN RCRD: CPT | Mod: CPTII,95,, | Performed by: INTERNAL MEDICINE

## 2024-10-21 PROCEDURE — 1159F MED LIST DOCD IN RCRD: CPT | Mod: CPTII,95,, | Performed by: INTERNAL MEDICINE

## 2024-10-21 PROCEDURE — 99215 OFFICE O/P EST HI 40 MIN: CPT | Mod: 95,,, | Performed by: INTERNAL MEDICINE

## 2024-10-21 PROCEDURE — G2211 COMPLEX E/M VISIT ADD ON: HCPCS | Mod: 95,,, | Performed by: INTERNAL MEDICINE

## 2024-10-21 PROCEDURE — 4010F ACE/ARB THERAPY RXD/TAKEN: CPT | Mod: CPTII,95,, | Performed by: INTERNAL MEDICINE

## 2024-10-21 NOTE — Clinical Note
Please cancel CT in October. Has it done already. Please schedule patient to see palliative medicine. See YESSY in 2 weeks with CBC, CMP, TSH. See me in 4 weeks with CBC, CMP, TSH.

## 2024-10-21 NOTE — PROGRESS NOTES
"The patient location is: home in [place]  The chief complaint leading to consultation is: follow up for stage IV GIST    Visit type: audiovirtual    Face to Face time with patient: 20 min  40 minutes of total time spent on the encounter, which includes face to face time and non-face to face time preparing to see the patient (eg, review of tests), Obtaining and/or reviewing separately obtained history, Documenting clinical information in the electronic or other health record, Independently interpreting results (not separately reported) and communicating results to the patient/family/caregiver, or Care coordination (not separately reported).         Each patient to whom he or she provides medical services by telemedicine is:  (1) informed of the relationship between the physician and patient and the respective role of any other health care provider with respect to management of the patient; and (2) notified that he or she may decline to receive medical services by telemedicine and may withdraw from such care at any time.    Notes:                                                                PROGRESS NOTE    Subjective:       Patient ID: Agusto Juarez is a 64 y.o. male.    Chief Complaint: follow up for stage IV GIST    Diagnosis:  Metastatic GIST, KIT/PDGFRA/BRAF/SDH wild-type, diagnosed in April 2021  [facility] blood 6/7/24: positive for KIT exon 13 p.V654A mutation, TMB 8.6. 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 CT A/P 4/26/21 showed "Large upper abdominal mass which may arise from the stomach or pancreas " "and may involve the liver, duodenum, spleen, and gallbladder. There are also multiple nodular masses throughout the peritoneum and mesentery consistent with peritoneal carcinomatosis." Biopsy of gastric mass and perigastric LN showed grade 1 GIST. Mixed spindle cell and epithelioid type. Mitotic rate cannot be determined. No necrosis. Presumed low-grade (G1). Given 20 cm with low mitotic count, likely moderate risk. Strata test at that time was negative for any mutation. GIST panel was negative.es.  He started gleevec 400 mg daily at that time. CT in Oct 2021 and 2022 showed response to gleevec with reduction of tumor size. CT in 2023 showed stable disease. He had CT C/A/P done on 24. It showed "Interval disease progression in the abdomen pelvis with worsening peritoneal carcinomatosis and worsening hepatic metastatic disease." He presents today for a second opinion. He saw Dr Dee yesterday. Sutent was prescribed. Patient remembers being told in the past he can take two 400 mg gleevec a day when it is not working. He started 400 mg bid yesterday and feels better.   Recc staying on 400 mg bid (since 24) with close follow up scan.   2. CT C/A/P 24 showed progression. Sutent 37.5 mg daily started around 24. Patient went to ED on 10/17/24 for abdominal pain. CT A/P 10/18/24 showed progression of disease.    Interval History:   Mr Juarez returns for follow up. He has been taking sutent 37.5 mg daily. He went to ED on 10/17/24 for abdominal pain. CT A/P 10/18/24 showed progression of disease. Pain is better after morphine. He only needs to take tylenol as needed for pain. Was given oxycodone but has not needed it yet. Takes stool softener for constipation.     ECO    ROS:   A ten-point system review is obtained and negative except for what was stated in the Interval History.     Physical Examination:    Home BP log reviewed. BP good over the past 3 days  General: well hydrated, well " developed, in no acute distress  HEENT: normocephalic, EOMI, anicteric sclerae  Neuro: alert and oriented x 4  Psych: pleasant and appropriate mood and affect    Objective:     Laboratory Data:  Labs reviewed. CBC, CMP adequate for treatment.     Imaging Data:  CT C/A/P 6/5/24:  1.  Interval disease progression in the abdomen pelvis with worsening peritoneal carcinomatosis and worsening hepatic metastatic disease.     2.  No evidence for thoracic metastatic disease.     CT C/A/P 8/7/24:  Impression:     Interval progression of disease with enlarging left upper quadrant mass and hepatic metastatic lesions, and increased size and number of peritoneal carcinomatosis lesions.     Mild narrowing of the GE junction.  Consider gastrostomy tube placement.     No evidence of intrathoracic metastatic disease.    CT A/P 10/8/24:  FINDINGS:  Lung bases are clear.     There is interval enlargement of a left anterior diaphragmatic lymph node measuring 2.0 x 1.8 cm.     Extensive metastatic disease involving the liver with interval enlargement.  For example, anterior segment right lobe liver lesion now measures 5.7 x 4.8 cm.  Previous measurements 4.8 x 4.2 cm.  Additionally, large mass in the left upper quadrant, now measures 13.6 x 12.9 cm.  Previous measurement 12.7 x 9.9 cm.  It appears to extend from the fundus of the stomach exophytically and involves the spleen.  Previous measurement 12.7 x 9.9 cm.  There is compression on the GE junction which is narrowed.  Additionally, there is extensive other in masses seen throughout the upper abdomen involving the peritoneum and or omentum some of which appear calcified which also appear significantly increased in size.  No ascites.  No evidence of bowel obstruction.  No retroperitoneal adenopathy.  Atherosclerosis of the abdominal aorta with ectasia of the iliac arteries appears stable.  The bladder is unremarkable.  No pelvic adenopathy or mass.  Skeleton is intact.      Impression:     Worsening of primary mass in the left upper outer and metastasis involving the upper abdomen with increasing compression on the stomach NG junction which likely is causing patient's symptoms of early postprandial satiety.       Assessment and Plan:     1. Malignant gastrointestinal stromal tumor, unspecified site    2. Secondary malignancy of parietal peritoneum    3. Secondary liver cancer    4. Immunodeficiency due to chemotherapy    5. Immunodeficiency secondary to neoplasm    6. Other secondary hypertension    7. Cancer related pain        1-5  - Mr Juarez is a 65 yo man with metastatic gastric GIST with met to the peritoneum and liver. Diagnosed in April 2021. Has been on gleevec 400 mg daily since. Had good response initially. Recent CT 6/5/24 showed progression of disease. Patient started gleevec 400 mg bid by himself on 6/6/24. [facility] showed KIT exon 11 and exon 13 mutations.   - CT C/A/P 8/7/24 showed progression. Sutent 37.5 mg daily started around 8/16/24.  - Patient went to ED on 10/17/24 for abdominal pain. CT A/P 10/18/24 showed progression of disease.  - long discussion with patient and wife. Disease has progressed. Will change to regorafenib. Went over side effects in detail with patient.   - Discussed with patient regarding the side effects of regorafenib, which include but are not limited to anemia, increased liver enzymes, fatigue, protein in the urine, low calcium, low phosphorous, low white blood cell counts, decreased appetite, increased lipase and amylase, high bilirubin in the blood, hand-foot syndrome, diarrhea, low platelets, mouth sores, weight loss, infection, high blood pressure, voice disorder, low sodium, nausea, pain, fever, rash, low potassium, increased INR, increased bleeding, headache,  liver damage. Handouts posted to patient portal  - Patient understands and wants to try regorafenib. Start with 80 mg daily for 1 week, then 120 mg daily for 1 week, then 160 mg  daily for 1 week. For cycle 2, will do the max dose tolerated daily 3 weeks on, 1 week off  - c/w coreg 12.5 mg bid, amlodipine 10 mg daily, lisinopril 40 mg daily  - c/w lisinopril 40 mg daily and amlodipine 10 mg daily  - long discussion with patient re need for feeding tube due to concern of gastric outlet obstruction. Patient does not want to get feeding tube right now  - RTC in 2 weeks    6.  - see above    7.  - long discussion. [facility] palliative medicine. Patient understands and agrees with the plan.     Follow-up:     RTC in 2 weeks  Knows to call in the interval if any problems arise.    Electronically signed by Tawny Mesa

## 2024-10-21 NOTE — PATIENT INSTRUCTIONS
Regorafenib Tablets    What is this medication?  REGORAFENIB (RE ayanaladonna MJ dougherty nib) treats some types of cancer. It works by blocking a protein that causes cancer cells to grow and multiply. This helps to slow or stop the spread of cancer cells.  This medicine may be used for other purposes; ask your health care provider or pharmacist if you have questions.  COMMON BRAND NAME(S): Monica  What should I tell my care team before I take this medication?  They need to know if you have any of these conditions:  Bleeding disorder  Having or recent surgery  Heart disease  High blood pressure  Liver disease  An unusual or allergic reaction to regorafenib, other medications, foods, dyes, or preservatives  If you or your partner are pregnant or trying to get pregnant  Breastfeeding  How should I use this medication?  Take this medication by mouth with water. Take it as directed on the prescription label at the same time every day. Take it with food. Keep taking it unless your care team tells you to stop.  Do not take this medication with grapefruit juice.  Talk to your care team about the use of this medication in children. Special care may be needed.  Overdosage: If you think you have taken too much of this medicine contact a poison control center or emergency room at once.  NOTE: This medicine is only for you. Do not share this medicine with others.  What if I miss a dose?  If you miss a dose, take it as soon as you can. If it is almost time for your next dose, take only that dose. Do not take double or extra doses.  What may interact with this medication?  Certain medications for fungal infections, such as itraconazole, ketoconazole, posaconazole, voriconazole  Certain medications for seizures, such as carbamazepine, phenobarbital, phenytoin  Irinotecan  Rifampin  [place]ston's wort  Telithromycin  Warfarin  This list may not describe all possible interactions. Give your health care provider a list of all the medicines, herbs,  non-prescription drugs, or dietary supplements you use. Also tell them if you smoke, drink alcohol, or use illegal drugs. Some items may interact with your medicine.  What should I watch for while using this medication?  Your condition will be monitored carefully while you are receiving this medication. You may need blood work while taking this medication.  This medication may make you feel generally unwell. This is not uncommon as chemotherapy can affect healthy cells as well as cancer cells. Report any side effects. Continue your course of treatment even though you feel ill unless your care team tells you to stop.  Before having surgery, talk to your care team to make sure it is ok. This medication can increase the risk of poor healing of your surgical site or wound. You will need to stop this medication for 2 weeks before surgery. After surgery, wait at least 2 weeks before restarting this medication. Make sure the surgical site or wound is healed enough before restarting this medication. Talk to your care team if questions.  Talk to your care team about your risk of cancer. You may be more at risk for certain types of cancer if you take this medication.  Talk to your care team if you may be pregnant. Serious birth defects can occur if you take this medication during pregnancy and for 2 months after the last dose. Contraception is recommended while taking this medication and for 2 months after the last dose. Your care team can help you find the option that works for you.  If your partner can get pregnant, use a condom during sex while taking this medication and for 2 months after the last dose.  Do not breastfeed while taking this medication and for 2 weeks after the last dose.  What side effects may I notice from receiving this medication?  Side effects that you should report to your care team as soon as possible:  Allergic reactions--skin rash, itching, hives, swelling of the face, lips, tongue, or  throat  Bleeding--bloody or black, tar-like stools, vomiting blood or brown material that looks like coffee grounds, red or dark brown urine, small red or purple spots on skin, unusual bruising or bleeding  Heart attack--pain or tightness in the chest, shoulders, arms, or jaw, nausea, shortness of breath, cold or clammy skin, feeling faint or lightheaded  Increase in blood pressure  Infection--fever, chills, cough, sore throat, wounds that don't heal, pain or trouble when passing urine, general feeling of discomfort or being unwell  Liver injury--right upper belly pain, loss of appetite, nausea, light-colored stool, dark yellow or brown urine, yellowing skin or eyes, unusual weakness or fatigue  Redness, blistering, peeling, or loosening of the skin, including inside the mouth  Stomach pain that is severe, does not go away, or gets worse  Sudden and severe headache, confusion, change in vision, seizures, which may be signs of Posterior reversible encephalopathy syndrome (PRES)  Side effects that usually do not require medical attention (report to your care team if they continue or are bothersome):  Change in voice, hoarseness  Diarrhea  Loss of appetite with weight loss  Nausea  Pain, redness, or swelling with sores inside the mouth or throat  Stomach pain  Unusual weakness or fatigue  This list may not describe all possible side effects. Call your doctor for medical advice about side effects. You may report side effects to FDA at 0-844-FDA-6208.  Where should I keep my medication?  Keep out of the reach of children and pets.  Store at room temperature between 20 and 25 degrees C (68 and 77 degrees F). Keep this medication in the original container. Get rid of any unused medication 7 weeks after opening or after it expires, whichever is first.  To get rid of medications that are no longer needed or have :  Take the medication to a medication take-back program. Check with your pharmacy or law enforcement to  find a location.  If you cannot return the medication, check the label or package insert to see if the medication should be thrown out in the garbage or flushed down the toilet. If you are not sure, ask your care team. If it is safe to put it in the trash, take the medication out of the container. Mix the medication with cat litter, dirt, coffee grounds, or other unwanted substance. Seal the mixture in a bag or container. Put it in the trash.  NOTE: This sheet is a summary. It may not cover all possible information. If you have questions about this medicine, talk to your doctor, pharmacist, or health care provider.  © 2024 [employer] (2023-05-16 00:00:00)      Additional Information From [employer] About Regorafenib  Self-Care Tips:  Drink at least two to three quarts of fluid every 24 hours, unless you are instructed otherwise.  You may be at risk of infection so try to avoid crowds or people with colds, and report fever or any other signs of infection immediately to your healthcare provider.  Wash your hands often.  Ask your doctor or nurse before scheduling dental appointments or procedures.  Use an electric razor and a soft toothbrush to minimize bleeding.  Avoid contact sports or activities that could cause injury.  If you should experience nausea, take anti-nausea medications as prescribed by your doctor, and eat small frequent meals. Sucking on lozenges and chewing gum may also help.  Avoid sun exposure. Wear SPF 15 (or higher) sun block and protective clothing.  In general, drinking alcoholic beverages should be avoided completely. You should discuss this with your doctor.  Get plenty of rest.  Maintain good nutrition.  Keep your mouth clean with baking soda and salt rinses. You can mix 1/2 to 1 tsp. of baking soda and/or 1/2 to 1 tsp. salt in 8 ounces of water, and use as a mouthwash, to avoid or decrease the severity of mouth sores.  Regorafenib can cause tiredness, weakness or blurred  vision. If you have any of these symptoms, use caution when driving a car, using machinery, or anything that requires you to be alert.  If you experience symptoms or side effects, be sure to discuss them with your health care team. They can prescribe medications and/or offer other suggestions that are effective in managing such problems.    When to contact your doctor or health care provider:  Contact your health care provider immediately, day or night, if you should experience any of the following symptoms:  Fever of 100.4° F (38° or higher, chills)  Signs of a very bad reaction (wheezing, chest tightness, fever, itching, bad cough, blue or grey skin color, seizures, or swelling or the face, lips, tongue or throat).  Sudden change in eyesight, fast heartbeat, very bad headache, very bad dizziness or passing out.  Always inform your health care provider if you experience any unusual symptoms.  The following symptoms require medical attention, but are not an emergency. Contact your health care provider within 24 hours of noticing any of the following:  Diarrhea (4-6 episodes in a 24-hour period).  Nausea (interferes with ability to eat and unrelieved with prescribed medication).  Vomiting (vomiting more than 4-5 times in a 24 hour period).  Unable to eat or drink for 24 hours or have signs of dehydration: tiredness, thirst, dry mouth, dark and decrease amount of urine, or dizziness.  Skin or the whites of your eyes turn yellow  Urine turns dark or brown (tea color)  Decreased appetite  Pain on the right side of your stomach  Bleed or bruise more easily than normal  Skin changes (rash, acne, itching, blisters, peeling, redness or swelling)  Increase in blood pressure  Feeling very tired or weak (unable to carry on self-care activities)  Big weight gain or loss  Black or tarry stools, or blood in your stools  Blood in the urine  Signs of infection (very bad sore throat, ear or sinus pain, cough, more sputum or change  in color of sputum, pain with passing urine, mouth sores, wound that will not heal or anal itching or pain).  Always inform your health care provider if you experience any unusual symptoms.

## 2024-10-28 ENCOUNTER — PATIENT MESSAGE (OUTPATIENT)
Dept: HEMATOLOGY/ONCOLOGY | Facility: CLINIC | Age: 64
End: 2024-10-28
Payer: COMMERCIAL

## 2024-10-29 ENCOUNTER — DOCUMENTATION ONLY (OUTPATIENT)
Dept: HEMATOLOGY/ONCOLOGY | Facility: CLINIC | Age: 64
End: 2024-10-29
Payer: COMMERCIAL

## 2024-10-29 ENCOUNTER — TELEPHONE (OUTPATIENT)
Dept: HEMATOLOGY/ONCOLOGY | Facility: CLINIC | Age: 64
End: 2024-10-29
Payer: COMMERCIAL

## 2024-10-30 ENCOUNTER — PATIENT MESSAGE (OUTPATIENT)
Dept: HEMATOLOGY/ONCOLOGY | Facility: CLINIC | Age: 64
End: 2024-10-30
Payer: COMMERCIAL

## 2024-10-31 ENCOUNTER — NURSE TRIAGE (OUTPATIENT)
Dept: ADMINISTRATIVE | Facility: CLINIC | Age: 64
End: 2024-10-31
Payer: COMMERCIAL

## 2024-11-01 ENCOUNTER — PATIENT MESSAGE (OUTPATIENT)
Dept: FAMILY MEDICINE | Facility: CLINIC | Age: 64
End: 2024-11-01
Payer: COMMERCIAL

## 2024-11-01 ENCOUNTER — LAB VISIT (OUTPATIENT)
Dept: LAB | Facility: HOSPITAL | Age: 64
End: 2024-11-01
Attending: INTERNAL MEDICINE
Payer: COMMERCIAL

## 2024-11-01 ENCOUNTER — TELEPHONE (OUTPATIENT)
Dept: FAMILY MEDICINE | Facility: CLINIC | Age: 64
End: 2024-11-01
Payer: COMMERCIAL

## 2024-11-01 DIAGNOSIS — C78.7 SECONDARY MALIGNANT NEOPLASM OF LIVER: ICD-10-CM

## 2024-11-01 DIAGNOSIS — C49.A0 MALIGNANT GASTROINTESTINAL STROMAL TUMOR, UNSPECIFIED SITE: ICD-10-CM

## 2024-11-01 LAB
ALBUMIN SERPL BCP-MCNC: 2.2 G/DL (ref 3.5–5.2)
ALP SERPL-CCNC: 233 U/L (ref 40–150)
ALT SERPL W/O P-5'-P-CCNC: 55 U/L (ref 10–44)
ANION GAP SERPL CALC-SCNC: 11 MMOL/L (ref 8–16)
AST SERPL-CCNC: 61 U/L (ref 10–40)
BASOPHILS # BLD AUTO: 0.03 K/UL (ref 0–0.2)
BASOPHILS NFR BLD: 0.4 % (ref 0–1.9)
BILIRUB SERPL-MCNC: 1.7 MG/DL (ref 0.1–1)
BUN SERPL-MCNC: 33 MG/DL (ref 8–23)
CALCIUM SERPL-MCNC: 8.9 MG/DL (ref 8.7–10.5)
CHLORIDE SERPL-SCNC: 102 MMOL/L (ref 95–110)
CO2 SERPL-SCNC: 26 MMOL/L (ref 23–29)
CREAT SERPL-MCNC: 1.3 MG/DL (ref 0.5–1.4)
DIFFERENTIAL METHOD BLD: ABNORMAL
EOSINOPHIL # BLD AUTO: 0 K/UL (ref 0–0.5)
EOSINOPHIL NFR BLD: 0.4 % (ref 0–8)
ERYTHROCYTE [DISTWIDTH] IN BLOOD BY AUTOMATED COUNT: 20.5 % (ref 11.5–14.5)
EST. GFR  (NO RACE VARIABLE): >60 ML/MIN/1.73 M^2
GLUCOSE SERPL-MCNC: 99 MG/DL (ref 70–110)
HCT VFR BLD AUTO: 33.5 % (ref 40–54)
HGB BLD-MCNC: 11.2 G/DL (ref 14–18)
IMM GRANULOCYTES # BLD AUTO: 0.03 K/UL (ref 0–0.04)
IMM GRANULOCYTES NFR BLD AUTO: 0.4 % (ref 0–0.5)
LYMPHOCYTES # BLD AUTO: 0.9 K/UL (ref 1–4.8)
LYMPHOCYTES NFR BLD: 13.8 % (ref 18–48)
MCH RBC QN AUTO: 29.6 PG (ref 27–31)
MCHC RBC AUTO-ENTMCNC: 33.4 G/DL (ref 32–36)
MCV RBC AUTO: 89 FL (ref 82–98)
MONOCYTES # BLD AUTO: 0.8 K/UL (ref 0.3–1)
MONOCYTES NFR BLD: 11.6 % (ref 4–15)
NEUTROPHILS # BLD AUTO: 5 K/UL (ref 1.8–7.7)
NEUTROPHILS NFR BLD: 73.4 % (ref 38–73)
NRBC BLD-RTO: 0 /100 WBC
PLATELET # BLD AUTO: 272 K/UL (ref 150–450)
PMV BLD AUTO: 9.8 FL (ref 9.2–12.9)
POTASSIUM SERPL-SCNC: 3.8 MMOL/L (ref 3.5–5.1)
PROT SERPL-MCNC: 7.6 G/DL (ref 6–8.4)
RBC # BLD AUTO: 3.78 M/UL (ref 4.6–6.2)
SODIUM SERPL-SCNC: 139 MMOL/L (ref 136–145)
TSH SERPL DL<=0.005 MIU/L-ACNC: 1.22 UIU/ML (ref 0.4–4)
WBC # BLD AUTO: 6.75 K/UL (ref 3.9–12.7)

## 2024-11-01 PROCEDURE — 36415 COLL VENOUS BLD VENIPUNCTURE: CPT | Mod: PO | Performed by: INTERNAL MEDICINE

## 2024-11-01 PROCEDURE — 84443 ASSAY THYROID STIM HORMONE: CPT | Performed by: INTERNAL MEDICINE

## 2024-11-01 PROCEDURE — 80053 COMPREHEN METABOLIC PANEL: CPT | Performed by: INTERNAL MEDICINE

## 2024-11-01 PROCEDURE — 85025 COMPLETE CBC W/AUTO DIFF WBC: CPT | Mod: PO | Performed by: INTERNAL MEDICINE

## 2024-11-04 ENCOUNTER — HOSPITAL ENCOUNTER (EMERGENCY)
Facility: HOSPITAL | Age: 64
Discharge: HOME | End: 2024-11-04
Attending: EMERGENCY MEDICINE
Payer: COMMERCIAL

## 2024-11-04 ENCOUNTER — PATIENT MESSAGE (OUTPATIENT)
Dept: HEMATOLOGY/ONCOLOGY | Facility: CLINIC | Age: 64
End: 2024-11-04
Payer: COMMERCIAL

## 2024-11-04 VITALS
SYSTOLIC BLOOD PRESSURE: 142 MMHG | TEMPERATURE: 98 F | HEIGHT: 68 IN | WEIGHT: 150 LBS | RESPIRATION RATE: 18 BRPM | DIASTOLIC BLOOD PRESSURE: 97 MMHG | BODY MASS INDEX: 22.73 KG/M2 | HEART RATE: 81 BPM | OXYGEN SATURATION: 97 %

## 2024-11-04 DIAGNOSIS — C78.7 CANCER, METASTATIC TO LIVER: ICD-10-CM

## 2024-11-04 DIAGNOSIS — D64.9 CHRONIC ANEMIA: ICD-10-CM

## 2024-11-04 DIAGNOSIS — C49.A2 MALIGNANT GASTRIC STROMAL TUMOR: Primary | ICD-10-CM

## 2024-11-04 DIAGNOSIS — E44.0 MODERATE PROTEIN-CALORIE MALNUTRITION: ICD-10-CM

## 2024-11-04 DIAGNOSIS — K59.00 CONSTIPATION: ICD-10-CM

## 2024-11-04 LAB
ABO + RH BLD: NORMAL
ABO GROUP BLD: NORMAL
ALBUMIN SERPL BCP-MCNC: 2.2 G/DL (ref 3.5–5.2)
ALP SERPL-CCNC: 244 U/L (ref 40–150)
ALT SERPL W/O P-5'-P-CCNC: 39 U/L (ref 10–44)
ANION GAP SERPL CALC-SCNC: 9 MMOL/L (ref 8–16)
APTT PPP: 41.4 SEC (ref 21–32)
AST SERPL-CCNC: 37 U/L (ref 10–40)
BASOPHILS # BLD AUTO: 0.02 K/UL (ref 0–0.2)
BASOPHILS NFR BLD: 0.2 % (ref 0–1.9)
BILIRUB SERPL-MCNC: 2.1 MG/DL (ref 0.1–1)
BLD GP AB SCN CELLS X3 SERPL QL: NORMAL
BUN SERPL-MCNC: 29 MG/DL (ref 8–23)
CALCIUM SERPL-MCNC: 8.9 MG/DL (ref 8.7–10.5)
CHLORIDE SERPL-SCNC: 102 MMOL/L (ref 95–110)
CO2 SERPL-SCNC: 28 MMOL/L (ref 23–29)
CREAT SERPL-MCNC: 1.2 MG/DL (ref 0.5–1.4)
DIFFERENTIAL METHOD BLD: ABNORMAL
EOSINOPHIL # BLD AUTO: 0 K/UL (ref 0–0.5)
EOSINOPHIL NFR BLD: 0.2 % (ref 0–8)
ERYTHROCYTE [DISTWIDTH] IN BLOOD BY AUTOMATED COUNT: 20.1 % (ref 11.5–14.5)
EST. GFR  (NO RACE VARIABLE): >60 ML/MIN/1.73 M^2
GLUCOSE SERPL-MCNC: 99 MG/DL (ref 70–110)
HCT VFR BLD AUTO: 31.2 % (ref 40–54)
HGB BLD-MCNC: 11 G/DL (ref 14–18)
IMM GRANULOCYTES # BLD AUTO: 0.06 K/UL (ref 0–0.04)
IMM GRANULOCYTES NFR BLD AUTO: 0.6 % (ref 0–0.5)
INR PPP: 1.6 (ref 0.8–1.2)
LIPASE SERPL-CCNC: 29 U/L (ref 4–60)
LYMPHOCYTES # BLD AUTO: 1 K/UL (ref 1–4.8)
LYMPHOCYTES NFR BLD: 10.2 % (ref 18–48)
MCH RBC QN AUTO: 30.2 PG (ref 27–31)
MCHC RBC AUTO-ENTMCNC: 35.3 G/DL (ref 32–36)
MCV RBC AUTO: 86 FL (ref 82–98)
MONOCYTES # BLD AUTO: 0.9 K/UL (ref 0.3–1)
MONOCYTES NFR BLD: 8.9 % (ref 4–15)
NEUTROPHILS # BLD AUTO: 8.1 K/UL (ref 1.8–7.7)
NEUTROPHILS NFR BLD: 79.9 % (ref 38–73)
NRBC BLD-RTO: 0 /100 WBC
OB PNL STL: NEGATIVE
PLATELET # BLD AUTO: 289 K/UL (ref 150–450)
PMV BLD AUTO: 9.7 FL (ref 9.2–12.9)
POTASSIUM SERPL-SCNC: 3.8 MMOL/L (ref 3.5–5.1)
PROT SERPL-MCNC: 8.1 G/DL (ref 6–8.4)
PROTHROMBIN TIME: 17.6 SEC (ref 9–12.5)
RBC # BLD AUTO: 3.64 M/UL (ref 4.6–6.2)
RH BLD: NORMAL
SODIUM SERPL-SCNC: 139 MMOL/L (ref 136–145)
SPECIMEN OUTDATE: NORMAL
WBC # BLD AUTO: 10.13 K/UL (ref 3.9–12.7)

## 2024-11-04 PROCEDURE — 85610 PROTHROMBIN TIME: CPT | Performed by: EMERGENCY MEDICINE

## 2024-11-04 PROCEDURE — 99284 EMERGENCY DEPT VISIT MOD MDM: CPT | Mod: 25

## 2024-11-04 PROCEDURE — 86901 BLOOD TYPING SEROLOGIC RH(D): CPT | Performed by: EMERGENCY MEDICINE

## 2024-11-04 PROCEDURE — 86850 RBC ANTIBODY SCREEN: CPT | Performed by: EMERGENCY MEDICINE

## 2024-11-04 PROCEDURE — 82272 OCCULT BLD FECES 1-3 TESTS: CPT | Performed by: EMERGENCY MEDICINE

## 2024-11-04 PROCEDURE — 85730 THROMBOPLASTIN TIME PARTIAL: CPT | Performed by: EMERGENCY MEDICINE

## 2024-11-04 PROCEDURE — 86900 BLOOD TYPING SEROLOGIC ABO: CPT | Performed by: EMERGENCY MEDICINE

## 2024-11-04 PROCEDURE — 80053 COMPREHEN METABOLIC PANEL: CPT | Performed by: EMERGENCY MEDICINE

## 2024-11-04 PROCEDURE — 85025 COMPLETE CBC W/AUTO DIFF WBC: CPT | Performed by: EMERGENCY MEDICINE

## 2024-11-04 PROCEDURE — 83690 ASSAY OF LIPASE: CPT | Performed by: EMERGENCY MEDICINE

## 2024-11-04 RX ORDER — LACTULOSE 10 G/15ML
20 SOLUTION ORAL 3 TIMES DAILY
Qty: 630 ML | Refills: 0 | Status: SHIPPED | OUTPATIENT
Start: 2024-11-04 | End: 2024-11-11

## 2024-11-04 NOTE — ED PROVIDER NOTES
SCRIBE #1 NOTE: I, Juan Gaines, am scribing for, and in the presence of, Viri Mancia MD. I have scribed the entire note.       History     Chief Complaint   Patient presents with    Rectal Bleeding     Pt states he woke up this morning bleeding from the rectum. Pt states he has stomach cancer and has not had a bowel movement in over a week.      Review of patient's allergies indicates:  No Known Allergies      History of Present Illness     HPI    11/4/2024, 11:00 AM  History obtained from the patient      History of Present Illness: Agusto Juarez is a 64 y.o. male patient with a PMHx of HTN, HLD, and malignant gastrointestinal stromal tumor who presents to the Emergency Department for evaluation of rectal bleeding which onset gradually since this morning after straining on the toilet. Blood was present on bedding when he returned to lay down at home. Pt believes he is constipated. Pt has taken Miralax and stool softener with no improvement. The pt has not been eating as much as usual and is dehydrated. Pt is taking chemo and pain medications currently. Pt has an appointment with Dr. Dee Wednesday. Symptoms are constant and moderate in severity. No mitigating or exacerbating factors reported. Associated sxs include N/V. Patient denies any SOB, CP, dizziness, fever, and all other sxs at this time. No further complaints or concerns at this time.       Arrival mode: Personal vehicle     PCP: Joe Bentley MD        Past Medical History:  Past Medical History:   Diagnosis Date    BPH (benign prostatic hypertrophy)     Hyperlipidemia     Hypertension     Malignant gastrointestinal stromal tumor 05/05/2021    Villous adenoma of colon 02/07/2024    high grade dysplasia       Past Surgical History:  Past Surgical History:   Procedure Laterality Date    COLONOSCOPY N/A 9/26/2023    Procedure: COLONOSCOPY;  Surgeon: Luis Choi MD;  Location: [facility];  Service: Endoscopy;  Laterality: N/A;     COLONOSCOPY N/A 2/7/2024    Procedure: COLONOSCOPY;  Surgeon: Rikki Shabazz MD;  Location: [facility] (2ND FLR);  Service: Endoscopy;  Laterality: N/A;  12/18 portal instr- suprep-colonoscopy with EMR with me in the next 4-6 weeks.[facility] only.45 minute case is okay. Shabazz-tt  1/31/24- precall complete - ERW    COLONOSCOPY N/A 8/20/2024    Procedure: COLONOSCOPY;  Surgeon: Rikki Shabazz MD;  Location: [facility] (2ND FLR);  Service: Endoscopy;  Laterality: N/A;  6/14 portal-suprep- colonoscopy with me in 6 months to follow-up prior EMR. Can be scheduled as a 45min case. [facility] only. shabazz-tt  8/13 SWP PRECALL COMPLETE-RT    ENDOSCOPIC ULTRASOUND OF UPPER GASTROINTESTINAL TRACT N/A 4/30/2021    Procedure: ULTRASOUND, UPPER GI TRACT, ENDOSCOPIC;  Surgeon: Rikki Shabazz MD;  Location: [facility];  Service: Endoscopy;  Laterality: N/A;  Rapid COVID prior - last minute addition to schedule - ttr    ESOPHAGOGASTRODUODENOSCOPY N/A 4/30/2021    Procedure: EGD (ESOPHAGOGASTRODUODENOSCOPY);  Surgeon: Rikki Shabazz MD;  Location: [facility];  Service: Endoscopy;  Laterality: N/A;  EGD/EUS with biopsy within a week is fine. 45min case with me OK. Any campus. -Dr. Shabazz         Family History:  Family History   Problem Relation Name Age of Onset    Hypertension Other      Lung cancer Father  60 - 69       Social History:  Social History     Tobacco Use    Smoking status: Some Days     Current packs/day: 0.50     Average packs/day: 0.5 packs/day for 46.5 years (23.3 ttl pk-yrs)     Types: Cigarettes     Start date: 5/1/1978    Smokeless tobacco: Never   Substance and Sexual Activity    Alcohol use: Yes     Comment: occasionallly    Drug use: No    Sexual activity: Yes        Review of Systems     Review of Systems   Constitutional:  Negative for chills, diaphoresis and fever.   HENT:  Negative for congestion.    Respiratory:  Negative for cough and shortness of breath.    Cardiovascular:  Negative for chest pain.   Gastrointestinal:   Positive for anal bleeding, constipation, nausea, rectal pain and vomiting.   Genitourinary:  Negative for dysuria.   Musculoskeletal:  Negative for back pain.   Skin:  Negative for rash.   Neurological:  Negative for dizziness, weakness, light-headedness, numbness and headaches.   Hematological:  Does not bruise/bleed easily.   All other systems reviewed and are negative.       Physical Exam     Initial Vitals [11/04/24 0848]   BP Pulse Resp Temp SpO2   128/76 83 16 98.2 °F (36.8 °C) 98 %      MAP       --          Physical Exam  Nursing Notes and Vital Signs Reviewed.  Constitutional: Patient is in no acute distress. Appears malnourished. Appears older than stated age. Appears chronically-ill.  Head: Atraumatic. Normocephalic.  Eyes: PERRL. EOM intact. Conjunctivae are not pale. No scleral icterus.  ENT: Mucous membranes are moist. Oropharynx is clear and symmetric.    Neck: Supple. Full ROM. No lymphadenopathy.  Cardiovascular: Regular rate. Regular rhythm. No murmurs, rubs, or gallops. Distal pulses are 2+ and symmetric.  Pulmonary/Chest: No respiratory distress. Clear to auscultation bilaterally. No wheezing or rales.  Abdominal: Soft and non-distended.  There is no tenderness.  No rebound, guarding, or rigidity. Good bowel sounds.  Genitourinary: No CVA tenderness  Rectal Exam: No active bleeding. No external hemorrhoids. Normal rectal tones. There is no stool in the rectal vault.   Musculoskeletal: Moves all extremities. No obvious deformities. No edema. No calf tenderness.  Skin: Warm and dry.  Neurological:  Alert, awake, and appropriate.  Normal speech.  No acute focal neurological deficits are appreciated.  Psychiatric: Normal affect. Good eye contact. Appropriate in content.     ED Course   Procedures  ED Vital Signs:  Vitals:    11/04/24 0848 11/04/24 0859 11/04/24 0905 11/04/24 0929   BP: 128/76  128/84 137/83   Pulse: 83  77 75   Resp: 16  18 18   Temp: 98.2 °F (36.8 °C)      TempSrc: Oral      SpO2:  "98%  98% 98%   Weight:  68 kg (150 lb)     Height: 5' 8" (1.727 m)       11/04/24 0931 11/04/24 0933 11/04/24 1000 11/04/24 1100   BP: 135/86 111/73 133/85 (!) 138/91   Pulse: 76 84 75 72   Resp: 18 18 18 18   Temp:       TempSrc:       SpO2: 98% 97% 96% 98%   Weight:       Height:        11/04/24 1200 11/04/24 1229   BP: (!) 140/88 (!) 142/97   Pulse: 79 81   Resp: 20 18   Temp:  98.1 °F (36.7 °C)   TempSrc:  Oral   SpO2: 97% 97%   Weight:     Height:         Abnormal Lab Results:  Labs Reviewed   CBC W/ AUTO DIFFERENTIAL - Abnormal       Result Value    WBC 10.13      RBC 3.64 (*)     Hemoglobin 11.0 (*)     Hematocrit 31.2 (*)     MCV 86      MCH 30.2      MCHC 35.3      RDW 20.1 (*)     Platelets 289      MPV 9.7      Immature Granulocytes 0.6 (*)     Gran # (ANC) 8.1 (*)     Immature Grans (Abs) 0.06 (*)     Lymph # 1.0      Mono # 0.9      Eos # 0.0      Baso # 0.02      nRBC 0      Gran % 79.9 (*)     Lymph % 10.2 (*)     Mono % 8.9      Eosinophil % 0.2      Basophil % 0.2      Differential Method Automated     COMPREHENSIVE METABOLIC PANEL - Abnormal    Sodium 139      Potassium 3.8      Chloride 102      CO2 28      Glucose 99      BUN 29 (*)     Creatinine 1.2      Calcium 8.9      Total Protein 8.1      Albumin 2.2 (*)     Total Bilirubin 2.1 (*)     Alkaline Phosphatase 244 (*)     AST 37      ALT 39      eGFR >60      Anion Gap 9     PROTIME-INR - Abnormal    Prothrombin Time 17.6 (*)     INR 1.6 (*)    APTT - Abnormal    aPTT 41.4 (*)    LIPASE    Lipase 29     OCCULT BLOOD X 1, STOOL    Occult Blood Negative     TYPE & SCREEN    Group & Rh CANCELED      Indirect [place] NEG      Specimen Outdate 11/07/2024 23:59     GROUP & RH    ABO A      Rh Type POS          All Lab Results:  Results for orders placed or performed during the hospital encounter of 11/04/24   Type & Screen    Collection Time: 11/04/24  9:05 AM   Result Value Ref Range    Group & Rh CANCELED     Indirect [place] NEG     Specimen " Outdate 11/07/2024 23:59    Group & Rh    Collection Time: 11/04/24  9:05 AM   Result Value Ref Range    ABO A     Rh Type POS    CBC W/ AUTO DIFFERENTIAL    Collection Time: 11/04/24  9:20 AM   Result Value Ref Range    WBC 10.13 3.90 - 12.70 K/uL    RBC 3.64 (L) 4.60 - 6.20 M/uL    Hemoglobin 11.0 (L) 14.0 - 18.0 g/dL    Hematocrit 31.2 (L) 40.0 - 54.0 %    MCV 86 82 - 98 fL    MCH 30.2 27.0 - 31.0 pg    MCHC 35.3 32.0 - 36.0 g/dL    RDW 20.1 (H) 11.5 - 14.5 %    Platelets 289 150 - 450 K/uL    MPV 9.7 9.2 - 12.9 fL    Immature Granulocytes 0.6 (H) 0.0 - 0.5 %    Gran # (ANC) 8.1 (H) 1.8 - 7.7 K/uL    Immature Grans (Abs) 0.06 (H) 0.00 - 0.04 K/uL    Lymph # 1.0 1.0 - 4.8 K/uL    Mono # 0.9 0.3 - 1.0 K/uL    Eos # 0.0 0.0 - 0.5 K/uL    Baso # 0.02 0.00 - 0.20 K/uL    nRBC 0 0 /100 WBC    Gran % 79.9 (H) 38.0 - 73.0 %    Lymph % 10.2 (L) 18.0 - 48.0 %    Mono % 8.9 4.0 - 15.0 %    Eosinophil % 0.2 0.0 - 8.0 %    Basophil % 0.2 0.0 - 1.9 %    Differential Method Automated    Comp. Metabolic Panel    Collection Time: 11/04/24  9:20 AM   Result Value Ref Range    Sodium 139 136 - 145 mmol/L    Potassium 3.8 3.5 - 5.1 mmol/L    Chloride 102 95 - 110 mmol/L    CO2 28 23 - 29 mmol/L    Glucose 99 70 - 110 mg/dL    BUN 29 (H) 8 - 23 mg/dL    Creatinine 1.2 0.5 - 1.4 mg/dL    Calcium 8.9 8.7 - 10.5 mg/dL    Total Protein 8.1 6.0 - 8.4 g/dL    Albumin 2.2 (L) 3.5 - 5.2 g/dL    Total Bilirubin 2.1 (H) 0.1 - 1.0 mg/dL    Alkaline Phosphatase 244 (H) 40 - 150 U/L    AST 37 10 - 40 U/L    ALT 39 10 - 44 U/L    eGFR >60 >60 mL/min/1.73 m^2    Anion Gap 9 8 - 16 mmol/L   Lipase    Collection Time: 11/04/24  9:20 AM   Result Value Ref Range    Lipase 29 4 - 60 U/L   Protime-INR    Collection Time: 11/04/24  9:20 AM   Result Value Ref Range    Prothrombin Time 17.6 (H) 9.0 - 12.5 sec    INR 1.6 (H) 0.8 - 1.2   APTT    Collection Time: 11/04/24  9:20 AM   Result Value Ref Range    aPTT 41.4 (H) 21.0 - 32.0 sec   Occult blood x 1,  stool    Collection Time: 11/04/24 10:57 AM    Specimen: Stool   Result Value Ref Range    Occult Blood Negative Negative         Imaging Results:  Imaging Results              X-Ray Abdomen Flat And Erect (Final result)  Result time 11/04/24 11:39:53      Final result by Abbe Silverman MD (11/04/24 11:39:53)                   Impression:      No acute abnormality identified.  Borderline findings for constipation.      Electronically signed by: Abbe Silverman  Date:    11/04/2024  Time:    11:39               Narrative:    EXAMINATION:  XR ABDOMEN FLAT AND ERECT    CLINICAL HISTORY:  Constipation, unspecified    TECHNIQUE:  Flat and erect AP views of the abdomen were performed.    COMPARISON:  None    FINDINGS:  No air-fluid levels on upright radiograph.  No dilated loops of small bowel on supine radiograph.    Borderline findings for constipation.    No calculi overlie the renal shadows.    Osseous structures are grossly intact.  Lung bases are clear.                                             The Emergency Provider reviewed the vital signs and test results, which are outlined above.     ED Discussion       12:08 PM: Reassessed pt at this time. Discussed with pt all pertinent ED information and results. Discussed pt dx and plan of tx. Gave pt all f/u and return to the ED instructions. All questions and concerns were addressed at this time. Pt expresses understanding of information and instructions, and is comfortable with plan to discharge. Pt is stable for discharge.    I discussed with patient and/or family/caretaker that evaluation in the ED does not suggest any emergent or life threatening medical conditions requiring immediate intervention beyond what was provided in the ED, and I believe patient is safe for discharge.  Regardless, an unremarkable evaluation in the ED does not preclude the development or presence of a serious of life threatening condition. As such, patient was instructed to return  immediately for any worsening or change in current symptoms.        Medical Decision Making  DDX: 1. Chronic constipation 2. Hemorrhoidal bleeding 3. Obstruction    Rectal exam normal, stool heme negative, xray abdomen shows mild constipation, no obstruction, no impaction, lab work reviewed and unchanged from baseline, h/h stable, no concerns for infection. Discussed with patient and wife that if patient isn't eating then will not produce stool needs to improve nutrition    Amount and/or Complexity of Data Reviewed  External Data Reviewed: notes.  Labs: ordered. Decision-making details documented in ED Course.  Radiology: ordered. Decision-making details documented in ED Course.    Risk  Prescription drug management.  Decision regarding hospitalization.                ED Medication(s):  Medications - No data to display    Discharge Medication List as of 11/4/2024 12:05 PM        START taking these medications    Details   lactulose (CHRONULAC) 20 gram/30 mL Soln Take 30 mLs (20 g total) by mouth 3 (three) times daily. for 7 days, Starting Mon 11/4/2024, Until Mon 11/11/2024, Normal              Follow-up Information       [facility]. Schedule an appointment as soon as possible for a visit in 2 days.    Specialty: Hematology and Oncology  Why: Return to the Emergency Room, If symptoms worsen  Contact information:  [address] [facility] [address] [phone]                               Scribe Attestation:   Scribe #1: I performed the above scribed service and the documentation accurately describes the services I performed. I attest to the accuracy of the note.     Attending:   Physician Attestation Statement for Scribe #1: I, Viri Mancia MD, personally performed the services described in this documentation, as scribed by Juan Gaines, in my presence, and it is both accurate and complete.           Clinical Impression       ICD-10-CM ICD-9-CM   1. Malignant gastric  stromal tumor  C49.A2 151.9   2. Constipation  K59.00 564.00   3. Cancer, metastatic to liver  C78.7 197.7   4. Moderate protein-calorie malnutrition  E44.0 263.0   5. Chronic anemia  D64.9 285.9       Disposition:   Disposition: Discharged  Condition: Stable        Viri Mancia MD  11/07/24 [id number]

## 2024-11-06 ENCOUNTER — TELEPHONE (OUTPATIENT)
Dept: HEMATOLOGY/ONCOLOGY | Facility: CLINIC | Age: 64
End: 2024-11-06

## 2024-11-06 ENCOUNTER — OFFICE VISIT (OUTPATIENT)
Dept: HEMATOLOGY/ONCOLOGY | Facility: CLINIC | Age: 64
End: 2024-11-06
Payer: COMMERCIAL

## 2024-11-06 DIAGNOSIS — C78.7 SECONDARY LIVER CANCER: ICD-10-CM

## 2024-11-06 DIAGNOSIS — C49.A0 MALIGNANT GASTROINTESTINAL STROMAL TUMOR, UNSPECIFIED SITE: Primary | ICD-10-CM

## 2024-11-06 DIAGNOSIS — C78.6 SECONDARY MALIGNANCY OF PARIETAL PERITONEUM: ICD-10-CM

## 2024-11-06 PROCEDURE — 99214 OFFICE O/P EST MOD 30 MIN: CPT | Mod: 95,,, | Performed by: INTERNAL MEDICINE

## 2024-11-06 PROCEDURE — 4010F ACE/ARB THERAPY RXD/TAKEN: CPT | Mod: CPTII,95,, | Performed by: INTERNAL MEDICINE

## 2024-11-06 RX ORDER — BACLOFEN 20 MG/1
20 TABLET ORAL 3 TIMES DAILY
Qty: 90 TABLET | Refills: 11 | Status: SHIPPED | OUTPATIENT
Start: 2024-11-06 | End: 2025-11-06

## 2024-11-06 NOTE — TELEPHONE ENCOUNTER
Spoke to patient: Informed him  that  Dr. Dee wants him to continue  with current medications and use the beclofen for hiccups.  Then he will see him back in two weeks

## 2024-11-06 NOTE — PROGRESS NOTES
Subjective:       Patient ID: Agusto Juarez is a 64 y.o. male.    Chief Complaint: Results and Chemotherapy    HPI:  64-year-old male with hiccups.  Recently diagnosed with progressive disease patient has been seen by Dr. De La Cruz started on Stivarga.  Patient is seen in virtual visit for evaluation    Past Medical History:   Diagnosis Date    BPH (benign prostatic hypertrophy)     Hyperlipidemia     Hypertension     Malignant gastrointestinal stromal tumor 05/05/2021    Villous adenoma of colon 02/07/2024    high grade dysplasia     Family History   Problem Relation Name Age of Onset    Hypertension Other      Lung cancer Father  60 - 69     Social History     Socioeconomic History    Marital status:    Tobacco Use    Smoking status: Some Days     Current packs/day: 0.50     Average packs/day: 0.5 packs/day for 46.5 years (23.3 ttl pk-yrs)     Types: Cigarettes     Start date: 5/1/1978    Smokeless tobacco: Never   Substance and Sexual Activity    Alcohol use: Yes     Comment: occasionallly    Drug use: No    Sexual activity: Yes     Social Drivers of Health     Physical Activity: Unknown (6/6/2024)    Exercise Vital Sign     Days of Exercise per Week: 5 days     Past Surgical History:   Procedure Laterality Date    COLONOSCOPY N/A 9/26/2023    Procedure: COLONOSCOPY;  Surgeon: Luis Choi MD;  Location: [facility];  Service: Endoscopy;  Laterality: N/A;    COLONOSCOPY N/A 2/7/2024    Procedure: COLONOSCOPY;  Surgeon: Rikki Shabazz MD;  Location: [facility] ([address] [place] [address]);  Service: Endoscopy;  Laterality: N/A;  12/18 portal instr- suprep-colonoscopy with EMR with me in the next 4-6 weeks.[facility] only.45 minute case is okay. Harika-tt  1/31/24- precall complete - ER[facility]    COLONOSCOPY N/A 8/20/2024    Procedure: COLONOSCOPY;  Surgeon: Rikki Shabazz MD;  Location: [facility] (2ND FLR);  Service: Endoscopy;  Laterality: N/A;  6/14 portal-suprep- colonoscopy with me in 6 months to follow-up prior EMR. Can be scheduled as a  45min case. [facility] only. shabazz-tt  8/13 SWP PRECALL COMPLETE-RT    ENDOSCOPIC ULTRASOUND OF UPPER GASTROINTESTINAL TRACT N/A 4/30/2021    Procedure: ULTRASOUND, UPPER GI TRACT, ENDOSCOPIC;  Surgeon: Rikki Shabazz MD;  Location: [facility] ENDO;  Service: Endoscopy;  Laterality: N/A;  Rapid COVID prior - last minute addition to schedule - ttr    ESOPHAGOGASTRODUODENOSCOPY N/A 4/30/2021    Procedure: EGD (ESOPHAGOGASTRODUODENOSCOPY);  Surgeon: Rikki Shabazz MD;  Location: [facility] ENDO;  Service: Endoscopy;  Laterality: N/A;  EGD/EUS with biopsy within a week is fine. 45min case with me OK. Any campus. -Dr. Shabazz       Labs:  Lab Results   Component Value Date    WBC 10.13 11/04/2024    HGB 11.0 (L) 11/04/2024    HCT 31.2 (L) 11/04/2024    MCV 86 11/04/2024     11/04/2024     BMP  Lab Results   Component Value Date     11/04/2024    K 3.8 11/04/2024     11/04/2024    CO2 28 11/04/2024    BUN 29 (H) 11/04/2024    CREATININE 1.2 11/04/2024    CALCIUM 8.9 11/04/2024    ANIONGAP 9 11/04/2024    ESTGFRAFRICA >60 05/17/2022    EGFRNONAA >60 05/17/2022     Lab Results   Component Value Date    ALT 39 11/04/2024    AST 37 11/04/2024    ALKPHOS 244 (H) 11/04/2024    BILITOT 2.1 (H) 11/04/2024       Lab Results   Component Value Date    IRON 36 (L) 09/05/2024    TIBC 391 09/05/2024    FERRITIN 99 09/05/2024     Lab Results   Component Value Date    UZUNPHYH16 561 09/05/2024     Lab Results   Component Value Date    FOLATE 13.9 09/05/2024     Lab Results   Component Value Date    TSH 1.218 11/01/2024         Review of Systems   Constitutional:  Negative for activity change, appetite change, chills, diaphoresis, fatigue, fever and unexpected weight change.   HENT:  Negative for congestion, dental problem, drooling, ear discharge, ear pain, facial swelling, hearing loss, mouth sores, nosebleeds, postnasal drip, rhinorrhea, sinus pressure, sneezing, sore throat, tinnitus, trouble swallowing and voice change.    Eyes:   Negative for photophobia, pain, discharge, redness, itching and visual disturbance.   Respiratory:  Negative for apnea, cough, choking, chest tightness, shortness of breath, wheezing and stridor.    Cardiovascular:  Negative for chest pain, palpitations and leg swelling.   Gastrointestinal:  Negative for abdominal distention, abdominal pain, anal bleeding, blood in stool, constipation, diarrhea, nausea, rectal pain and vomiting.        New onset of hiccups   Endocrine: Negative for cold intolerance, heat intolerance, polydipsia, polyphagia and polyuria.   Genitourinary:  Negative for decreased urine volume, difficulty urinating, dysuria, enuresis, flank pain, frequency, genital sores, hematuria, penile discharge, penile pain, penile swelling, scrotal swelling, testicular pain and urgency.   Musculoskeletal:  Negative for arthralgias, back pain, gait problem, joint swelling, myalgias, neck pain and neck stiffness.   Skin:  Negative for color change, pallor, rash and wound.   Allergic/Immunologic: Negative for environmental allergies, food allergies and immunocompromised state.   Neurological:  Negative for dizziness, tremors, seizures, syncope, facial asymmetry, speech difficulty, weakness, light-headedness, numbness and headaches.   Hematological:  Negative for adenopathy. Does not bruise/bleed easily.   Psychiatric/Behavioral:  Negative for agitation, behavioral problems, confusion, decreased concentration, dysphoric mood, hallucinations, self-injury, sleep disturbance and suicidal ideas. The patient is not nervous/anxious and is not hyperactive.        Objective:      Physical Exam  Constitutional:       Appearance: Normal appearance. He is ill-appearing.   Neurological:      Mental Status: He is alert.             Assessment:      1. Malignant gastrointestinal stromal tumor, unspecified site           Med Onc Chart Routing      Follow up with physician . Return to clinic to see me at least in virtual visit in 2  weeks   Follow up with YESSY    Infusion scheduling note    Injection scheduling note    Labs    Imaging    Pharmacy appointment    Other referrals                   Plan:     The patient location is:  Home  The chief complaint leading to consultation is:  Hiccups    Visit type: audiovisual    Face to Face time with patient: 25 minutes of total time spent on the encounter, which includes face to face time and non-face to face time preparing to see the patient (eg, review of tests), Obtaining and/or reviewing separately obtained history, Documenting clinical information in the electronic or other health record, Independently interpreting results (not separately reported) and communicating results to the patient/family/caregiver, or Care coordination (not separately reported).         Each patient to whom he or she provides medical services by telemedicine is:  (1) informed of the relationship between the physician and patient and the respective role of any other health care provider with respect to management of the patient; and (2) notified that he or she may decline to receive medical services by telemedicine and may withdraw from such care at any time.    Notes:   Recent diagnosis of progressive disease started on Stivarga by Dr. De La Cruz.  Patient is complaining of hiccups will proceed with baclofen 20 mg 3 times a day see patient back in 2 weeks for review for response to baclofen treatment as well as need to continue with Stivarga for disease present      Freeman Dee Jr, MD FACP

## 2024-11-07 ENCOUNTER — PATIENT MESSAGE (OUTPATIENT)
Dept: FAMILY MEDICINE | Facility: CLINIC | Age: 64
End: 2024-11-07
Payer: COMMERCIAL

## 2024-11-08 NOTE — TELEPHONE ENCOUNTER
Spoke to pt over the phone, inform him that I faxed the copy to the number on the form and also I left a copy at the front for the pt , per pt request

## 2024-11-11 ENCOUNTER — TELEPHONE (OUTPATIENT)
Dept: FAMILY MEDICINE | Facility: CLINIC | Age: 64
End: 2024-11-11
Payer: COMMERCIAL

## 2024-11-11 NOTE — TELEPHONE ENCOUNTER
Pt informed papers were faxed Friday, he reports they did not receive so I will refax for him and advise.

## 2024-11-11 NOTE — TELEPHONE ENCOUNTER
----- Message from [employer] sent at 11/11/2024 11:50 AM CST -----  Contact: self  .Type: Paper work status check     Who Called: .Agusto Juarez  Would the patient rather a call back or a response via [facility]? Call back  Best Call Back Number: .[phone]  Additional Information: pt is asking for an return call in reference to paperwork he dropped and is needing to check status of so he can return to employer

## 2024-11-11 NOTE — TELEPHONE ENCOUNTER
----- Message from Ирина sent at 11/11/2024  9:44 AM CST -----  Regarding: Patient Callback  Name of Who is Calling:   AGUSTO JUAREZ [[id number]]     What is the request in detail:   Patient Agusto Juarez MRN [id number] would like to speak with some one from from Dr. Villela's office. The patient has an EMLA form that needs to be filled out and faxed to the patients employer and would like to speak with someone to make sure the information is faxed over     Can the clinic reply by [facility]:   No     What Number to Call Back if not in [facility]:  Telephone Information:  Mobile          [phone]

## 2024-11-14 DIAGNOSIS — C49.A0 MALIGNANT GASTROINTESTINAL STROMAL TUMOR, UNSPECIFIED SITE: ICD-10-CM

## 2024-11-14 DIAGNOSIS — C78.6 SECONDARY MALIGNANCY OF PARIETAL PERITONEUM: ICD-10-CM

## 2024-11-14 NOTE — PROGRESS NOTES
PT evaluation complete and appropriate goals established.    Problem: Physical Therapy  Goal: Physical Therapy Goal  Description: Goals to be met by: 2024     Patient will increase functional independence with mobility by performin. Supine to sit with [place]  2. Sit to supine with [place]  3. Sit to stand transfer with [place]  4. Gait  x 200 feet with [place] using LRAD.   5. Ascend/descend 5 stair with left Handrails Minimal Assistance using LRAD.   6. Lower extremity exercise program x15 reps per handout, with independence    Outcome: Progressing     2024     Working HTN Report.    Requested an updated bp reading. States has not checked it recently and would actually need to find his machine and see if batteries are working.    Advised him I would call back in 1-2 days for bp reading.

## 2024-11-15 ENCOUNTER — TELEPHONE (OUTPATIENT)
Dept: HEMATOLOGY/ONCOLOGY | Facility: CLINIC | Age: 64
End: 2024-11-15
Payer: COMMERCIAL

## 2024-11-15 ENCOUNTER — HOSPITAL ENCOUNTER (EMERGENCY)
Facility: HOSPITAL | Age: 64
Discharge: HOME | End: 2024-11-15
Attending: FAMILY MEDICINE
Payer: COMMERCIAL

## 2024-11-15 VITALS
OXYGEN SATURATION: 100 % | DIASTOLIC BLOOD PRESSURE: 86 MMHG | TEMPERATURE: 98 F | WEIGHT: 144 LBS | HEIGHT: 68 IN | BODY MASS INDEX: 21.82 KG/M2 | RESPIRATION RATE: 19 BRPM | SYSTOLIC BLOOD PRESSURE: 152 MMHG | HEART RATE: 90 BPM

## 2024-11-15 DIAGNOSIS — C49.A2 MALIGNANT GASTROINTESTINAL STROMAL TUMOR (GIST) OF STOMACH: Primary | ICD-10-CM

## 2024-11-15 DIAGNOSIS — C78.6 SECONDARY MALIGNANCY OF PARIETAL PERITONEUM: ICD-10-CM

## 2024-11-15 DIAGNOSIS — C49.A0 MALIGNANT GASTROINTESTINAL STROMAL TUMOR, UNSPECIFIED SITE: ICD-10-CM

## 2024-11-15 DIAGNOSIS — R53.1 WEAKNESS: ICD-10-CM

## 2024-11-15 DIAGNOSIS — R63.4 WEIGHT LOSS: ICD-10-CM

## 2024-11-15 DIAGNOSIS — R63.0 ANOREXIA: ICD-10-CM

## 2024-11-15 LAB
ALBUMIN SERPL BCP-MCNC: 1.9 G/DL (ref 3.5–5.2)
ALP SERPL-CCNC: 278 U/L (ref 40–150)
ALT SERPL W/O P-5'-P-CCNC: 46 U/L (ref 10–44)
ANION GAP SERPL CALC-SCNC: 12 MMOL/L (ref 8–16)
AST SERPL-CCNC: 53 U/L (ref 10–40)
BACTERIA #/AREA URNS HPF: ABNORMAL /HPF
BASOPHILS NFR BLD: 0 % (ref 0–1.9)
BILIRUB SERPL-MCNC: 2.5 MG/DL (ref 0.1–1)
BILIRUB UR QL STRIP: NEGATIVE
BNP SERPL-MCNC: 60 PG/ML (ref 0–99)
BUN SERPL-MCNC: 28 MG/DL (ref 8–23)
CALCIUM SERPL-MCNC: 8.6 MG/DL (ref 8.7–10.5)
CHLORIDE SERPL-SCNC: 105 MMOL/L (ref 95–110)
CLARITY UR: ABNORMAL
CO2 SERPL-SCNC: 25 MMOL/L (ref 23–29)
COLOR UR: ABNORMAL
CREAT SERPL-MCNC: 1 MG/DL (ref 0.5–1.4)
DIFFERENTIAL METHOD BLD: ABNORMAL
EOSINOPHIL NFR BLD: 0 % (ref 0–8)
ERYTHROCYTE [DISTWIDTH] IN BLOOD BY AUTOMATED COUNT: 21 % (ref 11.5–14.5)
EST. GFR  (NO RACE VARIABLE): >60 ML/MIN/1.73 M^2
GLUCOSE SERPL-MCNC: 107 MG/DL (ref 70–110)
GLUCOSE UR QL STRIP: NEGATIVE
HCT VFR BLD AUTO: 31.8 % (ref 40–54)
HGB BLD-MCNC: 10.8 G/DL (ref 14–18)
HGB UR QL STRIP: NEGATIVE
HYALINE CASTS #/AREA URNS LPF: 0 /LPF
IMM GRANULOCYTES # BLD AUTO: ABNORMAL K/UL (ref 0–0.04)
IMM GRANULOCYTES NFR BLD AUTO: ABNORMAL % (ref 0–0.5)
KETONES UR QL STRIP: NEGATIVE
LEUKOCYTE ESTERASE UR QL STRIP: NEGATIVE
LYMPHOCYTES NFR BLD: 18 % (ref 18–48)
MCH RBC QN AUTO: 29.4 PG (ref 27–31)
MCHC RBC AUTO-ENTMCNC: 34 G/DL (ref 32–36)
MCV RBC AUTO: 87 FL (ref 82–98)
METAMYELOCYTES NFR BLD MANUAL: 1 %
MICROSCOPIC COMMENT: ABNORMAL
MONOCYTES NFR BLD: 13 % (ref 4–15)
NEUTROPHILS NFR BLD: 67 % (ref 38–73)
NEUTS BAND NFR BLD MANUAL: 1 %
NITRITE UR QL STRIP: NEGATIVE
NRBC BLD-RTO: 0 /100 WBC
OHS QRS DURATION: 82 MS
OHS QTC CALCULATION: 445 MS
PH UR STRIP: 6 [PH] (ref 5–8)
PLATELET # BLD AUTO: 269 K/UL (ref 150–450)
PLATELET BLD QL SMEAR: ABNORMAL
PMV BLD AUTO: 10.2 FL (ref 9.2–12.9)
POTASSIUM SERPL-SCNC: 4 MMOL/L (ref 3.5–5.1)
PROT SERPL-MCNC: 8.4 G/DL (ref 6–8.4)
PROT UR QL STRIP: ABNORMAL
RBC # BLD AUTO: 3.67 M/UL (ref 4.6–6.2)
RBC #/AREA URNS HPF: 0 /HPF (ref 0–4)
SODIUM SERPL-SCNC: 142 MMOL/L (ref 136–145)
SP GR UR STRIP: 1.02 (ref 1–1.03)
TROPONIN I SERPL DL<=0.01 NG/ML-MCNC: <0.006 NG/ML (ref 0–0.03)
URN SPEC COLLECT METH UR: ABNORMAL
UROBILINOGEN UR STRIP-ACNC: >=8 EU/DL
WBC # BLD AUTO: 7.68 K/UL (ref 3.9–12.7)
WBC #/AREA URNS HPF: 0 /HPF (ref 0–5)

## 2024-11-15 PROCEDURE — 93005 ELECTROCARDIOGRAM TRACING: CPT

## 2024-11-15 PROCEDURE — 85027 COMPLETE CBC AUTOMATED: CPT | Performed by: FAMILY MEDICINE

## 2024-11-15 PROCEDURE — 96361 HYDRATE IV INFUSION ADD-ON: CPT

## 2024-11-15 PROCEDURE — 63600175 PHARM REV CODE 636 W HCPCS: Performed by: FAMILY MEDICINE

## 2024-11-15 PROCEDURE — 96374 THER/PROPH/DIAG INJ IV PUSH: CPT

## 2024-11-15 PROCEDURE — 85007 BL SMEAR W/DIFF WBC COUNT: CPT | Performed by: FAMILY MEDICINE

## 2024-11-15 PROCEDURE — 83880 ASSAY OF NATRIURETIC PEPTIDE: CPT | Performed by: FAMILY MEDICINE

## 2024-11-15 PROCEDURE — 93010 ELECTROCARDIOGRAM REPORT: CPT | Mod: ,,, | Performed by: INTERNAL MEDICINE

## 2024-11-15 PROCEDURE — 80053 COMPREHEN METABOLIC PANEL: CPT | Performed by: FAMILY MEDICINE

## 2024-11-15 PROCEDURE — 84484 ASSAY OF TROPONIN QUANT: CPT | Performed by: FAMILY MEDICINE

## 2024-11-15 PROCEDURE — 81000 URINALYSIS NONAUTO W/SCOPE: CPT | Performed by: FAMILY MEDICINE

## 2024-11-15 PROCEDURE — 25000003 PHARM REV CODE 250: Performed by: FAMILY MEDICINE

## 2024-11-15 PROCEDURE — 99285 EMERGENCY DEPT VISIT HI MDM: CPT | Mod: 25

## 2024-11-15 RX ORDER — HYDROMORPHONE HYDROCHLORIDE 1 MG/ML
1 INJECTION, SOLUTION INTRAMUSCULAR; INTRAVENOUS; SUBCUTANEOUS
Status: COMPLETED | OUTPATIENT
Start: 2024-11-15 | End: 2024-11-15

## 2024-11-15 RX ORDER — OXYCODONE HYDROCHLORIDE 10 MG/1
10 TABLET ORAL EVERY 4 HOURS PRN
Qty: 30 TABLET | Refills: 0 | Status: SHIPPED | OUTPATIENT
Start: 2024-11-15

## 2024-11-15 RX ORDER — MEGESTROL ACETATE 40 MG/ML
200 SUSPENSION ORAL DAILY
Qty: 150 ML | Refills: 11 | Status: SHIPPED | OUTPATIENT
Start: 2024-11-15 | End: 2025-11-15

## 2024-11-15 RX ADMIN — HYDROMORPHONE HYDROCHLORIDE 1 MG: 1 INJECTION, SOLUTION INTRAMUSCULAR; INTRAVENOUS; SUBCUTANEOUS at 02:11

## 2024-11-15 RX ADMIN — SODIUM CHLORIDE 1000 ML: 9 INJECTION, SOLUTION INTRAVENOUS at 02:11

## 2024-11-15 NOTE — TELEPHONE ENCOUNTER
Pt's daughter was contacted by Dr. Dee' nurse, Josefa, and was advised to take pt to ED. Pt is scheduled to see Dr. Dee on 11/20

## 2024-11-15 NOTE — ED PROVIDER NOTES
SCRIBE #1 NOTE: I, Sonja Wang, am scribing for, and in the presence of, Ava Doss MD. I have scribed the entire note.       History     Chief Complaint   Patient presents with    Fatigue     Per [facility] pt was picked up at residence w/ c/o weakness and decreased appetite since yesterday. Pt has hx of metastatic cancer and is currently being treated w/ chemo.      Review of patient's allergies indicates:  No Known Allergies      History of Present Illness     HPI    11/15/2024, 1:59 PM  History obtained from the patient and wife.      History of Present Illness: Agusto Juarez is a 64 y.o. male patient with a PMHx of hypertension, hyperlipidemia, benign prostatic hyperplasia, and malignant gastrointestinal stromal tumor who presents to the Emergency Department for evaluation of fatigue that has worsened since yesterday. Pt is currently receiving chemotherapy. Wife states the pt's urine is very concentrated and he has little to no appetite. Pt denies any recent falls. Wife reports the pt hasn't had a bowel movement recently due to the appetite decrease. Symptoms are constant and moderate in severity. No mitigating or exacerbating factors reported. Associated sxs include left sided abdominal pain, left chest wall pain, and weakness. Patient denies any fever, diarrhea, constipation, nausea, vomiting, and all other sxs at this time. No prior tx reported. No further complaints or concerns at this time.       Arrival mode: Ambulance Service      PCP: Joe Bentley MD        Past Medical History:  Past Medical History:   Diagnosis Date    BPH (benign prostatic hypertrophy)     Hyperlipidemia     Hypertension     Malignant gastrointestinal stromal tumor 05/05/2021    Villous adenoma of colon 02/07/2024    high grade dysplasia       Past Surgical History:  Past Surgical History:   Procedure Laterality Date    COLONOSCOPY N/A 9/26/2023    Procedure: COLONOSCOPY;  Surgeon: Luis Choi MD;  Location: [facility]  ENDO;  Service: Endoscopy;  Laterality: N/A;    COLONOSCOPY N/A 2/7/2024    Procedure: COLONOSCOPY;  Surgeon: Rikki Shabazz MD;  Location: [facility] (2ND FLR);  Service: Endoscopy;  Laterality: N/A;  12/18 portal instr- suprep-colonoscopy with EMR with me in the next 4-6 weeks.[facility] only.45 minute case is okay. [id number]-[id number]  1/31/24- precall complete - ER[facility]    COLONOSCOPY N/A 8/20/2024    Procedure: COLONOSCOPY;  Surgeon: Rikki Shabazz MD;  Location: [facility] (2ND FLR);  Service: Endoscopy;  Laterality: N/A;  6/14 portal-suprep- colonoscopy with me in 6 months to follow-up prior EMR. Can be scheduled as a 45min case. [facility] only. [id number]  8/13 SWP PRECALL COMPLETE-RT    ENDOSCOPIC ULTRASOUND OF UPPER GASTROINTESTINAL TRACT N/A 4/30/2021    Procedure: ULTRASOUND, UPPER GI TRACT, ENDOSCOPIC;  Surgeon: Rikki Shabazz MD;  Location: [facility];  Service: Endoscopy;  Laterality: N/A;  Rapid COVID prior - last minute addition to schedule - ttr    ESOPHAGOGASTRODUODENOSCOPY N/A 4/30/2021    Procedure: EGD (ESOPHAGOGASTRODUODENOSCOPY);  Surgeon: Rikki Shabazz MD;  Location: [facility];  Service: Endoscopy;  Laterality: N/A;  EGD/EUS with biopsy within a week is fine. 45min case with me OK. Any campus. -Dr. Shabazz         Family History:  Family History   Problem Relation Name Age of Onset    Hypertension Other      Lung cancer Father  60 - 69       Social History:  Social History     Tobacco Use    Smoking status: Some Days     Current packs/day: 0.50     Average packs/day: 0.5 packs/day for 46.6 years (23.3 ttl pk-yrs)     Types: Cigarettes     Start date: 5/1/1978    Smokeless tobacco: Never   Substance and Sexual Activity    Alcohol use: Yes     Comment: occasionallly    Drug use: No    Sexual activity: Yes        Review of Systems     Review of Systems   Constitutional:  Positive for appetite change (decreased) and fatigue. Negative for fever.   Gastrointestinal:  Positive for abdominal pain. Negative for constipation, diarrhea,  "nausea and vomiting.   Musculoskeletal:  Positive for myalgias (left chest wall).   Neurological:  Positive for weakness.   All other systems reviewed and are negative.       Physical Exam     Initial Vitals [11/15/24 1133]   BP Pulse Resp Temp SpO2   (!) 152/96 90 18 97.8 °F (36.6 °C) 95 %      MAP       --          Physical Exam  Nursing Notes and Vital Signs Reviewed.  Constitutional: Patient is in no acute distress. Frail and ill-appearing.  Head: Atraumatic. Normocephalic.  Eyes: PERRL. EOM intact. Conjunctivae are not pale. No scleral icterus.  ENT: Mucous membranes are moist. Oropharynx is clear and symmetric.    Neck: Full ROM. No lymphadenopathy.  Cardiovascular: Regular rate. Regular rhythm. No murmurs, rubs, or gallops. Distal pulses are 2+ and symmetric.  Pulmonary/Chest: No respiratory distress. Clear to auscultation bilaterally. No wheezing or rales.  Abdominal: Left upper and lower sided abdominal pain. Mass to left lower abdomen.  Genitourinary: No CVA tenderness  Musculoskeletal: No obvious deformities. No edema.  Skin: Warm and dry.  Neurological:  Alert, awake, and appropriate.  Normal speech.  No acute focal neurological deficits are appreciated.  Psychiatric: Normal affect. Good eye contact. Appropriate in content.     ED Course   Procedures  ED Vital Signs:  Vitals:    11/15/24 1133 11/15/24 1259 11/15/24 1333 11/15/24 1413   BP: (!) 152/96  (!) 151/92    Pulse: 90  88    Resp: 18  17 18   Temp: 97.8 °F (36.6 °C)      TempSrc:       SpO2: 95%  95%    Weight: 65.3 kg (143 lb 15.4 oz) 65.3 kg (144 lb)     Height: 5' 7.99" (1.727 m)       11/15/24 1433 11/15/24 1503 11/15/24 1530 11/15/24 1602   BP: (!) 164/97 (!) 142/88 (!) 142/89 (!) 152/86   Pulse: 91 90 90 90   Resp: 19 13 13 19   Temp: 98 °F (36.7 °C)   98.2 °F (36.8 °C)   TempSrc: Oral   Oral   SpO2: 95% 95% 95% 100%   Weight:       Height:           Abnormal Lab Results:  Labs Reviewed   CBC W/ AUTO DIFFERENTIAL - Abnormal       Result " Value    WBC 7.68      RBC 3.67 (*)     Hemoglobin 10.8 (*)     Hematocrit 31.8 (*)     MCV 87      MCH 29.4      MCHC 34.0      RDW 21.0 (*)     Platelets 269      MPV 10.2      Immature Granulocytes CANCELED      Immature Grans (Abs) CANCELED      nRBC 0      Gran % 67.0      Lymph % 18.0      Mono % 13.0      Eosinophil % 0.0      Basophil % 0.0      Bands 1.0      Metamyelocytes 1.0      Platelet Estimate Appears normal      Differential Method Manual     COMPREHENSIVE METABOLIC PANEL - Abnormal    Sodium 142      Potassium 4.0      Chloride 105      CO2 25      Glucose 107      BUN 28 (*)     Creatinine 1.0      Calcium 8.6 (*)     Total Protein 8.4      Albumin 1.9 (*)     Total Bilirubin 2.5 (*)     Alkaline Phosphatase 278 (*)     AST 53 (*)     ALT 46 (*)     eGFR >60      Anion Gap 12     URINALYSIS - Abnormal    Specimen UA Urine, Clean Catch      Color, UA Orange (*)     Appearance, UA Cloudy (*)     pH, UA 6.0      Specific Gravity, UA 1.020      Protein, UA 1+ (*)     Glucose, UA Negative      Ketones, UA Negative      Bilirubin (UA) Negative      Occult Blood UA Negative      Nitrite, UA Negative      Urobilinogen, UA >=8.0 (*)     Leukocytes, UA Negative     URINALYSIS MICROSCOPIC - Abnormal    RBC, UA 0      WBC, UA 0      Bacteria Many (*)     Hyaline Casts, UA 0      Microscopic Comment SEE COMMENT     B-TYPE NATRIURETIC PEPTIDE    BNP 60     TROPONIN I    Troponin I <0.006          All Lab Results:  Results for orders placed or performed during the hospital encounter of 11/15/24   EKG 12-lead    Collection Time: 11/15/24 12:55 PM   Result Value Ref Range    QRS Duration 82 ms    [facility] QTC Calculation 445 ms   CBC auto differential    Collection Time: 11/15/24 12:57 PM   Result Value Ref Range    WBC 7.68 3.90 - 12.70 K/uL    RBC 3.67 (L) 4.60 - 6.20 M/uL    Hemoglobin 10.8 (L) 14.0 - 18.0 g/dL    Hematocrit 31.8 (L) 40.0 - 54.0 %    MCV 87 82 - 98 fL    MCH 29.4 27.0 - 31.0 pg    MCHC 34.0 32.0 -  36.0 g/dL    RDW 21.0 (H) 11.5 - 14.5 %    Platelets 269 150 - 450 K/uL    MPV 10.2 9.2 - 12.9 fL    Immature Granulocytes CANCELED 0.0 - 0.5 %    Immature Grans (Abs) CANCELED 0.00 - 0.04 K/uL    nRBC 0 0 /100 WBC    Gran % 67.0 38.0 - 73.0 %    Lymph % 18.0 18.0 - 48.0 %    Mono % 13.0 4.0 - 15.0 %    Eosinophil % 0.0 0.0 - 8.0 %    Basophil % 0.0 0.0 - 1.9 %    Bands 1.0 %    Metamyelocytes 1.0 %    Platelet Estimate Appears normal     Differential Method Manual    Comprehensive metabolic panel    Collection Time: 11/15/24 12:57 PM   Result Value Ref Range    Sodium 142 136 - 145 mmol/L    Potassium 4.0 3.5 - 5.1 mmol/L    Chloride 105 95 - 110 mmol/L    CO2 25 23 - 29 mmol/L    Glucose 107 70 - 110 mg/dL    BUN 28 (H) 8 - 23 mg/dL    Creatinine 1.0 0.5 - 1.4 mg/dL    Calcium 8.6 (L) 8.7 - 10.5 mg/dL    Total Protein 8.4 6.0 - 8.4 g/dL    Albumin 1.9 (L) 3.5 - 5.2 g/dL    Total Bilirubin 2.5 (H) 0.1 - 1.0 mg/dL    Alkaline Phosphatase 278 (H) 40 - 150 U/L    AST 53 (H) 10 - 40 U/L    ALT 46 (H) 10 - 44 U/L    eGFR >60 >60 mL/min/1.73 m^2    Anion Gap 12 8 - 16 mmol/L   B-Type natriuretic peptide (BNP)    Collection Time: 11/15/24 12:57 PM   Result Value Ref Range    BNP 60 0 - 99 pg/mL   Troponin I    Collection Time: 11/15/24 12:57 PM   Result Value Ref Range    Troponin I <0.006 0.000 - 0.026 ng/mL   Urinalysis - Clean Catch    Collection Time: 11/15/24  2:12 PM   Result Value Ref Range    Specimen UA Urine, Clean Catch     Color, UA Orange (A) Yellow, Straw, Jaelyn    Appearance, UA Cloudy (A) Clear    pH, UA 6.0 5.0 - 8.0    Specific Gravity, UA 1.020 1.005 - 1.030    Protein, UA 1+ (A) Negative    Glucose, UA Negative Negative    Ketones, UA Negative Negative    Bilirubin (UA) Negative Negative    Occult Blood UA Negative Negative    Nitrite, UA Negative Negative    Urobilinogen, UA >=8.0 (A) <2.0 EU/dL    Leukocytes, UA Negative Negative   Urinalysis Microscopic    Collection Time: 11/15/24  2:12 PM   Result  Value Ref Range    RBC, UA 0 0 - 4 /hpf    WBC, UA 0 0 - 5 /hpf    Bacteria Many (A) None-Occ /hpf    Hyaline Casts, UA 0 0-1/lpf /lpf    Microscopic Comment SEE COMMENT      *Note: Due to a large number of results and/or encounters for the requested time period, some results have not been displayed. A complete set of results can be found in Results Review.         Imaging Results:  Imaging Results              X-Ray Chest AP Portable (Final result)  Result time 11/15/24 13:39:20      Final result by Riccardo Han MD (11/15/24 13:39:20)                   Impression:      No acute process seen.      Electronically signed by: Riccardo Han MD  Date:    11/15/2024  Time:    13:39               Narrative:    EXAMINATION:  XR CHEST AP PORTABLE    CLINICAL HISTORY:  Chest Pain;    FINDINGS:  Single view of the chest.  Comparison 10/31/2024    Cardiac silhouette is normal.  The lungs demonstrate no evidence of active disease.  No evidence of pleural effusion or pneumothorax.  Bones appear intact.                                       The EKG was ordered, reviewed, and independently interpreted by the ED provider.  Interpretation time: 12:55  Rate: 91 BPM  Rhythm: normal sinus rhythm  Interpretation: No acute ST changes. No STEMI.           The Emergency Provider reviewed the vital signs and test results, which are outlined above.     ED Discussion       3:48 PM: Reassessed pt at this time. Discussed with pt all pertinent ED information and results. Discussed pt dx and plan of tx. Gave pt all f/u and return to the ED instructions. All questions and concerns were addressed at this time. Pt expresses understanding of information and instructions, and is comfortable with plan to discharge. Pt is stable for discharge.    I discussed with patient and/or family/caretaker that evaluation in the ED does not suggest any emergent or life threatening medical conditions requiring immediate intervention beyond what was provided in  the ED, and I believe patient is safe for discharge.  Regardless, an unremarkable evaluation in the ED does not preclude the development or presence of a serious of life threatening condition. As such, patient was instructed to return immediately for any worsening or change in current symptoms.         Medical Decision Making  65 yo male with gastrointestinal stromal tumor of stomach c/o weakness, poor po intake and dry mucosal membranes.  Wife says he is out of his percocet, and is unable to take more than a spoonful of food, because he is just not hungry.  He continues to have left sided abdominal pain at the site of his previously diagnosed tumor. On exam his abdomen is soft and nondistended.  Tenderness in left upper and lower quadrant with firm palpable mass. Denies fever.  Decreased BM due to lack of po intake. Wife is requesting medication for appetite stimulation. Patient given rx for percocet and megace and instructed to follow up with hemonc and PCP.    Amount and/or Complexity of Data Reviewed  Independent Historian: spouse  External Data Reviewed: labs, radiology, ECG and notes.  Labs: ordered. Decision-making details documented in ED Course.  Radiology: ordered and independent interpretation performed. Decision-making details documented in ED Course.  ECG/medicine tests: ordered and independent interpretation performed. Decision-making details documented in ED Course.    Risk  OTC drugs.  Prescription drug management.  Parenteral controlled substances.  Decision regarding hospitalization.    Critical Care  Total time providing critical care: 35 minutes                ED Medication(s):  Medications   sodium chloride 0.9% bolus 1,000 mL 1,000 mL (0 mLs Intravenous Stopped 11/15/24 1513)   HYDROmorphone injection 1 mg (1 mg Intravenous Given 11/15/24 1413)       Discharge Medication List as of 11/15/2024  3:50 PM        START taking these medications    Details   megestroL (MEGACE) 400 mg/10 mL (40 mg/mL)  Susp Take 5 mLs (200 mg total) by mouth once daily., Starting Fri 11/15/2024, Until Sat 11/15/2025, Print      !! oxyCODONE (ROXICODONE) 10 mg Tab immediate release tablet Take 1 tablet (10 mg total) by mouth every 4 (four) hours as needed for Pain., Starting Fri 11/15/2024, Print       !! - Potential duplicate medications found. Please discuss with provider.           Follow-up Information       Schedule an appointment as soon as possible for a visit  with Joe Bentley MD.    Specialty: Family Medicine  Why: As needed  Contact information:  [id number] [address]  [phone]                                 Scribe Attestation:   Scribe #1: I performed the above scribed service and the documentation accurately describes the services I performed. I attest to the accuracy of the note.     Attending:   Physician Attestation Statement for Scribe #1: I, Ava Doss MD, personally performed the services described in this documentation, as scribed by Sonja Wang, in my presence, and it is both accurate and complete.           Clinical Impression       ICD-10-CM ICD-9-CM   1. Malignant gastrointestinal stromal tumor (GIST) of stomach  C49.A2 151.9   2. Weakness  R53.1 780.79   3. Anorexia  R63.0 783.0   4. Weight loss  R63.4 783.21       Disposition:   Disposition: Discharged  Condition: Stable       Ava Doss MD  11/19/24 1044

## 2024-11-15 NOTE — TELEPHONE ENCOUNTER
"----- Message from PAULA Shepard sent at 11/15/2024  9:31 AM CST -----  Regarding: urgent appt  [facility].  Daughter said pt and family would like for him to see either Dr Dee or Dr Mesa today.  He hasn't been eating and they think he's dehydrated.  They also want to follow up on the discussion re tube feeding.   They do not want to go to ER as they worry that the clinic follow up gets "pushed back".  Appreciate any help you can give    - Drea  ----- Message -----  From: Luz Elena Zeng  Sent: 11/15/2024   8:50 AM CST  To: Moshe Hector Staff; Luiz BUENO Staff    Who Called: Pt daughter    What is the request in detail: Requesting call back to discuss scheduling urgent appt today. Please advise    Can the clinic reply by [facility]? No    Best Call Back Number: [phone]    Additional Information:  "

## 2024-11-15 NOTE — TELEPHONE ENCOUNTER
Received call from patient's daughter: Reported patient is weak, not had intake of any kind  X  3 days, and showing signs of dehydration.    Instructed family to take patient to [facility] ED.    Dr. Dee notified.

## 2024-11-20 ENCOUNTER — OFFICE VISIT (OUTPATIENT)
Dept: HEMATOLOGY/ONCOLOGY | Facility: CLINIC | Age: 64
End: 2024-11-20
Payer: COMMERCIAL

## 2024-11-20 DIAGNOSIS — T45.1X5A IMMUNODEFICIENCY DUE TO CHEMOTHERAPY: ICD-10-CM

## 2024-11-20 DIAGNOSIS — D50.0 IRON DEFICIENCY ANEMIA DUE TO CHRONIC BLOOD LOSS: ICD-10-CM

## 2024-11-20 DIAGNOSIS — D84.821 IMMUNODEFICIENCY DUE TO CHEMOTHERAPY: ICD-10-CM

## 2024-11-20 DIAGNOSIS — C49.A2 MALIGNANT GASTROINTESTINAL STROMAL TUMOR (GIST) OF STOMACH: Primary | ICD-10-CM

## 2024-11-20 DIAGNOSIS — I10 PRIMARY HYPERTENSION: ICD-10-CM

## 2024-11-20 DIAGNOSIS — C78.6 SECONDARY MALIGNANCY OF PARIETAL PERITONEUM: ICD-10-CM

## 2024-11-20 DIAGNOSIS — Z79.899 IMMUNODEFICIENCY DUE TO CHEMOTHERAPY: ICD-10-CM

## 2024-11-20 DIAGNOSIS — C78.7 SECONDARY LIVER CANCER: ICD-10-CM

## 2024-11-20 DIAGNOSIS — E78.2 MIXED HYPERLIPIDEMIA: ICD-10-CM

## 2024-11-20 PROBLEM — D64.9 ANEMIA: Status: RESOLVED | Noted: 2024-08-23 | Resolved: 2024-11-20

## 2024-11-20 PROCEDURE — 1160F RVW MEDS BY RX/DR IN RCRD: CPT | Mod: CPTII,95,, | Performed by: INTERNAL MEDICINE

## 2024-11-20 PROCEDURE — 1159F MED LIST DOCD IN RCRD: CPT | Mod: CPTII,95,, | Performed by: INTERNAL MEDICINE

## 2024-11-20 PROCEDURE — 99214 OFFICE O/P EST MOD 30 MIN: CPT | Mod: 95,,, | Performed by: INTERNAL MEDICINE

## 2024-11-20 PROCEDURE — 4010F ACE/ARB THERAPY RXD/TAKEN: CPT | Mod: CPTII,95,, | Performed by: INTERNAL MEDICINE

## 2024-11-20 RX ORDER — HYDROCODONE BITARTRATE AND ACETAMINOPHEN 10; 325 MG/1; MG/1
1 TABLET ORAL EVERY 4 HOURS PRN
Qty: 60 TABLET | Refills: 0 | Status: SHIPPED | OUTPATIENT
Start: 2024-11-20

## 2024-11-20 NOTE — PROGRESS NOTES
Subjective:       Patient ID: Agusto Juarez is a 64 y.o. male.    Chief Complaint: Results, Chemotherapy, and Cancer    HPI:  64-year-old male history of relapsed gastrointestinal stromal tumor.  Patient is currently on Stivarga.  Patient also was started on baclofen for hiccups patient has improved continues with current treatment.  Patient understands in his concerned over pain control.  And difficulty in obtaining narcotics in local pharmacy    Past Medical History:   Diagnosis Date    BPH (benign prostatic hypertrophy)     Hyperlipidemia     Hypertension     Malignant gastrointestinal stromal tumor 05/05/2021    Villous adenoma of colon 02/07/2024    high grade dysplasia     Family History   Problem Relation Name Age of Onset    Hypertension Other      Lung cancer Father  60 - 69     Social History     Socioeconomic History    Marital status:    Tobacco Use    Smoking status: Some Days     Current packs/day: 0.50     Average packs/day: 0.5 packs/day for 46.6 years (23.3 ttl pk-yrs)     Types: Cigarettes     Start date: 5/1/1978    Smokeless tobacco: Never   Substance and Sexual Activity    Alcohol use: Yes     Comment: occasionallly    Drug use: No    Sexual activity: Yes     Social Drivers of Health     Physical Activity: Unknown (6/6/2024)    Exercise Vital Sign     Days of Exercise per Week: 5 days     Past Surgical History:   Procedure Laterality Date    COLONOSCOPY N/A 9/26/2023    Procedure: COLONOSCOPY;  Surgeon: Luis Choi MD;  Location: [facility];  Service: Endoscopy;  Laterality: N/A;    COLONOSCOPY N/A 2/7/2024    Procedure: COLONOSCOPY;  Surgeon: Rikki Shabazz MD;  Location: [address]);  Service: Endoscopy;  Laterality: N/A;  12/18 portal instr- suprep-colonoscopy with EMR with me in the next 4-6 weeks.[facility] only.45 minute case is okay. Shabazz-tt  1/31/24- precall complete - ERW    COLONOSCOPY N/A 8/20/2024    Procedure: COLONOSCOPY;  Surgeon: Rikki Shabazz MD;  Location: [facility]  ENDO (2ND FLR);  Service: Endoscopy;  Laterality: N/A;  6/14 portal-suprep- colonoscopy with me in 6 months to follow-up prior EMR. Can be scheduled as a 45min case. [facility] only. twila-tt  8/13 SWP PRECALL COMPLETE-RT    ENDOSCOPIC ULTRASOUND OF UPPER GASTROINTESTINAL TRACT N/A 4/30/2021    Procedure: ULTRASOUND, UPPER GI TRACT, ENDOSCOPIC;  Surgeon: Rikki Shabazz MD;  Location: [facility] ENDO;  Service: Endoscopy;  Laterality: N/A;  Rapid COVID prior - last minute addition to schedule - ttr    ESOPHAGOGASTRODUODENOSCOPY N/A 4/30/2021    Procedure: EGD (ESOPHAGOGASTRODUODENOSCOPY);  Surgeon: Rikki Shabazz MD;  Location: [facility] ENDO;  Service: Endoscopy;  Laterality: N/A;  EGD/EUS with biopsy within a week is fine. 45min case with me OK. Any campus. -Dr. Shabazz       Labs:  Lab Results   Component Value Date    WBC 7.68 11/15/2024    HGB 10.8 (L) 11/15/2024    HCT 31.8 (L) 11/15/2024    MCV 87 11/15/2024     11/15/2024     BMP  Lab Results   Component Value Date     11/15/2024    K 4.0 11/15/2024     11/15/2024    CO2 25 11/15/2024    BUN 28 (H) 11/15/2024    CREATININE 1.0 11/15/2024    CALCIUM 8.6 (L) 11/15/2024    ANIONGAP 12 11/15/2024    ESTGFRAFRICA >60 05/17/2022    EGFRNONAA >60 05/17/2022     Lab Results   Component Value Date    ALT 46 (H) 11/15/2024    AST 53 (H) 11/15/2024    ALKPHOS 278 (H) 11/15/2024    BILITOT 2.5 (H) 11/15/2024       Lab Results   Component Value Date    IRON 36 (L) 09/05/2024    TIBC 391 09/05/2024    FERRITIN 99 09/05/2024     Lab Results   Component Value Date    MJVJOQNW39 561 09/05/2024     Lab Results   Component Value Date    FOLATE 13.9 09/05/2024     Lab Results   Component Value Date    TSH 1.218 11/01/2024         Review of Systems   Constitutional:  Positive for activity change, appetite change, fatigue and unexpected weight change. Negative for chills, diaphoresis and fever.   HENT:  Negative for congestion, dental problem, drooling, ear discharge, ear pain,  facial swelling, hearing loss, mouth sores, nosebleeds, postnasal drip, rhinorrhea, sinus pressure, sneezing, sore throat, tinnitus, trouble swallowing and voice change.    Eyes:  Negative for photophobia, pain, discharge, redness, itching and visual disturbance.   Respiratory:  Negative for apnea, cough, choking, chest tightness, shortness of breath, wheezing and stridor.    Cardiovascular:  Negative for chest pain, palpitations and leg swelling.   Gastrointestinal:  Positive for abdominal pain. Negative for abdominal distention, anal bleeding, blood in stool, constipation, diarrhea, nausea, rectal pain and vomiting.   Endocrine: Negative for cold intolerance, heat intolerance, polydipsia, polyphagia and polyuria.   Genitourinary:  Negative for decreased urine volume, difficulty urinating, dysuria, enuresis, flank pain, frequency, genital sores, hematuria, penile discharge, penile pain, penile swelling, scrotal swelling, testicular pain and urgency.   Musculoskeletal:  Positive for arthralgias, gait problem and myalgias. Negative for back pain, joint swelling, neck pain and neck stiffness.   Skin:  Negative for color change, pallor, rash and wound.   Allergic/Immunologic: Negative for environmental allergies, food allergies and immunocompromised state.   Neurological:  Positive for weakness. Negative for dizziness, tremors, seizures, syncope, facial asymmetry, speech difficulty, light-headedness, numbness and headaches.   Hematological:  Negative for adenopathy. Does not bruise/bleed easily.   Psychiatric/Behavioral:  Positive for dysphoric mood. Negative for agitation, behavioral problems, confusion, decreased concentration, hallucinations, self-injury, sleep disturbance and suicidal ideas. The patient is nervous/anxious. The patient is not hyperactive.        Objective:      Physical Exam  Constitutional:       Appearance: Normal appearance. He is ill-appearing.   Neurological:      Mental Status: He is alert.              Assessment:      1. Malignant gastrointestinal stromal tumor (GIST) of stomach    2. Iron deficiency anemia due to chronic blood loss    3. Immunodeficiency due to chemotherapy    4. Primary hypertension    5. Mixed hyperlipidemia    6. Secondary liver cancer    7. Secondary malignancy of parietal peritoneum           Med Onc Chart Routing      Follow up with physician . Return in 1 month with CBC CMP C-reactive protein serum iron TIBC ferritin LDH along with CT chest abdomen pelvis for response to therapy   Follow up with [facility]    Infusion scheduling note    Injection scheduling note    Labs    Imaging    Pharmacy appointment    Other referrals                   Plan:     The patient location is:  Home  The chief complaint leading to consultation is:  Results    Visit type: audiovisual    Face to Face time with patient: 25 minutes of total time spent on the encounter, which includes face to face time and non-face to face time preparing to see the patient (eg, review of tests), Obtaining and/or reviewing separately obtained history, Documenting clinical information in the electronic or other health record, Independently interpreting results (not separately reported) and communicating results to the patient/family/caregiver, or Care coordination (not separately reported).         Each patient to whom he or she provides medical services by telemedicine is:  (1) informed of the relationship between the physician and patient and the respective role of any other health care provider with respect to management of the patient; and (2) notified that he or she may decline to receive medical services by telemedicine and may withdraw from such care at any time.    Notes:   Reviewed information.  At this time proceed with follow-up discussed implications and answered questions at this time would recommend re-stage in 1 month to see whether not current medication is working properly.  Discussed implications and  answered questions prescription sent to [facility]      Freeman Dee Jr, MD FACP

## 2024-11-24 ENCOUNTER — TELEPHONE (OUTPATIENT)
Dept: PHARMACY | Facility: CLINIC | Age: 64
End: 2024-11-24
Payer: COMMERCIAL

## 2024-11-24 NOTE — TELEPHONE ENCOUNTER
[facility]/Population Health Chart Review & Patient Outreach Details For Medication Adherence Project    Reason for Outreach Encounter: 3rd Party payor non-compliance report ([employer], [employer], C, etc)  2.  Patient Outreach Method: Reviewed Patient Chart  3.   Medication in question: pravastatin   LAST FILLED: 11/6/24 for 90 day supply  Hyperlipidemia Medications               pravastatin (PRAVACHOL) 10 MG tablet Take 1 tablet by mouth once daily               4.  Reviewed and or Updates Made To: Patient Chart  5. Outreach Outcomes and/or actions taken: Patient filled medication and is on track to be adherent

## 2024-11-26 ENCOUNTER — TELEPHONE (OUTPATIENT)
Dept: HEMATOLOGY/ONCOLOGY | Facility: CLINIC | Age: 64
End: 2024-11-26

## 2024-11-26 ENCOUNTER — LAB VISIT (OUTPATIENT)
Dept: LAB | Facility: HOSPITAL | Age: 64
End: 2024-11-26
Attending: INTERNAL MEDICINE
Payer: COMMERCIAL

## 2024-11-26 ENCOUNTER — OFFICE VISIT (OUTPATIENT)
Dept: HEMATOLOGY/ONCOLOGY | Facility: CLINIC | Age: 64
End: 2024-11-26
Payer: COMMERCIAL

## 2024-11-26 ENCOUNTER — HOSPITAL ENCOUNTER (OUTPATIENT)
Facility: HOSPITAL | Age: 64
Discharge: HOSPICE | End: 2024-11-27
Attending: EMERGENCY MEDICINE | Admitting: HOSPITALIST
Payer: COMMERCIAL

## 2024-11-26 DIAGNOSIS — R07.9 CHEST PAIN: ICD-10-CM

## 2024-11-26 DIAGNOSIS — D84.81 IMMUNODEFICIENCY SECONDARY TO NEOPLASM: ICD-10-CM

## 2024-11-26 DIAGNOSIS — C78.6 SECONDARY MALIGNANCY OF PARIETAL PERITONEUM: ICD-10-CM

## 2024-11-26 DIAGNOSIS — C78.7 SECONDARY LIVER CANCER: ICD-10-CM

## 2024-11-26 DIAGNOSIS — E46 MALNUTRITION, UNSPECIFIED TYPE: Primary | ICD-10-CM

## 2024-11-26 DIAGNOSIS — I10 PRIMARY HYPERTENSION: ICD-10-CM

## 2024-11-26 DIAGNOSIS — E46 MALNUTRITION, UNSPECIFIED TYPE: ICD-10-CM

## 2024-11-26 DIAGNOSIS — R68.81 EARLY SATIETY: ICD-10-CM

## 2024-11-26 DIAGNOSIS — C49.A2 MALIGNANT GASTROINTESTINAL STROMAL TUMOR (GIST) OF STOMACH: Primary | ICD-10-CM

## 2024-11-26 DIAGNOSIS — T45.1X5A IMMUNODEFICIENCY DUE TO CHEMOTHERAPY: ICD-10-CM

## 2024-11-26 DIAGNOSIS — Z79.899 IMMUNODEFICIENCY DUE TO CHEMOTHERAPY: ICD-10-CM

## 2024-11-26 DIAGNOSIS — D84.821 IMMUNODEFICIENCY DUE TO CHEMOTHERAPY: ICD-10-CM

## 2024-11-26 DIAGNOSIS — D49.9 IMMUNODEFICIENCY SECONDARY TO NEOPLASM: ICD-10-CM

## 2024-11-26 DIAGNOSIS — C49.A0 MALIGNANT GASTROINTESTINAL STROMAL TUMOR, UNSPECIFIED SITE: ICD-10-CM

## 2024-11-26 PROBLEM — E86.0 DEHYDRATION: Status: ACTIVE | Noted: 2024-11-26

## 2024-11-26 LAB
ALBUMIN SERPL BCP-MCNC: 1.9 G/DL (ref 3.5–5.2)
ALBUMIN SERPL BCP-MCNC: 1.9 G/DL (ref 3.5–5.2)
ALP SERPL-CCNC: 254 U/L (ref 40–150)
ALP SERPL-CCNC: 264 U/L (ref 40–150)
ALT SERPL W/O P-5'-P-CCNC: 25 U/L (ref 10–44)
ALT SERPL W/O P-5'-P-CCNC: 27 U/L (ref 10–44)
ANION GAP SERPL CALC-SCNC: 14 MMOL/L (ref 8–16)
ANION GAP SERPL CALC-SCNC: 15 MMOL/L (ref 8–16)
AST SERPL-CCNC: 31 U/L (ref 10–40)
AST SERPL-CCNC: 50 U/L (ref 10–40)
BASOPHILS # BLD AUTO: 0.03 K/UL (ref 0–0.2)
BASOPHILS # BLD AUTO: 0.04 K/UL (ref 0–0.2)
BASOPHILS NFR BLD: 0.4 % (ref 0–1.9)
BASOPHILS NFR BLD: 0.5 % (ref 0–1.9)
BILIRUB SERPL-MCNC: 2.5 MG/DL (ref 0.1–1)
BILIRUB SERPL-MCNC: 2.5 MG/DL (ref 0.1–1)
BUN SERPL-MCNC: 36 MG/DL (ref 8–23)
BUN SERPL-MCNC: 40 MG/DL (ref 8–23)
CALCIUM SERPL-MCNC: 8.5 MG/DL (ref 8.7–10.5)
CALCIUM SERPL-MCNC: 8.7 MG/DL (ref 8.7–10.5)
CHLORIDE SERPL-SCNC: 102 MMOL/L (ref 95–110)
CHLORIDE SERPL-SCNC: 104 MMOL/L (ref 95–110)
CO2 SERPL-SCNC: 22 MMOL/L (ref 23–29)
CO2 SERPL-SCNC: 25 MMOL/L (ref 23–29)
CREAT SERPL-MCNC: 1.2 MG/DL (ref 0.5–1.4)
CREAT SERPL-MCNC: 1.2 MG/DL (ref 0.5–1.4)
DIFFERENTIAL METHOD BLD: ABNORMAL
DIFFERENTIAL METHOD BLD: ABNORMAL
EOSINOPHIL # BLD AUTO: 0.1 K/UL (ref 0–0.5)
EOSINOPHIL # BLD AUTO: 0.2 K/UL (ref 0–0.5)
EOSINOPHIL NFR BLD: 1 % (ref 0–8)
EOSINOPHIL NFR BLD: 2.6 % (ref 0–8)
ERYTHROCYTE [DISTWIDTH] IN BLOOD BY AUTOMATED COUNT: 19.8 % (ref 11.5–14.5)
ERYTHROCYTE [DISTWIDTH] IN BLOOD BY AUTOMATED COUNT: 20.2 % (ref 11.5–14.5)
EST. GFR  (NO RACE VARIABLE): >60 ML/MIN/1.73 M^2
EST. GFR  (NO RACE VARIABLE): >60 ML/MIN/1.73 M^2
GLUCOSE SERPL-MCNC: 105 MG/DL (ref 70–110)
GLUCOSE SERPL-MCNC: 106 MG/DL (ref 70–110)
HCT VFR BLD AUTO: 29.1 % (ref 40–54)
HCT VFR BLD AUTO: 31.7 % (ref 40–54)
HGB BLD-MCNC: 10.1 G/DL (ref 14–18)
HGB BLD-MCNC: 10.7 G/DL (ref 14–18)
IMM GRANULOCYTES # BLD AUTO: 0.03 K/UL (ref 0–0.04)
IMM GRANULOCYTES # BLD AUTO: 0.03 K/UL (ref 0–0.04)
IMM GRANULOCYTES NFR BLD AUTO: 0.4 % (ref 0–0.5)
IMM GRANULOCYTES NFR BLD AUTO: 0.4 % (ref 0–0.5)
LYMPHOCYTES # BLD AUTO: 0.9 K/UL (ref 1–4.8)
LYMPHOCYTES # BLD AUTO: 1 K/UL (ref 1–4.8)
LYMPHOCYTES NFR BLD: 11.7 % (ref 18–48)
LYMPHOCYTES NFR BLD: 13.3 % (ref 18–48)
MAGNESIUM SERPL-MCNC: 2.4 MG/DL (ref 1.6–2.6)
MCH RBC QN AUTO: 28.7 PG (ref 27–31)
MCH RBC QN AUTO: 29.1 PG (ref 27–31)
MCHC RBC AUTO-ENTMCNC: 33.8 G/DL (ref 32–36)
MCHC RBC AUTO-ENTMCNC: 34.7 G/DL (ref 32–36)
MCV RBC AUTO: 84 FL (ref 82–98)
MCV RBC AUTO: 85 FL (ref 82–98)
MONOCYTES # BLD AUTO: 0.8 K/UL (ref 0.3–1)
MONOCYTES # BLD AUTO: 0.9 K/UL (ref 0.3–1)
MONOCYTES NFR BLD: 10.3 % (ref 4–15)
MONOCYTES NFR BLD: 11.8 % (ref 4–15)
NEUTROPHILS # BLD AUTO: 5.3 K/UL (ref 1.8–7.7)
NEUTROPHILS # BLD AUTO: 6.1 K/UL (ref 1.8–7.7)
NEUTROPHILS NFR BLD: 71.5 % (ref 38–73)
NEUTROPHILS NFR BLD: 76.1 % (ref 38–73)
NRBC BLD-RTO: 0 /100 WBC
NRBC BLD-RTO: 1 /100 WBC
PHOSPHATE SERPL-MCNC: 3.4 MG/DL (ref 2.7–4.5)
PLATELET # BLD AUTO: 281 K/UL (ref 150–450)
PLATELET # BLD AUTO: 289 K/UL (ref 150–450)
PMV BLD AUTO: 9.8 FL (ref 9.2–12.9)
PMV BLD AUTO: 9.9 FL (ref 9.2–12.9)
POTASSIUM SERPL-SCNC: 3.9 MMOL/L (ref 3.5–5.1)
POTASSIUM SERPL-SCNC: 4 MMOL/L (ref 3.5–5.1)
PROT SERPL-MCNC: 8.2 G/DL (ref 6–8.4)
PROT SERPL-MCNC: 8.3 G/DL (ref 6–8.4)
RBC # BLD AUTO: 3.47 M/UL (ref 4.6–6.2)
RBC # BLD AUTO: 3.73 M/UL (ref 4.6–6.2)
SODIUM SERPL-SCNC: 141 MMOL/L (ref 136–145)
SODIUM SERPL-SCNC: 141 MMOL/L (ref 136–145)
TSH SERPL DL<=0.005 MIU/L-ACNC: 1.51 UIU/ML (ref 0.4–4)
WBC # BLD AUTO: 7.37 K/UL (ref 3.9–12.7)
WBC # BLD AUTO: 8.03 K/UL (ref 3.9–12.7)

## 2024-11-26 PROCEDURE — 96375 TX/PRO/DX INJ NEW DRUG ADDON: CPT

## 2024-11-26 PROCEDURE — 36415 COLL VENOUS BLD VENIPUNCTURE: CPT | Mod: PO | Performed by: INTERNAL MEDICINE

## 2024-11-26 PROCEDURE — G0378 HOSPITAL OBSERVATION PER HR: HCPCS

## 2024-11-26 PROCEDURE — 80053 COMPREHEN METABOLIC PANEL: CPT | Performed by: EMERGENCY MEDICINE

## 2024-11-26 PROCEDURE — 1160F RVW MEDS BY RX/DR IN RCRD: CPT | Mod: CPTII,95,, | Performed by: INTERNAL MEDICINE

## 2024-11-26 PROCEDURE — 99215 OFFICE O/P EST HI 40 MIN: CPT | Mod: 95,,, | Performed by: INTERNAL MEDICINE

## 2024-11-26 PROCEDURE — 25000003 PHARM REV CODE 250: Performed by: EMERGENCY MEDICINE

## 2024-11-26 PROCEDURE — 4010F ACE/ARB THERAPY RXD/TAKEN: CPT | Mod: CPTII,95,, | Performed by: INTERNAL MEDICINE

## 2024-11-26 PROCEDURE — 1159F MED LIST DOCD IN RCRD: CPT | Mod: CPTII,95,, | Performed by: INTERNAL MEDICINE

## 2024-11-26 PROCEDURE — 85025 COMPLETE CBC W/AUTO DIFF WBC: CPT | Mod: PO | Performed by: INTERNAL MEDICINE

## 2024-11-26 PROCEDURE — 96374 THER/PROPH/DIAG INJ IV PUSH: CPT

## 2024-11-26 PROCEDURE — 96361 HYDRATE IV INFUSION ADD-ON: CPT

## 2024-11-26 PROCEDURE — 83735 ASSAY OF MAGNESIUM: CPT | Performed by: NURSE PRACTITIONER

## 2024-11-26 PROCEDURE — 84100 ASSAY OF PHOSPHORUS: CPT | Performed by: NURSE PRACTITIONER

## 2024-11-26 PROCEDURE — 25500020 PHARM REV CODE 255: Performed by: HOSPITALIST

## 2024-11-26 PROCEDURE — 84443 ASSAY THYROID STIM HORMONE: CPT | Performed by: INTERNAL MEDICINE

## 2024-11-26 PROCEDURE — 63600175 PHARM REV CODE 636 W HCPCS: Performed by: EMERGENCY MEDICINE

## 2024-11-26 PROCEDURE — 80053 COMPREHEN METABOLIC PANEL: CPT | Mod: 91 | Performed by: INTERNAL MEDICINE

## 2024-11-26 PROCEDURE — 99285 EMERGENCY DEPT VISIT HI MDM: CPT | Mod: 25

## 2024-11-26 PROCEDURE — G2211 COMPLEX E/M VISIT ADD ON: HCPCS | Mod: 95,,, | Performed by: INTERNAL MEDICINE

## 2024-11-26 PROCEDURE — 63600175 PHARM REV CODE 636 W HCPCS: Performed by: HOSPITALIST

## 2024-11-26 PROCEDURE — 85025 COMPLETE CBC W/AUTO DIFF WBC: CPT | Mod: 91 | Performed by: EMERGENCY MEDICINE

## 2024-11-26 RX ORDER — GLUCAGON 1 MG
1 KIT INJECTION
Status: DISCONTINUED | OUTPATIENT
Start: 2024-11-26 | End: 2024-11-27 | Stop reason: HOSPADM

## 2024-11-26 RX ORDER — NALOXONE HCL 0.4 MG/ML
0.02 VIAL (ML) INJECTION
Status: DISCONTINUED | OUTPATIENT
Start: 2024-11-26 | End: 2024-11-27 | Stop reason: HOSPADM

## 2024-11-26 RX ORDER — SODIUM CHLORIDE 9 MG/ML
1000 INJECTION, SOLUTION INTRAVENOUS
Status: COMPLETED | OUTPATIENT
Start: 2024-11-26 | End: 2024-11-26

## 2024-11-26 RX ORDER — TALC
6 POWDER (GRAM) TOPICAL NIGHTLY PRN
Status: DISCONTINUED | OUTPATIENT
Start: 2024-11-26 | End: 2024-11-27 | Stop reason: HOSPADM

## 2024-11-26 RX ORDER — ONDANSETRON HYDROCHLORIDE 2 MG/ML
4 INJECTION, SOLUTION INTRAVENOUS EVERY 8 HOURS PRN
Status: DISCONTINUED | OUTPATIENT
Start: 2024-11-26 | End: 2024-11-27 | Stop reason: HOSPADM

## 2024-11-26 RX ORDER — PROMETHAZINE HYDROCHLORIDE 25 MG/1
25 TABLET ORAL EVERY 6 HOURS PRN
Status: DISCONTINUED | OUTPATIENT
Start: 2024-11-26 | End: 2024-11-27 | Stop reason: HOSPADM

## 2024-11-26 RX ORDER — HYDROCODONE BITARTRATE AND ACETAMINOPHEN 5; 325 MG/1; MG/1
1 TABLET ORAL EVERY 6 HOURS PRN
Status: DISCONTINUED | OUTPATIENT
Start: 2024-11-26 | End: 2024-11-27

## 2024-11-26 RX ORDER — SODIUM CHLORIDE, SODIUM LACTATE, POTASSIUM CHLORIDE, CALCIUM CHLORIDE 600; 310; 30; 20 MG/100ML; MG/100ML; MG/100ML; MG/100ML
INJECTION, SOLUTION INTRAVENOUS CONTINUOUS
Status: DISCONTINUED | OUTPATIENT
Start: 2024-11-26 | End: 2024-11-27 | Stop reason: HOSPADM

## 2024-11-26 RX ORDER — ACETAMINOPHEN 650 MG/1
650 SUPPOSITORY RECTAL EVERY 6 HOURS PRN
Status: DISCONTINUED | OUTPATIENT
Start: 2024-11-26 | End: 2024-11-27 | Stop reason: HOSPADM

## 2024-11-26 RX ORDER — MORPHINE SULFATE 4 MG/ML
4 INJECTION, SOLUTION INTRAMUSCULAR; INTRAVENOUS
Status: COMPLETED | OUTPATIENT
Start: 2024-11-26 | End: 2024-11-26

## 2024-11-26 RX ORDER — SODIUM CHLORIDE 0.9 % (FLUSH) 0.9 %
10 SYRINGE (ML) INJECTION EVERY 12 HOURS PRN
Status: DISCONTINUED | OUTPATIENT
Start: 2024-11-26 | End: 2024-11-27 | Stop reason: HOSPADM

## 2024-11-26 RX ORDER — MORPHINE SULFATE 4 MG/ML
2 INJECTION, SOLUTION INTRAMUSCULAR; INTRAVENOUS EVERY 4 HOURS PRN
Status: DISCONTINUED | OUTPATIENT
Start: 2024-11-26 | End: 2024-11-27

## 2024-11-26 RX ORDER — IBUPROFEN 200 MG
24 TABLET ORAL
Status: DISCONTINUED | OUTPATIENT
Start: 2024-11-26 | End: 2024-11-27 | Stop reason: HOSPADM

## 2024-11-26 RX ORDER — IPRATROPIUM BROMIDE AND ALBUTEROL SULFATE 2.5; .5 MG/3ML; MG/3ML
3 SOLUTION RESPIRATORY (INHALATION) EVERY 6 HOURS PRN
Status: DISCONTINUED | OUTPATIENT
Start: 2024-11-26 | End: 2024-11-27 | Stop reason: HOSPADM

## 2024-11-26 RX ORDER — ALUMINUM HYDROXIDE, MAGNESIUM HYDROXIDE, AND SIMETHICONE 1200; 120; 1200 MG/30ML; MG/30ML; MG/30ML
30 SUSPENSION ORAL 4 TIMES DAILY PRN
Status: DISCONTINUED | OUTPATIENT
Start: 2024-11-26 | End: 2024-11-27 | Stop reason: HOSPADM

## 2024-11-26 RX ORDER — ONDANSETRON HYDROCHLORIDE 2 MG/ML
4 INJECTION, SOLUTION INTRAVENOUS
Status: COMPLETED | OUTPATIENT
Start: 2024-11-26 | End: 2024-11-26

## 2024-11-26 RX ORDER — IBUPROFEN 200 MG
16 TABLET ORAL
Status: DISCONTINUED | OUTPATIENT
Start: 2024-11-26 | End: 2024-11-27 | Stop reason: HOSPADM

## 2024-11-26 RX ORDER — ACETAMINOPHEN 325 MG/1
650 TABLET ORAL EVERY 8 HOURS PRN
Status: DISCONTINUED | OUTPATIENT
Start: 2024-11-26 | End: 2024-11-27 | Stop reason: HOSPADM

## 2024-11-26 RX ORDER — AMOXICILLIN 250 MG
1 CAPSULE ORAL 2 TIMES DAILY PRN
Status: DISCONTINUED | OUTPATIENT
Start: 2024-11-26 | End: 2024-11-27 | Stop reason: HOSPADM

## 2024-11-26 RX ADMIN — MORPHINE SULFATE 4 MG: 4 INJECTION INTRAVENOUS at 06:11

## 2024-11-26 RX ADMIN — SODIUM CHLORIDE 1000 ML: 9 INJECTION, SOLUTION INTRAVENOUS at 06:11

## 2024-11-26 RX ADMIN — SODIUM CHLORIDE, POTASSIUM CHLORIDE, SODIUM LACTATE AND CALCIUM CHLORIDE: 600; 310; 30; 20 INJECTION, SOLUTION INTRAVENOUS at 08:11

## 2024-11-26 RX ADMIN — ONDANSETRON 4 MG: 2 INJECTION INTRAMUSCULAR; INTRAVENOUS at 06:11

## 2024-11-26 RX ADMIN — IOHEXOL 75 ML: 350 INJECTION, SOLUTION INTRAVENOUS at 09:11

## 2024-11-26 NOTE — PROGRESS NOTES
"The patient location is: home in [place]  The chief complaint leading to consultation is: follow up for stage IV GIST    Visit type: audiovirtual    Face to Face time with patient: 20 min  40 minutes of total time spent on the encounter, which includes face to face time and non-face to face time preparing to see the patient (eg, review of tests), Obtaining and/or reviewing separately obtained history, Documenting clinical information in the electronic or other health record, Independently interpreting results (not separately reported) and communicating results to the patient/family/caregiver, or Care coordination (not separately reported).         Each patient to whom he or she provides medical services by telemedicine is:  (1) informed of the relationship between the physician and patient and the respective role of any other health care provider with respect to management of the patient; and (2) notified that he or she may decline to receive medical services by telemedicine and may withdraw from such care at any time.    Notes:                                                                PROGRESS NOTE    Subjective:       Patient ID: Agusto Juarez is a 64 y.o. male.    Chief Complaint: follow up for stage IV GIST    Diagnosis:  Metastatic GIST, KIT/PDGFRA/BRAF/SDH wild-type, diagnosed in April 2021  Tempus blood 6/7/24: positive for KIT exon 13 p.V654A mutation, TMB 8.6. 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 CT A/P 4/26/21 showed "Large upper abdominal mass which may arise from the stomach or pancreas " "and may involve the liver, duodenum, spleen, and gallbladder. There are also multiple nodular masses throughout the peritoneum and mesentery consistent with peritoneal carcinomatosis." Biopsy of gastric mass and perigastric LN showed grade 1 GIST. Mixed spindle cell and epithelioid type. Mitotic rate cannot be determined. No necrosis. Presumed low-grade (G1). Given 20 cm with low mitotic count, likely moderate risk. Strata test at that time was negative for any mutation. GIST panel was negative.es.  He started gleevec 400 mg daily at that time. CT in Oct 2021 and April 2022 showed response to gleevec with reduction of tumor size. CT in Jan 2023 showed stable disease. He had CT C/A/P done on 6/5/24. It showed "Interval disease progression in the abdomen pelvis with worsening peritoneal carcinomatosis and worsening hepatic metastatic disease." He presents today for a second opinion. He saw Dr Dee yesterday. Sutent was prescribed. Patient remembers being told in the past he can take two 400 mg gleevec a day when it is not working. He started 400 mg bid yesterday and feels better.   Recc staying on 400 mg bid (since 6/6/24) with close follow up scan.   2. CT C/A/P 8/7/24 showed progression. Sutent 37.5 mg daily started around 8/16/24. Patient went to ED on 10/17/24 for abdominal pain. CT A/P 10/18/24 showed progression of disease. Started regorafenib. Cannot tolerate over 80 mg daily.     Interval History:   Mr Juarez returns for follow up. He just resumed regorafenib 80 mg daily last Sat after one week break. He has been feeling very weak. Not able to eat and drink much for 2 weeks. Lost a lot of weight.    ECOG: 3    ROS:   A ten-point system review is obtained and negative except for what was stated in the Interval History.     Physical Examination:    General: looks cachectic, dehydrated, temporal wasting, looks very weak  HEENT: normocephalic, EOMI, anicteric sclerae  Neuro: alert and oriented x 4  Psych: " pleasant and appropriate mood and affect    Objective:     Laboratory Data:  Labs reviewed. CBC unremarkable    Imaging Data:  CT C/A/P 6/5/24:  1.  Interval disease progression in the abdomen pelvis with worsening peritoneal carcinomatosis and worsening hepatic metastatic disease.     2.  No evidence for thoracic metastatic disease.     CT C/A/P 8/7/24:  Impression:     Interval progression of disease with enlarging left upper quadrant mass and hepatic metastatic lesions, and increased size and number of peritoneal carcinomatosis lesions.     Mild narrowing of the GE junction.  Consider gastrostomy tube placement.     No evidence of intrathoracic metastatic disease.    CT A/P 10/8/24:  FINDINGS:  Lung bases are clear.     There is interval enlargement of a left anterior diaphragmatic lymph node measuring 2.0 x 1.8 cm.     Extensive metastatic disease involving the liver with interval enlargement.  For example, anterior segment right lobe liver lesion now measures 5.7 x 4.8 cm.  Previous measurements 4.8 x 4.2 cm.  Additionally, large mass in the left upper quadrant, now measures 13.6 x 12.9 cm.  Previous measurement 12.7 x 9.9 cm.  It appears to extend from the fundus of the stomach exophytically and involves the spleen.  Previous measurement 12.7 x 9.9 cm.  There is compression on the GE junction which is narrowed.  Additionally, there is extensive other in masses seen throughout the upper abdomen involving the peritoneum and or omentum some of which appear calcified which also appear significantly increased in size.  No ascites.  No evidence of bowel obstruction.  No retroperitoneal adenopathy.  Atherosclerosis of the abdominal aorta with ectasia of the iliac arteries appears stable.  The bladder is unremarkable.  No pelvic adenopathy or mass.  Skeleton is intact.     Impression:     Worsening of primary mass in the left upper outer and metastasis involving the upper abdomen with increasing compression on the  stomach NG junction which likely is causing patient's symptoms of early postprandial satiety.       Assessment and Plan:     1. Malignant gastrointestinal stromal tumor (GIST) of stomach    2. Secondary malignancy of parietal peritoneum    3. Secondary liver cancer    4. Immunodeficiency due to chemotherapy    5. Immunodeficiency secondary to neoplasm    6. Early satiety    7. Primary hypertension    8. Malnutrition, unspecified type        1-5  - Mr Juarez is a 63 yo man with metastatic gastric GIST with met to the peritoneum and liver. Diagnosed in April 2021. Has been on gleevec 400 mg daily since. Had good response initially. Recent CT 6/5/24 showed progression of disease. Patient started gleevec 400 mg bid by himself on 6/6/24. Tempus showed KIT exon 11 and exon 13 mutations.   - CT C/A/P 8/7/24 showed progression. Sutent 37.5 mg daily started around 8/16/24.  - Patient went to ED on 10/17/24 for abdominal pain. CT A/P 10/18/24 showed progression of disease. Started regorafenib. Cannot tolerate over 80 mg daily.  - He has not been able to eat or drink for 2 weeks due to early satiety. We previously had long discussion about this. I suspect his cancer is pushing against his stomach causing outlet obstruction. He previously refused feeding tube. Today I had a long discussion with him and family again. Without feeding tube, he will likely deteriorate fast due to poor nutrition and hydration. I asked him to stop regorafenib and resume only when he feels better after tube feeding. Family asked about TPN. Discussed with them I do not recommend TPN as it does not improve survival and increase risk of sepsis.   - after long discussion, patient agrees with going to the ER. He needs to be admitted for IV hydration, get CT abdomen/pelvis, and consult Dr Pfeiffer for feeding tube  - spoke with Dr Pfeiffer  - RTC after discharge    6.  - see above    7.  - monitor    8.  - see above. Needs feeding tube    Follow-up:     RTC in  2 weeks  Knows to call in the interval if any problems arise.    Electronically signed by Tawny Mesa

## 2024-11-26 NOTE — FIRST PROVIDER EVALUATION
Emergency Department TeleTriage Encounter Note      CHIEF COMPLAINT    Chief Complaint   Patient presents with    Other     Pt undergoing chemotherapy was sent by Dr. Mesa for possible hydration, PEG tube, and admit.       VITAL SIGNS   Initial Vitals [11/26/24 1644]   BP Pulse Resp Temp SpO2   133/78 102 16 97.5 °F (36.4 °C) 100 %      MAP       --            ALLERGIES    Review of patient's allergies indicates:  No Known Allergies    PROVIDER TRIAGE NOTE  Patient sent by hem-onc provider due to gastric outlet obstruction for admission for possible PEG tube. Had virtual visit today due to inability to tolerate PO intake for the past two weeks with weight loss as well; generalized weakness.Had labs drawn today.    Limited physical exam via telehealth: The patient is awake, alert, answering questions appropriately and is not in respiratory distress.  As the Teletriage provider, I performed an initial assessment and ordered appropriate labs and imaging studies, if any, to facilitate the patient's care once placed in the ED. Once a room is available, care and a full evaluation will be completed by an alternate ED provider.  Any additional orders and the final disposition will be determined by that provider.  All imaging and labs will not be followed-up by the Teletriage Team, including myself.            ORDERS  Labs Reviewed - No data to display    ED Orders (720h ago, onward)      Start Ordered     Status Ordering Provider    11/26/24 1708 11/26/24 1708  Urinalysis, Reflex to Urine Culture Urine, Clean Catch  STAT         Ordered DARDAR, STACI A.    11/26/24 1707 11/26/24 1708  Magnesium  STAT         Ordered DARDAR, STACI A.    11/26/24 1707 11/26/24 1708  Phosphorus  STAT         Ordered DARDAR, STACI A.    11/26/24 1706 11/26/24 1708  Saline lock IV  Once         Ordered DARDAR, STACI A.              Virtual Visit Note: The provider triage portion of this emergency department evaluation and documentation was  performed via [email or web address], a HIPAA-compliant telemedicine application, in concert with a tele-presenter in the room. A face to face patient evaluation with one of my colleagues will occur once the patient is placed in an emergency department room.      DISCLAIMER: This note was prepared with [employer] voice recognition transcription software. Garbled syntax, mangled pronouns, and other bizarre constructions may be attributed to that software system.

## 2024-11-26 NOTE — TELEPHONE ENCOUNTER
Secure chat from Dr Mesa:  Drea, can you call [facility]?  I am sending this patient to ER.  he has a GIST pushing towards the duodenum causing gastric outlet obstruction. he needs to be hydrated, admitted, and consult Dr Anabelle Pfeiffer for feeing tube. thanks

## 2024-11-27 VITALS
DIASTOLIC BLOOD PRESSURE: 83 MMHG | HEART RATE: 9 BPM | RESPIRATION RATE: 16 BRPM | OXYGEN SATURATION: 98 % | SYSTOLIC BLOOD PRESSURE: 145 MMHG | TEMPERATURE: 98 F | BODY MASS INDEX: 21.31 KG/M2 | HEIGHT: 68 IN | WEIGHT: 140.63 LBS

## 2024-11-27 PROBLEM — R53.1 GENERALIZED WEAKNESS: Status: ACTIVE | Noted: 2024-11-27

## 2024-11-27 PROBLEM — D50.0 IRON DEFICIENCY ANEMIA DUE TO CHRONIC BLOOD LOSS: Chronic | Status: ACTIVE | Noted: 2024-09-09

## 2024-11-27 PROBLEM — C49.A0 MALIGNANT GASTROINTESTINAL STROMAL TUMOR: Chronic | Status: ACTIVE | Noted: 2021-05-05

## 2024-11-27 PROBLEM — E46 PROTEIN-CALORIE MALNUTRITION: Status: ACTIVE | Noted: 2024-11-27

## 2024-11-27 PROBLEM — Z79.899 IMMUNODEFICIENCY DUE TO CHEMOTHERAPY: Chronic | Status: ACTIVE | Noted: 2023-01-09

## 2024-11-27 PROBLEM — E46 PROTEIN-CALORIE MALNUTRITION: Chronic | Status: ACTIVE | Noted: 2024-11-27

## 2024-11-27 PROBLEM — D84.821 IMMUNODEFICIENCY DUE TO CHEMOTHERAPY: Chronic | Status: ACTIVE | Noted: 2023-01-09

## 2024-11-27 PROBLEM — T45.1X5A IMMUNODEFICIENCY DUE TO CHEMOTHERAPY: Chronic | Status: ACTIVE | Noted: 2023-01-09

## 2024-11-27 PROBLEM — R10.84 GENERALIZED ABDOMINAL PAIN: Status: ACTIVE | Noted: 2024-11-27

## 2024-11-27 LAB
ALBUMIN SERPL BCP-MCNC: 1.6 G/DL (ref 3.5–5.2)
ALP SERPL-CCNC: 227 U/L (ref 40–150)
ALT SERPL W/O P-5'-P-CCNC: 21 U/L (ref 10–44)
ANION GAP SERPL CALC-SCNC: 11 MMOL/L (ref 8–16)
AST SERPL-CCNC: 29 U/L (ref 10–40)
BASOPHILS # BLD AUTO: 0.01 K/UL (ref 0–0.2)
BASOPHILS NFR BLD: 0.1 % (ref 0–1.9)
BILIRUB SERPL-MCNC: 2.1 MG/DL (ref 0.1–1)
BUN SERPL-MCNC: 38 MG/DL (ref 8–23)
CALCIUM SERPL-MCNC: 8.2 MG/DL (ref 8.7–10.5)
CHLORIDE SERPL-SCNC: 108 MMOL/L (ref 95–110)
CO2 SERPL-SCNC: 24 MMOL/L (ref 23–29)
CREAT SERPL-MCNC: 1.1 MG/DL (ref 0.5–1.4)
DIFFERENTIAL METHOD BLD: ABNORMAL
EOSINOPHIL # BLD AUTO: 0 K/UL (ref 0–0.5)
EOSINOPHIL NFR BLD: 0.3 % (ref 0–8)
ERYTHROCYTE [DISTWIDTH] IN BLOOD BY AUTOMATED COUNT: 19.9 % (ref 11.5–14.5)
EST. GFR  (NO RACE VARIABLE): >60 ML/MIN/1.73 M^2
GLUCOSE SERPL-MCNC: 88 MG/DL (ref 70–110)
HCT VFR BLD AUTO: 27.5 % (ref 40–54)
HGB BLD-MCNC: 9.5 G/DL (ref 14–18)
IMM GRANULOCYTES # BLD AUTO: 0.02 K/UL (ref 0–0.04)
IMM GRANULOCYTES NFR BLD AUTO: 0.3 % (ref 0–0.5)
LYMPHOCYTES # BLD AUTO: 1.1 K/UL (ref 1–4.8)
LYMPHOCYTES NFR BLD: 15.2 % (ref 18–48)
MCH RBC QN AUTO: 29.4 PG (ref 27–31)
MCHC RBC AUTO-ENTMCNC: 34.5 G/DL (ref 32–36)
MCV RBC AUTO: 85 FL (ref 82–98)
MONOCYTES # BLD AUTO: 0.9 K/UL (ref 0.3–1)
MONOCYTES NFR BLD: 12.6 % (ref 4–15)
NEUTROPHILS # BLD AUTO: 5.1 K/UL (ref 1.8–7.7)
NEUTROPHILS NFR BLD: 71.5 % (ref 38–73)
NRBC BLD-RTO: 1 /100 WBC
PLATELET # BLD AUTO: 267 K/UL (ref 150–450)
PMV BLD AUTO: 10.1 FL (ref 9.2–12.9)
POTASSIUM SERPL-SCNC: 3.8 MMOL/L (ref 3.5–5.1)
PROT SERPL-MCNC: 7.2 G/DL (ref 6–8.4)
RBC # BLD AUTO: 3.23 M/UL (ref 4.6–6.2)
SODIUM SERPL-SCNC: 143 MMOL/L (ref 136–145)
WBC # BLD AUTO: 7.15 K/UL (ref 3.9–12.7)

## 2024-11-27 PROCEDURE — 99205 OFFICE O/P NEW HI 60 MIN: CPT | Mod: ,,, | Performed by: SURGERY

## 2024-11-27 PROCEDURE — 80053 COMPREHEN METABOLIC PANEL: CPT | Performed by: HOSPITALIST

## 2024-11-27 PROCEDURE — G0378 HOSPITAL OBSERVATION PER HR: HCPCS

## 2024-11-27 PROCEDURE — 36415 COLL VENOUS BLD VENIPUNCTURE: CPT | Performed by: HOSPITALIST

## 2024-11-27 PROCEDURE — 85025 COMPLETE CBC W/AUTO DIFF WBC: CPT | Performed by: HOSPITALIST

## 2024-11-27 PROCEDURE — 96361 HYDRATE IV INFUSION ADD-ON: CPT

## 2024-11-27 RX ORDER — MORPHINE SULFATE 4 MG/ML
4 INJECTION, SOLUTION INTRAMUSCULAR; INTRAVENOUS EVERY 6 HOURS PRN
Status: DISCONTINUED | OUTPATIENT
Start: 2024-11-27 | End: 2024-11-27

## 2024-11-27 RX ORDER — MORPHINE SULFATE 4 MG/ML
4 INJECTION, SOLUTION INTRAMUSCULAR; INTRAVENOUS EVERY 6 HOURS PRN
Status: DISCONTINUED | OUTPATIENT
Start: 2024-11-27 | End: 2024-11-27 | Stop reason: HOSPADM

## 2024-11-27 RX ORDER — OXYCODONE HYDROCHLORIDE 5 MG/1
10 TABLET ORAL EVERY 6 HOURS PRN
Status: DISCONTINUED | OUTPATIENT
Start: 2024-11-27 | End: 2024-11-27 | Stop reason: HOSPADM

## 2024-11-27 NOTE — ASSESSMENT & PLAN NOTE
Patient presented with worsening generalized weakness in setting of decreased p.o. intake and early satiety.  Patient with known chest tumor currently on active chemotherapy followed by Hematology/Oncology outpatient presented for admission to undergo PEG versus J-tube placement for nutritional supplementation.  Patient remains afebrile without leukocytosis with remaining laboratory workup near baseline.  CT abdomen/pelvis obtained in ordered per Oncology recommendations.  Surgical oncology consulted for feeding tube placement.  Plan:  -NPO  -Continue current pain regimen, titrate as needed  -Bowel regimen  -Bedrest  -Continue IVFs  -Antiemetics prn  -Tylenol as needed for fever   -f/u CT abdomen/pelvis  -f/u surgical oncology

## 2024-11-27 NOTE — ASSESSMENT & PLAN NOTE
Anemia is likely due to Iron deficiency, chronic disease due to Malignancy, and drug toxicity from chemotherapy . Most recent hemoglobin and hematocrit are listed below.  Recent Labs     11/26/24  1215 11/26/24  1832   HGB 10.7* 10.1*   HCT 31.7* 29.1*   Plan  -Monitor serial CBC: Daily  -Transfuse PRBC if patient becomes hemodynamically unstable, symptomatic or H/H drops below 7/21.  -Patient has not received any PRBC transfusions to date  -Patient's anemia is currently stable

## 2024-11-27 NOTE — PLAN OF CARE
Patient discharged Home with family. AVS given and explained to patient. Patient verbalizes understanding. Tele monitor removed and given to US. IV removed with catheter intact. Prescriptions delivered to bedside. Follow up appointments arranged.

## 2024-11-27 NOTE — CONSULTS
"[facility]  General Surgery  Consult Note    Patient Name: Agusto Juarez  MRN: [id number]  Code Status: Full Code  Admission Date: 11/26/2024  Hospital Length of Stay: 0 days  Attending Physician: Jean-Claude Lopes MD  Primary Care Provider: Joe Bentley MD    Patient information was obtained from patient, spouse/SO, relative(s), past medical records, ER records, and primary team.     Inpatient consult to Surgical Oncology  Consult performed by: Anabelle Pfeiffer MD  Consult ordered by: Jean-Claude Lopes MD  Reason for consult: metastatic GIST, eval for Jtube placement  Assessment/Recommendations: Pt not a candidate for enteral access.  Continued progression of disease on third line therapy.  Recommend discharge home with hospice.          Subjective:     Principal Problem: Generalized weakness    History of Present Illness: Mr Juarez is a 65 yo man with HTN and high grade dysplasia of villous adenoma of the colon who first saw me on 6/7/24 for further management of gastric GIST. 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 CT A/P 4/26/21 showed "Large upper abdominal mass which may arise from the stomach or pancreas and may involve the liver, duodenum, spleen, and gallbladder. There are also multiple nodular masses throughout the peritoneum and mesentery consistent with peritoneal carcinomatosis." Biopsy of gastric mass and perigastric LN showed grade 1 GIST, mixed spindle cell and epithelioid type. He started gleevec 400 mg daily at that time. CT in Oct 2021 and April 2022 showed response to gleevec with reduction of tumor size. CT in Jan 2023 showed stable disease. He had CT C/A/P done on 6/5/24. It showed "Interval disease progression in the abdomen pelvis with worsening peritoneal carcinomatosis and worsening hepatic metastatic disease." He was continued on Gleevec 800mg daily, however CT TAP on 8/7/24 showed progression and he was " started on Sutent.  Unfortunately he presented to the ED again on 10/17/24 with abdominal pain and imaging again showed progression so he was started on regorafenib.  He was seen by Dr. Mesa with medical oncology on a virtual visit yesterday and instructed to go to the ED for evaluation due to a 2 week history of poor PO intake, weight loss, and generalized malaise.  In the ED he was afebrile, vitals otherwise stable.  Labs showed a normal WBC, albumin 1.9, T bili 2.5, alk phosphatase 254, with remaining CBC and CMP near baseline. CT abdomen/pelvis obtained and showed progression of the omental and peritoneal carcinomatosis with possible contained perforation in the area of the large LUQ mass.  He denies any pain at this time, but is struggling with swallowing including his own secretions.      No current facility-administered medications on file prior to encounter.     Current Outpatient Medications on File Prior to Encounter   Medication Sig    acetaminophen (TYLENOL) 325 MG tablet Take 325 mg by mouth every 6 (six) hours as needed for Pain.    amLODIPine (NORVASC) 10 MG tablet Take 1 tablet (10 mg total) by mouth once daily.    baclofen (LIORESAL) 20 MG tablet Take 1 tablet (20 mg total) by mouth 3 (three) times daily.    buPROPion (WELLBUTRIN XL) 150 MG TB24 tablet Take 1 tablet (150 mg total) by mouth once daily.    carvediloL (COREG) 25 MG tablet Take 1 tablet (25 mg total) by mouth 2 (two) times daily.    docusate sodium (COLACE) 100 MG capsule Take 1 capsule (100 mg total) by mouth 2 (two) times daily.    HYDROcodone-acetaminophen (NORCO)  mg per tablet Take 1 tablet by mouth every 4 (four) hours as needed for Pain.    lisinopriL (PRINIVIL,ZESTRIL) 40 MG tablet Take 1 tablet (40 mg total) by mouth once daily.    megestroL (MEGACE) 400 mg/10 mL (40 mg/mL) Susp Take 5 mLs (200 mg total) by mouth once daily.    multivitamin capsule Take 1 capsule by mouth once daily.    omeprazole (PRILOSEC) 20 MG capsule  Take 1 capsule (20 mg total) by mouth once daily.    omeprazole magnesium 10 mg SuDR Take 20 mg by mouth once daily.    ondansetron (ZOFRAN-ODT) 8 MG TbDL Take 1 tablet (8 mg total) by mouth every 8 (eight) hours as needed (nausea).    oxyCODONE (ROXICODONE) 10 mg Tab immediate release tablet Take 1 tablet (10 mg total) by mouth every 8 (eight) hours as needed for Pain.    oxyCODONE (ROXICODONE) 10 mg Tab immediate release tablet Take 1 tablet (10 mg total) by mouth every 4 (four) hours as needed for Pain.    potassium chloride SA (K-DUR,KLOR-CON) 20 MEQ tablet Take 1 tablet by mouth once daily    pravastatin (PRAVACHOL) 10 MG tablet Take 1 tablet by mouth once daily    regorafenib (STIVARGA) 40 mg Tab Take 2 tablets (80 mg) once daily for 21 days, then off for 7 days, every 28-day cycle (3 weeks on, 1 week off).    tobramycin-dexAMETHasone 0.3-0.1% (TOBRADEX) 0.3-0.1 % DrpS INSTILL 1 DROP INTO LEFT EYE 4 TIMES DAILY       Review of patient's allergies indicates:  No Known Allergies    Past Medical History:   Diagnosis Date    BPH (benign prostatic hypertrophy)     Hyperlipidemia     Hypertension     Malignant gastrointestinal stromal tumor 05/05/2021    Villous adenoma of colon 02/07/2024    high grade dysplasia     Past Surgical History:   Procedure Laterality Date    COLONOSCOPY N/A 9/26/2023    Procedure: COLONOSCOPY;  Surgeon: Luis Choi MD;  Location: [facility];  Service: Endoscopy;  Laterality: N/A;    COLONOSCOPY N/A 2/7/2024    Procedure: COLONOSCOPY;  Surgeon: Rikki Shabazz MD;  Location: [facility] (2ND FLR);  Service: Endoscopy;  Laterality: N/A;  12/18 portal instr- suprep-colonoscopy with EMR with me in the next 4-6 weeks.[facility] only.45 minute case is okay. Harika-tt  1/31/24- precall complete - ER[facility]    COLONOSCOPY N/A 8/20/2024    Procedure: COLONOSCOPY;  Surgeon: Rikki Shabazz MD;  Location: [facility] (2ND FLR);  Service: Endoscopy;  Laterality: N/A;  6/14 portal-suprep- colonoscopy with me in 6  months to follow-up prior EMR. Can be scheduled as a 45min case. [facility] only. [id number]  8/13 SWP PRECALL COMPLETE-RT    ENDOSCOPIC ULTRASOUND OF UPPER GASTROINTESTINAL TRACT N/A 4/30/2021    Procedure: ULTRASOUND, UPPER GI TRACT, ENDOSCOPIC;  Surgeon: Rikki Shabazz MD;  Location: [facility] ENDO;  Service: Endoscopy;  Laterality: N/A;  Rapid COVID prior - last minute addition to schedule - ttr    ESOPHAGOGASTRODUODENOSCOPY N/A 4/30/2021    Procedure: EGD (ESOPHAGOGASTRODUODENOSCOPY);  Surgeon: Rikki Shabazz MD;  Location: [facility] ENDO;  Service: Endoscopy;  Laterality: N/A;  EGD/EUS with biopsy within a week is fine. 45min case with me OK. Any campus. -Dr. Shabazz     Family History       Problem Relation (Age of Onset)    Hypertension Other    Lung cancer Father (60 - 69)          Tobacco Use    Smoking status: Some Days     Current packs/day: 0.50     Average packs/day: 0.5 packs/day for 46.6 years (23.3 ttl pk-yrs)     Types: Cigarettes     Start date: 5/1/1978    Smokeless tobacco: Never   Substance and Sexual Activity    Alcohol use: Yes     Comment: occasionallly    Drug use: No    Sexual activity: Yes     Review of Systems   Constitutional:  Positive for activity change, appetite change, fatigue and unexpected weight change. Negative for chills and fever.   Respiratory:  Negative for cough, chest tightness and shortness of breath.    Gastrointestinal:  Positive for abdominal distention, constipation and nausea. Negative for abdominal pain.   Neurological:  Positive for weakness.     Objective:     Vital Signs (Most Recent):  Temp: 98.2 °F (36.8 °C) (11/27/24 0828)  Pulse: 106 (11/27/24 0954)  Resp: 16 (11/27/24 0828)  BP: 139/85 (11/27/24 0828)  SpO2: 96 % (11/27/24 0828) Vital Signs (24h Range):  Temp:  [97.5 °F (36.4 °C)-98.6 °F (37 °C)] 98.2 °F (36.8 °C)  Pulse:  [] 106  Resp:  [16-18] 16  SpO2:  [95 %-100 %] 96 %  BP: (133-151)/(78-93) 139/85     Weight: 63.8 kg (140 lb 10.5 oz)  Body mass index is 21.39 kg/m².    "  Physical Exam  Constitutional:       Appearance: He is ill-appearing.      Comments: Thin, frail, and chronically ill appearing   HENT:      Head: Normocephalic and atraumatic.   Eyes:      Extraocular Movements: Extraocular movements intact.      Conjunctiva/sclera: Conjunctivae normal.   Cardiovascular:      Rate and Rhythm: Normal rate and regular rhythm.   Pulmonary:      Effort: Pulmonary effort is normal.   Abdominal:      General: There is distension.      Tenderness: There is abdominal tenderness.   Musculoskeletal:         General: No swelling, tenderness, deformity or signs of injury. Normal range of motion.   Skin:     General: Skin is warm and dry.      Coloration: Skin is not jaundiced.   Neurological:      General: No focal deficit present.      Mental Status: He is alert and oriented to person, place, and time.   Psychiatric:         Mood and Affect: Mood normal.         Behavior: Behavior normal.         Thought Content: Thought content normal.            I have reviewed all pertinent lab results within the past 24 hours.  CBC:   Recent Labs   Lab 11/27/24  0623   WBC 7.15   RBC 3.23*   HGB 9.5*   HCT 27.5*      MCV 85   MCH 29.4   MCHC 34.5     BMP:   Recent Labs   Lab 11/26/24  1716 11/26/24  1832 11/27/24  0623   GLU  --    < > 88   NA  --    < > 143   K  --    < > 3.8   CL  --    < > 108   CO2  --    < > 24   BUN  --    < > 38*   CREATININE  --    < > 1.1   CALCIUM  --    < > 8.2*   MG 2.4  --   --     < > = values in this interval not displayed.     CMP:   Recent Labs   Lab 11/27/24  0623   GLU 88   CALCIUM 8.2*   ALBUMIN 1.6*   PROT 7.2      K 3.8   CO2 24      BUN 38*   CREATININE 1.1   ALKPHOS 227*   ALT 21   AST 29   BILITOT 2.1*     LFTs:   Recent Labs   Lab 11/27/24  0623   ALT 21   AST 29   ALKPHOS 227*   BILITOT 2.1*   PROT 7.2   ALBUMIN 1.6*     Coagulation: No results for input(s): "LABPROT", "INR", "APTT" in the last 168 hours.  Cardiac markers: No results for " "input(s): "CKMB", "CPKMB", "TROPONINT", "TROPONINI", "MYOGLOBIN" in the last 168 hours.  ABGs: No results for input(s): "PH", "PCO2", "PO2", "HCO3", "POCSATURATED", "BE" in the last 168 hours.  Microbiology Results (last 7 days)       ** No results found for the last 168 hours. **          Specimen (24h ago, onward)      None          No results for input(s): "COLORU", "CLARITYU", "SPECGRAV", "PHUR", "PROTEINUA", "GLUCOSEU", "BILIRUBINCON", "BLOODU", "WBCU", "RBCU", "BACTERIA", "MUCUS", "NITRITE", "LEUKOCYTESUR", "UROBILINOGEN", "HYALINECASTS" in the last 168 hours.    Significant Diagnostics:  I have reviewed all pertinent imaging results/findings within the past 24 hours.  CT: I have reviewed all pertinent results/findings within the past 24 hours and my personal findings are:  diffuse peritoneal carcinomatosis with large mass in the LUQ and multiple hepatic metastasis    Imaging Results              CT Abdomen Pelvis With IV Contrast NO Oral Contrast (Final result)  Result time 11/26/24 22:08:56      Final result by Mar Ruth MD (11/26/24 22:08:56)                   Impression:      Large indistinct insinuating presumed primary mass left upper quadrant likely enlarged difficult to measure with possible associated contained perforation.  Omental/peritoneal carcinomatosis appears progressed although degraded imaging with motion.    Indistinct hepatic metastases similar to increasing    No hydronephrosis    Urinary bladder decompressed    Small volume ascites    Bilateral iliac artery aneurysms similar    No obstructive bowel findings    No adverse bony finding      Electronically signed by: Mar Ruth  Date:    11/26/2024  Time:    22:08               Narrative:    EXAMINATION:  CT ABDOMEN PELVIS WITH IV CONTRAST    CLINICAL HISTORY:  Nausea/vomiting;has GIST early satiety causing severe decrease in solid and liquid intake.;    TECHNIQUE:  Low dose axial images, sagittal and coronal reformations " were obtained from the lung bases to the pubic symphysis following the IV administration of 100 mL of Omnipaque 350 iterative technique automated exposure control    COMPARISON:  October 2024                                        Assessment/Plan:     * Generalized weakness  2/2 tumor progression   -- Management as per primary team     Generalized abdominal pain  2/2 peritoneal carcinomatosis and possible contained perforation.  Recommend pain control and palliative care consultation, with transition to hospice.     Protein-calorie malnutrition  Unfortunately due to the extent of his peritoneal carcinomatosis he is not a candidate for J tube placement, furthermore PEG or any G tube placement is impossible due to the bulky mass engulfing his entire stomach/LUQ region, and with the possibility of a contained perforation in that area he is not a candidate for stenting.  As per my recommendations above, I would recommend PO intake for comfort and hospice.  Pts primary medical oncologist Dr. Mesa is in agreement with this as well.  The other alternative would be TPN however there is no data to support that TPN in the setting of malignant bowel obstruction improves survival or quality of life.  Pt and his family are in agreement with hospice.      Dehydration  -- Management as per primary team     Iron deficiency anemia due to chronic blood loss  -- Management as per primary team     Immunodeficiency due to chemotherapy  -- Management as per primary team     Malignant gastrointestinal stromal tumor  Pt with metastatic GIST on third line therapy with continued progression on third line therapy.  Imaging c/w progression of peritoneal disease and possible contained perforation.  Them that unfortunately his imaging does show continued progression of his disease.  He is currently on 3rd line therapy and is not responding well to that and other lines of therapy will also be unlikely to have any effect.  I also discussed with them  that due to the extent of his peritoneal disease he is unfortunately not a candidate for any surgically or endoscopically placed feeding access.  I had a lengthy discussion with the patient that at this time his options are transitioning to hospice hospice or trying TPN with continued systemic therapy.  I discussed with him that there is no data to support the use of TPN in patients with malignant bowel obstruction and that it does not provide improved quality or quantity of life and therefore would NOT be my recommendation especially in the setting of progressive disease without reasonable additional lines of therapy.  I have discussed his case with his primary medical oncologist Dr. Mesa who is in agreement that transition to hospice is most appropriate at this time.  The patient and his family and understandably disappointed by this news but are in agreement with hospice and wish to go home today if possible.    -- recommend discharge home with hospice     Hyperlipidemia  -- Management as per primary team     Hypertension  -- Management as per primary team       VTE Risk Mitigation (From admission, onward)           Ordered     Reason for No Pharmacological VTE Prophylaxis  Once        Question:  Reasons:  Answer:  Physician Provided (leave comment)  Comment:  pending procedure    11/26/24 1929     IP VTE HIGH RISK PATIENT  Once         11/26/24 1929     Place sequential compression device  Until discontinued         11/26/24 1929                    Thank you for your consult. I will sign off. Please contact us if you have any additional questions.    Anabelle Pfeiffer MD  General Surgery  [facility]

## 2024-11-27 NOTE — ASSESSMENT & PLAN NOTE
Patient currently follows Dr. Dee and Dr. Mesa from Hematology/Oncology and currently on active chemotherapy.  Patient admitted for PEG versus J-tube placement for malnutrition.  Plan:  -f/u surgical oncology for feeding tube placement  -nutrition/dietary consulted  -optimize pain control   -f/u oncology outpatient as directed

## 2024-11-27 NOTE — ASSESSMENT & PLAN NOTE
Unfortunately due to the extent of his peritoneal carcinomatosis he is not a candidate for J tube placement, furthermore PEG or any G tube placement is impossible due to the bulky mass engulfing his entire stomach/LUQ region, and with the possibility of a contained perforation in that area he is not a candidate for stenting.  As per my recommendations above, I would recommend PO intake for comfort and hospice.  Pts primary medical oncologist Dr. Mesa is in agreement with this as well.  The other alternative would be TPN however there is no data to support that TPN in the setting of malignant bowel obstruction improves survival or quality of life.  Pt and his family are in agreement with hospice.

## 2024-11-27 NOTE — ASSESSMENT & PLAN NOTE
Chronic, controlled.  Latest blood pressure and vitals reviewed-   Temp:  [97.5 °F (36.4 °C)-98.6 °F (37 °C)]   Pulse:  []   Resp:  [16-18]   BP: (133-151)/(78-85)   SpO2:  [96 %-100 %] .   Home meds for hypertension were reviewed and noted below.   Hypertension Medications               amLODIPine (NORVASC) 10 MG tablet Take 1 tablet (10 mg total) by mouth once daily.    carvediloL (COREG) 25 MG tablet Take 1 tablet (25 mg total) by mouth 2 (two) times daily.    lisinopriL (PRINIVIL,ZESTRIL) 40 MG tablet Take 1 tablet (40 mg total) by mouth once daily.     While in the hospital, will manage blood pressure as follows; Continue home antihypertensive regimen    Will utilize p.r.n. blood pressure medication only if patient's blood pressure greater than  180/110 and he develops symptoms such as worsening chest pain or shortness of breath.

## 2024-11-27 NOTE — ASSESSMENT & PLAN NOTE
Nutrition consulted. Most recent weight and BMI monitored-     Measurements:  Wt Readings from Last 1 Encounters:   11/26/24 63.8 kg (140 lb 10.5 oz)   Body mass index is 21.39 kg/m².    Patient awaiting screening and assessment by RD.    Malnutrition Type:  Context:    Level:      Malnutrition Characteristic Summary:       Interventions/Recommendations (treatment strategy):

## 2024-11-27 NOTE — ASSESSMENT & PLAN NOTE
Patient is chronically on statin.will not continue for now. Last Lipid Panel:   Lab Results   Component Value Date    CHOL 103 (L) 05/17/2024    HDL 45 05/17/2024    LDLCALC 48.6 (L) 05/17/2024    TRIG 47 05/17/2024    CHOLHDL 43.7 05/17/2024   Plan:  -Continue home medication when not npo

## 2024-11-27 NOTE — HPI
"Mr Juarez is a 65 yo man with HTN and high grade dysplasia of villous adenoma of the colon who first saw me on 6/7/24 for further management of gastric GIST. 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 CT A/P 4/26/21 showed "Large upper abdominal mass which may arise from the stomach or pancreas and may involve the liver, duodenum, spleen, and gallbladder. There are also multiple nodular masses throughout the peritoneum and mesentery consistent with peritoneal carcinomatosis." Biopsy of gastric mass and perigastric LN showed grade 1 GIST, mixed spindle cell and epithelioid type. He started gleevec 400 mg daily at that time. CT in Oct 2021 and April 2022 showed response to gleevec with reduction of tumor size. CT in Jan 2023 showed stable disease. He had CT C/A/P done on 6/5/24. It showed "Interval disease progression in the abdomen pelvis with worsening peritoneal carcinomatosis and worsening hepatic metastatic disease." He was continued on Gleevec 800mg daily, however CT TAP on 8/7/24 showed progression and he was started on Sutent.  Unfortunately he presented to the ED again on 10/17/24 with abdominal pain and imaging again showed progression so he was started on regorafenib.  He was seen by Dr. Mesa with medical oncology on a virtual visit yesterday and instructed to go to the ED for evaluation due to a 2 week history of poor PO intake, weight loss, and generalized malaise.  In the ED he was afebrile, vitals otherwise stable.  Labs showed a normal WBC, albumin 1.9, T bili 2.5, alk phosphatase 254, with remaining CBC and CMP near baseline. CT abdomen/pelvis obtained and showed progression of the omental and peritoneal carcinomatosis with possible contained perforation in the area of the large LUQ mass.  He denies any pain at this time, but is struggling with swallowing including his own secretions.    "

## 2024-11-27 NOTE — ASSESSMENT & PLAN NOTE
2/2 peritoneal carcinomatosis and possible contained perforation.  Recommend pain control and palliative care consultation, with transition to hospice.

## 2024-11-27 NOTE — ASSESSMENT & PLAN NOTE
Pt with metastatic GIST on third line therapy with continued progression on third line therapy.  Imaging c/w progression of peritoneal disease and possible contained perforation.  Them that unfortunately his imaging does show continued progression of his disease.  He is currently on 3rd line therapy and is not responding well to that and other lines of therapy will also be unlikely to have any effect.  I also discussed with them that due to the extent of his peritoneal disease he is unfortunately not a candidate for any surgically or endoscopically placed feeding access.  I had a lengthy discussion with the patient that at this time his options are transitioning to hospice hospice or trying TPN with continued systemic therapy.  I discussed with him that there is no data to support the use of TPN in patients with malignant bowel obstruction and that it does not provide improved quality or quantity of life and therefore would NOT be my recommendation especially in the setting of progressive disease without reasonable additional lines of therapy.  I have discussed his case with his primary medical oncologist Dr. Mesa who is in agreement that transition to hospice is most appropriate at this time.  The patient and his family and understandably disappointed by this news but are in agreement with hospice and wish to go home today if possible.    -- recommend discharge home with hospice

## 2024-11-27 NOTE — SUBJECTIVE & OBJECTIVE
Review of Systems   All other systems reviewed and are negative.    Objective:     Vital Signs (Most Recent):  Temp: 98.2 °F (36.8 °C) (11/27/24 0828)  Pulse: 84 (11/27/24 1121)  Resp: 16 (11/27/24 0828)  BP: 139/85 (11/27/24 0828)  SpO2: 96 % (11/27/24 0828) Vital Signs (24h Range):  Temp:  [97.5 °F (36.4 °C)-98.6 °F (37 °C)] 98.2 °F (36.8 °C)  Pulse:  [] 84  Resp:  [16-18] 16  SpO2:  [95 %-100 %] 96 %  BP: (133-151)/(78-93) 139/85     Weight: 63.8 kg (140 lb 10.5 oz)  Body mass index is 21.39 kg/m².    Intake/Output Summary (Last 24 hours) at 11/27/2024 1141  Last data filed at 11/27/2024 0653  Gross per 24 hour   Intake 504.72 ml   Output 1100 ml   Net -595.28 ml         Physical Exam  Constitutional:       General: He is not in acute distress.     Comments: Ill appearing  Frail, cachexia   Cardiovascular:      Rate and Rhythm: Normal rate and regular rhythm.      Heart sounds: No murmur heard.  Pulmonary:      Effort: Pulmonary effort is normal. No respiratory distress.      Breath sounds: Normal breath sounds. No wheezing.   Abdominal:      General: There is distension.      Palpations: Abdomen is soft.      Tenderness: There is abdominal tenderness.   Neurological:      Mental Status: He is alert.             Significant Labs: All pertinent labs within the past 24 hours have been reviewed.  Recent Lab Results         11/27/24  0623   11/26/24  1832   11/26/24  1716   11/26/24  1215        Albumin 1.6   1.9     1.9          254     264       ALT 21   25     27       Anion Gap 11   15     14       AST 29   31     50       Baso # 0.01   0.03     0.04       Basophil % 0.1   0.4     0.5       BILIRUBIN TOTAL 2.1  Comment: For infants and newborns, interpretation of results should be based  on gestational age, weight and in agreement with clinical  observations.    Premature Infant recommended reference ranges:  Up to 24 hours.............<8.0 mg/dL  Up to 48 hours............<12.0 mg/dL  3-5  days..................<15.0 mg/dL  6-29 days.................<15.0 mg/dL     2.5  Comment: For infants and newborns, interpretation of results should be based  on gestational age, weight and in agreement with clinical  observations.    Premature Infant recommended reference ranges:  Up to 24 hours.............<8.0 mg/dL  Up to 48 hours............<12.0 mg/dL  3-5 days..................<15.0 mg/dL  6-29 days.................<15.0 mg/dL       2.5  Comment: For infants and newborns, interpretation of results should be based  on gestational age, weight and in agreement with clinical  observations.    Premature Infant recommended reference ranges:  Up to 24 hours.............<8.0 mg/dL  Up to 48 hours............<12.0 mg/dL  3-5 days..................<15.0 mg/dL  6-29 days.................<15.0 mg/dL         BUN 38   40     36       Calcium 8.2   8.5     8.7       Chloride 108   104     102       CO2 24   22     25       Creatinine 1.1   1.2     1.2       Differential Method Automated   Automated     Automated       eGFR >60   >60     >60.0       Eos # 0.0   0.2     0.1       Eos % 0.3   2.6     1.0       Glucose 88   106     105       Gran # (ANC) 5.1   5.3     6.1       Gran % 71.5   71.5     76.1       Hematocrit 27.5   29.1     31.7       Hemoglobin 9.5   10.1     10.7       Immature Grans (Abs) 0.02  Comment: Mild elevation in immature granulocytes is non specific and   can be seen in a variety of conditions including stress response,   acute inflammation, trauma and pregnancy. Correlation with other   laboratory and clinical findings is essential.     0.03  Comment: Mild elevation in immature granulocytes is non specific and   can be seen in a variety of conditions including stress response,   acute inflammation, trauma and pregnancy. Correlation with other   laboratory and clinical findings is essential.       0.03  Comment: Mild elevation in immature granulocytes is non specific and   can be seen in a variety of  conditions including stress response,   acute inflammation, trauma and pregnancy. Correlation with other   laboratory and clinical findings is essential.         Immature Granulocytes 0.3   0.4     0.4       Lymph # 1.1   1.0     0.9       Lymph % 15.2   13.3     11.7       Magnesium      2.4         MCH 29.4   29.1     28.7       MCHC 34.5   34.7     33.8       MCV 85   84     85       Mono # 0.9   0.9     0.8       Mono % 12.6   11.8     10.3       MPV 10.1   9.9     9.8       nRBC 1   0     1       Phosphorus Level     3.4         Platelet Count 267   281     289       Potassium 3.8   3.9     4.0       PROTEIN TOTAL 7.2   8.2     8.3       RBC 3.23   3.47     3.73       RDW 19.9   19.8     20.2       Sodium 143   141     141       TSH       1.511       WBC 7.15   7.37     8.03               Significant Imaging: I have reviewed all pertinent imaging results/findings within the past 24 hours.    CT Abdomen Pelvis With IV Contrast NO Oral Contrast   Final Result      Large indistinct insinuating presumed primary mass left upper quadrant likely enlarged difficult to measure with possible associated contained perforation.  Omental/peritoneal carcinomatosis appears progressed although degraded imaging with motion.      Indistinct hepatic metastases similar to increasing      No hydronephrosis      Urinary bladder decompressed      Small volume ascites      Bilateral iliac artery aneurysms similar      No obstructive bowel findings      No adverse bony finding         Electronically signed by: Mar Ruth   Date:    11/26/2024   Time:    22:08

## 2024-11-27 NOTE — SUBJECTIVE & OBJECTIVE
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 [id number]  1/31/24- precall complete - [facility]    COLONOSCOPY N/A 8/20/2024    Procedure: COLONOSCOPY;  Surgeon: Rikki Shabazz MD;  Location: [facility] (2ND FLR);  Service: Endoscopy;  Laterality: N/A;  6/14 portal-suprep- colonoscopy with me in 6 months to follow-up prior EMR. Can be scheduled as a 45min case. [facility] only. [id number]  8/13 SWP PRECALL COMPLETE-RT    ENDOSCOPIC ULTRASOUND OF UPPER GASTROINTESTINAL TRACT N/A 4/30/2021    Procedure: ULTRASOUND, UPPER GI TRACT, ENDOSCOPIC;  Surgeon: Rikki Shabazz MD;  Location: [facility];  Service: Endoscopy;  Laterality: N/A;  Rapid COVID prior - last minute addition to schedule - ttr    ESOPHAGOGASTRODUODENOSCOPY N/A 4/30/2021    Procedure: EGD (ESOPHAGOGASTRODUODENOSCOPY);  Surgeon: Rikki Shaabzz MD;  Location: [facility];  Service: Endoscopy;  Laterality: N/A;  EGD/EUS with biopsy within a week is fine. 45min case with me OK. Any campus. -Dr. Shabazz       Review of patient's allergies indicates:  No Known Allergies    No current facility-administered medications on file prior to encounter.     Current Outpatient Medications on File Prior to Encounter   Medication Sig    acetaminophen (TYLENOL) 325 MG tablet Take 325 mg by mouth every 6 (six) hours as needed for Pain.    amLODIPine  (NORVASC) 10 MG tablet Take 1 tablet (10 mg total) by mouth once daily.    baclofen (LIORESAL) 20 MG tablet Take 1 tablet (20 mg total) by mouth 3 (three) times daily.    buPROPion (WELLBUTRIN XL) 150 MG TB24 tablet Take 1 tablet (150 mg total) by mouth once daily.    carvediloL (COREG) 25 MG tablet Take 1 tablet (25 mg total) by mouth 2 (two) times daily.    docusate sodium (COLACE) 100 MG capsule Take 1 capsule (100 mg total) by mouth 2 (two) times daily.    HYDROcodone-acetaminophen (NORCO)  mg per tablet Take 1 tablet by mouth every 4 (four) hours as needed for Pain.    lisinopriL (PRINIVIL,ZESTRIL) 40 MG tablet Take 1 tablet (40 mg total) by mouth once daily.    megestroL (MEGACE) 400 mg/10 mL (40 mg/mL) Susp Take 5 mLs (200 mg total) by mouth once daily.    multivitamin capsule Take 1 capsule by mouth once daily.    omeprazole (PRILOSEC) 20 MG capsule Take 1 capsule (20 mg total) by mouth once daily.    omeprazole magnesium 10 mg SuDR Take 20 mg by mouth once daily.    ondansetron (ZOFRAN-ODT) 8 MG TbDL Take 1 tablet (8 mg total) by mouth every 8 (eight) hours as needed (nausea).    oxyCODONE (ROXICODONE) 10 mg Tab immediate release tablet Take 1 tablet (10 mg total) by mouth every 8 (eight) hours as needed for Pain.    oxyCODONE (ROXICODONE) 10 mg Tab immediate release tablet Take 1 tablet (10 mg total) by mouth every 4 (four) hours as needed for Pain.    potassium chloride SA (K-DUR,KLOR-CON) 20 MEQ tablet Take 1 tablet by mouth once daily    pravastatin (PRAVACHOL) 10 MG tablet Take 1 tablet by mouth once daily    regorafenib (STIVARGA) 40 mg Tab Take 2 tablets (80 mg) once daily for 21 days, then off for 7 days, every 28-day cycle (3 weeks on, 1 week off).    tobramycin-dexAMETHasone 0.3-0.1% (TOBRADEX) 0.3-0.1 % DrpS INSTILL 1 DROP INTO LEFT EYE 4 TIMES DAILY     Family History       Problem Relation (Age of Onset)    Hypertension Other    Lung cancer Father (60 - 69)          Tobacco Use    Smoking  status: Some Days     Current packs/day: 0.50     Average packs/day: 0.5 packs/day for 46.6 years (23.3 ttl pk-yrs)     Types: Cigarettes     Start date: 5/1/1978    Smokeless tobacco: Never   Substance and Sexual Activity    Alcohol use: Yes     Comment: occasionallly    Drug use: No    Sexual activity: Yes     Review of Systems   All other systems reviewed and are negative.    Objective:     Vital Signs (Most Recent):  Temp: 97.5 °F (36.4 °C) (11/26/24 1644)  Pulse: 86 (11/26/24 1900)  Resp: 18 (11/26/24 1900)  BP: (!) 151/85 (11/26/24 1900)  SpO2: 99 % (11/26/24 1900) Vital Signs (24h Range):  Temp:  [97.5 °F (36.4 °C)] 97.5 °F (36.4 °C)  Pulse:  [] 86  Resp:  [16-18] 18  SpO2:  [99 %-100 %] 99 %  BP: (133-151)/(78-85) 151/85        There is no height or weight on file to calculate BMI.     Physical Exam  Vitals reviewed.   Constitutional:       General: He is not in acute distress.     Appearance: He is ill-appearing. He is not toxic-appearing or diaphoretic.      Comments: Cachectic-appearing   HENT:      Head: Normocephalic and atraumatic.      Comments: Temporal wasting noted     Right Ear: External ear normal.      Left Ear: External ear normal.      Nose: Nose normal. No congestion or rhinorrhea.      Mouth/Throat:      Mouth: Mucous membranes are dry.      Pharynx: Oropharynx is clear. No oropharyngeal exudate or posterior oropharyngeal erythema.   Eyes:      General: No scleral icterus.     Extraocular Movements: Extraocular movements intact.      Conjunctiva/sclera: Conjunctivae normal.      Pupils: Pupils are equal, round, and reactive to light.   Neck:      Vascular: No carotid bruit.   Cardiovascular:      Rate and Rhythm: Normal rate and regular rhythm.      Pulses: Normal pulses.      Heart sounds: Normal heart sounds. No murmur heard.     No friction rub. No gallop.   Pulmonary:      Effort: Pulmonary effort is normal. No respiratory distress.      Breath sounds: Normal breath sounds. No  stridor. No wheezing, rhonchi or rales.   Chest:      Chest wall: No tenderness.   Abdominal:      General: There is distension.      Palpations: There is no mass.      Tenderness: There is abdominal tenderness. There is no guarding or rebound.      Hernia: No hernia is present.      Comments: Abdomen diffusely distended with TTP noted throughout greatest in epigastric region without evidence of guarding or rebound tenderness noted.  Hypoactive bowel sounds noted throughout.   Musculoskeletal:         General: No swelling, tenderness, deformity or signs of injury. Normal range of motion.      Cervical back: Normal range of motion and neck supple. No rigidity or tenderness.      Right lower leg: No edema.      Left lower leg: No edema.   Lymphadenopathy:      Cervical: No cervical adenopathy.   Skin:     General: Skin is warm and dry.      Capillary Refill: Capillary refill takes less than 2 seconds.      Coloration: Skin is not jaundiced or pale.      Findings: No bruising, erythema, lesion or rash.   Neurological:      General: No focal deficit present.      Mental Status: He is alert and oriented to person, place, and time. Mental status is at baseline.      Cranial Nerves: No cranial nerve deficit.      Sensory: No sensory deficit.      Motor: No weakness.      Coordination: Coordination normal.   Psychiatric:         Mood and Affect: Mood normal.         Behavior: Behavior normal.         Thought Content: Thought content normal.         Judgment: Judgment normal.              CRANIAL NERVES     CN III, IV, VI   Pupils are equal, round, and reactive to light.       Significant Labs: All pertinent labs within the past 24 hours have been reviewed.    Significant Imaging: I have reviewed all pertinent imaging results/findings within the past 24 hours.    LABS:  Recent Results (from the past 24 hours)   CBC Auto Differential    Collection Time: 11/26/24 12:15 PM   Result Value Ref Range    WBC 8.03 3.90 - 12.70 K/uL     RBC 3.73 (L) 4.60 - 6.20 M/uL    Hemoglobin 10.7 (L) 14.0 - 18.0 g/dL    Hematocrit 31.7 (L) 40.0 - 54.0 %    MCV 85 82 - 98 fL    MCH 28.7 27.0 - 31.0 pg    MCHC 33.8 32.0 - 36.0 g/dL    RDW 20.2 (H) 11.5 - 14.5 %    Platelets 289 150 - 450 K/uL    MPV 9.8 9.2 - 12.9 fL    Immature Granulocytes 0.4 0.0 - 0.5 %    Gran # (ANC) 6.1 1.8 - 7.7 K/uL    Immature Grans (Abs) 0.03 0.00 - 0.04 K/uL    Lymph # 0.9 (L) 1.0 - 4.8 K/uL    Mono # 0.8 0.3 - 1.0 K/uL    Eos # 0.1 0.0 - 0.5 K/uL    Baso # 0.04 0.00 - 0.20 K/uL    nRBC 1 (A) 0 /100 WBC    Gran % 76.1 (H) 38.0 - 73.0 %    Lymph % 11.7 (L) 18.0 - 48.0 %    Mono % 10.3 4.0 - 15.0 %    Eosinophil % 1.0 0.0 - 8.0 %    Basophil % 0.5 0.0 - 1.9 %    Differential Method Automated    Magnesium    Collection Time: 11/26/24  5:16 PM   Result Value Ref Range    Magnesium 2.4 1.6 - 2.6 mg/dL   Phosphorus    Collection Time: 11/26/24  5:16 PM   Result Value Ref Range    Phosphorus 3.4 2.7 - 4.5 mg/dL   CBC auto differential    Collection Time: 11/26/24  6:32 PM   Result Value Ref Range    WBC 7.37 3.90 - 12.70 K/uL    RBC 3.47 (L) 4.60 - 6.20 M/uL    Hemoglobin 10.1 (L) 14.0 - 18.0 g/dL    Hematocrit 29.1 (L) 40.0 - 54.0 %    MCV 84 82 - 98 fL    MCH 29.1 27.0 - 31.0 pg    MCHC 34.7 32.0 - 36.0 g/dL    RDW 19.8 (H) 11.5 - 14.5 %    Platelets 281 150 - 450 K/uL    MPV 9.9 9.2 - 12.9 fL    Immature Granulocytes 0.4 0.0 - 0.5 %    Gran # (ANC) 5.3 1.8 - 7.7 K/uL    Immature Grans (Abs) 0.03 0.00 - 0.04 K/uL    Lymph # 1.0 1.0 - 4.8 K/uL    Mono # 0.9 0.3 - 1.0 K/uL    Eos # 0.2 0.0 - 0.5 K/uL    Baso # 0.03 0.00 - 0.20 K/uL    nRBC 0 0 /100 WBC    Gran % 71.5 38.0 - 73.0 %    Lymph % 13.3 (L) 18.0 - 48.0 %    Mono % 11.8 4.0 - 15.0 %    Eosinophil % 2.6 0.0 - 8.0 %    Basophil % 0.4 0.0 - 1.9 %    Differential Method Automated    Comprehensive metabolic panel    Collection Time: 11/26/24  6:32 PM   Result Value Ref Range    Sodium 141 136 - 145 mmol/L    Potassium 3.9 3.5 - 5.1  mmol/L    Chloride 104 95 - 110 mmol/L    CO2 22 (L) 23 - 29 mmol/L    Glucose 106 70 - 110 mg/dL    BUN 40 (H) 8 - 23 mg/dL    Creatinine 1.2 0.5 - 1.4 mg/dL    Calcium 8.5 (L) 8.7 - 10.5 mg/dL    Total Protein 8.2 6.0 - 8.4 g/dL    Albumin 1.9 (L) 3.5 - 5.2 g/dL    Total Bilirubin 2.5 (H) 0.1 - 1.0 mg/dL    Alkaline Phosphatase 254 (H) 40 - 150 U/L    AST 31 10 - 40 U/L    ALT 25 10 - 44 U/L    eGFR >60 >60 mL/min/1.73 m^2    Anion Gap 15 8 - 16 mmol/L       RADIOLOGY  X-Ray Chest AP Portable    Result Date: 11/15/2024  EXAMINATION: XR CHEST AP PORTABLE CLINICAL HISTORY: Chest Pain; FINDINGS: Single view of the chest.  Comparison 10/31/2024 Cardiac silhouette is normal.  The lungs demonstrate no evidence of active disease.  No evidence of pleural effusion or pneumothorax.  Bones appear intact.     No acute process seen. Electronically signed by: Riccardo Han MD Date:    11/15/2024 Time:    13:39    X-Ray Abdomen Flat And Erect    Result Date: 11/4/2024  EXAMINATION: XR ABDOMEN FLAT AND ERECT CLINICAL HISTORY: Constipation, unspecified TECHNIQUE: Flat and erect AP views of the abdomen were performed. COMPARISON: None FINDINGS: No air-fluid levels on upright radiograph.  No dilated loops of small bowel on supine radiograph. Borderline findings for constipation. No calculi overlie the renal shadows. Osseous structures are grossly intact.  Lung bases are clear.     No acute abnormality identified.  Borderline findings for constipation. Electronically signed by: Abbe Silverman Date:    11/04/2024 Time:    11:39    X-Ray Abdomen Flat And Erect    Result Date: 11/1/2024  EXAMINATION: XR ABDOMEN FLAT AND ERECT CLINICAL HISTORY: Hiccough. COMPARISON: CT abdomen pelvis from 10/18/2024 FINDINGS: Flat and erect views of the abdomen were obtained.  The bowel gas pattern is nonobstructive.  No visible free air is appreciated.  There are extensive calcifications projecting over the upper to mid abdomen likely related to evidence  of partially calcified mass noted on recent CT from 10/18/2024.  The visualized lung bases appear grossly clear.  Osseous degenerative changes are seen.  No evidence of acute displaced fracture or dislocation is radiographically conspicuous.     1. The bowel gas pattern is non obstructive. Electronically signed by: David Lovett MD Date:    11/01/2024 Time:    07:31    X-Ray Chest AP Portable    Result Date: 10/31/2024  EXAMINATION: XR CHEST AP PORTABLE CLINICAL HISTORY: Hiccough. COMPARISON: Chest x-ray 05/17/2022 FINDINGS: Frontal view of the chest was obtained.  The cardiac silhouette is within normal limits for size. The aorta is partially calcified.  Pulmonary hyperinflation is noted.  No evidence of lobar type consolidation, visible pneumothorax, or pleural effusion is seen.  No acute displaced fracture is visualized.     1. No evidence of lobar type consolidation or acute cardiac decompensation is appreciated. Electronically signed by: David Lovett MD Date:    10/31/2024 Time:    16:54      EKG    MICROBIOLOGY    MDM

## 2024-11-27 NOTE — DISCHARGE SUMMARY
[facility]– [place] [facility]  Discharge Summary      Patient Name: Agusto Juarez  MRN: [id number]  [facility]: [id number]  Patient Class: OP- Observation  Admission Date: 11/26/2024  Hospital Length of Stay: 0 days  Discharge Date and Time: No discharge date for patient encounter.  Attending Physician: Jean-Claude Lopes MD   Discharging Provider: Jean-Claude Lopes MD  Primary Care Provider: Joe Bentley MD    Primary Care Team: Networked reference to record PCT     HPI:   Agusto Juarez is a 64 y.o. male with a PMH  has a past medical history of BPH (benign prostatic hypertrophy), Hyperlipidemia, Hypertension, Malignant gastrointestinal stromal tumor (05/05/2021), and Villous adenoma of colon (02/07/2024). who presented to the ED directed by primary oncologist Dr. Mesa for admission to receive IVFs for treatment of dehydration and PEG versus J-tube placement for supplemental nutrition given decreased p.o. intake, early satiety, progressive weight loss, and malnutrition.  Patient currently undergoing chemotherapy for treatment of GIST and has had decreased p.o. intake with persistent weight loss over the past 2 weeks. During his outpatient visit with Dr. Msea, the discussion was held to move forward with feeding tube placement to help aid in nutrition to increase his weight and strength as patient would likely declined rapidly without it. Patient currently complaining of generalized weakness as well as unchanged abdominal pain and fullness described as pressure-like in sensation, currently rated 7/10 in severity with no known alleviating or aggravating factors noted.  He denied endorsing any lightheadedness, dizziness, headache, visual changes, fever, chills, sweats, nausea, vomiting, chest pain, dysuria, hematuria, melena, hematochezia, diarrhea, or onset neurological deficits.  Patient does reported associated shortness of breath upon lying flat due to his abdomen pressing up on his diaphragm as well as intermittent  confusion/loss of thought process but reported all other review of systems negative except as noted above.  Initial workup in the ED revealed patient to be afebrile without leukocytosis, albumin 1.9, T bili 2.5, alk phosphatase 254, with remaining CBC and CMP near baseline.  CT abdomen/pelvis obtained and pending read. Patient in agreement with treatment plan and was admitted to Hospital Medicine under observation while awaiting evaluation by Surgical Oncology for feeding tube placement.      PCP: Joe Bentley       * No surgery found *      Hospital Course:     11/27/24  JOSEPH, patient reports abdominal soreness, weight loss, fatigue  Wife at bedside reports decreased PO intake, progressive weight loss.  Patient admitted for failure to thrive  CT A/P with IV contrast noted omental/peritoneal, and hepatic mets; LUQ primary mass with possbile associated contained perforation.  Currently NPO except for meds.  Pain appears controlled with current PRN regimen.  Surgical Oncology consulted for PEG tube consideration.  Updated patient's wife at bedside  -----  This afternoon, Dr. 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  Plan to transition to home hospice     Goals of Care Treatment Preferences:  Code Status: Full Code    Health care agent: SDM: Spouse: Michelle Juarez  Health care agent number: [phone]          What is most important right now is to focus on remaining as independent as possible, symptom/pain control, quality of life, even if it means sacrificing a little time.  Accordingly, we have decided that the best plan to meet the patient's goals includes continuing with treatment.      SDOH Screening:  The patient was screened for utility difficulties, food insecurity, transport difficulties, housing insecurity, and interpersonal safety and there were no concerns identified this admission.     Consults:   Consults  (From admission, onward)          Status Ordering Provider     Inpatient consult to Social Work  Once        Provider:  (Not yet assigned)    Completed MONROE, PARVEEN     Inpatient consult to Surgical Oncology  Once        Provider:  (Not yet assigned)    Completed MONROE, PARVEEN     Inpatient consult to Registered Dietitian/Nutritionist  Once        Provider:  (Not yet assigned)    Acknowledged LUIS ARMANDO WATTERS            No new Assessment & Plan notes have been filed under this hospital service since the last note was generated.  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    Specialty: Family Medicine  Why: As needed  Contact information:  [address]  [facility] [address] [phone]                       Contact information for after-discharge care       Destination       [facility] .    Service: Inpatient Hospice  Contact information:  [address]  [facility] [address] [phone]                                 Patient Instructions:   No discharge procedures on file.    Significant Diagnostic Studies: Labs: All labs within the past 24 hours have  been reviewed    Pending Diagnostic Studies:       None           Medications:  Reconciled Home Medications:      Medication List        CONTINUE taking these medications      acetaminophen 325 MG tablet  Commonly known as: TYLENOL  Take 325 mg by mouth every 6 (six) hours as needed for Pain.     amLODIPine 10 MG tablet  Commonly known as: NORVASC  Take 1 tablet (10 mg total) by mouth once daily.     baclofen 20 MG tablet  Commonly known as: LIORESAL  Take 1 tablet (20 mg total) by mouth 3 (three) times daily.     buPROPion 150 MG TB24 tablet  Commonly known as: WELLBUTRIN XL  Take 1 tablet (150 mg total) by mouth once daily.     carvediloL 25 MG tablet  Commonly known as: COREG  Take 1 tablet (25 mg total) by mouth 2 (two) times daily.     docusate sodium 100 MG capsule  Commonly known as: COLACE  Take 1 capsule (100 mg total) by mouth 2 (two) times daily.     HYDROcodone-acetaminophen  mg per tablet  Commonly known as: NORCO  Take 1 tablet by mouth every 4 (four) hours as needed for Pain.     lisinopriL 40 MG tablet  Commonly known as: PRINIVIL,ZESTRIL  Take 1 tablet (40 mg total) by mouth once daily.     megestroL 400 mg/10 mL (40 mg/mL) Susp  Commonly known as: MEGACE  Take 5 mLs (200 mg total) by mouth once daily.     multivitamin capsule  Take 1 capsule by mouth once daily.     omeprazole 20 MG capsule  Commonly known as: PRILOSEC  Take 1 capsule (20 mg total) by mouth once daily.     omeprazole magnesium 10 mg Sudr  Take 20 mg by mouth once daily.     ondansetron 8 MG Tbdl  Commonly known as: ZOFRAN-ODT  Take 1 tablet (8 mg total) by mouth every 8 (eight) hours as needed (nausea).     * oxyCODONE 10 mg Tab immediate release tablet  Commonly known as: ROXICODONE  Take 1 tablet (10 mg total) by mouth every 8 (eight) hours as needed for Pain.     * oxyCODONE 10 mg Tab immediate release tablet  Commonly known as: ROXICODONE  Take 1 tablet (10 mg total) by mouth every 4 (four) hours as needed for Pain.      potassium chloride SA 20 MEQ tablet  Commonly known as: K-DUR,KLOR-CON  Take 1 tablet by mouth once daily     pravastatin 10 MG tablet  Commonly known as: PRAVACHOL  Take 1 tablet by mouth once daily     regorafenib 40 mg Tab  Commonly known as: STIVARGA  Take 2 tablets (80 mg) once daily for 21 days, then off for 7 days, every 28-day cycle (3 weeks on, 1 week off).     tobramycin-dexAMETHasone 0.3-0.1% 0.3-0.1 % Drps  Commonly known as: TOBRADEX  INSTILL 1 DROP INTO LEFT EYE 4 TIMES DAILY           * This list has 2 medication(s) that are the same as other medications prescribed for you. Read the directions carefully, and ask your doctor or other care provider to review them with you.                  Indwelling Lines/Drains at time of discharge:   Lines/Drains/Airways       None                   Time spent on the discharge of patient: 45 minutes         Jean-Claude Lopes MD  Department of Hospital Medicine  [facility] Med S[facility]

## 2024-11-27 NOTE — ED PROVIDER NOTES
SCRIBE #1 NOTE: I, Andreas Sue, am scribing for, and in the presence of, Fabio Tyler DO. I have scribed the entire note.       History     Chief Complaint   Patient presents with    Other     Pt undergoing chemotherapy was sent by Dr. Mesa for possible hydration, PEG tube, and admit.     Review of patient's allergies indicates:  No Known Allergies      History of Present Illness     HPI    11/26/2024, 6:00 PM  History obtained from the patient      History of Present Illness: Agusto Juarez is a 64 y.o. male patient with a PMHx of benign prostatic hypertrophy, hyperlipidemia, hypertension, malignant gastrointestinal stromal tumor, and villous adenoma of colon who presents to the Emergency Department for evaluation of hydration, PEG tube, and admit. Patient is currently undergoing chemotherapy and was sent to the ED by Dr. Mesa. Symptoms are constant and moderate in severity. No mitigating or exacerbating factors reported. Associated sxs include abdominal pain. Patient denies any nausea, vomiting, diarrhea, fever, chills, and all other sxs at this time. No further complaints or concerns at this time.       Arrival mode: Personal vehicle    PCP: Joe Bentley MD        Past Medical History:  Past Medical History:   Diagnosis Date    BPH (benign prostatic hypertrophy)     Hyperlipidemia     Hypertension     Malignant gastrointestinal stromal tumor 05/05/2021    Villous adenoma of colon 02/07/2024    high grade dysplasia       Past Surgical History:  Past Surgical History:   Procedure Laterality Date    COLONOSCOPY N/A 9/26/2023    Procedure: COLONOSCOPY;  Surgeon: Luis Choi MD;  Location: [facility];  Service: Endoscopy;  Laterality: N/A;    COLONOSCOPY N/A 2/7/2024    Procedure: COLONOSCOPY;  Surgeon: Rikki Shabazz MD;  Location: [facility] ([address] [place] [address]);  Service: Endoscopy;  Laterality: N/A;  12/18 portal instr- suprep-colonoscopy with EMR with me in the next 4-6 weeks.[facility] only.45 minute case is  shadi. Harika-tt  1/31/24- precall complete - ERW    COLONOSCOPY N/A 8/20/2024    Procedure: COLONOSCOPY;  Surgeon: Rikki Shabazz MD;  [facility]  Service: Endoscopy;  Laterality: N/A;  6/14 portal-suprep- colonoscopy with me in 6 months to follow-up prior EMR. Can be scheduled as a 45min case[place] [facility] [place] harika-tt  8/13 SWP PRECALL COMPLETE-RT    ENDOSCOPIC ULTRASOUND OF UPPER GASTROINTESTINAL TRACT N/A 4/30/2021    Procedure: ULTRASOUND, UPPER GI TRACT, ENDOSCOPIC;  Surgeon: Rikki Shabazz MD;  Location: [facility];  Service: Endoscopy;  Laterality: N/A;  Rapid COVID prior - last minute addition to schedule - ttr    ESOPHAGOGASTRODUODENOSCOPY N/A 4/30/2021    Procedure: EGD (ESOPHAGOGASTRODUODENOSCOPY);  Surgeon: Rikki Shabazz MD;  Location: [facility];  Service: Endoscopy;  Laterality: N/A;  EGD/EUS with biopsy within a week is fine. 45min case with me OK. Any campus. -Dr. Shabazz         Family History:  Family History   Problem Relation Name Age of Onset    Hypertension Other      Lung cancer Father  60 - 69       Social History:  Social History     Tobacco Use    Smoking status: Some Days     Current packs/day: 0.50     Average packs/day: 0.5 packs/day for 46.6 years (23.3 ttl pk-yrs)     Types: Cigarettes     Start date: 5/1/1978    Smokeless tobacco: Never   Substance and Sexual Activity    Alcohol use: Yes     Comment: occasionallly    Drug use: No    Sexual activity: Yes        Review of Systems     Review of Systems   Constitutional:  Negative for chills and fever.   Gastrointestinal:  Positive for abdominal pain. Negative for diarrhea, nausea and vomiting.   All other systems reviewed and are negative.       Physical Exam     Initial Vitals [11/26/24 1644]   BP Pulse Resp Temp SpO2   133/78 102 16 97.5 °F (36.4 °C) 100 %      MAP       --          Physical Exam  Vitals reviewed.   Constitutional:       Comments: Cachectic individual, no acute distress   Cardiovascular:      Rate and Rhythm:  "Normal rate and regular rhythm.      Heart sounds: No murmur heard.  Pulmonary:      Effort: Pulmonary effort is normal.      Breath sounds: No wheezing.   Abdominal:      Palpations: Abdomen is soft.      Comments: Bilateral upper quadrant tenderness to palpation   Musculoskeletal:         General: Normal range of motion.   Skin:     General: Skin is warm and dry.      Findings: No rash.   Neurological:      General: No focal deficit present.      Mental Status: He is alert and oriented to person, place, and time.            ED Course   Procedures  ED Vital Signs:  Vitals:    11/26/24 1644 11/26/24 1835 11/26/24 1845 11/26/24 1900   BP: 133/78  (!) 148/85 (!) 151/85   Pulse: 102  89 86   Resp: 16 16 17 18   Temp: 97.5 °F (36.4 °C)      TempSrc: Oral      SpO2: 100%  99% 99%   Weight:       Height:        11/26/24 2000 11/26/24 2359 11/27/24 0306 11/27/24 0352   BP: 135/85 (!) 142/78  (!) 145/93   Pulse: 86 83 86 85   Resp: 16 16  17   Temp: 97.7 °F (36.5 °C) 98.6 °F (37 °C)  98.3 °F (36.8 °C)   TempSrc: Oral Oral  Oral   SpO2: 99% 96%  95%   Weight: 63.8 kg (140 lb 10.5 oz)      Height: 5' 8" (1.727 m)       11/27/24 0807 11/27/24 0828 11/27/24 0954 11/27/24 1121   BP:  139/85     Pulse: 83 89 106 84   Resp:  16     Temp:  98.2 °F (36.8 °C)     TempSrc:  Oral     SpO2:  96%     Weight:       Height:        11/27/24 1246 11/27/24 1319 11/27/24 1411   BP: (!) 145/83     Pulse: 86 93 (!) 9   Resp: 16     Temp: 97.8 °F (36.6 °C)     TempSrc: Oral     SpO2: 98%     Weight:      Height:          Abnormal Lab Results:  Labs Reviewed   CBC W/ AUTO DIFFERENTIAL - Abnormal       Result Value    WBC 7.37      RBC 3.47 (*)     Hemoglobin 10.1 (*)     Hematocrit 29.1 (*)     MCV 84      MCH 29.1      MCHC 34.7      RDW 19.8 (*)     Platelets 281      MPV 9.9      Immature Granulocytes 0.4      Gran # (ANC) 5.3      Immature Grans (Abs) 0.03      Lymph # 1.0      Mono # 0.9      Eos # 0.2      Baso # 0.03      nRBC 0      Gran % " 71.5      Lymph % 13.3 (*)     Mono % 11.8      Eosinophil % 2.6      Basophil % 0.4      Differential Method Automated     COMPREHENSIVE METABOLIC PANEL - Abnormal    Sodium 141      Potassium 3.9      Chloride 104      CO2 22 (*)     Glucose 106      BUN 40 (*)     Creatinine 1.2      Calcium 8.5 (*)     Total Protein 8.2      Albumin 1.9 (*)     Total Bilirubin 2.5 (*)     Alkaline Phosphatase 254 (*)     AST 31      ALT 25      eGFR >60      Anion Gap 15     MAGNESIUM    Magnesium 2.4     PHOSPHORUS    Phosphorus 3.4          All Lab Results:  Results for orders placed or performed during the hospital encounter of 11/26/24   Magnesium    Collection Time: 11/26/24  5:16 PM   Result Value Ref Range    Magnesium 2.4 1.6 - 2.6 mg/dL   Phosphorus    Collection Time: 11/26/24  5:16 PM   Result Value Ref Range    Phosphorus 3.4 2.7 - 4.5 mg/dL   CBC auto differential    Collection Time: 11/26/24  6:32 PM   Result Value Ref Range    WBC 7.37 3.90 - 12.70 K/uL    RBC 3.47 (L) 4.60 - 6.20 M/uL    Hemoglobin 10.1 (L) 14.0 - 18.0 g/dL    Hematocrit 29.1 (L) 40.0 - 54.0 %    MCV 84 82 - 98 fL    MCH 29.1 27.0 - 31.0 pg    MCHC 34.7 32.0 - 36.0 g/dL    RDW 19.8 (H) 11.5 - 14.5 %    Platelets 281 150 - 450 K/uL    MPV 9.9 9.2 - 12.9 fL    Immature Granulocytes 0.4 0.0 - 0.5 %    Gran # (ANC) 5.3 1.8 - 7.7 K/uL    Immature Grans (Abs) 0.03 0.00 - 0.04 K/uL    Lymph # 1.0 1.0 - 4.8 K/uL    Mono # 0.9 0.3 - 1.0 K/uL    Eos # 0.2 0.0 - 0.5 K/uL    Baso # 0.03 0.00 - 0.20 K/uL    nRBC 0 0 /100 WBC    Gran % 71.5 38.0 - 73.0 %    Lymph % 13.3 (L) 18.0 - 48.0 %    Mono % 11.8 4.0 - 15.0 %    Eosinophil % 2.6 0.0 - 8.0 %    Basophil % 0.4 0.0 - 1.9 %    Differential Method Automated    Comprehensive metabolic panel    Collection Time: 11/26/24  6:32 PM   Result Value Ref Range    Sodium 141 136 - 145 mmol/L    Potassium 3.9 3.5 - 5.1 mmol/L    Chloride 104 95 - 110 mmol/L    CO2 22 (L) 23 - 29 mmol/L    Glucose 106 70 - 110 mg/dL     BUN 40 (H) 8 - 23 mg/dL    Creatinine 1.2 0.5 - 1.4 mg/dL    Calcium 8.5 (L) 8.7 - 10.5 mg/dL    Total Protein 8.2 6.0 - 8.4 g/dL    Albumin 1.9 (L) 3.5 - 5.2 g/dL    Total Bilirubin 2.5 (H) 0.1 - 1.0 mg/dL    Alkaline Phosphatase 254 (H) 40 - 150 U/L    AST 31 10 - 40 U/L    ALT 25 10 - 44 U/L    eGFR >60 >60 mL/min/1.73 m^2    Anion Gap 15 8 - 16 mmol/L   Comprehensive Metabolic Panel (CMP)    Collection Time: 11/27/24  6:23 AM   Result Value Ref Range    Sodium 143 136 - 145 mmol/L    Potassium 3.8 3.5 - 5.1 mmol/L    Chloride 108 95 - 110 mmol/L    CO2 24 23 - 29 mmol/L    Glucose 88 70 - 110 mg/dL    BUN 38 (H) 8 - 23 mg/dL    Creatinine 1.1 0.5 - 1.4 mg/dL    Calcium 8.2 (L) 8.7 - 10.5 mg/dL    Total Protein 7.2 6.0 - 8.4 g/dL    Albumin 1.6 (L) 3.5 - 5.2 g/dL    Total Bilirubin 2.1 (H) 0.1 - 1.0 mg/dL    Alkaline Phosphatase 227 (H) 40 - 150 U/L    AST 29 10 - 40 U/L    ALT 21 10 - 44 U/L    eGFR >60 >60 mL/min/1.73 m^2    Anion Gap 11 8 - 16 mmol/L   CBC with Automated Differential    Collection Time: 11/27/24  6:23 AM   Result Value Ref Range    WBC 7.15 3.90 - 12.70 K/uL    RBC 3.23 (L) 4.60 - 6.20 M/uL    Hemoglobin 9.5 (L) 14.0 - 18.0 g/dL    Hematocrit 27.5 (L) 40.0 - 54.0 %    MCV 85 82 - 98 fL    MCH 29.4 27.0 - 31.0 pg    MCHC 34.5 32.0 - 36.0 g/dL    RDW 19.9 (H) 11.5 - 14.5 %    Platelets 267 150 - 450 K/uL    MPV 10.1 9.2 - 12.9 fL    Immature Granulocytes 0.3 0.0 - 0.5 %    Gran # (ANC) 5.1 1.8 - 7.7 K/uL    Immature Grans (Abs) 0.02 0.00 - 0.04 K/uL    Lymph # 1.1 1.0 - 4.8 K/uL    Mono # 0.9 0.3 - 1.0 K/uL    Eos # 0.0 0.0 - 0.5 K/uL    Baso # 0.01 0.00 - 0.20 K/uL    nRBC 1 (A) 0 /100 WBC    Gran % 71.5 38.0 - 73.0 %    Lymph % 15.2 (L) 18.0 - 48.0 %    Mono % 12.6 4.0 - 15.0 %    Eosinophil % 0.3 0.0 - 8.0 %    Basophil % 0.1 0.0 - 1.9 %    Differential Method Automated      *Note: Due to a large number of results and/or encounters for the requested time period, some results have not been  displayed. A complete set of results can be found in Results Review.         Imaging Results:  Imaging Results              CT Abdomen Pelvis With IV Contrast NO Oral Contrast (Final result)  Result time 11/26/24 22:08:56      Final result by Mar Ruth MD (11/26/24 22:08:56)                   Impression:      Large indistinct insinuating presumed primary mass left upper quadrant likely enlarged difficult to measure with possible associated contained perforation.  Omental/peritoneal carcinomatosis appears progressed although degraded imaging with motion.    Indistinct hepatic metastases similar to increasing    No hydronephrosis    Urinary bladder decompressed    Small volume ascites    Bilateral iliac artery aneurysms similar    No obstructive bowel findings    No adverse bony finding      Electronically signed by: Mar Ruth  Date:    11/26/2024  Time:    22:08               Narrative:    EXAMINATION:  CT ABDOMEN PELVIS WITH IV CONTRAST    CLINICAL HISTORY:  Nausea/vomiting;has GIST early satiety causing severe decrease in solid and liquid intake.;    TECHNIQUE:  Low dose axial images, sagittal and coronal reformations were obtained from the lung bases to the pubic symphysis following the IV administration of 100 mL of Omnipaque 350 iterative technique automated exposure control    COMPARISON:  October 2024                                       The EKG was ordered, reviewed, and independently interpreted by the ED provider.    No EKG Ordered.     The Emergency Provider reviewed the vital signs and test results, which are outlined above.     ED Discussion       6:32 PM: Discussed case with  ([facility]). Dr. Garcia agrees with current care and management of pt and accepts admission.   Admitting Service: Inpatient  Admitting Physician: Dr. Garcia  Admit to: Med Tele      ED Course as of 11/28/24 1049   Tue Nov 26, 2024   1928 CBC auto differential(!)  Chronic findings noted [CD]    1928 Comprehensive metabolic panel(!)  Chronic findings noted [CD]   1928 Phosphorus  Within normal limits [CD]   1928 Magnesium  Within normal limits [CD]      ED Course User Index  [CD] Fabio Tyler, DO     Medical Decision Making  Amount and/or Complexity of Data Reviewed  Labs: ordered. Decision-making details documented in ED Course.  Radiology: ordered.     Details: Large indistinct insinuating presumed primary mass left upper quadrant likely enlarged difficult to measure with possible associated contained perforation.  Omental/peritoneal carcinomatosis appears progressed although degraded imaging with motion.     Indistinct hepatic metastases similar to increasing     No hydronephrosis     Urinary bladder decompressed     Small volume ascites     Bilateral iliac artery aneurysms similar     No obstructive bowel findings     No adverse bony finding         Risk  Prescription drug management.  Risk Details: Differential diagnosis includes but is not limited to:  Bowel obstruction, complications secondary to mass, electrolyte abnormality                ED Medication(s):  Medications   0.9% NaCl infusion (0 mLs Intravenous Stopped 11/26/24 2000)   morphine injection 4 mg (4 mg Intravenous Given 11/26/24 1835)   ondansetron injection 4 mg (4 mg Intravenous Given 11/26/24 1835)   iohexoL (OMNIPAQUE 350) injection 75 mL (75 mLs Intravenous Given 11/26/24 2113)       Discharge Medication List as of 11/27/2024  2:40 PM           Contact information for follow-up providers       Joe Bentley MD Follow up.    Specialty: Family Medicine  Why: As needed  Contact information:  [address] [facility] [address] [place] [address] [phone]                       Contact information for after-discharge care       Destination       [facility] .    Service: Inpatient Hospice  Contact information:  [address]  [facility] [address] [phone]                                        Scribe Attestation:   Scribe #1: I performed the above scribed service and the documentation accurately describes the services I performed. I attest to the accuracy of the note.     Attending:   Physician Attestation Statement for Scribe #1: I, Fabio Tyler DO, personally performed the services described in this documentation, as scribed by Andreas Sue, in my presence, and it is both accurate and complete.           Clinical Impression       ICD-10-CM ICD-9-CM   1. Malnutrition, unspecified type  E46 263.9   2. Chest pain  R07.9 786.50       Disposition:   Disposition: Placed in Observation  Condition: Stable         Fabio Tyler DO  11/28/24 1052

## 2024-11-27 NOTE — H&P
[facility] - Emergency Dept.  [facility] Medicine  History & Physical    Patient Name: Agusto Juarez  MRN: [id number]  Patient Class: OP- Observation  Admission Date: 11/26/2024  Attending Physician: Zane Garcia MD   Primary Care Provider: Joe Bentley MD         Patient information was obtained from patient, past medical records, and ER records.     Subjective:     Principal Problem:Generalized weakness    Chief Complaint:   Chief Complaint   Patient presents with    Other     Pt undergoing chemotherapy was sent by Dr. Mesa for possible hydration, PEG tube, and admit.        HPI: Agusto Juarez is a 64 y.o. male with a PMH  has a past medical history of BPH (benign prostatic hypertrophy), Hyperlipidemia, Hypertension, Malignant gastrointestinal stromal tumor (05/05/2021), and Villous adenoma of colon (02/07/2024). who presented to the ED directed by primary oncologist Dr. Mesa for admission to receive IVFs for treatment of dehydration and PEG versus J-tube placement for supplemental nutrition given decreased p.o. intake, early satiety, progressive weight loss, and malnutrition.  Patient currently undergoing chemotherapy for treatment of GIST and has had decreased p.o. intake with persistent weight loss over the past 2 weeks. During his outpatient visit with Dr. Mesa, the discussion was held to move forward with feeding tube placement to help aid in nutrition to increase his weight and strength as patient would likely declined rapidly without it. Patient currently complaining of generalized weakness as well as unchanged abdominal pain and fullness described as pressure-like in sensation, currently rated 7/10 in severity with no known alleviating or aggravating factors noted.  He denied endorsing any lightheadedness, dizziness, headache, visual changes, fever, chills, sweats, nausea, vomiting, chest pain, dysuria, hematuria, melena, hematochezia, diarrhea, or onset neurological deficits.  Patient does reported  associated shortness of breath upon lying flat due to his abdomen pressing up on his diaphragm as well as intermittent confusion/loss of thought process but reported all other review of systems negative except as noted above.  Initial workup in the ED revealed patient to be afebrile without leukocytosis, albumin 1.9, T bili 2.5, alk phosphatase 254, with remaining CBC and CMP near baseline.  CT abdomen/pelvis obtained and pending read. Patient in agreement with treatment plan and was admitted to Hospital Medicine under observation while awaiting evaluation by Surgical Oncology for feeding tube placement.      PCP: Joe Bentley       Past Medical History:   Diagnosis Date    BPH (benign prostatic hypertrophy)     Hyperlipidemia     Hypertension     Malignant gastrointestinal stromal tumor 05/05/2021    Villous adenoma of colon 02/07/2024    high grade dysplasia       Past Surgical History:   Procedure Laterality Date    COLONOSCOPY N/A 9/26/2023    Procedure: COLONOSCOPY;  Surgeon: Luis Choi MD;  Location: [facility];  Service: Endoscopy;  Laterality: N/A;    COLONOSCOPY N/A 2/7/2024    Procedure: COLONOSCOPY;  Surgeon: Rikki Shabazz MD;  [facility]  Service: Endoscopy;  Laterality: N/A;  12/18 portal instr- suprep-colonoscopy with EMR with me in the next 4-6 weeks.[facility] only.45 minute case is okay. Harika-tt  1/31/24- precall complete - ER[facility]    COLONOSCOPY N/A 8/20/2024    Procedure: COLONOSCOPY;  Surgeon: Rikki Shabazz MD;  Location: [facility] ([address] [place] [address]);  Service: Endoscopy;  Laterality: N/A;  6/14 portal-suprep- colonoscopy with me in 6 months to follow-up prior EMR. Can be scheduled as a 45min case. [facility] only. harika-tt  8/13 SWP PRECALL COMPLETE-RT    ENDOSCOPIC ULTRASOUND OF UPPER GASTROINTESTINAL TRACT N/A 4/30/2021    Procedure: ULTRASOUND, UPPER GI TRACT, ENDOSCOPIC;  Surgeon: Rikki Shabazz MD;  Location: [facility];  Service: Endoscopy;  Laterality: N/A;  Rapid COVID prior - last  minute addition to schedule - ttr    ESOPHAGOGASTRODUODENOSCOPY N/A 4/30/2021    Procedure: EGD (ESOPHAGOGASTRODUODENOSCOPY);  Surgeon: Rikki Shabazz MD;  Location: [facility];  Service: Endoscopy;  Laterality: N/A;  EGD/EUS with biopsy within a week is fine. 45min case with me OK. Any campus. -Dr. Shabazz       Review of patient's allergies indicates:  No Known Allergies    No current facility-administered medications on file prior to encounter.     Current Outpatient Medications on File Prior to Encounter   Medication Sig    acetaminophen (TYLENOL) 325 MG tablet Take 325 mg by mouth every 6 (six) hours as needed for Pain.    amLODIPine (NORVASC) 10 MG tablet Take 1 tablet (10 mg total) by mouth once daily.    baclofen (LIORESAL) 20 MG tablet Take 1 tablet (20 mg total) by mouth 3 (three) times daily.    buPROPion (WELLBUTRIN XL) 150 MG TB24 tablet Take 1 tablet (150 mg total) by mouth once daily.    carvediloL (COREG) 25 MG tablet Take 1 tablet (25 mg total) by mouth 2 (two) times daily.    docusate sodium (COLACE) 100 MG capsule Take 1 capsule (100 mg total) by mouth 2 (two) times daily.    HYDROcodone-acetaminophen (NORCO)  mg per tablet Take 1 tablet by mouth every 4 (four) hours as needed for Pain.    lisinopriL (PRINIVIL,ZESTRIL) 40 MG tablet Take 1 tablet (40 mg total) by mouth once daily.    megestroL (MEGACE) 400 mg/10 mL (40 mg/mL) Susp Take 5 mLs (200 mg total) by mouth once daily.    multivitamin capsule Take 1 capsule by mouth once daily.    omeprazole (PRILOSEC) 20 MG capsule Take 1 capsule (20 mg total) by mouth once daily.    omeprazole magnesium 10 mg SuDR Take 20 mg by mouth once daily.    ondansetron (ZOFRAN-ODT) 8 MG TbDL Take 1 tablet (8 mg total) by mouth every 8 (eight) hours as needed (nausea).    oxyCODONE (ROXICODONE) 10 mg Tab immediate release tablet Take 1 tablet (10 mg total) by mouth every 8 (eight) hours as needed for Pain.    oxyCODONE (ROXICODONE) 10 mg Tab immediate release  tablet Take 1 tablet (10 mg total) by mouth every 4 (four) hours as needed for Pain.    potassium chloride SA (K-DUR,KLOR-CON) 20 MEQ tablet Take 1 tablet by mouth once daily    pravastatin (PRAVACHOL) 10 MG tablet Take 1 tablet by mouth once daily    regorafenib (STIVARGA) 40 mg Tab Take 2 tablets (80 mg) once daily for 21 days, then off for 7 days, every 28-day cycle (3 weeks on, 1 week off).    tobramycin-dexAMETHasone 0.3-0.1% (TOBRADEX) 0.3-0.1 % DrpS INSTILL 1 DROP INTO LEFT EYE 4 TIMES DAILY     Family History       Problem Relation (Age of Onset)    Hypertension Other    Lung cancer Father (60 - 69)          Tobacco Use    Smoking status: Some Days     Current packs/day: 0.50     Average packs/day: 0.5 packs/day for 46.6 years (23.3 ttl pk-yrs)     Types: Cigarettes     Start date: 5/1/1978    Smokeless tobacco: Never   Substance and Sexual Activity    Alcohol use: Yes     Comment: occasionallly    Drug use: No    Sexual activity: Yes     Review of Systems   All other systems reviewed and are negative.    Objective:     Vital Signs (Most Recent):  Temp: 97.5 °F (36.4 °C) (11/26/24 1644)  Pulse: 86 (11/26/24 1900)  Resp: 18 (11/26/24 1900)  BP: (!) 151/85 (11/26/24 1900)  SpO2: 99 % (11/26/24 1900) Vital Signs (24h Range):  Temp:  [97.5 °F (36.4 °C)] 97.5 °F (36.4 °C)  Pulse:  [] 86  Resp:  [16-18] 18  SpO2:  [99 %-100 %] 99 %  BP: (133-151)/(78-85) 151/85        There is no height or weight on file to calculate BMI.     Physical Exam  Vitals reviewed.   Constitutional:       General: He is not in acute distress.     Appearance: He is ill-appearing. He is not toxic-appearing or diaphoretic.      Comments: Cachectic-appearing   HENT:      Head: Normocephalic and atraumatic.      Comments: Temporal wasting noted     Right Ear: External ear normal.      Left Ear: External ear normal.      Nose: Nose normal. No congestion or rhinorrhea.      Mouth/Throat:      Mouth: Mucous membranes are dry.      Pharynx:  Oropharynx is clear. No oropharyngeal exudate or posterior oropharyngeal erythema.   Eyes:      General: No scleral icterus.     Extraocular Movements: Extraocular movements intact.      Conjunctiva/sclera: Conjunctivae normal.      Pupils: Pupils are equal, round, and reactive to light.   Neck:      Vascular: No carotid bruit.   Cardiovascular:      Rate and Rhythm: Normal rate and regular rhythm.      Pulses: Normal pulses.      Heart sounds: Normal heart sounds. No murmur heard.     No friction rub. No gallop.   Pulmonary:      Effort: Pulmonary effort is normal. No respiratory distress.      Breath sounds: Normal breath sounds. No stridor. No wheezing, rhonchi or rales.   Chest:      Chest wall: No tenderness.   Abdominal:      General: There is distension.      Palpations: There is no mass.      Tenderness: There is abdominal tenderness. There is no guarding or rebound.      Hernia: No hernia is present.      Comments: Abdomen diffusely distended with TTP noted throughout greatest in epigastric region without evidence of guarding or rebound tenderness noted.  Hypoactive bowel sounds noted throughout.   Musculoskeletal:         General: No swelling, tenderness, deformity or signs of injury. Normal range of motion.      Cervical back: Normal range of motion and neck supple. No rigidity or tenderness.      Right lower leg: No edema.      Left lower leg: No edema.   Lymphadenopathy:      Cervical: No cervical adenopathy.   Skin:     General: Skin is warm and dry.      Capillary Refill: Capillary refill takes less than 2 seconds.      Coloration: Skin is not jaundiced or pale.      Findings: No bruising, erythema, lesion or rash.   Neurological:      General: No focal deficit present.      Mental Status: He is alert and oriented to person, place, and time. Mental status is at baseline.      Cranial Nerves: No cranial nerve deficit.      Sensory: No sensory deficit.      Motor: No weakness.      Coordination:  Coordination normal.   Psychiatric:         Mood and Affect: Mood normal.         Behavior: Behavior normal.         Thought Content: Thought content normal.         Judgment: Judgment normal.              CRANIAL NERVES     CN III, IV, VI   Pupils are equal, round, and reactive to light.       Significant Labs: All pertinent labs within the past 24 hours have been reviewed.    Significant Imaging: I have reviewed all pertinent imaging results/findings within the past 24 hours.    LABS:  Recent Results (from the past 24 hours)   CBC Auto Differential    Collection Time: 11/26/24 12:15 PM   Result Value Ref Range    WBC 8.03 3.90 - 12.70 K/uL    RBC 3.73 (L) 4.60 - 6.20 M/uL    Hemoglobin 10.7 (L) 14.0 - 18.0 g/dL    Hematocrit 31.7 (L) 40.0 - 54.0 %    MCV 85 82 - 98 fL    MCH 28.7 27.0 - 31.0 pg    MCHC 33.8 32.0 - 36.0 g/dL    RDW 20.2 (H) 11.5 - 14.5 %    Platelets 289 150 - 450 K/uL    MPV 9.8 9.2 - 12.9 fL    Immature Granulocytes 0.4 0.0 - 0.5 %    Gran # (ANC) 6.1 1.8 - 7.7 K/uL    Immature Grans (Abs) 0.03 0.00 - 0.04 K/uL    Lymph # 0.9 (L) 1.0 - 4.8 K/uL    Mono # 0.8 0.3 - 1.0 K/uL    Eos # 0.1 0.0 - 0.5 K/uL    Baso # 0.04 0.00 - 0.20 K/uL    nRBC 1 (A) 0 /100 WBC    Gran % 76.1 (H) 38.0 - 73.0 %    Lymph % 11.7 (L) 18.0 - 48.0 %    Mono % 10.3 4.0 - 15.0 %    Eosinophil % 1.0 0.0 - 8.0 %    Basophil % 0.5 0.0 - 1.9 %    Differential Method Automated    Magnesium    Collection Time: 11/26/24  5:16 PM   Result Value Ref Range    Magnesium 2.4 1.6 - 2.6 mg/dL   Phosphorus    Collection Time: 11/26/24  5:16 PM   Result Value Ref Range    Phosphorus 3.4 2.7 - 4.5 mg/dL   CBC auto differential    Collection Time: 11/26/24  6:32 PM   Result Value Ref Range    WBC 7.37 3.90 - 12.70 K/uL    RBC 3.47 (L) 4.60 - 6.20 M/uL    Hemoglobin 10.1 (L) 14.0 - 18.0 g/dL    Hematocrit 29.1 (L) 40.0 - 54.0 %    MCV 84 82 - 98 fL    MCH 29.1 27.0 - 31.0 pg    MCHC 34.7 32.0 - 36.0 g/dL    RDW 19.8 (H) 11.5 - 14.5 %     Platelets 281 150 - 450 K/uL    MPV 9.9 9.2 - 12.9 fL    Immature Granulocytes 0.4 0.0 - 0.5 %    Gran # (ANC) 5.3 1.8 - 7.7 K/uL    Immature Grans (Abs) 0.03 0.00 - 0.04 K/uL    Lymph # 1.0 1.0 - 4.8 K/uL    Mono # 0.9 0.3 - 1.0 K/uL    Eos # 0.2 0.0 - 0.5 K/uL    Baso # 0.03 0.00 - 0.20 K/uL    nRBC 0 0 /100 WBC    Gran % 71.5 38.0 - 73.0 %    Lymph % 13.3 (L) 18.0 - 48.0 %    Mono % 11.8 4.0 - 15.0 %    Eosinophil % 2.6 0.0 - 8.0 %    Basophil % 0.4 0.0 - 1.9 %    Differential Method Automated    Comprehensive metabolic panel    Collection Time: 11/26/24  6:32 PM   Result Value Ref Range    Sodium 141 136 - 145 mmol/L    Potassium 3.9 3.5 - 5.1 mmol/L    Chloride 104 95 - 110 mmol/L    CO2 22 (L) 23 - 29 mmol/L    Glucose 106 70 - 110 mg/dL    BUN 40 (H) 8 - 23 mg/dL    Creatinine 1.2 0.5 - 1.4 mg/dL    Calcium 8.5 (L) 8.7 - 10.5 mg/dL    Total Protein 8.2 6.0 - 8.4 g/dL    Albumin 1.9 (L) 3.5 - 5.2 g/dL    Total Bilirubin 2.5 (H) 0.1 - 1.0 mg/dL    Alkaline Phosphatase 254 (H) 40 - 150 U/L    AST 31 10 - 40 U/L    ALT 25 10 - 44 U/L    eGFR >60 >60 mL/min/1.73 m^2    Anion Gap 15 8 - 16 mmol/L       RADIOLOGY  X-Ray Chest AP Portable    Result Date: 11/15/2024  EXAMINATION: XR CHEST AP PORTABLE CLINICAL HISTORY: Chest Pain; FINDINGS: Single view of the chest.  Comparison 10/31/2024 Cardiac silhouette is normal.  The lungs demonstrate no evidence of active disease.  No evidence of pleural effusion or pneumothorax.  Bones appear intact.     No acute process seen. Electronically signed by: Riccardo Han MD Date:    11/15/2024 Time:    13:39    X-Ray Abdomen Flat And Erect    Result Date: 11/4/2024  EXAMINATION: XR ABDOMEN FLAT AND ERECT CLINICAL HISTORY: Constipation, unspecified TECHNIQUE: Flat and erect AP views of the abdomen were performed. COMPARISON: None FINDINGS: No air-fluid levels on upright radiograph.  No dilated loops of small bowel on supine radiograph. Borderline findings for constipation. No calculi  overlie the renal shadows. Osseous structures are grossly intact.  Lung bases are clear.     No acute abnormality identified.  Borderline findings for constipation. Electronically signed by: Abbe Silverman Date:    11/04/2024 Time:    11:39    X-Ray Abdomen Flat And Erect    Result Date: 11/1/2024  EXAMINATION: XR ABDOMEN FLAT AND ERECT CLINICAL HISTORY: Hiccough. COMPARISON: CT abdomen pelvis from 10/18/2024 FINDINGS: Flat and erect views of the abdomen were obtained.  The bowel gas pattern is nonobstructive.  No visible free air is appreciated.  There are extensive calcifications projecting over the upper to mid abdomen likely related to evidence of partially calcified mass noted on recent CT from 10/18/2024.  The visualized lung bases appear grossly clear.  Osseous degenerative changes are seen.  No evidence of acute displaced fracture or dislocation is radiographically conspicuous.     1. The bowel gas pattern is non obstructive. Electronically signed by: David Lovett MD Date:    11/01/2024 Time:    07:31    X-Ray Chest AP Portable    Result Date: 10/31/2024  EXAMINATION: XR CHEST AP PORTABLE CLINICAL HISTORY: Hiccough. COMPARISON: Chest x-ray 05/17/2022 FINDINGS: Frontal view of the chest was obtained.  The cardiac silhouette is within normal limits for size. The aorta is partially calcified.  Pulmonary hyperinflation is noted.  No evidence of lobar type consolidation, visible pneumothorax, or pleural effusion is seen.  No acute displaced fracture is visualized.     1. No evidence of lobar type consolidation or acute cardiac decompensation is appreciated. Electronically signed by: David Lovett MD Date:    10/31/2024 Time:    16:54      EKG    MICROBIOLOGY    [facility]    Assessment/Plan:     * Generalized weakness    Generalized abdominal pain    Dehydration  Patient presented with worsening generalized weakness in setting of decreased p.o. intake and early satiety.  Patient with known chest tumor currently on active  chemotherapy followed by Hematology/Oncology outpatient presented for admission to undergo PEG versus J-tube placement for nutritional supplementation.  Patient remains afebrile without leukocytosis with remaining laboratory workup near baseline.  CT abdomen/pelvis obtained in ordered per Oncology recommendations.  Surgical oncology consulted for feeding tube placement.  Plan:  -NPO  -Continue current pain regimen, titrate as needed  -Bowel regimen  -Bedrest  -Continue IVFs  -Antiemetics prn  -Tylenol as needed for fever   -f/u CT abdomen/pelvis  -f/u surgical oncology       Protein-calorie malnutrition  Nutrition consulted. Most recent weight and BMI monitored-     Measurements:  Wt Readings from Last 1 Encounters:   11/26/24 63.8 kg (140 lb 10.5 oz)   Body mass index is 21.39 kg/m².    Patient awaiting screening and assessment by RD.    Malnutrition Type:  Context:    Level:      Malnutrition Characteristic Summary:       Interventions/Recommendations (treatment strategy):       Hypertension  Chronic, controlled.  Latest blood pressure and vitals reviewed-   Temp:  [97.5 °F (36.4 °C)-98.6 °F (37 °C)]   Pulse:  []   Resp:  [16-18]   BP: (133-151)/(78-85)   SpO2:  [96 %-100 %] .   Home meds for hypertension were reviewed and noted below.   Hypertension Medications               amLODIPine (NORVASC) 10 MG tablet Take 1 tablet (10 mg total) by mouth once daily.    carvediloL (COREG) 25 MG tablet Take 1 tablet (25 mg total) by mouth 2 (two) times daily.    lisinopriL (PRINIVIL,ZESTRIL) 40 MG tablet Take 1 tablet (40 mg total) by mouth once daily.     While in the hospital, will manage blood pressure as follows; Continue home antihypertensive regimen    Will utilize p.r.n. blood pressure medication only if patient's blood pressure greater than  180/110 and he develops symptoms such as worsening chest pain or shortness of breath.      Iron deficiency anemia due to chronic blood loss  Anemia is likely due to Iron  deficiency, chronic disease due to Malignancy, and drug toxicity from chemotherapy . Most recent hemoglobin and hematocrit are listed below.  Recent Labs     11/26/24  1215 11/26/24  1832   HGB 10.7* 10.1*   HCT 31.7* 29.1*   Plan  -Monitor serial CBC: Daily  -Transfuse PRBC if patient becomes hemodynamically unstable, symptomatic or H/H drops below 7/21.  -Patient has not received any PRBC transfusions to date  -Patient's anemia is currently stable      Hyperlipidemia  Patient is chronically on statin.will not continue for now. Last Lipid Panel:   Lab Results   Component Value Date    CHOL 103 (L) 05/17/2024    HDL 45 05/17/2024    LDLCALC 48.6 (L) 05/17/2024    TRIG 47 05/17/2024    CHOLHDL 43.7 05/17/2024   Plan:  -Continue home medication when not npo      Immunodeficiency due to chemotherapy    Malignant gastrointestinal stromal tumor  Patient currently follows Dr. Dee and Dr. Mesa from Hematology/Oncology and currently on active chemotherapy.  Patient admitted for PEG versus J-tube placement for malnutrition.  Plan:  -f/u surgical oncology for feeding tube placement  -nutrition/dietary consulted  -optimize pain control   -f/u oncology outpatient as directed        VTE Risk Mitigation (From admission, onward)           Ordered     Reason for No Pharmacological VTE Prophylaxis  Once        Question:  Reasons:  Answer:  Physician Provided (leave comment)  Comment:  pending procedure    11/26/24 1929     IP VTE HIGH RISK PATIENT  Once         11/26/24 1929     Place sequential compression device  Until discontinued         11/26/24 1929                  //Core Measures   -DVT proph: SCDs, withholding anticoagulation pending surgical intervention   -Code status: Full    -Surrogate: spouse       Components of this note were documented using a voice recognition system and are subject to errors not corrected at the time the document was proof read. Please contact the author for any clarifications.       On  11/26/2024, patient should be placed in hospital observation services under my care.       Zane Garcia MD  Department of Hospital Medicine  '[place] - Emergency Dept.

## 2024-11-27 NOTE — PROGRESS NOTES
[facility] Medicine  Progress Note    Patient Name: Agusto Juarez  MRN: [id number]  Patient Class: OP- Observation   Admission Date: 11/26/2024  Length of Stay: 0 days  Attending Physician: Jean-Claude Lopes MD  Primary Care Provider: Joe Bentley MD        Subjective:     Principal Problem:Generalized weakness        HPI:  Agusto Juarez is a 64 y.o. male with a PMH  has a past medical history of BPH (benign prostatic hypertrophy), Hyperlipidemia, Hypertension, Malignant gastrointestinal stromal tumor (05/05/2021), and Villous adenoma of colon (02/07/2024). who presented to the ED directed by primary oncologist Dr. Mesa for admission to receive IVFs for treatment of dehydration and PEG versus J-tube placement for supplemental nutrition given decreased p.o. intake, early satiety, progressive weight loss, and malnutrition.  Patient currently undergoing chemotherapy for treatment of GIST and has had decreased p.o. intake with persistent weight loss over the past 2 weeks. During his outpatient visit with Dr. Mesa, the discussion was held to move forward with feeding tube placement to help aid in nutrition to increase his weight and strength as patient would likely declined rapidly without it. Patient currently complaining of generalized weakness as well as unchanged abdominal pain and fullness described as pressure-like in sensation, currently rated 7/10 in severity with no known alleviating or aggravating factors noted.  He denied endorsing any lightheadedness, dizziness, headache, visual changes, fever, chills, sweats, nausea, vomiting, chest pain, dysuria, hematuria, melena, hematochezia, diarrhea, or onset neurological deficits.  Patient does reported associated shortness of breath upon lying flat due to his abdomen pressing up on his diaphragm as well as intermittent confusion/loss of thought process but reported all other review of systems negative except as noted above.  Initial workup in the ED  revealed patient to be afebrile without leukocytosis, albumin 1.9, T bili 2.5, alk phosphatase 254, with remaining CBC and CMP near baseline.  CT abdomen/pelvis obtained and pending read. Patient in agreement with treatment plan and was admitted to Hospital Medicine under observation while awaiting evaluation by Surgical Oncology for feeding tube placement.      PCP: Joe Bentley       Overview/Hospital Course:    11/27/24  NAEON, patient reports abdominal soreness, weight loss, fatigue  Wife at bedside reports decreased PO intake, progressive weight loss.  Patient admitted for failure to thrive  CT A/P with IV contrast noted omental/peritoneal, and hepatic mets; LUQ primary mass with possbile associated contained perforation.  Currently NPO except for meds.  Pain appears controlled with current PRN regimen.  Surgical Oncology consulted for PEG tube consideration.  Updated patient's wife at bedside      Review of Systems   All other systems reviewed and are negative.    Objective:     Vital Signs (Most Recent):  Temp: 98.2 °F (36.8 °C) (11/27/24 0828)  Pulse: 84 (11/27/24 1121)  Resp: 16 (11/27/24 0828)  BP: 139/85 (11/27/24 0828)  SpO2: 96 % (11/27/24 0828) Vital Signs (24h Range):  Temp:  [97.5 °F (36.4 °C)-98.6 °F (37 °C)] 98.2 °F (36.8 °C)  Pulse:  [] 84  Resp:  [16-18] 16  SpO2:  [95 %-100 %] 96 %  BP: (133-151)/(78-93) 139/85     Weight: 63.8 kg (140 lb 10.5 oz)  Body mass index is 21.39 kg/m².    Intake/Output Summary (Last 24 hours) at 11/27/2024 1141  Last data filed at 11/27/2024 0653  Gross per 24 hour   Intake 504.72 ml   Output 1100 ml   Net -595.28 ml         Physical Exam  Constitutional:       General: He is not in acute distress.     Comments: Ill appearing  Frail, cachexia   Cardiovascular:      Rate and Rhythm: Normal rate and regular rhythm.      Heart sounds: No murmur heard.  Pulmonary:      Effort: Pulmonary effort is normal. No respiratory distress.      Breath sounds: Normal  breath sounds. No wheezing.   Abdominal:      General: There is distension.      Palpations: Abdomen is soft.      Tenderness: There is abdominal tenderness.   Neurological:      Mental Status: He is alert.             Significant Labs: All pertinent labs within the past 24 hours have been reviewed.  Recent Lab Results         11/27/24  0623   11/26/24  1832   11/26/24  1716   11/26/24  1215        Albumin 1.6   1.9     1.9          254     264       ALT 21   25     27       Anion Gap 11   15     14       AST 29   31     50       Baso # 0.01   0.03     0.04       Basophil % 0.1   0.4     0.5       BILIRUBIN TOTAL 2.1  Comment: For infants and newborns, interpretation of results should be based  on gestational age, weight and in agreement with clinical  observations.    Premature Infant recommended reference ranges:  Up to 24 hours.............<8.0 mg/dL  Up to 48 hours............<12.0 mg/dL  3-5 days..................<15.0 mg/dL  6-29 days.................<15.0 mg/dL     2.5  Comment: For infants and newborns, interpretation of results should be based  on gestational age, weight and in agreement with clinical  observations.    Premature Infant recommended reference ranges:  Up to 24 hours.............<8.0 mg/dL  Up to 48 hours............<12.0 mg/dL  3-5 days..................<15.0 mg/dL  6-29 days.................<15.0 mg/dL       2.5  Comment: For infants and newborns, interpretation of results should be based  on gestational age, weight and in agreement with clinical  observations.    Premature Infant recommended reference ranges:  Up to 24 hours.............<8.0 mg/dL  Up to 48 hours............<12.0 mg/dL  3-5 days..................<15.0 mg/dL  6-29 days.................<15.0 mg/dL         BUN 38   40     36       Calcium 8.2   8.5     8.7       Chloride 108   104     102       CO2 24   22     25       Creatinine 1.1   1.2     1.2       Differential Method Automated   Automated     Automated       eGFR  >60   >60     >60.0       Eos # 0.0   0.2     0.1       Eos % 0.3   2.6     1.0       Glucose 88   106     105       Gran # (ANC) 5.1   5.3     6.1       Gran % 71.5   71.5     76.1       Hematocrit 27.5   29.1     31.7       Hemoglobin 9.5   10.1     10.7       Immature Grans (Abs) 0.02  Comment: Mild elevation in immature granulocytes is non specific and   can be seen in a variety of conditions including stress response,   acute inflammation, trauma and pregnancy. Correlation with other   laboratory and clinical findings is essential.     0.03  Comment: Mild elevation in immature granulocytes is non specific and   can be seen in a variety of conditions including stress response,   acute inflammation, trauma and pregnancy. Correlation with other   laboratory and clinical findings is essential.       0.03  Comment: Mild elevation in immature granulocytes is non specific and   can be seen in a variety of conditions including stress response,   acute inflammation, trauma and pregnancy. Correlation with other   laboratory and clinical findings is essential.         Immature Granulocytes 0.3   0.4     0.4       Lymph # 1.1   1.0     0.9       Lymph % 15.2   13.3     11.7       Magnesium      2.4         MCH 29.4   29.1     28.7       MCHC 34.5   34.7     33.8       MCV 85   84     85       Mono # 0.9   0.9     0.8       Mono % 12.6   11.8     10.3       MPV 10.1   9.9     9.8       nRBC 1   0     1       Phosphorus Level     3.4         Platelet Count 267   281     289       Potassium 3.8   3.9     4.0       PROTEIN TOTAL 7.2   8.2     8.3       RBC 3.23   3.47     3.73       RDW 19.9   19.8     20.2       Sodium 143   141     141       TSH       1.511       WBC 7.15   7.37     8.03               Significant Imaging: I have reviewed all pertinent imaging results/findings within the past 24 hours.    CT Abdomen Pelvis With IV Contrast NO Oral Contrast   Final Result      Large indistinct insinuating presumed primary mass  left upper quadrant likely enlarged difficult to measure with possible associated contained perforation.  Omental/peritoneal carcinomatosis appears progressed although degraded imaging with motion.      Indistinct hepatic metastases similar to increasing      No hydronephrosis      Urinary bladder decompressed      Small volume ascites      Bilateral iliac artery aneurysms similar      No obstructive bowel findings      No adverse bony finding         Electronically signed by: Mar Ruth   Date:    11/26/2024   Time:    22:08            Assessment/Plan:      * Generalized weakness        Generalized abdominal pain        Protein-calorie malnutrition  Nutrition consulted. Most recent weight and BMI monitored-     Measurements:  Wt Readings from Last 1 Encounters:   11/26/24 63.8 kg (140 lb 10.5 oz)   Body mass index is 21.39 kg/m².    Patient awaiting screening and assessment by RD.    Malnutrition Type:  Context:    Level:      Malnutrition Characteristic Summary:       Interventions/Recommendations (treatment strategy):       Dehydration  Patient presented with worsening generalized weakness in setting of decreased p.o. intake and early satiety.  Patient with known chest tumor currently on active chemotherapy followed by Hematology/Oncology outpatient presented for admission to undergo PEG versus J-tube placement for nutritional supplementation.  Patient remains afebrile without leukocytosis with remaining laboratory workup near baseline.  CT abdomen/pelvis obtained in ordered per Oncology recommendations.  Surgical oncology consulted for feeding tube placement.  Plan:  -NPO  -Continue current pain regimen, titrate as needed  -Bowel regimen  -Bedrest  -Continue IVFs  -Antiemetics prn  -Tylenol as needed for fever   -f/u CT abdomen/pelvis  -f/u surgical oncology       Iron deficiency anemia due to chronic blood loss  Anemia is likely due to Iron deficiency, chronic disease due to Malignancy, and drug  toxicity from chemotherapy . Most recent hemoglobin and hematocrit are listed below.  Recent Labs     11/26/24  1215 11/26/24  1832   HGB 10.7* 10.1*   HCT 31.7* 29.1*   Plan  -Monitor serial CBC: Daily  -Transfuse PRBC if patient becomes hemodynamically unstable, symptomatic or H/H drops below 7/21.  -Patient has not received any PRBC transfusions to date  -Patient's anemia is currently stable      Immunodeficiency due to chemotherapy        Malignant gastrointestinal stromal tumor  Patient currently follows Dr. Dee and Dr. Mesa from Hematology/Oncology and currently on active chemotherapy.  Patient admitted for PEG versus J-tube placement for malnutrition.  Plan:  -f/u surgical oncology for feeding tube placement  -nutrition/dietary consulted  -optimize pain control   -f/u oncology outpatient as directed      Hyperlipidemia  Patient is chronically on statin.will not continue for now. Last Lipid Panel:   Lab Results   Component Value Date    CHOL 103 (L) 05/17/2024    HDL 45 05/17/2024    LDLCALC 48.6 (L) 05/17/2024    TRIG 47 05/17/2024    CHOLHDL 43.7 05/17/2024   Plan:  -Continue home medication when not npo      Hypertension  Chronic, controlled.  Latest blood pressure and vitals reviewed-   Temp:  [97.5 °F (36.4 °C)-98.6 °F (37 °C)]   Pulse:  []   Resp:  [16-18]   BP: (133-151)/(78-85)   SpO2:  [96 %-100 %] .   Home meds for hypertension were reviewed and noted below.   Hypertension Medications               amLODIPine (NORVASC) 10 MG tablet Take 1 tablet (10 mg total) by mouth once daily.    carvediloL (COREG) 25 MG tablet Take 1 tablet (25 mg total) by mouth 2 (two) times daily.    lisinopriL (PRINIVIL,ZESTRIL) 40 MG tablet Take 1 tablet (40 mg total) by mouth once daily.     While in the hospital, will manage blood pressure as follows; Continue home antihypertensive regimen    Will utilize p.r.n. blood pressure medication only if patient's blood pressure greater than  180/110 and he develops symptoms  such as worsening chest pain or shortness of breath.        VTE Risk Mitigation (From admission, onward)           Ordered     Reason for No Pharmacological VTE Prophylaxis  Once        Question:  Reasons:  Answer:  Physician Provided (leave comment)  Comment:  pending procedure    11/26/24 1929     IP VTE HIGH RISK PATIENT  Once         11/26/24 1929     Place sequential compression device  Until discontinued         11/26/24 1929                    Discharge Planning   LESLEY:      Code Status: Full Code   Is the patient medically ready for discharge?:     Reason for patient still in hospital (select all that apply): Patient trending condition, Treatment, Consult recommendations, PT / OT recommendations, and Pending disposition  Discharge Plan A: Home Health                  Jean-Claude Lopes MD  Department of Hospital Medicine   '[facility]

## 2024-11-27 NOTE — HOSPITAL COURSE
11/27/24  JOSEPH, patient reports abdominal soreness, weight loss, fatigue  Wife at bedside reports decreased PO intake, progressive weight loss.  Patient admitted for failure to thrive  CT A/P with IV contrast noted omental/peritoneal, and hepatic mets; LUQ primary mass with possbile associated contained perforation.  Currently NPO except for meds.  Pain appears controlled with current PRN regimen.  Surgical Oncology consulted for PEG tube consideration.  Updated patient's wife at bedside  -----  This afternoon, Dr. 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  Plan to transition to home hospice

## 2024-11-27 NOTE — PLAN OF CARE
[facility] - Med Surg  Discharge Final Note    Primary Care Provider: Joe Bentley MD    Expected Discharge Date: 11/27/2024    Final Discharge Note (most recent)       Final Note - 11/27/24 1322          Final Note    Assessment Type Final Discharge Note     Anticipated Discharge Disposition Hospice/Home     Hospital Resources/Appts/Education Provided Post-Acute resouces added to AVS        Post-Acute Status    Post-Acute Authorization Hospice     Hospice Status Set-up Complete/Auth obtained     Discharge Delays None known at this time                      Follow-up providers       Joe Bentley MD   Specialty: Family Medicine   Relationship: PCP - General    [address] [facility] [address]   Phone: [phone]       Next Steps: Follow up    Instructions: As needed              After-discharge care                Destination       [facility] [place]   Service: Inpatient Hospice    [address], [address]  [facility] [address]   Phone: [phone]                             [facility] with [facility] to meet with family at bedside shortly to sign hospice consents.     Patient has no d/c needs at this time. Sw to follow up, as needed, for d/c planning purposes.

## 2024-11-27 NOTE — PLAN OF CARE
[facility]  Initial Discharge Assessment       Primary Care Provider: Joe Bentley MD    Admission Diagnosis: Chest pain [R07.9]  Malnutrition, unspecified type [E46]    Admission Date: 11/26/2024  Expected Discharge Date: Per Attending     Transition of Care Barriers: None    Payor: BLUE CROSS BLUE SHIELD / Plan: BCBS OF [employer] / Product Type: Commercial /     Extended Emergency Contact Information  Primary Emergency Contact: Michelle Juarez  Address: [address] [place], [place] [id number] [place] [address]  Home Phone: [phone]  Mobile Phone: [phone]  Relation: Spouse    Discharge Plan A: Home Health         [facility] [address] [place], [place] - [address]  [address] [facility] [address]  Phone: [phone] Fax: [phone]    [facility] [address] [place], [place] - [address]  [address] [place] [address]  Phone: [phone] Fax: [phone]    [facility] - [place], [place] - [address]  [address] [place] [address] [address]  [facility] [address]  Phone: [phone] Fax: [phone]      Initial Assessment (most recent)       Adult Discharge Assessment - 11/27/24 0852          Discharge Assessment    Assessment Type Discharge Planning Assessment     Confirmed/corrected address, phone number and insurance Yes     Confirmed Demographics Correct on Facesheet     Source of Information patient;family     Communicated LESLEY with patient/caregiver Date not available/Unable to determine     Reason For Admission generalized weakness     People in Home spouse;child(zhen), adult;grandchild(zhen)     Do you expect to return to your current living situation? Yes     Do you have help at home or someone to help you manage your care at home? Yes     Who are your caregiver(s) and their phone number(s)? spouse     Prior to hospitilization cognitive status: Alert/Oriented     Current cognitive status: Alert/Oriented      Walking or Climbing Stairs Difficulty no     Dressing/Bathing Difficulty no     Home Layout Able to live on 1st floor     Equipment Currently Used at Home none     Readmission within 30 days? No     Patient currently being followed by outpatient case management? No     Do you currently have service(s) that help you manage your care at home? No     Do you take prescription medications? Yes     Do you have prescription coverage? Yes     Coverage BCBS     Do you have any problems affording any of your prescribed medications? No     Is the patient taking medications as prescribed? yes     Who is going to help you get home at discharge? spouse     How do you get to doctors appointments? family or friend will provide;car, drives self     Are you on dialysis? No     Do you take coumadin? No     Discharge Plan A Home Health     DME Needed Upon Discharge  none     Discharge Plan discussed with: Patient;Spouse/sig other     Transition of Care Barriers None                   Anticipated DC dispo: Home   Prior Level of Function: Independent   People in home:  Spouse, dtr, and grandchild     Comments:  CM met with patient at bedside to introduce role and discuss discharge planning. Family  will be help at home and can provide transport at time of discharge. CM discharge needs depends on hospital progress. CM will continue following to assist with other needs.      Spouse stated pt will be getting a PEG tube placed for tube feeds. Sw discuss infusion companies for tube feedings/supplies; no preference. Pt and spouse consented for Sw to set up tube feeds through [facility] once nutrition recommendations are made.

## 2024-11-27 NOTE — CONSULTS
[facility] spoke with pt's spouse to discuss home with hospice. Pt's spouse stated she did not have a hospice agency in mind and had no preference. Sw explained she will send a referral to [facility]; spouse is agreeable.     [facility] notified Jeanie with [facility] of referral.     1303 [facility] spoke with Werner with [facility]; referral to be pulled and consents to be signed with family this afternoon.     [facility] updated Attending MD, Dr. Lopes, so that pt can be discharged.

## 2024-11-27 NOTE — PLAN OF CARE
Discussed plan of care with pt and spouse. Pt verbalized understanding. No signs of acute distress. Q2h turns/repositioning performed. Bed alarm set with bed at lowest position. Pain managed with ordered medication. Tele monitor [id number] in place. Call light within reach. Purposeful rounding Q2h.      Chart check complete.     Problem: Adult Inpatient Plan of Care  Goal: Plan of Care Review  Outcome: Progressing  Goal: Patient-Specific Goal (Individualized)  Outcome: Progressing  Goal: Absence of Hospital-Acquired Illness or Injury  Outcome: Progressing  Goal: Optimal Comfort and Wellbeing  Outcome: Progressing  Goal: Readiness for Transition of Care  Outcome: Progressing     Problem: Wound  Goal: Optimal Coping  Outcome: Progressing  Goal: Optimal Functional Ability  Outcome: Progressing  Goal: Absence of Infection Signs and Symptoms  Outcome: Progressing  Goal: Improved Oral Intake  Outcome: Progressing  Goal: Optimal Pain Control and Function  Outcome: Progressing  Goal: Skin Health and Integrity  Outcome: Progressing  Goal: Optimal Wound Healing  Outcome: Progressing     Problem: Skin Injury Risk Increased  Goal: Skin Health and Integrity  Outcome: Progressing     Problem: Fall Injury Risk  Goal: Absence of Fall and Fall-Related Injury  Outcome: Progressing     Problem: Pain Acute  Goal: Optimal Pain Control and Function  Outcome: Progressing     Problem: Fatigue  Goal: Improved Activity Tolerance  Outcome: Progressing

## 2024-11-27 NOTE — HPI
Agusto Juarez is a 64 y.o. male with a PMH  has a past medical history of BPH (benign prostatic hypertrophy), Hyperlipidemia, Hypertension, Malignant gastrointestinal stromal tumor (05/05/2021), and Villous adenoma of colon (02/07/2024). who presented to the ED directed by primary oncologist Dr. Mesa for admission to receive IVFs for treatment of dehydration and PEG versus J-tube placement for supplemental nutrition given decreased p.o. intake, early satiety, progressive weight loss, and malnutrition.  Patient currently undergoing chemotherapy for treatment of GIST and has had decreased p.o. intake with persistent weight loss over the past 2 weeks. During his outpatient visit with Dr. Mesa, the discussion was held to move forward with feeding tube placement to help aid in nutrition to increase his weight and strength as patient would likely declined rapidly without it. Patient currently complaining of generalized weakness as well as unchanged abdominal pain and fullness described as pressure-like in sensation, currently rated 7/10 in severity with no known alleviating or aggravating factors noted.  He denied endorsing any lightheadedness, dizziness, headache, visual changes, fever, chills, sweats, nausea, vomiting, chest pain, dysuria, hematuria, melena, hematochezia, diarrhea, or onset neurological deficits.  Patient does reported associated shortness of breath upon lying flat due to his abdomen pressing up on his diaphragm as well as intermittent confusion/loss of thought process but reported all other review of systems negative except as noted above.  Initial workup in the ED revealed patient to be afebrile without leukocytosis, albumin 1.9, T bili 2.5, alk phosphatase 254, with remaining CBC and CMP near baseline.  CT abdomen/pelvis obtained and pending read. Patient in agreement with treatment plan and was admitted to Hospital Medicine under observation while awaiting evaluation by Surgical Oncology for feeding  tube placement.      PCP: Joe Bentley

## 2024-11-27 NOTE — ACP (ADVANCE CARE PLANNING)
Advance Care Planning     Date: 11/27/2024    Code Status  In light of the patients advanced and life limiting illness,I engaged the the patient and family in a voluntary conversation about the patient's preferences for care  at the very end of life. The patient wishes to have a natural, peaceful death.  Along those lines, the patient does not wish to have CPR or other invasive treatments performed when his heart and/or breathing stops. I communicated to the patient and family that a DNR form was completed and will be scanned into EPIC.    A total of 22 min was spent on advance care planning, goals of care discussion, emotional support, formulating and communicating prognosis and exploring burden/benefit of various approaches of treatment. This discussion occurred on a fully voluntary basis with the verbal consent of the patient and/or family.

## 2024-11-27 NOTE — SUBJECTIVE & OBJECTIVE
No current facility-administered medications on file prior to encounter.     Current Outpatient Medications on File Prior to Encounter   Medication Sig    acetaminophen (TYLENOL) 325 MG tablet Take 325 mg by mouth every 6 (six) hours as needed for Pain.    amLODIPine (NORVASC) 10 MG tablet Take 1 tablet (10 mg total) by mouth once daily.    baclofen (LIORESAL) 20 MG tablet Take 1 tablet (20 mg total) by mouth 3 (three) times daily.    buPROPion (WELLBUTRIN XL) 150 MG TB24 tablet Take 1 tablet (150 mg total) by mouth once daily.    carvediloL (COREG) 25 MG tablet Take 1 tablet (25 mg total) by mouth 2 (two) times daily.    docusate sodium (COLACE) 100 MG capsule Take 1 capsule (100 mg total) by mouth 2 (two) times daily.    HYDROcodone-acetaminophen (NORCO)  mg per tablet Take 1 tablet by mouth every 4 (four) hours as needed for Pain.    lisinopriL (PRINIVIL,ZESTRIL) 40 MG tablet Take 1 tablet (40 mg total) by mouth once daily.    megestroL (MEGACE) 400 mg/10 mL (40 mg/mL) Susp Take 5 mLs (200 mg total) by mouth once daily.    multivitamin capsule Take 1 capsule by mouth once daily.    omeprazole (PRILOSEC) 20 MG capsule Take 1 capsule (20 mg total) by mouth once daily.    omeprazole magnesium 10 mg SuDR Take 20 mg by mouth once daily.    ondansetron (ZOFRAN-ODT) 8 MG TbDL Take 1 tablet (8 mg total) by mouth every 8 (eight) hours as needed (nausea).    oxyCODONE (ROXICODONE) 10 mg Tab immediate release tablet Take 1 tablet (10 mg total) by mouth every 8 (eight) hours as needed for Pain.    oxyCODONE (ROXICODONE) 10 mg Tab immediate release tablet Take 1 tablet (10 mg total) by mouth every 4 (four) hours as needed for Pain.    potassium chloride SA (K-DUR,KLOR-CON) 20 MEQ tablet Take 1 tablet by mouth once daily    pravastatin (PRAVACHOL) 10 MG tablet Take 1 tablet by mouth once daily    regorafenib (STIVARGA) 40 mg Tab Take 2 tablets (80 mg) once daily for 21 days, then off for 7 days, every 28-day cycle (3  weeks on, 1 week off).    tobramycin-dexAMETHasone 0.3-0.1% (TOBRADEX) 0.3-0.1 % DrpS INSTILL 1 DROP INTO LEFT EYE 4 TIMES DAILY       Review of patient's allergies indicates:  No Known Allergies    Past Medical History:   Diagnosis Date    BPH (benign prostatic hypertrophy)     Hyperlipidemia     Hypertension     Malignant gastrointestinal stromal tumor 05/05/2021    Villous adenoma of colon 02/07/2024    high grade dysplasia     Past Surgical History:   Procedure Laterality Date    COLONOSCOPY N/A 9/26/2023    Procedure: COLONOSCOPY;  Surgeon: Luis Choi MD;  Location: [facility];  Service: Endoscopy;  Laterality: N/A;    COLONOSCOPY N/A 2/7/2024    Procedure: COLONOSCOPY;  Surgeon: Rikki Shabazz MD;  Location: [facility] ([address] [place] [address]);  Service: Endoscopy;  Laterality: N/A;  12/18 portal instr- suprep-colonoscopy with EMR with me in the next 4-6 weeks.[facility] only.45 minute case is okay. Harika-tt  1/31/24- precall complete - ERW    COLONOSCOPY N/A 8/20/2024    Procedure: COLONOSCOPY;  Surgeon: Rikki Shabazz MD;  Location: [facility] ([address] [place] [address]);  Service: Endoscopy;  Laterality: N/A;  6/14 portal-suprep- colonoscopy with me in 6 months to follow-up prior EMR. Can be scheduled as a 45min case. [facility] only. harika-tt  8/13 SWP PRECALL COMPLETE-RT    ENDOSCOPIC ULTRASOUND OF UPPER GASTROINTESTINAL TRACT N/A 4/30/2021    Procedure: ULTRASOUND, UPPER GI TRACT, ENDOSCOPIC;  Surgeon: Rikki Shabazz MD;  Location: [facility];  Service: Endoscopy;  Laterality: N/A;  Rapid COVID prior - last minute addition to schedule - ttr    ESOPHAGOGASTRODUODENOSCOPY N/A 4/30/2021    Procedure: EGD (ESOPHAGOGASTRODUODENOSCOPY);  Surgeon: Rikki Shabazz MD;  Location: [facility];  Service: Endoscopy;  Laterality: N/A;  EGD/EUS with biopsy within a week is fine. 45min case with me OK. Any campus. -Dr. Shabazz     Family History       Problem Relation (Age of Onset)    Hypertension Other    Lung cancer Father (60 - 69)          Tobacco Use    Smoking  status: Some Days     Current packs/day: 0.50     Average packs/day: 0.5 packs/day for 46.6 years (23.3 ttl pk-yrs)     Types: Cigarettes     Start date: 5/1/1978    Smokeless tobacco: Never   Substance and Sexual Activity    Alcohol use: Yes     Comment: occasionallly    Drug use: No    Sexual activity: Yes     Review of Systems   Constitutional:  Positive for activity change, appetite change, fatigue and unexpected weight change. Negative for chills and fever.   Respiratory:  Negative for cough, chest tightness and shortness of breath.    Gastrointestinal:  Positive for abdominal distention, constipation and nausea. Negative for abdominal pain.   Neurological:  Positive for weakness.     Objective:     Vital Signs (Most Recent):  Temp: 98.2 °F (36.8 °C) (11/27/24 0828)  Pulse: 106 (11/27/24 0954)  Resp: 16 (11/27/24 0828)  BP: 139/85 (11/27/24 0828)  SpO2: 96 % (11/27/24 0828) Vital Signs (24h Range):  Temp:  [97.5 °F (36.4 °C)-98.6 °F (37 °C)] 98.2 °F (36.8 °C)  Pulse:  [] 106  Resp:  [16-18] 16  SpO2:  [95 %-100 %] 96 %  BP: (133-151)/(78-93) 139/85     Weight: 63.8 kg (140 lb 10.5 oz)  Body mass index is 21.39 kg/m².     Physical Exam  Constitutional:       Appearance: He is ill-appearing.      Comments: Thin, frail, and chronically ill appearing   HENT:      Head: Normocephalic and atraumatic.   Eyes:      Extraocular Movements: Extraocular movements intact.      Conjunctiva/sclera: Conjunctivae normal.   Cardiovascular:      Rate and Rhythm: Normal rate and regular rhythm.   Pulmonary:      Effort: Pulmonary effort is normal.   Abdominal:      General: There is distension.      Tenderness: There is abdominal tenderness.   Musculoskeletal:         General: No swelling, tenderness, deformity or signs of injury. Normal range of motion.   Skin:     General: Skin is warm and dry.      Coloration: Skin is not jaundiced.   Neurological:      General: No focal deficit present.      Mental Status: He is alert  "and oriented to person, place, and time.   Psychiatric:         Mood and Affect: Mood normal.         Behavior: Behavior normal.         Thought Content: Thought content normal.            I have reviewed all pertinent lab results within the past 24 hours.  CBC:   Recent Labs   Lab 11/27/24  0623   WBC 7.15   RBC 3.23*   HGB 9.5*   HCT 27.5*      MCV 85   MCH 29.4   MCHC 34.5     BMP:   Recent Labs   Lab 11/26/24  1716 11/26/24  1832 11/27/24  0623   GLU  --    < > 88   NA  --    < > 143   K  --    < > 3.8   CL  --    < > 108   CO2  --    < > 24   BUN  --    < > 38*   CREATININE  --    < > 1.1   CALCIUM  --    < > 8.2*   MG 2.4  --   --     < > = values in this interval not displayed.     CMP:   Recent Labs   Lab 11/27/24  0623   GLU 88   CALCIUM 8.2*   ALBUMIN 1.6*   PROT 7.2      K 3.8   CO2 24      BUN 38*   CREATININE 1.1   ALKPHOS 227*   ALT 21   AST 29   BILITOT 2.1*     LFTs:   Recent Labs   Lab 11/27/24  0623   ALT 21   AST 29   ALKPHOS 227*   BILITOT 2.1*   PROT 7.2   ALBUMIN 1.6*     Coagulation: No results for input(s): "LABPROT", "INR", "APTT" in the last 168 hours.  Cardiac markers: No results for input(s): "CKMB", "CPKMB", "TROPONINT", "TROPONINI", "MYOGLOBIN" in the last 168 hours.  ABGs: No results for input(s): "PH", "PCO2", "PO2", "HCO3", "POCSATURATED", "BE" in the last 168 hours.  Microbiology Results (last 7 days)       ** No results found for the last 168 hours. **          Specimen (24h ago, onward)      None          No results for input(s): "COLORU", "CLARITYU", "SPECGRAV", "PHUR", "PROTEINUA", "GLUCOSEU", "BILIRUBINCON", "BLOODU", "WBCU", "RBCU", "BACTERIA", "MUCUS", "NITRITE", "LEUKOCYTESUR", "UROBILINOGEN", "HYALINECASTS" in the last 168 hours.    Significant Diagnostics:  I have reviewed all pertinent imaging results/findings within the past 24 hours.  CT: I have reviewed all pertinent results/findings within the past 24 hours and my personal findings are:  diffuse " peritoneal carcinomatosis with large mass in the LUQ and multiple hepatic metastasis    Imaging Results              CT Abdomen Pelvis With IV Contrast NO Oral Contrast (Final result)  Result time 11/26/24 22:08:56      Final result by Mar Ruth MD (11/26/24 22:08:56)                   Impression:      Large indistinct insinuating presumed primary mass left upper quadrant likely enlarged difficult to measure with possible associated contained perforation.  Omental/peritoneal carcinomatosis appears progressed although degraded imaging with motion.    Indistinct hepatic metastases similar to increasing    No hydronephrosis    Urinary bladder decompressed    Small volume ascites    Bilateral iliac artery aneurysms similar    No obstructive bowel findings    No adverse bony finding      Electronically signed by: Mar Ruth  Date:    11/26/2024  Time:    22:08               Narrative:    EXAMINATION:  CT ABDOMEN PELVIS WITH IV CONTRAST    CLINICAL HISTORY:  Nausea/vomiting;has GIST early satiety causing severe decrease in solid and liquid intake.;    TECHNIQUE:  Low dose axial images, sagittal and coronal reformations were obtained from the lung bases to the pubic symphysis following the IV administration of 100 mL of Omnipaque 350 iterative technique automated exposure control    COMPARISON:  October 2024

## 2024-12-03 ENCOUNTER — TELEPHONE (OUTPATIENT)
Dept: FAMILY MEDICINE | Facility: CLINIC | Age: 64
End: 2024-12-03
Payer: COMMERCIAL

## 2024-12-03 NOTE — TELEPHONE ENCOUNTER
----- Message from Jaelyn sent at 12/3/2024  2:40 PM CST -----  Type:  Needs Medical Advice    Who Called: sister  Symptoms (please be specific): na   How long has patient had these symptoms:  na  Pharmacy name and phone #:  na  Would the patient rather a call back or a response via [facility]? call  Best Call Back Number: [phone]  Additional Information: requesting a call to confirm that fax was received

## 2024-12-03 NOTE — TELEPHONE ENCOUNTER
Spoke w/ pts sister . She is faxing over a Disability form , he is on Hospice .    She would like to be called so that she can  form once it is ready .   [phone]

## 2024-12-04 ENCOUNTER — TELEPHONE (OUTPATIENT)
Dept: FAMILY MEDICINE | Facility: CLINIC | Age: 64
End: 2024-12-04
Payer: COMMERCIAL

## 2024-12-04 ENCOUNTER — PATIENT MESSAGE (OUTPATIENT)
Dept: FAMILY MEDICINE | Facility: CLINIC | Age: 64
End: 2024-12-04
Payer: COMMERCIAL

## 2024-12-04 NOTE — TELEPHONE ENCOUNTER
Sister Mart informed, audio appt scheduled Friday at 220, she reports they also gave copies to Dr Dee.

## 2024-12-04 NOTE — TELEPHONE ENCOUNTER
Family faxed over paper work , they are req that you fill it out for them . His sister said that he is on Disability and is also on Hospice . Please advise,  Forms are in your box, thanks

## 2024-12-04 NOTE — TELEPHONE ENCOUNTER
Sent message via portal . Asked when are they available to do virtual visit ? Will wait for his response.

## 2024-12-04 NOTE — TELEPHONE ENCOUNTER
I have not seen patient in the past 2 months .  Please see if they can schedule a video visit so that I complete form.

## 2024-12-06 ENCOUNTER — OFFICE VISIT (OUTPATIENT)
Dept: FAMILY MEDICINE | Facility: CLINIC | Age: 64
End: 2024-12-06
Payer: COMMERCIAL

## 2024-12-06 DIAGNOSIS — C49.A0 MALIGNANT GASTROINTESTINAL STROMAL TUMOR, UNSPECIFIED SITE: Primary | ICD-10-CM

## 2024-12-06 DIAGNOSIS — C78.7 SECONDARY LIVER CANCER: ICD-10-CM

## 2024-12-06 DIAGNOSIS — R63.0 ANOREXIA: ICD-10-CM

## 2024-12-06 DIAGNOSIS — R10.84 GENERALIZED ABDOMINAL PAIN: ICD-10-CM

## 2024-12-06 DIAGNOSIS — C78.6 SECONDARY MALIGNANCY OF PARIETAL PERITONEUM: ICD-10-CM

## 2024-12-06 DIAGNOSIS — Z51.5 HOSPICE CARE PATIENT: ICD-10-CM

## 2024-12-06 NOTE — PROGRESS NOTES
Primary Care Telemedicine Note    Established Patient - Audio Only Telehealth Visit     The patient location is:  [place]  The chief complaint leading to consultation is:  Per below note  Visit type: Virtual visit with audio only (telephone)   Total time spent with patient: 20 min       Patient needed paperwork completed for monitoring disability insurance.  Information obtained from spouse as patient is currently in hospice and not able to speak or eat.  Patient diagnosed with malignant gastrointestinal stromal tumor 2021 and had been on treatment.  He had worsening metastatic disease this summer including metastasis to the liver and peritoneum.  Now unable to eat.  They did consider PEG tube however were not able to place it due to the extent of his peritoneal disease.  He is currently terminally ill.    Diagnoses and all orders for this visit:    Malignant gastrointestinal stromal tumor, unspecified site    Secondary liver cancer    Secondary malignancy of parietal peritoneum    Hospice care patient    Anorexia    Generalized abdominal pain       Completed disability paperwork.  Continue treatment through hospice.         Past Medical History:  Past Medical History:   Diagnosis Date    BPH (benign prostatic hypertrophy)     Hyperlipidemia     Hypertension     Malignant gastrointestinal stromal tumor 05/05/2021    Villous adenoma of colon 02/07/2024    high grade dysplasia     Past Surgical History:   Procedure Laterality Date    COLONOSCOPY N/A 9/26/2023    Procedure: COLONOSCOPY;  Surgeon: Luis Choi MD;  Location: [facility];  Service: Endoscopy;  Laterality: N/A;    COLONOSCOPY N/A 2/7/2024    Procedure: COLONOSCOPY;  Surgeon: Rikki Shabazz MD;  Location: [facility] ([address] [place] [address]);  Service: Endoscopy;  Laterality: N/A;  12/18 portal instr- suprep-colonoscopy with EMR with me in the next 4-6 weeks.[facility] only.45 minute case is okay. Harika-tt  1/31/24- precall complete - ER[facility]    COLONOSCOPY N/A 8/20/2024     Procedure: COLONOSCOPY;  Surgeon: Rikki Shabazz MD;  Location: [facility] (2ND FLR);  Service: Endoscopy;  Laterality: N/A;  6/14 portal-suprep- colonoscopy with me in 6 months to follow-up prior EMR. Can be scheduled as a 45min case. [facility] only. [id number]-tt  8/13 SWP PRECALL COMPLETE-RT    ENDOSCOPIC ULTRASOUND OF UPPER GASTROINTESTINAL TRACT N/A 4/30/2021    Procedure: ULTRASOUND, UPPER GI TRACT, ENDOSCOPIC;  Surgeon: Rikki Shabazz MD;  Location: [facility];  Service: Endoscopy;  Laterality: N/A;  Rapid COVID prior - last minute addition to schedule - ttr    ESOPHAGOGASTRODUODENOSCOPY N/A 4/30/2021    Procedure: EGD (ESOPHAGOGASTRODUODENOSCOPY);  Surgeon: Rikki Shabazz MD;  Location: [facility];  Service: Endoscopy;  Laterality: N/A;  EGD/EUS with biopsy within a week is fine. 45min case with me OK. Any campus. -Dr. Shabazz     Social History     Socioeconomic History    Marital status:    Tobacco Use    Smoking status: Some Days     Current packs/day: 0.50     Average packs/day: 0.5 packs/day for 46.6 years (23.3 ttl pk-yrs)     Types: Cigarettes     Start date: 5/1/1978    Smokeless tobacco: Never   Substance and Sexual Activity    Alcohol use: Yes     Comment: occasionallly    Drug use: No    Sexual activity: Yes     Social Drivers of Health     Financial Resource Strain: Low Risk  (11/26/2024)    Overall Financial Resource Strain (CARDIA)     Difficulty of Paying Living Expenses: Not hard at all   Food Insecurity: No Food Insecurity (11/26/2024)    Hunger Vital Sign     Worried About Running Out of Food in the Last Year: Never true     Ran Out of Food in the Last Year: Never true   Transportation Needs: No Transportation Needs (11/26/2024)    TRANSPORTATION NEEDS     Transportation : No   Physical Activity: Unknown (6/6/2024)    Exercise Vital Sign     Days of Exercise per Week: 5 days   Stress: No Stress Concern Present (11/26/2024)    [employer] [place] [employer] - Occupational Stress  Questionnaire     Feeling of Stress : Not at all   Housing Stability: Low Risk  (11/26/2024)    Housing Stability Vital Sign     Unable to Pay for Housing in the Last Year: No     Homeless in the Last Year: No     Family History   Problem Relation Name Age of Onset    Hypertension Other      Lung cancer Father  60 - 69     Review of patient's allergies indicates:  No Known Allergies  Current Outpatient Medications on File Prior to Visit   Medication Sig Dispense Refill    acetaminophen (TYLENOL) 325 MG tablet Take 325 mg by mouth every 6 (six) hours as needed for Pain.      amLODIPine (NORVASC) 10 MG tablet Take 1 tablet (10 mg total) by mouth once daily. 90 tablet 3    baclofen (LIORESAL) 20 MG tablet Take 1 tablet (20 mg total) by mouth 3 (three) times daily. 90 tablet 11    buPROPion (WELLBUTRIN XL) 150 MG TB24 tablet Take 1 tablet (150 mg total) by mouth once daily. 30 tablet 5    carvediloL (COREG) 25 MG tablet Take 1 tablet (25 mg total) by mouth 2 (two) times daily. 60 tablet 11    docusate sodium (COLACE) 100 MG capsule Take 1 capsule (100 mg total) by mouth 2 (two) times daily. 60 capsule 0    HYDROcodone-acetaminophen (NORCO)  mg per tablet Take 1 tablet by mouth every 4 (four) hours as needed for Pain. 60 tablet 0    lisinopriL (PRINIVIL,ZESTRIL) 40 MG tablet Take 1 tablet (40 mg total) by mouth once daily. 90 tablet 3    megestroL (MEGACE) 400 mg/10 mL (40 mg/mL) Susp Take 5 mLs (200 mg total) by mouth once daily. 150 mL 11    multivitamin capsule Take 1 capsule by mouth once daily.      omeprazole (PRILOSEC) 20 MG capsule Take 1 capsule (20 mg total) by mouth once daily. 28 capsule 0    omeprazole magnesium 10 mg SuDR Take 20 mg by mouth once daily. 56 each 0    ondansetron (ZOFRAN-ODT) 8 MG TbDL Take 1 tablet (8 mg total) by mouth every 8 (eight) hours as needed (nausea). 30 tablet 2    oxyCODONE (ROXICODONE) 10 mg Tab immediate release tablet Take 1 tablet (10 mg total) by mouth every 8 (eight)  hours as needed for Pain. 12 tablet 0    oxyCODONE (ROXICODONE) 10 mg Tab immediate release tablet Take 1 tablet (10 mg total) by mouth every 4 (four) hours as needed for Pain. 30 tablet 0    potassium chloride SA (K-DUR,KLOR-CON) 20 MEQ tablet Take 1 tablet by mouth once daily 90 tablet 3    pravastatin (PRAVACHOL) 10 MG tablet Take 1 tablet by mouth once daily 90 tablet 3    regorafenib (STIVARGA) 40 mg Tab Take 2 tablets (80 mg) once daily for 21 days, then off for 7 days, every 28-day cycle (3 weeks on, 1 week off). 42 tablet 11    tobramycin-dexAMETHasone 0.3-0.1% (TOBRADEX) 0.3-0.1 % DrpS INSTILL 1 DROP INTO LEFT EYE 4 TIMES DAILY       No current facility-administered medications on file prior to visit.         There were no vitals filed for this visit.    Wt Readings from Last 3 Encounters:   11/26/24 63.8 kg (140 lb 10.5 oz)   11/15/24 65.3 kg (144 lb)   11/04/24 68 kg (150 lb)       The reason for the audio only service rather than synchronous audio and video virtual visit was related to technical difficulties or patient preference/necessity.     Each patient to whom I provide medical services by telemedicine is:  (1) informed of the relationship between the physician and patient and the respective role of any other health care provider with respect to management of the patient; and (2) notified that they may decline to receive medical services by telemedicine and may withdraw from such care at any time. Patient verbally consented to receive this service via voice-only telephone call.    This service was not originating from a related E/M service provided within the previous 7 days nor will  to an E/M service or procedure within the next 24 hours or my soonest available appointment.  Prevailing standard of care was able to be met in this audio-only visit.

## 2024-12-17 ENCOUNTER — TELEPHONE (OUTPATIENT)
Dept: HEMATOLOGY/ONCOLOGY | Facility: CLINIC | Age: 64
End: 2024-12-17
Payer: COMMERCIAL

## 2024-12-17 NOTE — TELEPHONE ENCOUNTER
Received call from pt's sister inquiring about disability forms for pt's wife. She voiced that forms were given to both Dr. Bentley and Dr. Dee office. I informed pt that no forms were received in Dr. Dee' office and I would check with Dr. Bentley office. Pt's sister informed me that Mr. Liz passed away on 12/6/24. Verbalized condolence and informed her I would notify Dr. Dee and Dr. Mesa and update his chart in out system. Call ended well.